# Patient Record
Sex: MALE | Race: WHITE | NOT HISPANIC OR LATINO | Employment: OTHER | URBAN - METROPOLITAN AREA
[De-identification: names, ages, dates, MRNs, and addresses within clinical notes are randomized per-mention and may not be internally consistent; named-entity substitution may affect disease eponyms.]

---

## 2015-05-14 LAB — HBA1C MFR BLD: 5.8 % (ref 4–6)

## 2017-02-16 ENCOUNTER — OFFICE VISIT (OUTPATIENT)
Dept: FAMILY MEDICINE | Facility: CLINIC | Age: 77
End: 2017-02-16
Payer: COMMERCIAL

## 2017-02-16 VITALS
DIASTOLIC BLOOD PRESSURE: 70 MMHG | BODY MASS INDEX: 34.36 KG/M2 | HEIGHT: 70 IN | SYSTOLIC BLOOD PRESSURE: 145 MMHG | TEMPERATURE: 97.8 F | WEIGHT: 240 LBS | HEART RATE: 67 BPM | OXYGEN SATURATION: 96 %

## 2017-02-16 DIAGNOSIS — I10 HTN (HYPERTENSION), BENIGN: ICD-10-CM

## 2017-02-16 DIAGNOSIS — N18.30 CKD (CHRONIC KIDNEY DISEASE) STAGE 3, GFR 30-59 ML/MIN (H): ICD-10-CM

## 2017-02-16 DIAGNOSIS — R06.2 WHEEZING: ICD-10-CM

## 2017-02-16 DIAGNOSIS — G62.9 PERIPHERAL POLYNEUROPATHY: ICD-10-CM

## 2017-02-16 DIAGNOSIS — R60.0 BILATERAL EDEMA OF LOWER EXTREMITY: Primary | ICD-10-CM

## 2017-02-16 PROCEDURE — 80048 BASIC METABOLIC PNL TOTAL CA: CPT | Performed by: PHYSICIAN ASSISTANT

## 2017-02-16 PROCEDURE — 36415 COLL VENOUS BLD VENIPUNCTURE: CPT | Performed by: PHYSICIAN ASSISTANT

## 2017-02-16 PROCEDURE — 99214 OFFICE O/P EST MOD 30 MIN: CPT | Performed by: PHYSICIAN ASSISTANT

## 2017-02-16 RX ORDER — METOPROLOL TARTRATE 50 MG
50 TABLET ORAL 3 TIMES DAILY
Qty: 90 TABLET | Refills: 1 | Status: SHIPPED | OUTPATIENT
Start: 2017-02-16 | End: 2017-05-02

## 2017-02-16 RX ORDER — GABAPENTIN 600 MG/1
300 TABLET ORAL 2 TIMES DAILY
Qty: 270 TABLET | Refills: 3 | COMMUNITY
Start: 2017-02-16 | End: 2017-03-02

## 2017-02-16 RX ORDER — ALBUTEROL SULFATE 90 UG/1
2 AEROSOL, METERED RESPIRATORY (INHALATION) EVERY 6 HOURS PRN
Qty: 1 INHALER | Refills: 1 | Status: SHIPPED | OUTPATIENT
Start: 2017-02-16 | End: 2018-01-10

## 2017-02-16 RX ORDER — DULOXETIN HYDROCHLORIDE 30 MG/1
30 CAPSULE, DELAYED RELEASE ORAL 2 TIMES DAILY
Qty: 60 CAPSULE | Refills: 3 | Status: SHIPPED | OUTPATIENT
Start: 2017-02-16 | End: 2017-03-02

## 2017-02-16 NOTE — PROGRESS NOTES
HPI: 77 yo male here with worsening of bilat ankle edema since last week  He does take neurontin but has decreased dose to 300mg bid  He is followed by Dr. Knowles for his back pain  When he decreases his dose of neurontin his burning pain   Denies any increase in the sodium in his diet.  He does have a cough but the sputum is clear  Denies fever or chills  He does feel a slight sob.  When he decreased his dose of neurontin   He is seeing a chiro who has him doing exercises for his back    Past Medical History   Diagnosis Date     Arthritis      BPH (benign prostatic hyperplasia)      CKD (chronic kidney disease) stage 3, GFR 30-59 ml/min      Cluster headache 2015     Dr. Roth     Constipation      Dyslipidemia      Dyspnea      Normal nuc stress test 9-15-08, fainted once in life     Fatty liver      Ultrasound 11/11     FH: colon cancer      colonoscopy 10/10, repeat 5 years.     Former smoker      quit 2008     HTN (hypertension), benign      Peripheral neuropathy (H)      Dr. Patricia     Spinal stenosis      Last MRI 2010, Dr. Fraire     Syncope      Type 2 diabetes mellitus (H)      diet controlled     Ureterolithiases      Past Surgical History   Procedure Laterality Date     Thumb surg       Orthopedic surgery       Keratotomy arcuate with femtosecond laser/imaging for atiol Left 8/29/2016     Procedure: KERATOTOMY ARCUATE WITH FEMTOSECOND LASER/IMAGING FOR ATIOL;  Surgeon: Gerard Larson MD;  Location: [facility]     Phacoemulsification clear cornea with standard intraocular lens implant Left 8/29/2016     Procedure: PHACOEMULSIFICATION CLEAR CORNEA WITH STANDARD INTRAOCULAR LENS IMPLANT;  Surgeon: Gerard Larson MD;  Location: [facility]     Social History   Substance Use Topics     Smoking status: Former Smoker     Packs/day: 1.00     Years: 56.00     Types: Cigarettes     Quit date: 1/1/2008     Smokeless tobacco: Never Used      Comment: quit 2008     Alcohol use No     Current Outpatient Prescriptions  "  Medication Sig Dispense Refill     gabapentin (NEURONTIN) 600 MG tablet Take 0.5 tablets (300 mg) by mouth 2 times daily 270 tablet 3     DULoxetine (CYMBALTA) 30 MG EC capsule Take 1 capsule (30 mg) by mouth 2 times daily 60 capsule 3     finasteride (PROSCAR) 5 MG tablet Take 1 tablet (5 mg) by mouth daily 90 tablet 3     omega-3 acid ethyl esters (LOVAZA) 1 G capsule Take 2 capsules (2 g) by mouth 2 times daily 360 capsule 3     aspirin-acetaminophen-caffeine (EXCEDRIN MIGRAINE) 250-250-65 MG per tablet Take 1 tablet by mouth every 6 hours as needed for headaches       fluticasone (FLONASE) 50 MCG/ACT nasal spray Spray 1-2 sprays into both nostrils every evening as needed 48 g 3     tamsulosin (FLOMAX) 0.4 MG 24 hr capsule Take 1 capsule (0.4 mg) by mouth daily 90 capsule 1     losartan (COZAAR) 100 MG tablet Take 100 mg by mouth daily        Coenzyme Q10 (CO Q 10) 100 MG CAPS Take 1 capsule by mouth daily       atorvastatin (LIPITOR) 20 MG tablet Take 20 mg by mouth every evening       Cholecalciferol (VITAMIN D3) 5000 UNITS TABS Take 1 tablet by mouth daily.       aspirin 81 MG tablet Take 1 tablet by mouth daily.       Multiple Vitamin (MULTI-VITAMIN) per tablet Take 1 tablet by mouth daily.       metoprolol (LOPRESSOR) 50 MG tablet Take 50 mg by mouth 2 times daily.       [DISCONTINUED] gabapentin (NEURONTIN) 600 MG tablet Take 1 tablet (600 mg) by mouth 2 times daily 270 tablet 3     Allergies   Allergen Reactions     Lisinopril Shortness Of Breath and Fatigue     Amoxicillin Itching     Oxycodone GI Disturbance     Penicillins Itching     Red Yeast Rice Extract [Monascus Purpureus Went Yeast] Hives and Itching     Tramadol Itching     Verapamil Itching     Vicodin [Hydrocodone-Acetaminophen] Itching     FAMILY HISTORY NOTED AND REVIEWED    PHYSICAL EXAM:    /70 (BP Location: Right arm, Patient Position: Chair, Cuff Size: Adult Regular)  Pulse 67  Temp 97.8  F (36.6  C) (Oral)  Ht 5' 10.25\" (1.784 m) "  Wt 240 lb (108.9 kg)  SpO2 96%  BMI 34.19 kg/m2    Patient appears non toxic  Lungs: bilat coarse wheezing throughout  Heart: RRR  Extr: bilat puffy edema ankles, no weeping or ulcerations    Assessment and Plan:     (R60.0) Bilateral edema of lower extremity  (primary encounter diagnosis)  Comment: avoid salt, elevate legs.  Pt notes already better on lower dose of gabapentin  Plan: Taper completely off of gabapentin.  Will have him try cymbalta for his neuropathy pain instead.      (G62.9) Peripheral polyneuropathy (H)  Comment:   Plan: Basic metabolic panel, DULoxetine (CYMBALTA) 30 MG EC        capsule        Start with QD dosing for 1-2 weeks, then increase to bid    (I10) HTN (hypertension), benign  Comment: not well controlled, inreased metoprolol from 50mg bid to TID.  Plan: metoprolol (LOPRESSOR) 50 MG tablet            (N18.3) CKD (chronic kidney disease) stage 3, GFR 30-59 ml/min  Comment:   Plan: BMP today    (R06.2) Wheezing  Comment: f/u 2 weeks for recheck of lungs, BP and to see how edema is off of gabapentin  Plan: albuterol (PROAIR HFA/PROVENTIL HFA/VENTOLIN         HFA) 108 (90 BASE) MCG/ACT Inhaler              Josephine Jacob PA-C

## 2017-02-16 NOTE — NURSING NOTE
"Chief Complaint   Patient presents with     Leg Swelling     leg and feet swelling x 10days possibly due to gabapentin       Initial /70 (BP Location: Right arm, Patient Position: Chair, Cuff Size: Adult Regular)  Pulse 67  Temp 97.8  F (36.6  C) (Oral)  Ht 5' 10.25\" (1.784 m)  Wt 240 lb (108.9 kg)  SpO2 96%  BMI 34.19 kg/m2 Estimated body mass index is 34.19 kg/(m^2) as calculated from the following:    Height as of this encounter: 5' 10.25\" (1.784 m).    Weight as of this encounter: 240 lb (108.9 kg).  Medication Reconciliation: complete     Omar Ingram, ALDAIR        "

## 2017-02-17 LAB
ANION GAP SERPL CALCULATED.3IONS-SCNC: 7 MMOL/L (ref 3–14)
BUN SERPL-MCNC: 20 MG/DL (ref 7–30)
CALCIUM SERPL-MCNC: 8.9 MG/DL (ref 8.5–10.1)
CHLORIDE SERPL-SCNC: 102 MMOL/L (ref 94–109)
CO2 SERPL-SCNC: 28 MMOL/L (ref 20–32)
CREAT SERPL-MCNC: 1.6 MG/DL (ref 0.66–1.25)
GFR SERPL CREATININE-BSD FRML MDRD: 42 ML/MIN/1.7M2
GLUCOSE SERPL-MCNC: 108 MG/DL (ref 70–99)
POTASSIUM SERPL-SCNC: 4.8 MMOL/L (ref 3.4–5.3)
SODIUM SERPL-SCNC: 137 MMOL/L (ref 133–144)

## 2017-02-17 NOTE — PROGRESS NOTES
Casey,    Your creatinine is stable and hopefully this will improve once you get off of neurontin.    Josephine Jacob PA-C

## 2017-03-02 ENCOUNTER — OFFICE VISIT (OUTPATIENT)
Dept: FAMILY MEDICINE | Facility: CLINIC | Age: 77
End: 2017-03-02
Payer: COMMERCIAL

## 2017-03-02 VITALS
OXYGEN SATURATION: 94 % | TEMPERATURE: 97.7 F | HEART RATE: 64 BPM | BODY MASS INDEX: 33.23 KG/M2 | DIASTOLIC BLOOD PRESSURE: 60 MMHG | HEIGHT: 70 IN | WEIGHT: 232.1 LBS | SYSTOLIC BLOOD PRESSURE: 128 MMHG

## 2017-03-02 DIAGNOSIS — E78.5 HYPERLIPIDEMIA WITH TARGET LDL LESS THAN 100: ICD-10-CM

## 2017-03-02 DIAGNOSIS — G62.9 PERIPHERAL POLYNEUROPATHY: Primary | ICD-10-CM

## 2017-03-02 DIAGNOSIS — N18.30 CKD (CHRONIC KIDNEY DISEASE) STAGE 3, GFR 30-59 ML/MIN (H): ICD-10-CM

## 2017-03-02 DIAGNOSIS — I10 HTN (HYPERTENSION), BENIGN: ICD-10-CM

## 2017-03-02 PROCEDURE — 99213 OFFICE O/P EST LOW 20 MIN: CPT | Performed by: PHYSICIAN ASSISTANT

## 2017-03-02 RX ORDER — OMEGA-3-ACID ETHYL ESTERS 1 G/1
2 CAPSULE, LIQUID FILLED ORAL 2 TIMES DAILY
Qty: 360 CAPSULE | Refills: 3 | Status: SHIPPED | OUTPATIENT
Start: 2017-03-02 | End: 2017-12-14

## 2017-03-02 NOTE — TELEPHONE ENCOUNTER
Prescription approved per [facility], [facility] or [facility] refill protocol.  Meli Weeks RN  [facility] [employer]

## 2017-03-02 NOTE — TELEPHONE ENCOUNTER
omega-3 acid ethyl esters (LOVAZA) 1 G capsule       Last Written Prescription Date: 1/13/2016  Last Fill Quantity: 360, # refills: 3    Last Office Visit with [facility], [facility] or [facility] prescribing provider:  3/2/2017   Future Office Visit:    Next 5 appointments (look out 90 days)     Mar 02, 2017 12:30 PM CST   [facility] Visit with Josephine Jacob PA-C   [facility]    [address] [address]   [phone]                  Cholesterol   Date Value Ref Range Status   10/12/2016 121 <200 mg/dL Final     HDL Cholesterol   Date Value Ref Range Status   10/12/2016 43 >39 mg/dL Final     LDL Cholesterol Calculated   Date Value Ref Range Status   10/12/2016 51 <100 mg/dL Final     Comment:     Desirable:       <100 mg/dl     Triglycerides   Date Value Ref Range Status   10/12/2016 134 <150 mg/dL Final     Comment:     Fasting specimen     Cholesterol/HDL Ratio   Date Value Ref Range Status   11/10/2014 2.9 0.0 - 5.0 Final     ALT   Date Value Ref Range Status   08/10/2016 21 0 - 70 U/L Final

## 2017-03-02 NOTE — PROGRESS NOTES
HPI:  75 yo male with DM and peripheral neuropathy here for f/u HTN and LE edema  See note dated 2/16/17 for more details.  He had a lot of LE edema at that time so was tapered off of neurontin  Since then this edema has completely resolved and his wt is down 8lbs.    Pt tried to take cymbalta for his neuropathy but had too many side effects even with only 30mg qd and felt lethargic so stopped this.     HTN:  He is getting good readings at home with the increased dose of metoprolol    Past Medical History   Diagnosis Date     Arthritis      BPH (benign prostatic hyperplasia)      CKD (chronic kidney disease) stage 3, GFR 30-59 ml/min      Cluster headache 2015     Dr. Roth     Constipation      Dyslipidemia      Dyspnea      Normal nuc stress test 9-15-08, fainted once in life     Fatty liver      Ultrasound 11/11     FH: colon cancer      colonoscopy 10/10, repeat 5 years.     Former smoker      quit 2008     HTN (hypertension), benign      Peripheral neuropathy (H)      Dr. Patricia     Spinal stenosis      Last MRI 2010, Dr. Fraire     Syncope      Type 2 diabetes mellitus (H)      diet controlled     Ureterolithiases      Past Surgical History   Procedure Laterality Date     Thumb surg       Orthopedic surgery       Keratotomy arcuate with femtosecond laser/imaging for atiol Left 8/29/2016     Procedure: KERATOTOMY ARCUATE WITH FEMTOSECOND LASER/IMAGING FOR ATIOL;  Surgeon: Gerard Larson MD;  Location: [facility]     Phacoemulsification clear cornea with standard intraocular lens implant Left 8/29/2016     Procedure: PHACOEMULSIFICATION CLEAR CORNEA WITH STANDARD INTRAOCULAR LENS IMPLANT;  Surgeon: Gerard Larson MD;  Location: [facility]     Social History   Substance Use Topics     Smoking status: Former Smoker     Packs/day: 1.00     Years: 56.00     Types: Cigarettes     Quit date: 1/1/2008     Smokeless tobacco: Never Used      Comment: quit 2008     Alcohol use No     Current Outpatient Prescriptions  "  Medication Sig Dispense Refill     metoprolol (LOPRESSOR) 50 MG tablet Take 1 tablet (50 mg) by mouth 3 times daily 90 tablet 1     albuterol (PROAIR HFA/PROVENTIL HFA/VENTOLIN HFA) 108 (90 BASE) MCG/ACT Inhaler Inhale 2 puffs into the lungs every 6 hours as needed for shortness of breath / dyspnea or wheezing 1 Inhaler 1     finasteride (PROSCAR) 5 MG tablet Take 1 tablet (5 mg) by mouth daily 90 tablet 3     omega-3 acid ethyl esters (LOVAZA) 1 G capsule Take 2 capsules (2 g) by mouth 2 times daily 360 capsule 3     aspirin-acetaminophen-caffeine (EXCEDRIN MIGRAINE) 250-250-65 MG per tablet Take 1 tablet by mouth every 6 hours as needed for headaches       fluticasone (FLONASE) 50 MCG/ACT nasal spray Spray 1-2 sprays into both nostrils every evening as needed 48 g 3     tamsulosin (FLOMAX) 0.4 MG 24 hr capsule Take 1 capsule (0.4 mg) by mouth daily 90 capsule 1     losartan (COZAAR) 100 MG tablet Take 100 mg by mouth daily        Coenzyme Q10 (CO Q 10) 100 MG CAPS Take 1 capsule by mouth daily       atorvastatin (LIPITOR) 20 MG tablet Take 20 mg by mouth every evening       Cholecalciferol (VITAMIN D3) 5000 UNITS TABS Take 1 tablet by mouth daily.       aspirin 81 MG tablet Take 1 tablet by mouth daily.       Multiple Vitamin (MULTI-VITAMIN) per tablet Take 1 tablet by mouth daily.       Allergies   Allergen Reactions     Lisinopril Shortness Of Breath and Fatigue     Amoxicillin Itching     Neurontin [Gabapentin]      edema     Oxycodone GI Disturbance     Penicillins Itching     Red Yeast Rice Extract [Monascus Purpureus Went Yeast] Hives and Itching     Tramadol Itching     Verapamil Itching     Vicodin [Hydrocodone-Acetaminophen] Itching     PHYSICAL EXAM:    /60 (BP Location: Left arm, Patient Position: Chair, Cuff Size: Adult Large)  Pulse 64  Temp 97.7  F (36.5  C) (Oral)  Ht 5' 10.25\" (1.784 m)  Wt 232 lb 1.6 oz (105.3 kg)  SpO2 94%  BMI 33.07 kg/m2    Patient appears non toxic  LE: no edema, this " has resolved.  Wt is down  Psych: approp affect and mood.    Assessment and Plan:     (G62.9) Peripheral polyneuropathy (H)  (primary encounter diagnosis)  Comment: he had sig LE edema due to neurontin and suspect Cr elevated due to that as well.  Plan: recheck BMP in 2 months.  He stopped cymbalta after 10d due to SE.  His peripheral neuropathy sxs not bothersome right now so no further tx for this now.  If the neuropathy recurs, he could retry cymbalta 30mg qhs and see if the SE are transient.    (I10) HTN (hypertension), benign  Comment: well controlled with higher dose of metoprolol so cont same  Plan: cont to monitor    (N18.3) CKD (chronic kidney disease) stage 3, GFR 30-59 ml/min  Comment:   Plan: repeat BMP in 2 months, but suspect Cr to improve off of the neurontin.      Josephine Jacob PA-C

## 2017-03-02 NOTE — MR AVS SNAPSHOT
"              After Visit Summary   3/2/2017    Luis Daniel Gomez    MRN: [id number]           Patient Information     Date Of Birth          1940        Visit Information        Provider Department      3/2/2017 12:30 PM Josephine Jacob PA-C [facility]        Today's Diagnoses     Peripheral polyneuropathy (H)    -  1    HTN (hypertension), benign           Follow-ups after your visit        Who to contact     If you have questions or need follow up information about today's clinic visit or your schedule please contact [facility] directly at [phone].  Normal or non-critical lab and imaging results will be communicated to you by [employer], letter or phone within 4 business days after the clinic has received the results. If you do not hear from us within 7 days, please contact the clinic through [employer] or phone. If you have a critical or abnormal lab result, we will notify you by phone as soon as possible.  Submit refill requests through [employer] or call your pharmacy and they will forward the refill request to us. Please allow 3 business days for your refill to be completed.          Additional Information About Your Visit        MyChart Information     [employer] gives you secure access to your electronic health record. If you see a primary care provider, you can also send messages to your care team and make appointments. If you have questions, please call your primary care clinic.  If you do not have a primary care provider, please call [phone] and they will assist you.        Care EveryWhere ID     This is your Care EveryWhere ID. This could be used by other organizations to access your [place] medical records  [id number]        Your Vitals Were     Pulse Temperature Height Pulse Oximetry BMI (Body Mass Index)       64 97.7  F (36.5  C) (Oral) 5' 10.25\" (1.784 m) 94% 33.07 kg/m2        Blood Pressure from Last 3 Encounters:   03/02/17 128/60   02/16/17 145/70   12/13/16 " 142/60    Weight from Last 3 Encounters:   03/02/17 232 lb 1.6 oz (105.3 kg)   02/16/17 240 lb (108.9 kg)   12/13/16 233 lb (105.7 kg)              Today, you had the following     No orders found for display         Today's Medication Changes          These changes are accurate as of: 3/2/17  1:03 PM.  If you have any questions, ask your nurse or doctor.               Stop taking these medicines if you haven't already. Please contact your care team if you have questions.     DULoxetine 30 MG EC capsule   Commonly known as:  CYMBALTA   Stopped by:  Josephine Jacob PA-C                    Primary Care Provider Office Phone # Fax #    Josephine Jacob PA-C [phone]       [facility] [address] [facility] [address]  [facility] [address]        Thank you!     Thank you for choosing [facility]  for your care. Our goal is always to provide you with excellent care. Hearing back from our patients is one way we can continue to improve our services. Please take a few minutes to complete the written survey that you may receive in the mail after your visit with us. Thank you!             Your Updated Medication List - Protect others around you: Learn how to safely use, store and throw away your medicines at www.disposemymeds.org.          This list is accurate as of: 3/2/17  1:03 PM.  Always use your most recent med list.                   Brand Name Dispense Instructions for use    albuterol 108 (90 BASE) MCG/ACT Inhaler    PROAIR HFA/PROVENTIL HFA/VENTOLIN HFA    1 Inhaler    Inhale 2 puffs into the lungs every 6 hours as needed for shortness of breath / dyspnea or wheezing       aspirin 81 MG tablet      Take 1 tablet by mouth daily.       aspirin-acetaminophen-caffeine 250-250-65 MG per tablet    EXCEDRIN MIGRAINE     Take 1 tablet by mouth every 6 hours as needed for headaches       atorvastatin 20 MG tablet    LIPITOR     Take 20 mg by mouth every evening       cholecalciferol 5000 UNITS Tabs  tablet    vitamin D3     Take 1 tablet by mouth daily.       Co Q 10 100 MG Caps      Take 1 capsule by mouth daily       finasteride 5 MG tablet    PROSCAR    90 tablet    Take 1 tablet (5 mg) by mouth daily       fluticasone 50 MCG/ACT spray    FLONASE    48 g    Spray 1-2 sprays into both nostrils every evening as needed       losartan 100 MG tablet    COZAAR     Take 100 mg by mouth daily       metoprolol 50 MG tablet    LOPRESSOR    90 tablet    Take 1 tablet (50 mg) by mouth 3 times daily       Multi-vitamin Tabs tablet   Generic drug:  multivitamin, therapeutic with minerals      Take 1 tablet by mouth daily.       omega-3 acid ethyl esters 1 G capsule    Lovaza    360 capsule    Take 2 capsules (2 g) by mouth 2 times daily       tamsulosin 0.4 MG capsule    FLOMAX    90 capsule    Take 1 capsule (0.4 mg) by mouth daily

## 2017-03-02 NOTE — NURSING NOTE
"Chief Complaint   Patient presents with     Follow Up For     Bilateral edema of lower extremity        Initial /60 (BP Location: Left arm, Patient Position: Chair, Cuff Size: Adult Large)  Pulse 64  Temp 97.7  F (36.5  C) (Oral)  Ht 5' 10.25\" (1.784 m)  Wt 232 lb 1.6 oz (105.3 kg)  SpO2 94%  BMI 33.07 kg/m2 Estimated body mass index is 33.07 kg/(m^2) as calculated from the following:    Height as of this encounter: 5' 10.25\" (1.784 m).    Weight as of this encounter: 232 lb 1.6 oz (105.3 kg).  Medication Reconciliation: complete   Gloria Guzman MA  "

## 2017-05-02 DIAGNOSIS — I10 HTN (HYPERTENSION), BENIGN: ICD-10-CM

## 2017-05-02 RX ORDER — METOPROLOL TARTRATE 50 MG
TABLET ORAL
Qty: 270 TABLET | Refills: 1 | Status: SHIPPED | OUTPATIENT
Start: 2017-05-02 | End: 2018-01-10

## 2017-05-02 NOTE — TELEPHONE ENCOUNTER
Pending Prescriptions:                       Disp   Refills    metoprolol (LOPRESSOR) 50 MG tablet [Pharm*270 ta*1        Sig: TAKE 1 TABLET BY MOUTH THREE TIMES DAILY          Last Written Prescription Date: 02/16/2017  Last Fill Quantity: 90, # refills: 1    Last Office Visit with [facility], [facility] or [facility] prescribing provider:  03/02/2017   Future Office Visit:        BP Readings from Last 3 Encounters:   03/02/17 128/60   02/16/17 145/70   12/13/16 142/60

## 2017-05-02 NOTE — TELEPHONE ENCOUNTER
Prescription approved per [facility] Refill Protocol.  Charlene Corey RN

## 2017-06-26 ENCOUNTER — TRANSFERRED RECORDS (OUTPATIENT)
Dept: HEALTH INFORMATION MANAGEMENT | Facility: CLINIC | Age: 77
End: 2017-06-26

## 2017-06-26 LAB
CHOLEST SERPL-MCNC: 114 MG/DL (ref 0–200)
CREAT SERPL-MCNC: 1.6 MG/DL (ref 0.7–1.2)
GLUCOSE SERPL-MCNC: 121 MG/DL (ref 74–106)
HBA1C MFR BLD: 5.8 % (ref 0–5.7)
POTASSIUM SERPL-SCNC: 4.9 MMOL/L (ref 3.5–5)
TRIGL SERPL-MCNC: 191 MG/DL (ref 0–150)

## 2017-07-10 ENCOUNTER — OFFICE VISIT (OUTPATIENT)
Dept: FAMILY MEDICINE | Facility: CLINIC | Age: 77
End: 2017-07-10
Payer: COMMERCIAL

## 2017-07-10 VITALS
DIASTOLIC BLOOD PRESSURE: 82 MMHG | WEIGHT: 232.3 LBS | BODY MASS INDEX: 33.26 KG/M2 | OXYGEN SATURATION: 98 % | HEART RATE: 65 BPM | TEMPERATURE: 97.6 F | SYSTOLIC BLOOD PRESSURE: 130 MMHG | HEIGHT: 70 IN

## 2017-07-10 DIAGNOSIS — J98.01 ACUTE BRONCHOSPASM: Primary | ICD-10-CM

## 2017-07-10 DIAGNOSIS — J30.2 CHRONIC SEASONAL ALLERGIC RHINITIS, UNSPECIFIED TRIGGER: ICD-10-CM

## 2017-07-10 PROCEDURE — 99213 OFFICE O/P EST LOW 20 MIN: CPT | Performed by: NURSE PRACTITIONER

## 2017-07-10 RX ORDER — ALBUTEROL SULFATE 90 UG/1
2 AEROSOL, METERED RESPIRATORY (INHALATION) EVERY 6 HOURS PRN
Qty: 1 INHALER | Refills: 1 | Status: SHIPPED | OUTPATIENT
Start: 2017-07-10 | End: 2017-09-19

## 2017-07-10 NOTE — PATIENT INSTRUCTIONS
Nasal sprays:  Flonase, nasocort, rhinocort-  2 sprays each nostril once a day  Begin claritin 10mg  (plain) once a day  Take plain mucinex 1200mg twice a day  Push fluid intake    Use your albuterol inhaler 2 inhalation every 6-8 hours

## 2017-07-10 NOTE — PROGRESS NOTES
SUBJECTIVE:                                                    Luis Daniel Gomez is a 76 year old male who presents to clinic today for the following health issues:      RESPIRATORY SYMPTOMS      Duration: for months difficulty breathing through his nose.  Using afrin but still has large amt phlegm in back of throat at night with cough that wakes him up.  All the mucous seems to be coming from the right side of his sinuses. No facial pain, no fever or chills, no chest pain, no wheezes or SOB.  Does use albuterol about once a day       Description  nasal congestion at night and cough with thick phlegm    Severity:     Accompanying signs and symptoms: None    History (predisposing factors):  none    Precipitating or alleviating factors: None    Therapies tried and outcome:   mucinex, ventolin, nasal mist    Problem list and histories reviewed & adjusted, as indicated.  Additional history: as documented    Patient Active Problem List   Diagnosis     CKD (chronic kidney disease) stage 3, GFR 30-59 ml/min     BPH (benign prostatic hyperplasia)     HTN (hypertension), benign     Advanced directives, counseling/discussion     Kidney stone     Hyperlipidemia LDL goal <130     Former smoker     Peripheral neuropathy (H)     Syncope     Paroxysmal ventricular tachycardia (H)     Vascular bruit     Hyperlipidemia with target LDL less than 100     Intractable episodic cluster headache     Type 2 diabetes mellitus with diabetic nephropathy, unspecified long term insulin use status (H)     Past Surgical History:   Procedure Laterality Date     KERATOTOMY ARCUATE WITH FEMTOSECOND LASER/IMAGING FOR ATIOL Left 8/29/2016    Procedure: KERATOTOMY ARCUATE WITH FEMTOSECOND LASER/IMAGING FOR ATIOL;  Surgeon: Gerard Larson MD;  Location: [facility]     ORTHOPEDIC SURGERY       PHACOEMULSIFICATION CLEAR CORNEA WITH STANDARD INTRAOCULAR LENS IMPLANT Left 8/29/2016    Procedure: PHACOEMULSIFICATION CLEAR CORNEA WITH STANDARD INTRAOCULAR LENS  IMPLANT;  Surgeon: Gerard Larson MD;  Location:  [facility]     Thumb surg         Social History   Substance Use Topics     Smoking status: Former Smoker     Packs/day: 1.00     Years: 56.00     Types: Cigarettes     Quit date: 1/1/2008     Smokeless tobacco: Never Used      Comment: quit 2008     Alcohol use No     Family History   Problem Relation Age of Onset     C.A.D. Father      DIABETES Father      Cancer - colorectal Mother 80     Colon Cancer Mother      Cancer - colorectal Maternal Grandmother          Current Outpatient Prescriptions   Medication Sig Dispense Refill     cyabnocobalamin (VITAMIN B-12) 2500 MCG sublingual tablet Place 2,500 mcg under the tongue daily 30 tablet      albuterol (PROAIR HFA/PROVENTIL HFA/VENTOLIN HFA) 108 (90 BASE) MCG/ACT Inhaler Inhale 2 puffs into the lungs every 6 hours as needed for shortness of breath / dyspnea or wheezing 1 Inhaler 1     metoprolol (LOPRESSOR) 50 MG tablet TAKE 1 TABLET BY MOUTH THREE TIMES DAILY 270 tablet 1     omega-3 acid ethyl esters (LOVAZA) 1 G capsule Take 2 capsules (2 g) by mouth 2 times daily 360 capsule 3     albuterol (PROAIR HFA/PROVENTIL HFA/VENTOLIN HFA) 108 (90 BASE) MCG/ACT Inhaler Inhale 2 puffs into the lungs every 6 hours as needed for shortness of breath / dyspnea or wheezing 1 Inhaler 1     finasteride (PROSCAR) 5 MG tablet Take 1 tablet (5 mg) by mouth daily 90 tablet 3     tamsulosin (FLOMAX) 0.4 MG 24 hr capsule Take 1 capsule (0.4 mg) by mouth daily 90 capsule 1     losartan (COZAAR) 100 MG tablet Take 100 mg by mouth daily        Coenzyme Q10 (CO Q 10) 100 MG CAPS Take 1 capsule by mouth daily       atorvastatin (LIPITOR) 20 MG tablet Take 20 mg by mouth every evening       Cholecalciferol (VITAMIN D3) 5000 UNITS TABS Take 1 tablet by mouth daily.       aspirin 81 MG tablet Take 1 tablet by mouth daily.       Multiple Vitamin (MULTI-VITAMIN) per tablet Take 1 tablet by mouth daily.       Allergies   Allergen Reactions      "Lisinopril Shortness Of Breath and Fatigue     Amoxicillin Itching     Neurontin [Gabapentin]      edema     Oxycodone GI Disturbance     Penicillins Itching     Red Yeast Rice Extract [[place] Purpureus Went Yeast] Hives and Itching     Tramadol Itching     Verapamil Itching     Vicodin [Hydrocodone-Acetaminophen] Itching       Reviewed and updated as needed this visit by clinical staff       Reviewed and updated as needed this visit by Provider         ROS:  Constitutional, HEENT, cardiovascular, pulmonary, gi and gu systems are negative, except as otherwise noted.    OBJECTIVE:     /82  Pulse 65  Temp 97.6  F (36.4  C) (Oral)  Ht 5' 10.25\" (1.784 m)  Wt 232 lb 4.8 oz (105.4 kg)  SpO2 98%  BMI 33.09 kg/m2  Body mass index is 33.09 kg/(m^2).  GENERAL: healthy, alert and no distress  EYES: Eyes grossly normal to inspection, PERRL and conjunctivae and sclerae normal  HENT: ear canals and TM's normal, nose and mouth without ulcers or lesions  NECK: no adenopathy, no asymmetry, masses, or scars and thyroid normal to palpation  RESP: lungs  - no rales, rhonchi , bibasilar low pitched sonorous wheeze  CV: regular rate and rhythm, normal S1 S2, no S3 or S4, no murmur, click or rub, no peripheral edema and peripheral pulses strong  MS: no gross musculoskeletal defects noted, no edema  PSYCH: mentation appears normal, affect normal/bright    Diagnostic Test Results:  none     ASSESSMENT/PLAN:       ICD-10-CM    1. Acute bronchospasm J98.01 albuterol (PROAIR HFA/PROVENTIL HFA/VENTOLIN HFA) 108 (90 BASE) MCG/ACT Inhaler   2. Chronic seasonal allergic rhinitis, unspecified trigger J30.2        Patient Instructions   Nasal sprays:  Flonase, nasocort, rhinocort-  2 sprays each nostril once a day  Begin claritin 10mg  (plain) once a day  Take plain mucinex 1200mg twice a day  Push fluid intake    Use your albuterol inhaler 2 inhalation every 6-8 hours         HARSHA Miller [facility]  "

## 2017-07-10 NOTE — MR AVS SNAPSHOT
After Visit Summary   7/10/2017    Luis Daniel Gomez    MRN: [id number]           Patient Information     Date Of Birth          1940        Visit Information        Provider Department      7/10/2017 10:30 AM Aurelia Barnes APRN CNP [facility]        Today's Diagnoses     Acute bronchospasm    -  1      Care Instructions    Nasal sprays:  Flonase, nasocort, rhinocort-  2 sprays each nostril once a day  Begin claritin 10mg  (plain) once a day  Take plain mucinex 1200mg twice a day  Push fluid intake    Use your albuterol inhaler 2 inhalation every 6-8 hours             Follow-ups after your visit        Who to contact     If you have questions or need follow up information about today's clinic visit or your schedule please contact [facility] directly at [phone].  Normal or non-critical lab and imaging results will be communicated to you by Ailolahart, letter or phone within 4 business days after the clinic has received the results. If you do not hear from us within 7 days, please contact the clinic through Ailolahart or phone. If you have a critical or abnormal lab result, we will notify you by phone as soon as possible.  Submit refill requests through [employer] or call your pharmacy and they will forward the refill request to us. Please allow 3 business days for your refill to be completed.          Additional Information About Your Visit        MyChart Information     [employer] gives you secure access to your electronic health record. If you see a primary care provider, you can also send messages to your care team and make appointments. If you have questions, please call your primary care clinic.  If you do not have a primary care provider, please call [phone] and they will assist you.        Care EveryWhere ID     This is your Care EveryWhere ID. This could be used by other organizations to access your [place] medical records  [id number]        Your Vitals Were      "Pulse Temperature Height Pulse Oximetry BMI (Body Mass Index)       65 97.6  F (36.4  C) (Oral) 5' 10.25\" (1.784 m) 98% 33.09 kg/m2        Blood Pressure from Last 3 Encounters:   07/10/17 130/82   03/02/17 128/60   02/16/17 145/70    Weight from Last 3 Encounters:   07/10/17 232 lb 4.8 oz (105.4 kg)   03/02/17 232 lb 1.6 oz (105.3 kg)   02/16/17 240 lb (108.9 kg)              Today, you had the following     No orders found for display         Today's Medication Changes          These changes are accurate as of: 7/10/17 10:58 AM.  If you have any questions, ask your nurse or doctor.               These medicines have changed or have updated prescriptions.        Dose/Directions    * albuterol 108 (90 BASE) MCG/ACT Inhaler   Commonly known as:  PROAIR HFA/PROVENTIL HFA/VENTOLIN HFA   This may have changed:  Another medication with the same name was added. Make sure you understand how and when to take each.   Used for:  Wheezing   Changed by:  Josephine Jacob PA-C        Dose:  2 puff   Inhale 2 puffs into the lungs every 6 hours as needed for shortness of breath / dyspnea or wheezing   Quantity:  1 Inhaler   Refills:  1       * albuterol 108 (90 BASE) MCG/ACT Inhaler   Commonly known as:  PROAIR HFA/PROVENTIL HFA/VENTOLIN HFA   This may have changed:  You were already taking a medication with the same name, and this prescription was added. Make sure you understand how and when to take each.   Used for:  Acute bronchospasm   Changed by:  Aurelia Barnes APRN CNP        Dose:  2 puff   Inhale 2 puffs into the lungs every 6 hours as needed for shortness of breath / dyspnea or wheezing   Quantity:  1 Inhaler   Refills:  1       * Notice:  This list has 2 medication(s) that are the same as other medications prescribed for you. Read the directions carefully, and ask your doctor or other care provider to review them with you.      Stop taking these medicines if you haven't already. Please contact your care team if you " have questions.     aspirin-acetaminophen-caffeine 250-250-65 MG per tablet   Commonly known as:  EXCEDRIN MIGRAINE   Stopped by:  Aurelia Barnes APRN CNP           fluticasone 50 MCG/ACT spray   Commonly known as:  FLONASE   Stopped by:  Aurelia Barnes APRN CNP                Where to get your medicines      These medications were sent to [facility] [address] [place] [address] & [address], [place] [address]     Phone:  [phone]     albuterol 108 (90 BASE) MCG/ACT Inhaler                Primary Care Provider Office Phone # Fax #    Josephine Jacob PA-C [phone]       [facility] [address]  [facility] [address]        Equal Access to Services     MICHAEL JIMENEZ : Hadii aad ku hadasho Sonievesali, waaxda luqadaha, qaybta kaalmada adeegyada, regla sandoval haydexter webster . So [facility] [phone].    ATENCIÓN: Si habla español, tiene a machuca disposición servicios gratuitos de asistencia lingüística. Llame al [phone].    We comply with applicable federal civil rights laws and [place] laws. We do not discriminate on the basis of race, color, national origin, age, disability sex, sexual orientation or gender identity.            Thank you!     Thank you for choosing [facility]  for your care. Our goal is always to provide you with excellent care. Hearing back from our patients is one way we can continue to improve our services. Please take a few minutes to complete the written survey that you may receive in the mail after your visit with us. Thank you!             Your Updated Medication List - Protect others around you: Learn how to safely use, store and throw away your medicines at www.disposemymeds.org.          This list is accurate as of: 7/10/17 10:58 AM.  Always use your most recent med list.                   Brand Name Dispense Instructions for use Diagnosis    * albuterol 108 (90  BASE) MCG/ACT Inhaler    PROAIR HFA/PROVENTIL HFA/VENTOLIN HFA    1 Inhaler    Inhale 2 puffs into the lungs every 6 hours as needed for shortness of breath / dyspnea or wheezing    Wheezing       * albuterol 108 (90 BASE) MCG/ACT Inhaler    PROAIR HFA/PROVENTIL HFA/VENTOLIN HFA    1 Inhaler    Inhale 2 puffs into the lungs every 6 hours as needed for shortness of breath / dyspnea or wheezing    Acute bronchospasm       aspirin 81 MG tablet      Take 1 tablet by mouth daily.        atorvastatin 20 MG tablet    LIPITOR     Take 20 mg by mouth every evening        cholecalciferol 5000 UNITS Tabs tablet    vitamin D3     Take 1 tablet by mouth daily.        Co Q 10 100 MG Caps      Take 1 capsule by mouth daily        cyabnocobalamin 2500 MCG sublingual tablet    VITAMIN B-12    30 tablet    Place 2,500 mcg under the tongue daily        finasteride 5 MG tablet    PROSCAR    90 tablet    Take 1 tablet (5 mg) by mouth daily    Benign non-nodular prostatic hyperplasia with lower urinary tract symptoms       losartan 100 MG tablet    COZAAR     Take 100 mg by mouth daily        metoprolol 50 MG tablet    LOPRESSOR    270 tablet    TAKE 1 TABLET BY MOUTH THREE TIMES DAILY    HTN (hypertension), benign       Multi-vitamin Tabs tablet   Generic drug:  multivitamin, therapeutic with minerals      Take 1 tablet by mouth daily.        omega-3 acid ethyl esters 1 G capsule    Lovaza    360 capsule    Take 2 capsules (2 g) by mouth 2 times daily    Hyperlipidemia with target LDL less than 100       tamsulosin 0.4 MG capsule    FLOMAX    90 capsule    Take 1 capsule (0.4 mg) by mouth daily    BPH (benign prostatic hyperplasia)       * Notice:  This list has 2 medication(s) that are the same as other medications prescribed for you. Read the directions carefully, and ask your doctor or other care provider to review them with you.

## 2017-07-10 NOTE — NURSING NOTE
"Chief Complaint   Patient presents with     URI       Initial /72 (BP Location: Right arm, Patient Position: Chair, Cuff Size: Adult Regular)  Pulse 65  Temp 97.6  F (36.4  C) (Oral)  Ht 5' 10.25\" (1.784 m)  Wt 232 lb 4.8 oz (105.4 kg)  SpO2 98%  BMI 33.09 kg/m2 Estimated body mass index is 33.09 kg/(m^2) as calculated from the following:    Height as of this encounter: 5' 10.25\" (1.784 m).    Weight as of this encounter: 232 lb 4.8 oz (105.4 kg).  Medication Reconciliation: complete  Bianca Davis CMA    "

## 2017-09-19 DIAGNOSIS — J98.01 ACUTE BRONCHOSPASM: ICD-10-CM

## 2017-09-19 NOTE — TELEPHONE ENCOUNTER
Ventolin       Last Written Prescription Date: 07/10/17  Last Fill Quantity: 1 inhaler, # refills: 1    Last Office Visit with G, P or [facility] prescribing provider:  07/10/17   Future Office Visit:       Date of Last Asthma Action Plan Letter:   There are no preventive care reminders to display for this patient.   Asthma Control Test: No flowsheet data found.    Date of Last Spirometry Test:   No results found for this or any previous visit.          Nighat Caballero MA

## 2017-09-20 RX ORDER — ALBUTEROL SULFATE 90 UG/1
AEROSOL, METERED RESPIRATORY (INHALATION)
Qty: 18 G | Refills: 1 | Status: SHIPPED | OUTPATIENT
Start: 2017-09-20 | End: 2017-11-06

## 2017-09-20 NOTE — TELEPHONE ENCOUNTER
Routing to PCP.  Pt was in for acute visit 7/10/17 for this.  Please advise if needing further f/u or fill as appropriate  Sandy Blunt RN

## 2017-09-27 ENCOUNTER — MYC MEDICAL ADVICE (OUTPATIENT)
Dept: FAMILY MEDICINE | Facility: CLINIC | Age: 77
End: 2017-09-27

## 2017-09-27 DIAGNOSIS — M48.061 SPINAL STENOSIS OF LUMBAR REGION: Primary | ICD-10-CM

## 2017-09-27 NOTE — TELEPHONE ENCOUNTER
See other MyChart encounter, pt would like Rx sent in for Amitriptyline at bedtime.   Tahira MADSEN RN

## 2017-10-30 ENCOUNTER — TELEPHONE (OUTPATIENT)
Dept: FAMILY MEDICINE | Facility: CLINIC | Age: 77
End: 2017-10-30

## 2017-10-30 NOTE — TELEPHONE ENCOUNTER
[facility] has been [facility] for further review of the amitriptyline.    Homer Pro, CMA

## 2017-11-02 NOTE — TELEPHONE ENCOUNTER
PA has been approved for the amitriptyline from 8/2/17 - 10/31/18. I called the pharmacy to let them know.    Homer Pro, CMA

## 2017-11-06 DIAGNOSIS — J98.01 ACUTE BRONCHOSPASM: ICD-10-CM

## 2017-11-07 ENCOUNTER — DOCUMENTATION ONLY (OUTPATIENT)
Dept: LAB | Facility: CLINIC | Age: 77
End: 2017-11-07

## 2017-11-07 DIAGNOSIS — E78.5 HYPERLIPIDEMIA WITH TARGET LDL LESS THAN 100: ICD-10-CM

## 2017-11-07 DIAGNOSIS — E11.21 TYPE 2 DIABETES MELLITUS WITH DIABETIC NEPHROPATHY, UNSPECIFIED LONG TERM INSULIN USE STATUS: ICD-10-CM

## 2017-11-07 DIAGNOSIS — Z00.00 ENCOUNTER FOR ROUTINE ADULT HEALTH EXAMINATION WITHOUT ABNORMAL FINDINGS: Primary | ICD-10-CM

## 2017-11-07 DIAGNOSIS — I10 HTN (HYPERTENSION), BENIGN: ICD-10-CM

## 2017-11-07 DIAGNOSIS — N18.30 CKD (CHRONIC KIDNEY DISEASE) STAGE 3, GFR 30-59 ML/MIN (H): ICD-10-CM

## 2017-11-07 RX ORDER — ALBUTEROL SULFATE 90 UG/1
AEROSOL, METERED RESPIRATORY (INHALATION)
Qty: 18 G | Refills: 0 | Status: SHIPPED | OUTPATIENT
Start: 2017-11-07 | End: 2017-11-30

## 2017-11-07 NOTE — PROGRESS NOTES
This patient is coming in for pre physical labs. Please review, associate diagnosis and sign the orders. Thanks!  -Lab Staff

## 2017-11-07 NOTE — TELEPHONE ENCOUNTER
Routing refill request to provider for review/approval because:  Drug not on the G refill protocol for dx of Acute bronchospasm   Shantal PEREZ RN

## 2017-12-08 DIAGNOSIS — E11.21 TYPE 2 DIABETES MELLITUS WITH DIABETIC NEPHROPATHY, UNSPECIFIED LONG TERM INSULIN USE STATUS: ICD-10-CM

## 2017-12-08 DIAGNOSIS — N40.1 BENIGN NON-NODULAR PROSTATIC HYPERPLASIA WITH LOWER URINARY TRACT SYMPTOMS: ICD-10-CM

## 2017-12-08 DIAGNOSIS — E78.5 HYPERLIPIDEMIA WITH TARGET LDL LESS THAN 100: ICD-10-CM

## 2017-12-08 DIAGNOSIS — I10 HTN (HYPERTENSION), BENIGN: ICD-10-CM

## 2017-12-08 LAB
ALBUMIN SERPL-MCNC: 3.5 G/DL (ref 3.4–5)
ALP SERPL-CCNC: 94 U/L (ref 40–150)
ALT SERPL W P-5'-P-CCNC: 33 U/L (ref 0–70)
ANION GAP SERPL CALCULATED.3IONS-SCNC: 9 MMOL/L (ref 3–14)
AST SERPL W P-5'-P-CCNC: 21 U/L (ref 0–45)
BILIRUB SERPL-MCNC: 0.4 MG/DL (ref 0.2–1.3)
BUN SERPL-MCNC: 24 MG/DL (ref 7–30)
CALCIUM SERPL-MCNC: 9 MG/DL (ref 8.5–10.1)
CHLORIDE SERPL-SCNC: 105 MMOL/L (ref 94–109)
CHOLEST SERPL-MCNC: 135 MG/DL
CO2 SERPL-SCNC: 26 MMOL/L (ref 20–32)
CREAT SERPL-MCNC: 1.73 MG/DL (ref 0.66–1.25)
CREAT UR-MCNC: 89 MG/DL
ERYTHROCYTE [DISTWIDTH] IN BLOOD BY AUTOMATED COUNT: 12.7 % (ref 10–15)
GFR SERPL CREATININE-BSD FRML MDRD: 38 ML/MIN/1.7M2
GLUCOSE SERPL-MCNC: 83 MG/DL (ref 70–99)
HBA1C MFR BLD: 5.7 % (ref 4.3–6)
HCT VFR BLD AUTO: 44.8 % (ref 40–53)
HDLC SERPL-MCNC: 40 MG/DL
HGB BLD-MCNC: 15 G/DL (ref 13.3–17.7)
LDLC SERPL CALC-MCNC: 47 MG/DL
MCH RBC QN AUTO: 32.8 PG (ref 26.5–33)
MCHC RBC AUTO-ENTMCNC: 33.5 G/DL (ref 31.5–36.5)
MCV RBC AUTO: 98 FL (ref 78–100)
MICROALBUMIN UR-MCNC: 23 MG/L
MICROALBUMIN/CREAT UR: 26.35 MG/G CR (ref 0–17)
NONHDLC SERPL-MCNC: 95 MG/DL
PLATELET # BLD AUTO: 233 10E9/L (ref 150–450)
POTASSIUM SERPL-SCNC: 4.7 MMOL/L (ref 3.4–5.3)
PROT SERPL-MCNC: 7.5 G/DL (ref 6.8–8.8)
RBC # BLD AUTO: 4.58 10E12/L (ref 4.4–5.9)
SODIUM SERPL-SCNC: 140 MMOL/L (ref 133–144)
TRIGL SERPL-MCNC: 241 MG/DL
TSH SERPL DL<=0.005 MIU/L-ACNC: 3.83 MU/L (ref 0.4–4)
WBC # BLD AUTO: 10.4 10E9/L (ref 4–11)

## 2017-12-08 PROCEDURE — 84443 ASSAY THYROID STIM HORMONE: CPT | Performed by: PHYSICIAN ASSISTANT

## 2017-12-08 PROCEDURE — 85027 COMPLETE CBC AUTOMATED: CPT | Performed by: PHYSICIAN ASSISTANT

## 2017-12-08 PROCEDURE — 82043 UR ALBUMIN QUANTITATIVE: CPT | Performed by: PHYSICIAN ASSISTANT

## 2017-12-08 PROCEDURE — 80061 LIPID PANEL: CPT | Performed by: PHYSICIAN ASSISTANT

## 2017-12-08 PROCEDURE — 80053 COMPREHEN METABOLIC PANEL: CPT | Performed by: PHYSICIAN ASSISTANT

## 2017-12-08 PROCEDURE — 83036 HEMOGLOBIN GLYCOSYLATED A1C: CPT | Performed by: PHYSICIAN ASSISTANT

## 2017-12-08 PROCEDURE — 36415 COLL VENOUS BLD VENIPUNCTURE: CPT | Performed by: PHYSICIAN ASSISTANT

## 2017-12-12 RX ORDER — FINASTERIDE 5 MG/1
TABLET, FILM COATED ORAL
Qty: 30 TABLET | Refills: 0 | Status: SHIPPED | OUTPATIENT
Start: 2017-12-12 | End: 2017-12-14

## 2017-12-12 NOTE — TELEPHONE ENCOUNTER
Medication is being filled for 1 time refill only due to:  Patient needs to be seen because due fro Px.

## 2017-12-14 ENCOUNTER — OFFICE VISIT (OUTPATIENT)
Dept: FAMILY MEDICINE | Facility: CLINIC | Age: 77
End: 2017-12-14
Payer: COMMERCIAL

## 2017-12-14 VITALS
TEMPERATURE: 98.3 F | WEIGHT: 230 LBS | SYSTOLIC BLOOD PRESSURE: 141 MMHG | OXYGEN SATURATION: 95 % | HEART RATE: 77 BPM | DIASTOLIC BLOOD PRESSURE: 68 MMHG | BODY MASS INDEX: 32.93 KG/M2 | HEIGHT: 70 IN

## 2017-12-14 DIAGNOSIS — N40.1 BENIGN NON-NODULAR PROSTATIC HYPERPLASIA WITH LOWER URINARY TRACT SYMPTOMS: ICD-10-CM

## 2017-12-14 DIAGNOSIS — I10 HTN (HYPERTENSION), BENIGN: ICD-10-CM

## 2017-12-14 DIAGNOSIS — Z13.89 SCREENING FOR DIABETIC PERIPHERAL NEUROPATHY: ICD-10-CM

## 2017-12-14 DIAGNOSIS — E78.5 HYPERLIPIDEMIA WITH TARGET LDL LESS THAN 100: ICD-10-CM

## 2017-12-14 DIAGNOSIS — E11.21 TYPE 2 DIABETES MELLITUS WITH DIABETIC NEPHROPATHY, UNSPECIFIED LONG TERM INSULIN USE STATUS: ICD-10-CM

## 2017-12-14 DIAGNOSIS — M48.061 SPINAL STENOSIS OF LUMBAR REGION, UNSPECIFIED WHETHER NEUROGENIC CLAUDICATION PRESENT: ICD-10-CM

## 2017-12-14 DIAGNOSIS — N18.30 CKD (CHRONIC KIDNEY DISEASE) STAGE 3, GFR 30-59 ML/MIN (H): ICD-10-CM

## 2017-12-14 DIAGNOSIS — Z00.00 ENCOUNTER FOR ROUTINE ADULT HEALTH EXAMINATION WITHOUT ABNORMAL FINDINGS: Primary | ICD-10-CM

## 2017-12-14 PROCEDURE — G0439 PPPS, SUBSEQ VISIT: HCPCS | Performed by: PHYSICIAN ASSISTANT

## 2017-12-14 PROCEDURE — 99207 C FOOT EXAM  NO CHARGE: CPT | Performed by: PHYSICIAN ASSISTANT

## 2017-12-14 RX ORDER — FINASTERIDE 5 MG/1
1 TABLET, FILM COATED ORAL DAILY
Qty: 90 TABLET | Refills: 3 | Status: SHIPPED | OUTPATIENT
Start: 2017-12-14 | End: 2019-01-03

## 2017-12-14 RX ORDER — AMITRIPTYLINE HYDROCHLORIDE 50 MG/1
50 TABLET ORAL AT BEDTIME
Qty: 30 TABLET | Refills: 3 | Status: SHIPPED | OUTPATIENT
Start: 2017-12-14 | End: 2017-12-14

## 2017-12-14 RX ORDER — AMLODIPINE BESYLATE 2.5 MG/1
2.5 TABLET ORAL DAILY
Qty: 30 TABLET | Refills: 3 | Status: SHIPPED | OUTPATIENT
Start: 2017-12-14 | End: 2017-12-14

## 2017-12-14 RX ORDER — OMEGA-3-ACID ETHYL ESTERS 1 G/1
2 CAPSULE, LIQUID FILLED ORAL 2 TIMES DAILY
Qty: 360 CAPSULE | Refills: 3 | Status: SHIPPED | OUTPATIENT
Start: 2017-12-14 | End: 2018-01-10

## 2017-12-14 NOTE — MR AVS SNAPSHOT
After Visit Summary   12/14/2017    Luis Daniel Gomez    MRN: [id number]           Patient Information     Date Of Birth          1940        Visit Information        Provider Department      12/14/2017 10:00 AM Josephine Jacob PA-C [facility]        Today's Diagnoses     Encounter for routine adult health examination without abnormal findings    -  1    Type 2 diabetes mellitus with diabetic nephropathy, unspecified long term insulin use status (H)        CKD (chronic kidney disease) stage 3, GFR 30-59 ml/min        HTN (hypertension), benign        Hyperlipidemia with target LDL less than 100        Screening for diabetic peripheral neuropathy        Spinal stenosis of lumbar region, unspecified whether neurogenic claudication present        Benign non-nodular prostatic hyperplasia with lower urinary tract symptoms          Care Instructions      Preventive Health Recommendations:       Male Ages 65 and over    Yearly exam:             See your health care provider every year in order to  o   Review health changes.   o   Discuss preventive care.    o   Review your medicines if your doctor has prescribed any.    Talk with your health care provider about whether you should have a test to screen for prostate cancer (PSA).    Every 3 years, have a diabetes test (fasting glucose). If you are at risk for diabetes, you should have this test more often.    Every 5 years, have a cholesterol test. Have this test more often if you are at risk for high cholesterol or heart disease.     Every 10 years, have a colonoscopy. Or, have a yearly FIT test (stool test). These exams will check for colon cancer.    Talk to with your health care provider about screening for Abdominal Aortic Aneurysm if you have a family history of AAA or have a history of smoking.  Shots:     Get a flu shot each year.     Get a tetanus shot every 10 years.     Talk to your doctor about your pneumonia vaccines. There are  now two you should receive - Pneumovax (PPSV 23) and Prevnar (PCV 13).    Talk to your doctor about a shingles vaccine.     Talk to your doctor about the hepatitis B vaccine.  Nutrition:     Eat at least 5 servings of fruits and vegetables each day.     Eat whole-grain bread, whole-wheat pasta and brown rice instead of white grains and rice.     Talk to your doctor about Calcium and Vitamin D.   Lifestyle    Exercise for at least 150 minutes a week (30 minutes a day, 5 days a week). This will help you control your weight and prevent disease.     Limit alcohol to one drink per day.     No smoking.     Wear sunscreen to prevent skin cancer.     See your dentist every six months for an exam and cleaning.     See your eye doctor every 1 to 2 years to screen for conditions such as glaucoma, macular degeneration and cataracts.          Follow-ups after your visit        Additional Services     ORTHOPEDICS ADULT REFERRAL       Your provider has referred you to: [facility]  [place] [phone]   [email or web address]    Please be aware that coverage of these services is subject to the terms and limitations of your health insurance plan.  Call member services at your health plan with any benefit or coverage questions.      Please bring the following to your appointment:    >>   Any x-rays, CTs or MRIs which have been performed.  Contact the facility where they were done to arrange for  prior to your scheduled appointment.    >>   List of current medications   >>   This referral request   >>   Any documents/labs given to you for this referral                  Who to contact     If you have questions or need follow up information about today's clinic visit or your schedule please contact [facility] directly at [phone].  Normal or non-critical lab and imaging results will be communicated to you by MyChart, letter or phone within 4 business days after the clinic has received  "the results. If you do not hear from us within 7 days, please contact the clinic through [facility] or phone. If you have a critical or abnormal lab result, we will notify you by phone as soon as possible.  Submit refill requests through [facility] or call your pharmacy and they will forward the refill request to us. Please allow 3 business days for your refill to be completed.          Additional Information About Your Visit        [employer] Information     [employer] gives you secure access to your electronic health record. If you see a primary care provider, you can also send messages to your care team and make appointments. If you have questions, please call your primary care clinic.  If you do not have a primary care provider, please call [phone] and they will assist you.        Care EveryWhere ID     This is your Care EveryWhere ID. This could be used by other organizations to access your [place] medical records  [id number]        Your Vitals Were     Pulse Temperature Height Pulse Oximetry BMI (Body Mass Index)       77 98.3  F (36.8  C) 5' 10.25\" (1.784 m) 95% 32.77 kg/m2        Blood Pressure from Last 3 Encounters:   12/14/17 141/68   07/10/17 130/82   03/02/17 128/60    Weight from Last 3 Encounters:   12/14/17 230 lb (104.3 kg)   07/10/17 232 lb 4.8 oz (105.4 kg)   03/02/17 232 lb 1.6 oz (105.3 kg)              We Performed the Following     FOOT EXAM  NO CHARGE [90486.114]     ORTHOPEDICS ADULT REFERRAL          Today's Medication Changes          These changes are accurate as of: 12/14/17 10:21 AM.  If you have any questions, ask your nurse or doctor.               These medicines have changed or have updated prescriptions.        Dose/Directions    * amitriptyline 25 MG tablet   Commonly known as:  ELAVIL   This may have changed:  Another medication with the same name was added. Make sure you understand how and when to take each.   Used for:  Spinal stenosis of lumbar region   Changed by:  Josephine Jacob " SOLANGE Wu        Dose:  25 mg   Take 1 tablet (25 mg) by mouth At Bedtime   Quantity:  30 tablet   Refills:  6       * amitriptyline 50 MG tablet   Commonly known as:  ELAVIL   This may have changed:  You were already taking a medication with the same name, and this prescription was added. Make sure you understand how and when to take each.   Used for:  Spinal stenosis of lumbar region, unspecified whether neurogenic claudication present   Changed by:  Josephine Jacob PA-C        Dose:  50 mg   Take 1 tablet (50 mg) by mouth At Bedtime   Quantity:  30 tablet   Refills:  3       finasteride 5 MG tablet   Commonly known as:  PROSCAR   This may have changed:  See the new instructions.   Used for:  Benign non-nodular prostatic hyperplasia with lower urinary tract symptoms   Changed by:  Josephine Jacob PA-C        Dose:  1 tablet   Take 1 tablet (5 mg) by mouth daily   Quantity:  90 tablet   Refills:  3       * Notice:  This list has 2 medication(s) that are the same as other medications prescribed for you. Read the directions carefully, and ask your doctor or other care provider to review them with you.         Where to get your medicines      These medications were sent to [facility] - [address]  [address] [facility] [address]    Hours:  Mon-Fri 8:30am-5:00pm Toll Free [phone] Phone:  [phone]     finasteride 5 MG tablet    omega-3 acid ethyl esters 1 G capsule         These medications were sent to [facility] [address] [place] [address] & [address], [place] [address]     Phone:  [phone]     amitriptyline 50 MG tablet                Primary Care Provider Office Phone # Fax #    Josephine Jacob PA-C [phone] [address] [address]        Equal Access to Services     MICHAEL JIMENEZ AH: reji Weinstein,  regla flanneryaan ah. So [facility] [phone].    ATENCIÓN: Si mauricio scott, tiene a machuca disposición servicios gratuitos de asistencia lingüística. Dagoberto al [phone].    We comply with applicable federal civil rights laws and [place] laws. We do not discriminate on the basis of race, color, national origin, age, disability, sex, sexual orientation, or gender identity.            Thank you!     Thank you for choosing [facility]  for your care. Our goal is always to provide you with excellent care. Hearing back from our patients is one way we can continue to improve our services. Please take a few minutes to complete the written survey that you may receive in the mail after your visit with us. Thank you!             Your Updated Medication List - Protect others around you: Learn how to safely use, store and throw away your medicines at www.disposemymeds.org.          This list is accurate as of: 12/14/17 10:21 AM.  Always use your most recent med list.                   Brand Name Dispense Instructions for use Diagnosis    * albuterol 108 (90 BASE) MCG/ACT Inhaler    PROAIR HFA/PROVENTIL HFA/VENTOLIN HFA    1 Inhaler    Inhale 2 puffs into the lungs every 6 hours as needed for shortness of breath / dyspnea or wheezing    Wheezing       * VENTOLIN  (90 BASE) MCG/ACT Inhaler   Generic drug:  albuterol     18 g    INHALE 2 PUFFS INTO THE LUNGS EVERY 6 HOURS AS NEEDED FOR SHORTNESS OF BREATH OR DIFFICULT BREATHING OR WHEEZING    Acute bronchospasm       * amitriptyline 25 MG tablet    ELAVIL    30 tablet    Take 1 tablet (25 mg) by mouth At Bedtime    Spinal stenosis of lumbar region       * amitriptyline 50 MG tablet    ELAVIL    30 tablet    Take 1 tablet (50 mg) by mouth At Bedtime    Spinal stenosis of lumbar region, unspecified whether neurogenic claudication present       aspirin 81 MG tablet      Take 1 tablet by mouth daily.        atorvastatin  20 MG tablet    LIPITOR     Take 20 mg by mouth every evening        cholecalciferol 5000 UNITS Tabs tablet    vitamin D3     Take 1 tablet by mouth daily.        Co Q 10 100 MG Caps      Take 1 capsule by mouth daily        cyabnocobalamin 2500 MCG sublingual tablet    VITAMIN B-12    30 tablet    Place 3,000 mcg under the tongue daily        finasteride 5 MG tablet    PROSCAR    90 tablet    Take 1 tablet (5 mg) by mouth daily    Benign non-nodular prostatic hyperplasia with lower urinary tract symptoms       losartan 100 MG tablet    COZAAR     Take 100 mg by mouth daily        metoprolol 50 MG tablet    LOPRESSOR    270 tablet    TAKE 1 TABLET BY MOUTH THREE TIMES DAILY    HTN (hypertension), benign       Multi-vitamin Tabs tablet   Generic drug:  multivitamin, therapeutic with minerals      Take 1 tablet by mouth daily.        omega-3 acid ethyl esters 1 G capsule    Lovaza    360 capsule    Take 2 capsules (2 g) by mouth 2 times daily    Hyperlipidemia with target LDL less than 100       tamsulosin 0.4 MG capsule    FLOMAX    90 capsule    Take 1 capsule (0.4 mg) by mouth daily    BPH (benign prostatic hyperplasia)       * Notice:  This list has 4 medication(s) that are the same as other medications prescribed for you. Read the directions carefully, and ask your doctor or other care provider to review them with you.

## 2017-12-14 NOTE — PROGRESS NOTES
SUBJECTIVE:   Luis Daniel Gomez is a 76 year old male who presents for Preventive Visit.  Are you in the first 12 months of your Medicare Part B coverage?  No    Pt has lumbar spinal stenosis and can only walk one block before sitting to rest.  He has numbness in both feet but the burning isn't as bad  He has tried oxycodone and ultram which both made him itch  He tried elavil 25mg qhs but didn't notice any improvement in his back pain  He has CKD so avoids nsaids.  He takes some tylenol without relief.    He has hx of kidney stones and was in ED and needed morphine      Healthy Habits:    Do you get at least three servings of calcium containing foods daily (dairy, green leafy vegetables, etc.)? yes    Amount of exercise or daily activities, outside of work: 7 day(s) per week    Problems taking medications regularly No    Medication side effects: No    Have you had an eye exam in the past two years? yes    Do you see a dentist twice per year? yes    Do you have sleep apnea, excessive snoring or daytime drowsiness?no      Ability to successfully perform activities of daily living: Yes, no assistance needed    Home safety:  none identified     Hearing impairment: No    Fall risk:  Fallen 2 or more times in the past year?: No  Any fall with injury in the past year?: No        COGNITIVE SCREEN  1) Repeat 3 items (Banana, Sunrise, Chair)    2) Clock draw: NORMAL  3) 3 item recall: Recalls 3 objects  Results: 3 items recalled: COGNITIVE IMPAIRMENT LESS LIKELY    Mini-CogTM Copyright NILESH Dickey. Licensed by the author for use in [facility]; reprinted with permission ([email or web address]). All rights reserved.        Tobacco  Allergies  Meds  Med Hx  Surg Hx  Fam Hx         Reviewed and updated as needed this visit by Provider  Allergies  Meds  Med Hx  Surg Hx  Fam Hx        Social History   Substance Use Topics     Smoking status: Former Smoker     Packs/day: 1.00     Years: 56.00     Types: Cigarettes      Quit date: 1/1/2008     Smokeless tobacco: Never Used      Comment: quit 2008     Alcohol use No       If you drink alcohol do you typically have >3 drinks per day or >7 drinks per week? No                        Today's PHQ-2 Score:   PHQ-2 ( 1999 Pfizer) 12/14/2017 3/2/2017   Q1: Little interest or pleasure in doing things 0 0   Q2: Feeling down, depressed or hopeless 0 0   PHQ-2 Score 0 0         Do you feel safe in your environment - Yes    Do you have a Health Care Directive?: Yes: Patient states has Advance Directive and will bring in a copy to clinic.    Current providers sharing in care for this patient include:   Patient Care Team:  Josephine Jacob PA-C as PCP - General (Internal Medicine)    Past Medical History:   Diagnosis Date     Arthritis      BPH (benign prostatic hyperplasia)      CKD (chronic kidney disease) stage 3, GFR 30-59 ml/min      Cluster headache 2015    Dr. Roth     Constipation      Dyslipidemia      Dyspnea     Normal nuc stress test 9-15-08, fainted once in life     Fatty liver     Ultrasound 11/11     FH: colon cancer     colonoscopy 10/10, repeat 5 years.     Former smoker     quit 2008     HTN (hypertension), benign      Peripheral neuropathy     Dr. Patricia     Spinal stenosis     Last MRI 2010, Dr. Fraire     Syncope      Type 2 diabetes mellitus (H)     diet controlled     Ureterolithiases      Past Surgical History:   Procedure Laterality Date     KERATOTOMY ARCUATE WITH FEMTOSECOND LASER/IMAGING FOR ATIOL Left 8/29/2016    Procedure: KERATOTOMY ARCUATE WITH FEMTOSECOND LASER/IMAGING FOR ATIOL;  Surgeon: Gerard Larson MD;  Location: [facility]     ORTHOPEDIC SURGERY       PHACOEMULSIFICATION CLEAR CORNEA WITH STANDARD INTRAOCULAR LENS IMPLANT Left 8/29/2016    Procedure: PHACOEMULSIFICATION CLEAR CORNEA WITH STANDARD INTRAOCULAR LENS IMPLANT;  Surgeon: Gerard Larson MD;  Location: [facility]     Thumb surg       Current Outpatient Prescriptions   Medication Sig Dispense Refill      "amitriptyline (ELAVIL) 50 MG tablet Take 1 tablet (50 mg) by mouth At Bedtime 30 tablet 3     finasteride (PROSCAR) 5 MG tablet Take 1 tablet (5 mg) by mouth daily 90 tablet 3     omega-3 acid ethyl esters (LOVAZA) 1 G capsule Take 2 capsules (2 g) by mouth 2 times daily 360 capsule 3     amLODIPine (NORVASC) 2.5 MG tablet Take 1 tablet (2.5 mg) by mouth daily 30 tablet 3     VENTOLIN  (90 BASE) MCG/ACT Inhaler INHALE 2 PUFFS INTO THE LUNGS EVERY 6 HOURS AS NEEDED FOR SHORTNESS OF BREATH OR DIFFICULT BREATHING OR WHEEZING 18 g 1     cyabnocobalamin (VITAMIN B-12) 2500 MCG sublingual tablet Place 3,000 mcg under the tongue daily  30 tablet      metoprolol (LOPRESSOR) 50 MG tablet TAKE 1 TABLET BY MOUTH THREE TIMES DAILY 270 tablet 1     albuterol (PROAIR HFA/PROVENTIL HFA/VENTOLIN HFA) 108 (90 BASE) MCG/ACT Inhaler Inhale 2 puffs into the lungs every 6 hours as needed for shortness of breath / dyspnea or wheezing 1 Inhaler 1     tamsulosin (FLOMAX) 0.4 MG 24 hr capsule Take 1 capsule (0.4 mg) by mouth daily 90 capsule 1     losartan (COZAAR) 100 MG tablet Take 100 mg by mouth daily        Coenzyme Q10 (CO Q 10) 100 MG CAPS Take 1 capsule by mouth daily       atorvastatin (LIPITOR) 20 MG tablet Take 20 mg by mouth every evening       Cholecalciferol (VITAMIN D3) 5000 UNITS TABS Take 1 tablet by mouth daily.       aspirin 81 MG tablet Take 1 tablet by mouth daily.       Multiple Vitamin (MULTI-VITAMIN) per tablet Take 1 tablet by mouth daily.       [DISCONTINUED] amitriptyline (ELAVIL) 25 MG tablet Take 1 tablet (25 mg) by mouth At Bedtime 30 tablet 6       ROS:  Constitutional, HEENT, cardiovascular, pulmonary, GI, , musculoskeletal, neuro, skin, endocrine and psych systems are negative, except as otherwise noted.      OBJECTIVE:   /68 (BP Location: Right arm, Cuff Size: Adult Large)  Pulse 77  Temp 98.3  F (36.8  C)  Ht 5' 10.25\" (1.784 m)  Wt 230 lb (104.3 kg)  SpO2 95%  BMI 32.77 kg/m2 " "Estimated body mass index is 32.77 kg/(m^2) as calculated from the following:    Height as of this encounter: 5' 10.25\" (1.784 m).    Weight as of this encounter: 230 lb (104.3 kg).  EXAM:   GENERAL: healthy, alert and no distress  EYES: Eyes grossly normal to inspection, PERRL and conjunctivae and sclerae normal  HENT: ear canals and TM's normal, nose and mouth without ulcers or lesions  NECK: no adenopathy, no asymmetry, masses, or scars and thyroid normal to palpation  RESP: lungs clear to auscultation - no rales, rhonchi or wheezes  CV: regular rate and rhythm, normal S1 S2, no S3 or S4, no murmur, click or rub, no peripheral edema and peripheral pulses strong  ABDOMEN: soft, nontender, no hepatosplenomegaly, no masses and bowel sounds normal  MS: no gross musculoskeletal defects noted, no edema  SKIN: no suspicious lesions or rashes  NEURO: Normal strength and tone, mentation intact and speech normal  PSYCH: mentation appears normal, affect normal/bright    Results for orders placed or performed in visit on 12/08/17   Hemoglobin A1c   Result Value Ref Range    Hemoglobin A1C 5.7 4.3 - 6.0 %   TSH with free T4 reflex   Result Value Ref Range    TSH 3.83 0.40 - 4.00 mU/L   Albumin Random Urine Quantitative with Creat Ratio   Result Value Ref Range    Creatinine Urine 89 mg/dL    Albumin Urine mg/L 23 mg/L    Albumin Urine mg/g Cr 26.35 (H) 0 - 17 mg/g Cr   Comprehensive metabolic panel   Result Value Ref Range    Sodium 140 133 - 144 mmol/L    Potassium 4.7 3.4 - 5.3 mmol/L    Chloride 105 94 - 109 mmol/L    Carbon Dioxide 26 20 - 32 mmol/L    Anion Gap 9 3 - 14 mmol/L    Glucose 83 70 - 99 mg/dL    Urea Nitrogen 24 7 - 30 mg/dL    Creatinine 1.73 (H) 0.66 - 1.25 mg/dL    GFR Estimate 38 (L) >60 mL/min/1.7m2    GFR Estimate If Black 46 (L) >60 mL/min/1.7m2    Calcium 9.0 8.5 - 10.1 mg/dL    Bilirubin Total 0.4 0.2 - 1.3 mg/dL    Albumin 3.5 3.4 - 5.0 g/dL    Protein Total 7.5 6.8 - 8.8 g/dL    Alkaline " Phosphatase 94 40 - 150 U/L    ALT 33 0 - 70 U/L    AST 21 0 - 45 U/L   CBC with platelets   Result Value Ref Range    WBC 10.4 4.0 - 11.0 10e9/L    RBC Count 4.58 4.4 - 5.9 10e12/L    Hemoglobin 15.0 13.3 - 17.7 g/dL    Hematocrit 44.8 40.0 - 53.0 %    MCV 98 78 - 100 fl    MCH 32.8 26.5 - 33.0 pg    MCHC 33.5 31.5 - 36.5 g/dL    RDW 12.7 10.0 - 15.0 %    Platelet Count 233 150 - 450 10e9/L   Lipid Profile   Result Value Ref Range    Cholesterol 135 <200 mg/dL    Triglycerides 241 (H) <150 mg/dL    HDL Cholesterol 40 >39 mg/dL    LDL Cholesterol Calculated 47 <100 mg/dL    Non HDL Cholesterol 95 <130 mg/dL         ASSESSMENT / PLAN:   Assessment and Plan:     (Z00.00) Encounter for routine adult health examination without abnormal findings  (primary encounter diagnosis)  Comment:   Plan: reviewed previsit labs with pt and copy given.    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  Fu one month    (I10) HTN (hypertension), benign  Comment: not well controlled  Plan: add amLODIPine (NORVASC) 2.5 MG tablet        Qd. F/u with readings one month    (E78.5) Hyperlipidemia with target LDL less than 100  Comment:   Plan: omega-3 acid ethyl esters (LOVAZA) 1 G capsule        refilled    (Z13.89) Screening for diabetic peripheral neuropathy  Comment:   Plan: FOOT EXAM  NO CHARGE [30241.114]            (M48.061) Spinal stenosis of lumbar region, unspecified whether neurogenic claudication present  Comment:   Plan: ORTHOPEDICS ADULT REFERRAL, amitriptyline         (ELAVIL) 50 MG tablet        See either Dr. Lindo or Jerardo as very inactive now due to pain    (N40.1) Benign non-nodular prostatic hyperplasia with lower urinary tract symptoms  Comment:   Plan:  "finasteride (PROSCAR) 5 MG tablet        refilled        End of Life Planning:  Patient currently has an advanced directive: Yes.  Practitioner is supportive of decision.    COUNSELING:  Reviewed preventive health counseling, as reflected in patient instructions        Estimated body mass index is 32.77 kg/(m^2) as calculated from the following:    Height as of this encounter: 5' 10.25\" (1.784 m).    Weight as of this encounter: 230 lb (104.3 kg).       reports that he quit smoking about 9 years ago. His smoking use included Cigarettes. He has a 56.00 pack-year smoking history. He has never used smokeless tobacco.        Appropriate preventive services were discussed with this patient, including applicable screening as appropriate for cardiovascular disease, diabetes, osteopenia/osteoporosis, and glaucoma.  As appropriate for age/gender, discussed screening for colorectal cancer, prostate cancer, breast cancer, and cervical cancer. Checklist reviewing preventive services available has been given to the patient.    Reviewed patients plan of care and provided an AVS. The Basic Care Plan (routine screening as documented in Health Maintenance) for Casey meets the Care Plan requirement. This Care Plan has been established and reviewed with the Patient.    Counseling Resources:  ATP IV Guidelines  Pooled Cohorts Equation Calculator  Breast Cancer Risk Calculator  FRAX Risk Assessment  ICSI Preventive Guidelines  Dietary Guidelines for Americans, 2010  USDA's MyPlate  ASA Prophylaxis  Lung CA Screening    Josephine Jacob PA-C  [facility]  "

## 2017-12-14 NOTE — NURSING NOTE
"Chief Complaint   Patient presents with     Wellness Visit       Initial /68 (BP Location: Right arm, Cuff Size: Adult Large)  Pulse 77  Temp 98.3  F (36.8  C)  Ht 5' 10.25\" (1.784 m)  Wt 230 lb (104.3 kg)  SpO2 95%  BMI 32.77 kg/m2 Estimated body mass index is 32.77 kg/(m^2) as calculated from the following:    Height as of this encounter: 5' 10.25\" (1.784 m).    Weight as of this encounter: 230 lb (104.3 kg).  Medication Reconciliation: complete   Alivia Mitchell MA      "

## 2018-01-09 ENCOUNTER — TELEPHONE (OUTPATIENT)
Dept: FAMILY MEDICINE | Facility: CLINIC | Age: 78
End: 2018-01-09

## 2018-01-09 NOTE — TELEPHONE ENCOUNTER
"Fax from [facility]  Request for \"refill\" of Virtussin AC Syrup, #120mL, SIG take 5mL by mouth every 4 hours PRN for cough    Needs triage    Medication not active in chart    LOV 12-14-17 Nidia Gamino, RT (R)    "

## 2018-01-09 NOTE — TELEPHONE ENCOUNTER
Call back to patient and wife came on the phone at patient's request, she states [facility] made a mistake and they did not request a refill of the cough medicine from last year.  They are not ill with any respiratory illness at this time.  I addressed the needed follow up appointment since newly starting Norvasc and Casey has noticed a new swelling of both feet the last few days.  Appointment made to see Josephine Jacob tomorrow to follow up.    Tiny Benavidez RN    FYI to Josephine

## 2018-01-10 ENCOUNTER — OFFICE VISIT (OUTPATIENT)
Dept: FAMILY MEDICINE | Facility: CLINIC | Age: 78
End: 2018-01-10
Payer: COMMERCIAL

## 2018-01-10 VITALS
HEART RATE: 73 BPM | WEIGHT: 238 LBS | TEMPERATURE: 97.6 F | SYSTOLIC BLOOD PRESSURE: 148 MMHG | BODY MASS INDEX: 34.07 KG/M2 | HEIGHT: 70 IN | DIASTOLIC BLOOD PRESSURE: 65 MMHG | OXYGEN SATURATION: 95 %

## 2018-01-10 DIAGNOSIS — I10 HTN (HYPERTENSION), BENIGN: ICD-10-CM

## 2018-01-10 DIAGNOSIS — R60.0 BILATERAL LOWER EXTREMITY EDEMA: ICD-10-CM

## 2018-01-10 DIAGNOSIS — I10 HTN (HYPERTENSION), BENIGN: Primary | ICD-10-CM

## 2018-01-10 DIAGNOSIS — N18.30 CKD (CHRONIC KIDNEY DISEASE) STAGE 3, GFR 30-59 ML/MIN (H): ICD-10-CM

## 2018-01-10 PROCEDURE — 99213 OFFICE O/P EST LOW 20 MIN: CPT | Performed by: PHYSICIAN ASSISTANT

## 2018-01-10 RX ORDER — METOPROLOL TARTRATE 50 MG
50 TABLET ORAL 2 TIMES DAILY
Qty: 270 TABLET | Refills: 1 | COMMUNITY
End: 2019-12-18

## 2018-01-10 RX ORDER — LORATADINE 10 MG/1
10 TABLET ORAL DAILY
Qty: 30 TABLET | Refills: 1 | COMMUNITY
End: 2023-10-03

## 2018-01-10 RX ORDER — VALSARTAN 320 MG/1
320 TABLET ORAL DAILY
Qty: 30 TABLET | Refills: 3 | Status: SHIPPED | OUTPATIENT
Start: 2018-01-10 | End: 2018-01-10

## 2018-01-10 RX ORDER — OMEGA-3-ACID ETHYL ESTERS 1 G/1
2 CAPSULE, LIQUID FILLED ORAL DAILY
Qty: 360 CAPSULE | Refills: 3 | COMMUNITY
End: 2019-05-17

## 2018-01-10 RX ORDER — GUAIFENESIN 600 MG/1
1200 TABLET, EXTENDED RELEASE ORAL DAILY
Qty: 40 TABLET | Refills: 0 | COMMUNITY
End: 2019-12-18

## 2018-01-10 NOTE — MR AVS SNAPSHOT
After Visit Summary   1/10/2018    Luis Daniel Gomez    MRN: [id number]           Patient Information     Date Of Birth          1940        Visit Information        Provider Department      1/10/2018 12:00 PM Josephine Jacob PA-C [facility]        Today's Diagnoses     HTN (hypertension), benign    -  1    Bilateral lower extremity edema        CKD (chronic kidney disease) stage 3, GFR 30-59 ml/min          Care Instructions    Stop the amlodipine (norvasc) because it is causing feet swelling    Stop losartan (Cozaar) because it alone is not controlling your blood pressure and Diovan may work better.          Follow-ups after your visit        Your next 10 appointments already scheduled     Daniel 10, 2018 12:00 PM CST   [facility] Visit with Josephine Jacob PA-C   [facility]    [address] [facility] [id number] [phone]           Bring a current list of meds and any records pertaining to this visit. For Physicals, please bring immunization records and any forms needing to be filled out. Please arrive 10 minutes early to complete paperwork.              Who to contact     If you have questions or need follow up information about today's clinic visit or your schedule please contact [facility] directly at [phone].  Normal or non-critical lab and imaging results will be communicated to you by MyChart, letter or phone within 4 business days after the clinic has received the results. If you do not hear from us within 7 days, please contact the clinic through [employer] or phone. If you have a critical or abnormal lab result, we will notify you by phone as soon as possible.  Submit refill requests through [employer] or call your pharmacy and they will forward the refill request to us. Please allow 3 business days for your refill to be completed.          Additional Information About Your Visit        MyChart Information     [employer]  "gives you secure access to your electronic health record. If you see a primary care provider, you can also send messages to your care team and make appointments. If you have questions, please call your primary care clinic.  If you do not have a primary care provider, please call [phone] and they will assist you.        Care EveryWhere ID     This is your Care EveryWhere ID. This could be used by other organizations to access your [facility] medical records  [id number]        Your Vitals Were     Pulse Temperature Height Pulse Oximetry BMI (Body Mass Index)       73 97.6  F (36.4  C) (Oral) 5' 10.25\" (1.784 m) 95% 33.91 kg/m2        Blood Pressure from Last 3 Encounters:   01/10/18 155/71   12/14/17 141/68   07/10/17 130/82    Weight from Last 3 Encounters:   01/10/18 238 lb (108 kg)   12/14/17 230 lb (104.3 kg)   07/10/17 232 lb 4.8 oz (105.4 kg)              We Performed the Following     Creatinine          Today's Medication Changes          These changes are accurate as of: 1/10/18 11:48 AM.  If you have any questions, ask your nurse or doctor.               Start taking these medicines.        Dose/Directions    valsartan 320 MG tablet   Commonly known as:  DIOVAN   Used for:  HTN (hypertension), benign   Started by:  Josephine Jacob PA-C        Dose:  320 mg   Take 1 tablet (320 mg) by mouth daily   Quantity:  30 tablet   Refills:  3         These medicines have changed or have updated prescriptions.        Dose/Directions    metoprolol 50 MG tablet   Commonly known as:  LOPRESSOR   This may have changed:  See the new instructions.   Used for:  HTN (hypertension), benign   Changed by:  Josephine Jacob PA-C        Dose:  50 mg   Take 1 tablet (50 mg) by mouth 2 times daily   Quantity:  270 tablet   Refills:  1       omega-3 acid ethyl esters 1 G capsule   Commonly known as:  Lovaza   This may have changed:  when to take this   Changed by:  Josephine Jacob PA-C        Dose:  2 g   Take 2 capsules (2 " g) by mouth daily   Quantity:  360 capsule   Refills:  3         Stop taking these medicines if you haven't already. Please contact your care team if you have questions.     albuterol 108 (90 BASE) MCG/ACT Inhaler   Commonly known as:  PROAIR HFA/PROVENTIL HFA/VENTOLIN HFA   Stopped by:  Josephine Jacob PA-C           amLODIPine 2.5 MG tablet   Commonly known as:  NORVASC   Stopped by:  Josephine Jacob PA-C           losartan 100 MG tablet   Commonly known as:  COZAAR   Stopped by:  Josephine Jacob PA-C           VENTOLIN  (90 BASE) MCG/ACT Inhaler   Generic drug:  albuterol   Stopped by:  Josephine Jacob PA-C                Where to get your medicines      These medications were sent to [facility] [address] [place] [address] & [address], [place] [address]     Phone:  [phone]     valsartan 320 MG tablet                Primary Care Provider Office Phone # Fax #    Josephine Jacob PA-C [phone] [address] [address]        Equal Access to Services     MICHAEL JIMENEZ AH: Hadii aad ku hadasho Soomaali, waaxda luqadaha, qaybta kaalmada adeegyada, waxay idiin haydexter webster . So [facility] [phone].    ATENCIÓN: Si habla español, tiene a machuca disposición servicios gratuitos de asistencia lingüística. Llame al [phone].    We comply with applicable federal civil rights laws and [place] laws. We do not discriminate on the basis of race, color, national origin, age, disability, sex, sexual orientation, or gender identity.            Thank you!     Thank you for choosing [facility]  for your care. Our goal is always to provide you with excellent care. Hearing back from our patients is one way we can continue to improve our services. Please take a few minutes to complete the written survey that you may receive in the mail after your visit with us. Thank you!             Your  Updated Medication List - Protect others around you: Learn how to safely use, store and throw away your medicines at www.disposemymeds.org.          This list is accurate as of: 1/10/18 11:48 AM.  Always use your most recent med list.                   Brand Name Dispense Instructions for use Diagnosis    amitriptyline 50 MG tablet    ELAVIL    90 tablet    TAKE 1 TABLET(50 MG) BY MOUTH AT BEDTIME    Spinal stenosis of lumbar region, unspecified whether neurogenic claudication present       aspirin 81 MG tablet      Take 1 tablet by mouth daily.        atorvastatin 20 MG tablet    LIPITOR     Take 20 mg by mouth every evening        cholecalciferol 5000 UNITS Tabs tablet    vitamin D3     Take 1 tablet by mouth daily.        CLARITIN 10 MG tablet   Generic drug:  loratadine     30 tablet    Take 1 tablet (10 mg) by mouth daily        Co Q 10 100 MG Caps      Take 1 capsule by mouth daily        cyabnocobalamin 2500 MCG sublingual tablet    VITAMIN B-12    30 tablet    Place 3,000 mcg under the tongue daily        finasteride 5 MG tablet    PROSCAR    90 tablet    Take 1 tablet (5 mg) by mouth daily    Benign non-nodular prostatic hyperplasia with lower urinary tract symptoms       metoprolol 50 MG tablet    LOPRESSOR    270 tablet    Take 1 tablet (50 mg) by mouth 2 times daily    HTN (hypertension), benign       MUCINEX 600 MG 12 hr tablet   Generic drug:  guaiFENesin     40 tablet    Take 2 tablets (1,200 mg) by mouth daily        Multi-vitamin Tabs tablet   Generic drug:  multivitamin, therapeutic with minerals      Take 1 tablet by mouth daily.        omega-3 acid ethyl esters 1 G capsule    Lovaza    360 capsule    Take 2 capsules (2 g) by mouth daily        tamsulosin 0.4 MG capsule    FLOMAX    90 capsule    Take 1 capsule (0.4 mg) by mouth daily    BPH (benign prostatic hyperplasia)       valsartan 320 MG tablet    DIOVAN    30 tablet    Take 1 tablet (320 mg) by mouth daily    HTN (hypertension), benign

## 2018-01-10 NOTE — NURSING NOTE
"Chief Complaint   Patient presents with     Recheck Medication       Initial /71 (BP Location: Right arm, Patient Position: Chair, Cuff Size: Adult Regular)  Pulse 73  Temp 97.6  F (36.4  C) (Oral)  Ht 5' 10.25\" (1.784 m)  Wt 238 lb (108 kg)  SpO2 95%  BMI 33.91 kg/m2 Estimated body mass index is 33.91 kg/(m^2) as calculated from the following:    Height as of this encounter: 5' 10.25\" (1.784 m).    Weight as of this encounter: 238 lb (108 kg).  Medication Reconciliation: complete.  Bianca Davis CMA    "

## 2018-01-10 NOTE — PROGRESS NOTES
HPI: coretta is a 78 yo male here for f/u HTN   He is taking amitriptyline for his back (spinal stenosis) and notes he is sleeping better on this  He has a dry mouth from this medication  He has ref to surgery for his back but hasn't made appt yet.    Past Medical History:   Diagnosis Date     Arthritis      BPH (benign prostatic hyperplasia)      CKD (chronic kidney disease) stage 3, GFR 30-59 ml/min      Cluster headache 2015    Dr. Roth     Constipation      Dyslipidemia      Dyspnea     Normal nuc stress test 9-15-08, fainted once in life     Fatty liver     Ultrasound 11/11     FH: colon cancer     colonoscopy 10/10, repeat 5 years.     Former smoker     quit 2008     HTN (hypertension), benign      Peripheral neuropathy     Dr. Patricia     Spinal stenosis     Last MRI 2010, Dr. Fraire     Syncope      Type 2 diabetes mellitus (H)     diet controlled     Ureterolithiases      Past Surgical History:   Procedure Laterality Date     KERATOTOMY ARCUATE WITH FEMTOSECOND LASER/IMAGING FOR ATIOL Left 8/29/2016    Procedure: KERATOTOMY ARCUATE WITH FEMTOSECOND LASER/IMAGING FOR ATIOL;  Surgeon: Gerard Larson MD;  Location: [facility]     ORTHOPEDIC SURGERY       PHACOEMULSIFICATION CLEAR CORNEA WITH STANDARD INTRAOCULAR LENS IMPLANT Left 8/29/2016    Procedure: PHACOEMULSIFICATION CLEAR CORNEA WITH STANDARD INTRAOCULAR LENS IMPLANT;  Surgeon: Gerard Larson MD;  Location: [facility]     Thumb surg       Social History   Substance Use Topics     Smoking status: Former Smoker     Packs/day: 1.00     Years: 56.00     Types: Cigarettes     Quit date: 1/1/2008     Smokeless tobacco: Never Used      Comment: quit 2008     Alcohol use No     Current Outpatient Prescriptions   Medication Sig Dispense Refill     metoprolol (LOPRESSOR) 50 MG tablet Take 1 tablet (50 mg) by mouth 2 times daily 270 tablet 1     omega-3 acid ethyl esters (LOVAZA) 1 G capsule Take 2 capsules (2 g) by mouth daily 360 capsule 3     guaiFENesin (MUCINEX) 600  "MG 12 hr tablet Take 2 tablets (1,200 mg) by mouth daily 40 tablet 0     loratadine (CLARITIN) 10 MG tablet Take 1 tablet (10 mg) by mouth daily 30 tablet 1     valsartan (DIOVAN) 320 MG tablet Take 1 tablet (320 mg) by mouth daily 30 tablet 3     amitriptyline (ELAVIL) 50 MG tablet TAKE 1 TABLET(50 MG) BY MOUTH AT BEDTIME 90 tablet 3     finasteride (PROSCAR) 5 MG tablet Take 1 tablet (5 mg) by mouth daily 90 tablet 3     cyabnocobalamin (VITAMIN B-12) 2500 MCG sublingual tablet Place 3,000 mcg under the tongue daily  30 tablet      tamsulosin (FLOMAX) 0.4 MG 24 hr capsule Take 1 capsule (0.4 mg) by mouth daily 90 capsule 1     Coenzyme Q10 (CO Q 10) 100 MG CAPS Take 1 capsule by mouth daily       atorvastatin (LIPITOR) 20 MG tablet Take 20 mg by mouth every evening       Cholecalciferol (VITAMIN D3) 5000 UNITS TABS Take 1 tablet by mouth daily.       aspirin 81 MG tablet Take 1 tablet by mouth daily.       Multiple Vitamin (MULTI-VITAMIN) per tablet Take 1 tablet by mouth daily.       [DISCONTINUED] metoprolol (LOPRESSOR) 50 MG tablet TAKE 1 TABLET BY MOUTH THREE TIMES DAILY 270 tablet 1     Allergies   Allergen Reactions     Lisinopril Shortness Of Breath and Fatigue     Amoxicillin Itching     Duloxetine      \"I can't take it\"     Levofloxacin      \"I can't take the 500 mgs\"     Neurontin [Gabapentin]      edema     Norvasc [Amlodipine]      edema     Oxycodone GI Disturbance     Penicillins Itching     Red Yeast Rice Extract [Monascus Purpureus Went Yeast] Hives and Itching     Tramadol Itching     Verapamil Itching     Vicodin [Hydrocodone-Acetaminophen] Itching     FAMILY HISTORY NOTED AND REVIEWED      PHYSICAL EXAM:    /65  Pulse 73  Temp 97.6  F (36.4  C) (Oral)  Ht 5' 10.25\" (1.784 m)  Wt 238 lb (108 kg)  SpO2 95%  BMI 33.91 kg/m2    Patient appears non toxic  Rechecked pt's BP and this remained elevated  Ankles swollen bilat.  No ulcerations or weeping    Assessment and Plan:     (I10) HTN " (hypertension), benign  (primary encounter diagnosis)  Comment: DC norvasc given edema. DC losartan as another ARB may be more effective. Will have him start diovan. Bring in home BP monitor and readings one month  Plan: metoprolol (LOPRESSOR) 50 MG tablet, valsartan         (DIOVAN) 320 MG tablet            (R60.0) Bilateral lower extremity edema  Comment:   Plan: DC norvasc as above    (N18.3) CKD (chronic kidney disease) stage 3, GFR 30-59 ml/min  Comment:   Plan: Creatinine            Patient Instructions   Stop the amlodipine (norvasc) because it is causing feet swelling    Stop losartan (Cozaar) because it alone is not controlling your blood pressure and Diovan may work better.      Josephine Jacob PA-C

## 2018-01-10 NOTE — PATIENT INSTRUCTIONS
Stop the amlodipine (norvasc) because it is causing feet swelling    Stop losartan (Cozaar) because it alone is not controlling your blood pressure and Diovan may work better.

## 2018-01-11 RX ORDER — VALSARTAN 320 MG/1
TABLET ORAL
Qty: 90 TABLET | Refills: 0 | Status: SHIPPED | OUTPATIENT
Start: 2018-01-11 | End: 2018-08-06 | Stop reason: ALTCHOICE

## 2018-01-11 NOTE — TELEPHONE ENCOUNTER
PCP:     Pt is requesting 90 day supply. Rx pended, routing d/t Very High Contraindication.     Thank you,     Haley GASTON RN

## 2018-01-14 DIAGNOSIS — I10 HTN (HYPERTENSION), BENIGN: ICD-10-CM

## 2018-01-15 NOTE — TELEPHONE ENCOUNTER
metoprolol tartrate (LOPRESSOR) 50 MG tablet        Last Written Prescription Date:  ?  Last Fill Quantity: ?,   # refills: ?  Last Office Visit: 1/10/2018  Future Office visit:       Routing refill request to provider for review/approval because:  Medication is reported/historical

## 2018-01-16 RX ORDER — METOPROLOL TARTRATE 50 MG
TABLET ORAL
Refills: 0
Start: 2018-01-16

## 2018-01-16 NOTE — TELEPHONE ENCOUNTER
No longer takes TID, dose adjustment, see OV notes from 1/10/18, now only takes BID.    Kaitlin Hampton RN

## 2018-01-26 DIAGNOSIS — J98.01 ACUTE BRONCHOSPASM: ICD-10-CM

## 2018-01-26 RX ORDER — ALBUTEROL SULFATE 90 UG/1
2 AEROSOL, METERED RESPIRATORY (INHALATION) EVERY 6 HOURS PRN
Refills: 1 | COMMUNITY
Start: 2018-01-04 | End: 2018-03-27

## 2018-01-26 RX ORDER — ALBUTEROL SULFATE 90 UG/1
AEROSOL, METERED RESPIRATORY (INHALATION)
Qty: 18 G | Refills: 3 | Status: SHIPPED | OUTPATIENT
Start: 2018-01-26 | End: 2019-12-18

## 2018-01-26 NOTE — TELEPHONE ENCOUNTER
Routing refill request to provider for review/approval because:  Medication is reported/historical  Please authorize if appropriate.  Thanks,  Cata Mejia RN

## 2018-01-26 NOTE — TELEPHONE ENCOUNTER
"     VENTOLIN  (90 BASE) MCG/ACT Inhaler  1 1/4/2018        Sig: Inhale 2 puffs into the lungs every 6 hours as needed     Class: Historical         Last Written Prescription Date:  01/04/2018  Last Fill Quantity: Unknown,  # refills: Unknown   Last Office Visit with [facility] provider:  01/10/2018   Future Office Visit:   Unknown    Requested Prescriptions   Pending Prescriptions Disp Refills     VENTOLIN  (90 BASE) MCG/ACT Inhaler [Pharmacy Med Name: VENTOLIN HFA INH W/DOS CTR 200PUFFS] 18 g 0     Sig: INHALE 2 PUFFS INTO THE LUNGS EVERY 6 HOURS AS NEEDED FOR SHORTNESS OF BREATH OR DIFFICULT BREATHING OR WHEEZING    Asthma Maintenance Inhalers - Anticholinergics Passed    1/26/2018  3:38 AM       Passed - Patient is age 12 years or older       Passed - Recent or future visit with authorizing provider's specialty    Patient had office visit in the last year or has a visit in the next 30 days with authorizing provider.  See \"Patient Info\" tab in inbasket, or \"Choose Columns\" in Meds & Orders section of the refill encounter.               "

## 2018-02-05 ENCOUNTER — TRANSFERRED RECORDS (OUTPATIENT)
Dept: HEALTH INFORMATION MANAGEMENT | Facility: CLINIC | Age: 78
End: 2018-02-05

## 2018-03-27 ENCOUNTER — OFFICE VISIT (OUTPATIENT)
Dept: FAMILY MEDICINE | Facility: CLINIC | Age: 78
End: 2018-03-27
Payer: COMMERCIAL

## 2018-03-27 VITALS
DIASTOLIC BLOOD PRESSURE: 69 MMHG | TEMPERATURE: 98.3 F | OXYGEN SATURATION: 96 % | BODY MASS INDEX: 33.07 KG/M2 | WEIGHT: 231 LBS | HEIGHT: 70 IN | HEART RATE: 83 BPM | SYSTOLIC BLOOD PRESSURE: 133 MMHG

## 2018-03-27 DIAGNOSIS — J20.9 ACUTE BRONCHITIS, UNSPECIFIED ORGANISM: ICD-10-CM

## 2018-03-27 DIAGNOSIS — N18.30 CKD (CHRONIC KIDNEY DISEASE) STAGE 3, GFR 30-59 ML/MIN (H): ICD-10-CM

## 2018-03-27 DIAGNOSIS — I10 HTN (HYPERTENSION), BENIGN: Primary | ICD-10-CM

## 2018-03-27 PROCEDURE — 80048 BASIC METABOLIC PNL TOTAL CA: CPT | Performed by: PHYSICIAN ASSISTANT

## 2018-03-27 PROCEDURE — 99214 OFFICE O/P EST MOD 30 MIN: CPT | Performed by: PHYSICIAN ASSISTANT

## 2018-03-27 PROCEDURE — 36415 COLL VENOUS BLD VENIPUNCTURE: CPT | Performed by: PHYSICIAN ASSISTANT

## 2018-03-27 RX ORDER — PREDNISONE 20 MG/1
20 TABLET ORAL 2 TIMES DAILY
Qty: 10 TABLET | Refills: 0 | Status: SHIPPED | OUTPATIENT
Start: 2018-03-27 | End: 2018-08-20

## 2018-03-27 RX ORDER — AZITHROMYCIN 250 MG/1
TABLET, FILM COATED ORAL
Qty: 6 TABLET | Refills: 0 | Status: SHIPPED | OUTPATIENT
Start: 2018-03-27 | End: 2018-08-20

## 2018-03-27 NOTE — PROGRESS NOTES
HPI: Casey is a 78 yo male here for f/u HTN  See note dated 1/0/18: at that time we stopped norvasc due to edema and the edema did resolve  We also stopped losartan since BP was high and switched to diovan 320mg  BP lower on on this combination.    He was getting lower readings so decreased his metoprolol on his own (he thinks, not sure, will double check)    2. He has had chest congestion for a long time (one month) and thought this was a cold  He has cough and wheezing  Cough is loose and prod of yellow sputum  He is using his ventolin every day which does help for awhile.  He stopped mucinex about a week ago so stopped the medication.    3. Chronic back pain: He was too sedated on elavil 50mg so cut the dose to 25mg   We is not sure this is helping with his chronic back pain    Past Medical History:   Diagnosis Date     Arthritis      BPH (benign prostatic hyperplasia)      CKD (chronic kidney disease) stage 3, GFR 30-59 ml/min      Cluster headache 2015    Dr. Roth     Constipation      Dyslipidemia      Dyspnea     Normal nuc stress test 9-15-08, fainted once in life     Fatty liver     Ultrasound 11/11     FH: colon cancer     colonoscopy 10/10, repeat 5 years.     Former smoker     quit 2008     HTN (hypertension), benign      Peripheral neuropathy     Dr. Patricia     Spinal stenosis     Last MRI 2010, Dr. Fraire     Syncope      Type 2 diabetes mellitus (H)     diet controlled     Ureterolithiases      Past Surgical History:   Procedure Laterality Date     KERATOTOMY ARCUATE WITH FEMTOSECOND LASER/IMAGING FOR ATIOL Left 8/29/2016    Procedure: KERATOTOMY ARCUATE WITH FEMTOSECOND LASER/IMAGING FOR ATIOL;  Surgeon: Gerard Larson MD;  Location: [facility]     ORTHOPEDIC SURGERY       PHACOEMULSIFICATION CLEAR CORNEA WITH STANDARD INTRAOCULAR LENS IMPLANT Left 8/29/2016    Procedure: PHACOEMULSIFICATION CLEAR CORNEA WITH STANDARD INTRAOCULAR LENS IMPLANT;  Surgeon: Gerard Larson MD;  Location: [facility]     Thumb  surg       Social History   Substance Use Topics     Smoking status: Former Smoker     Packs/day: 1.00     Years: 56.00     Types: Cigarettes     Quit date: 1/1/2008     Smokeless tobacco: Never Used      Comment: quit 2008     Alcohol use No     Current Outpatient Prescriptions   Medication Sig Dispense Refill     azithromycin (ZITHROMAX) 250 MG tablet Two tablets first day, then one tablet daily for four days. 6 tablet 0     predniSONE (DELTASONE) 20 MG tablet Take 1 tablet (20 mg) by mouth 2 times daily 10 tablet 0     VENTOLIN  (90 BASE) MCG/ACT Inhaler INHALE 2 PUFFS INTO THE LUNGS EVERY 6 HOURS AS NEEDED FOR SHORTNESS OF BREATH OR DIFFICULT BREATHING OR WHEEZING 18 g 3     valsartan (DIOVAN) 320 MG tablet TAKE 1 TABLET(320 MG) BY MOUTH DAILY 90 tablet 0     metoprolol (LOPRESSOR) 50 MG tablet Take 1 tablet (50 mg) by mouth 2 times daily 270 tablet 1     omega-3 acid ethyl esters (LOVAZA) 1 G capsule Take 2 capsules (2 g) by mouth daily 360 capsule 3     guaiFENesin (MUCINEX) 600 MG 12 hr tablet Take 2 tablets (1,200 mg) by mouth daily 40 tablet 0     loratadine (CLARITIN) 10 MG tablet Take 1 tablet (10 mg) by mouth daily 30 tablet 1     amitriptyline (ELAVIL) 50 MG tablet TAKE 1 TABLET(50 MG) BY MOUTH AT BEDTIME 90 tablet 3     finasteride (PROSCAR) 5 MG tablet Take 1 tablet (5 mg) by mouth daily 90 tablet 3     cyabnocobalamin (VITAMIN B-12) 2500 MCG sublingual tablet Place 3,000 mcg under the tongue daily  30 tablet      tamsulosin (FLOMAX) 0.4 MG 24 hr capsule Take 1 capsule (0.4 mg) by mouth daily 90 capsule 1     Coenzyme Q10 (CO Q 10) 100 MG CAPS Take 1 capsule by mouth daily       atorvastatin (LIPITOR) 20 MG tablet Take 20 mg by mouth every evening       Cholecalciferol (VITAMIN D3) 5000 UNITS TABS Take 1 tablet by mouth daily.       aspirin 81 MG tablet Take 1 tablet by mouth daily.       Multiple Vitamin (MULTI-VITAMIN) per tablet Take 1 tablet by mouth daily.       [DISCONTINUED] VENTOLIN HFA  "108 (90 BASE) MCG/ACT Inhaler Inhale 2 puffs into the lungs every 6 hours as needed  1     Allergies   Allergen Reactions     Lisinopril Shortness Of Breath and Fatigue     Amoxicillin Itching     Duloxetine      \"I can't take it\"     Levofloxacin      \"I can't take the 500 mgs\"     Neurontin [Gabapentin]      edema     Norvasc [Amlodipine]      edema     Oxycodone GI Disturbance     Penicillins Itching     Red Yeast Rice Extract [Monascus Purpureus Went Yeast] Hives and Itching     Tramadol Itching     Verapamil Itching     Vicodin [Hydrocodone-Acetaminophen] Itching     FAMILY HISTORY NOTED AND REVIEWED      PHYSICAL EXAM:    /69 (BP Location: Right arm, Cuff Size: Adult Large)  Pulse 83  Temp 98.3  F (36.8  C) (Tympanic)  Ht 5' 10.25\" (1.784 m)  Wt 231 lb (104.8 kg)  SpO2 96%  BMI 32.91 kg/m2    Patient appears non toxic  Lungs: coarse wheezing throughout  Heart: RRR  Extr: no edema.    Assessment and Plan:     (I10) HTN (hypertension), benign  (primary encounter diagnosis)  Comment: well controlled on current regimen but pt to double check what dose of metoprolol he is taking  Plan: Basic metabolic panel            (N18.3) CKD (chronic kidney disease) stage 3, GFR 30-59 ml/min  Comment:   Plan: BMP    (J20.9) Acute bronchitis, unspecified organism  Comment:   Plan: azithromycin (ZITHROMAX) 250 MG tablet take as directed,         predniSONE (DELTASONE) 20 MG tablet        Bid x 5d with food.      Josephine Jacob PA-C                "

## 2018-03-27 NOTE — NURSING NOTE
"Chief Complaint   Patient presents with     Hypertension     pt does check bp at home, results are mostly under 130/70; pt was having dizzy spells, quit turmeric on 3/20/18       Initial /69 (BP Location: Right arm, Cuff Size: Adult Large)  Pulse 83  Temp 98.3  F (36.8  C) (Tympanic)  Ht 5' 10.25\" (1.784 m)  Wt 231 lb (104.8 kg)  SpO2 96%  BMI 32.91 kg/m2 Estimated body mass index is 32.91 kg/(m^2) as calculated from the following:    Height as of this encounter: 5' 10.25\" (1.784 m).    Weight as of this encounter: 231 lb (104.8 kg).  Medication Reconciliation: complete     Nighat Caballero MA    "

## 2018-03-27 NOTE — MR AVS SNAPSHOT
"              After Visit Summary   3/27/2018    Luis Daniel Gomez    MRN: [id number]           Patient Information     Date Of Birth          1940        Visit Information        Provider Department      3/27/2018 11:30 AM Josephine Jacob PA-C [facility]        Today's Diagnoses     HTN (hypertension), benign    -  1    CKD (chronic kidney disease) stage 3, GFR 30-59 ml/min        Acute bronchitis, unspecified organism           Follow-ups after your visit        Who to contact     If you have questions or need follow up information about today's clinic visit or your schedule please contact [facility] directly at [phone].  Normal or non-critical lab and imaging results will be communicated to you by [email or web address], letter or phone within 4 business days after the clinic has received the results. If you do not hear from us within 7 days, please contact the clinic through [email or web address] or phone. If you have a critical or abnormal lab result, we will notify you by phone as soon as possible.  Submit refill requests through [facility] or call your pharmacy and they will forward the refill request to us. Please allow 3 business days for your refill to be completed.          Additional Information About Your Visit        MyChart Information     [facility] gives you secure access to your electronic health record. If you see a primary care provider, you can also send messages to your care team and make appointments. If you have questions, please call your primary care clinic.  If you do not have a primary care provider, please call [phone] and they will assist you.        Care EveryWhere ID     This is your Care EveryWhere ID. This could be used by other organizations to access your [facility] medical records  [id number]        Your Vitals Were     Pulse Temperature Height Pulse Oximetry BMI (Body Mass Index)       83 98.3  F (36.8  C) (Tympanic) 5' 10.25\" (1.784 m) 96% 32.91 kg/m2        Blood Pressure " from Last 3 Encounters:   03/27/18 133/69   01/10/18 148/65   12/14/17 141/68    Weight from Last 3 Encounters:   03/27/18 231 lb (104.8 kg)   01/10/18 238 lb (108 kg)   12/14/17 230 lb (104.3 kg)              We Performed the Following     Basic metabolic panel          Today's Medication Changes          These changes are accurate as of 3/27/18 11:51 AM.  If you have any questions, ask your nurse or doctor.               Start taking these medicines.        Dose/Directions    azithromycin 250 MG tablet   Commonly known as:  ZITHROMAX   Used for:  Acute bronchitis, unspecified organism   Started by:  Josephine Jacob PA-C        Two tablets first day, then one tablet daily for four days.   Quantity:  6 tablet   Refills:  0       predniSONE 20 MG tablet   Commonly known as:  DELTASONE   Used for:  Acute bronchitis, unspecified organism   Started by:  Josephine Jacob PA-C        Dose:  20 mg   Take 1 tablet (20 mg) by mouth 2 times daily   Quantity:  10 tablet   Refills:  0         These medicines have changed or have updated prescriptions.        Dose/Directions    VENTOLIN  (90 BASE) MCG/ACT Inhaler   This may have changed:  Another medication with the same name was removed. Continue taking this medication, and follow the directions you see here.   Used for:  Acute bronchospasm   Generic drug:  albuterol   Changed by:  Josephine Jacob PA-C        INHALE 2 PUFFS INTO THE LUNGS EVERY 6 HOURS AS NEEDED FOR SHORTNESS OF BREATH OR DIFFICULT BREATHING OR WHEEZING   Quantity:  18 g   Refills:  3            Where to get your medicines      These medications were sent to [facility] [address] [address], [place] [address]     Phone:  [phone]     azithromycin 250 MG tablet    predniSONE 20 MG tablet                Primary Care Provider Office Phone # Fax #    Josephine Jacob PA-C [phone] [address]  [facility] [address]  [facility] [address]        Equal Access to Services     [facility] : Hadii jose a rai nidiavinay Sofatmata, waaxda luqadaha, qaybta kapattiveronique mensah, regla blas. So [facility] [phone].    ATENCIÓN: Si habla español, tiene a machuca disposición servicios gratuitos de asistencia lingüística. Brittanyame al [phone].    We comply with applicable federal civil rights laws and [place] laws. We do not discriminate on the basis of race, color, national origin, age, disability, sex, sexual orientation, or gender identity.            Thank you!     Thank you for choosing [facility]  for your care. Our goal is always to provide you with excellent care. Hearing back from our patients is one way we can continue to improve our services. Please take a few minutes to complete the written survey that you may receive in the mail after your visit with us. Thank you!             Your Updated Medication List - Protect others around you: Learn how to safely use, store and throw away your medicines at www.disposemymeds.org.          This list is accurate as of 3/27/18 11:51 AM.  Always use your most recent med list.                   Brand Name Dispense Instructions for use Diagnosis    amitriptyline 50 MG tablet    ELAVIL    90 tablet    TAKE 1 TABLET(50 MG) BY MOUTH AT BEDTIME    Spinal stenosis of lumbar region, unspecified whether neurogenic claudication present       aspirin 81 MG tablet      Take 1 tablet by mouth daily.        atorvastatin 20 MG tablet    LIPITOR     Take 20 mg by mouth every evening        azithromycin 250 MG tablet    ZITHROMAX    6 tablet    Two tablets first day, then one tablet daily for four days.    Acute bronchitis, unspecified organism       cholecalciferol 5000 UNITS Tabs tablet    vitamin D3     Take 1 tablet by mouth daily.        CLARITIN 10 MG tablet   Generic drug:  loratadine     30 tablet    Take 1 tablet (10 mg) by mouth daily        Co Q 10 100 MG Caps       Take 1 capsule by mouth daily        cyanocobalamin 2500 MCG sublingual tablet    VITAMIN B-12    30 tablet    Place 3,000 mcg under the tongue daily        finasteride 5 MG tablet    PROSCAR    90 tablet    Take 1 tablet (5 mg) by mouth daily    Benign non-nodular prostatic hyperplasia with lower urinary tract symptoms       metoprolol tartrate 50 MG tablet    LOPRESSOR    270 tablet    Take 1 tablet (50 mg) by mouth 2 times daily    HTN (hypertension), benign       MUCINEX 600 MG 12 hr tablet   Generic drug:  guaiFENesin     40 tablet    Take 2 tablets (1,200 mg) by mouth daily        Multi-vitamin Tabs tablet   Generic drug:  multivitamin, therapeutic with minerals      Take 1 tablet by mouth daily.        omega-3 acid ethyl esters 1 G capsule    Lovaza    360 capsule    Take 2 capsules (2 g) by mouth daily        predniSONE 20 MG tablet    DELTASONE    10 tablet    Take 1 tablet (20 mg) by mouth 2 times daily    Acute bronchitis, unspecified organism       tamsulosin 0.4 MG capsule    FLOMAX    90 capsule    Take 1 capsule (0.4 mg) by mouth daily    BPH (benign prostatic hyperplasia)       valsartan 320 MG tablet    DIOVAN    90 tablet    TAKE 1 TABLET(320 MG) BY MOUTH DAILY    HTN (hypertension), benign       VENTOLIN  (90 BASE) MCG/ACT Inhaler   Generic drug:  albuterol     18 g    INHALE 2 PUFFS INTO THE LUNGS EVERY 6 HOURS AS NEEDED FOR SHORTNESS OF BREATH OR DIFFICULT BREATHING OR WHEEZING    Acute bronchospasm

## 2018-03-28 LAB
ANION GAP SERPL CALCULATED.3IONS-SCNC: 6 MMOL/L (ref 3–14)
BUN SERPL-MCNC: 27 MG/DL (ref 7–30)
CALCIUM SERPL-MCNC: 9.4 MG/DL (ref 8.5–10.1)
CHLORIDE SERPL-SCNC: 103 MMOL/L (ref 94–109)
CO2 SERPL-SCNC: 27 MMOL/L (ref 20–32)
CREAT SERPL-MCNC: 1.79 MG/DL (ref 0.66–1.25)
GFR SERPL CREATININE-BSD FRML MDRD: 37 ML/MIN/1.7M2
GLUCOSE SERPL-MCNC: 123 MG/DL (ref 70–99)
POTASSIUM SERPL-SCNC: 5.4 MMOL/L (ref 3.4–5.3)
SODIUM SERPL-SCNC: 136 MMOL/L (ref 133–144)

## 2018-03-28 NOTE — PROGRESS NOTES
Casey,    Your labs are stable.    I was hoping the kidney function would come down but it stayed about the same.  Let's recheck this again in 3-4 months.    Josephine Jacob PA-C

## 2018-04-23 DIAGNOSIS — M48.061 SPINAL STENOSIS OF LUMBAR REGION, UNSPECIFIED WHETHER NEUROGENIC CLAUDICATION PRESENT: ICD-10-CM

## 2018-04-24 RX ORDER — AMITRIPTYLINE HYDROCHLORIDE 50 MG/1
TABLET ORAL
Start: 2018-04-24

## 2018-04-24 NOTE — TELEPHONE ENCOUNTER
"Denied  Refill request too early; Rx sent 12/19/2017 for 1 year  Shantal PEREZ RN    Requested Prescriptions   Pending Prescriptions Disp Refills     amitriptyline (ELAVIL) 50 MG tablet [Pharmacy Med Name: AMITRIPTYLINE 50MG TABLETS] 30 tablet 0     Sig: TAKE 1 TABLET(50 MG) BY MOUTH AT BEDTIME    Tricyclic Agents ( Annual appt and no PHQ9) Passed    4/24/2018  2:55 PM       Passed - Blood Pressure under 140/90 in past 12 mos    BP Readings from Last 3 Encounters:   03/27/18 133/69   01/10/18 148/65   12/14/17 141/68                Passed - Recent (12 mo) or future (30 days) visit within authorizing provider's specialty    Patient had office visit in the last 12 months or has a visit in the next 30 days with authorizing provider or within the authorizing provider's specialty.  See \"Patient Info\" tab in inbasket, or \"Choose Columns\" in Meds & Orders section of the refill encounter.           Passed - Patient is age 18 or older            "

## 2018-04-25 DIAGNOSIS — M48.061 SPINAL STENOSIS OF LUMBAR REGION, UNSPECIFIED WHETHER NEUROGENIC CLAUDICATION PRESENT: ICD-10-CM

## 2018-04-25 NOTE — TELEPHONE ENCOUNTER
"Last Written Prescription Date:  12/19/17  Last Fill Quantity: 90 tablet,  # refills: 3   Last office visit: 3/27/2018 with prescribing provider:  Nidia   Future Office Visit:      Requested Prescriptions   Pending Prescriptions Disp Refills     amitriptyline (ELAVIL) 50 MG tablet [Pharmacy Med Name: AMITRIPTYLINE 50MG TABLETS] 30 tablet 0     Sig: TAKE 1 TABLET(50 MG) BY MOUTH AT BEDTIME    Tricyclic Agents ( Annual appt and no PHQ9) Passed    4/25/2018  2:48 PM       Passed - Blood Pressure under 140/90 in past 12 mos    BP Readings from Last 3 Encounters:   03/27/18 133/69   01/10/18 148/65   12/14/17 141/68                Passed - Recent (12 mo) or future (30 days) visit within authorizing provider's specialty    Patient had office visit in the last 12 months or has a visit in the next 30 days with authorizing provider or within the authorizing provider's specialty.  See \"Patient Info\" tab in inbasket, or \"Choose Columns\" in Meds & Orders section of the refill encounter.           Passed - Patient is age 18 or older          "

## 2018-04-26 RX ORDER — AMITRIPTYLINE HYDROCHLORIDE 50 MG/1
TABLET ORAL
Start: 2018-04-26

## 2018-04-30 DIAGNOSIS — M48.061 SPINAL STENOSIS OF LUMBAR REGION, UNSPECIFIED WHETHER NEUROGENIC CLAUDICATION PRESENT: ICD-10-CM

## 2018-04-30 NOTE — TELEPHONE ENCOUNTER
"  amitriptyline (ELAVIL) 50 MG tablet 90 tablet 3 12/19/2017       Last Written Prescription Date:  12/19/2017  Last Fill Quantity: 90,  # refills: 3   Last office visit: 3/27/2018 with prescribing provider:  Josephine Jacob   Future Office Visit:  Unknown    Requested Prescriptions   Pending Prescriptions Disp Refills     amitriptyline (ELAVIL) 50 MG tablet [Pharmacy Med Name: AMITRIPTYLINE 50MG TABLETS] 30 tablet 0     Sig: TAKE 1 TABLET(50 MG) BY MOUTH AT BEDTIME    Tricyclic Agents ( Annual appt and no PHQ9) Passed    4/30/2018 10:49 AM       Passed - Blood Pressure under 140/90 in past 12 mos    BP Readings from Last 3 Encounters:   03/27/18 133/69   01/10/18 148/65   12/14/17 141/68                Passed - Recent (12 mo) or future (30 days) visit within authorizing provider's specialty    Patient had office visit in the last 12 months or has a visit in the next 30 days with authorizing provider or within the authorizing provider's specialty.  See \"Patient Info\" tab in inbasket, or \"Choose Columns\" in Meds & Orders section of the refill encounter.           Passed - Patient is age 18 or older          "

## 2018-05-01 RX ORDER — AMITRIPTYLINE HYDROCHLORIDE 50 MG/1
TABLET ORAL
Qty: 90 TABLET | Refills: 1 | Status: SHIPPED | OUTPATIENT
Start: 2018-05-01 | End: 2018-08-20

## 2018-05-01 NOTE — TELEPHONE ENCOUNTER
Prescription approved per [facility] [place] [place] Refill Protocol.  Marietta Morel RN

## 2018-07-03 ENCOUNTER — TRANSFERRED RECORDS (OUTPATIENT)
Dept: HEALTH INFORMATION MANAGEMENT | Facility: CLINIC | Age: 78
End: 2018-07-03

## 2018-07-03 LAB — HBA1C MFR BLD: 5.6 % (ref 4–6)

## 2018-08-06 ENCOUNTER — TELEPHONE (OUTPATIENT)
Dept: FAMILY MEDICINE | Facility: CLINIC | Age: 78
End: 2018-08-06

## 2018-08-06 DIAGNOSIS — I10 BENIGN ESSENTIAL HYPERTENSION: Primary | ICD-10-CM

## 2018-08-06 RX ORDER — IRBESARTAN 300 MG/1
300 TABLET ORAL DAILY
Qty: 90 TABLET | Refills: 1 | Status: SHIPPED | OUTPATIENT
Start: 2018-08-06 | End: 2018-08-20

## 2018-08-06 NOTE — TELEPHONE ENCOUNTER
The patient said that he has not taken the Valsartan in 5-6 months and that he takes his Metropolol in the AM and in the evening and his BP is fine   He does not want any replacement for Valsartan

## 2018-08-06 NOTE — TELEPHONE ENCOUNTER
Josephine had me call pt to inform him of the Valsartan recall and that she changed him to Avapro.   Pt wroe down that he will stop Valsartan and ask pharmacy for disposal guidelines. Pt will start taking Avapro.

## 2018-08-07 NOTE — TELEPHONE ENCOUNTER
He should be on that class of medication (ARB) because of his diabetes.  He is overdue for A1c. Have him make appt to see me so we can f/u diabetes, HTN and figure out when this med was stopped.

## 2018-08-20 ENCOUNTER — OFFICE VISIT (OUTPATIENT)
Dept: FAMILY MEDICINE | Facility: CLINIC | Age: 78
End: 2018-08-20
Payer: COMMERCIAL

## 2018-08-20 VITALS
TEMPERATURE: 97.8 F | RESPIRATION RATE: 12 BRPM | HEART RATE: 101 BPM | OXYGEN SATURATION: 93 % | DIASTOLIC BLOOD PRESSURE: 84 MMHG | WEIGHT: 227 LBS | SYSTOLIC BLOOD PRESSURE: 126 MMHG | BODY MASS INDEX: 32.34 KG/M2

## 2018-08-20 DIAGNOSIS — R09.81 CHRONIC NASAL CONGESTION: ICD-10-CM

## 2018-08-20 DIAGNOSIS — E11.21 TYPE 2 DIABETES MELLITUS WITH DIABETIC NEPHROPATHY, UNSPECIFIED LONG TERM INSULIN USE STATUS: Primary | ICD-10-CM

## 2018-08-20 DIAGNOSIS — R39.12 BENIGN PROSTATIC HYPERPLASIA WITH WEAK URINARY STREAM: ICD-10-CM

## 2018-08-20 DIAGNOSIS — N18.30 CKD (CHRONIC KIDNEY DISEASE) STAGE 3, GFR 30-59 ML/MIN (H): ICD-10-CM

## 2018-08-20 DIAGNOSIS — N40.1 BENIGN PROSTATIC HYPERPLASIA WITH WEAK URINARY STREAM: ICD-10-CM

## 2018-08-20 DIAGNOSIS — J01.90 ACUTE SINUSITIS WITH SYMPTOMS > 10 DAYS: ICD-10-CM

## 2018-08-20 DIAGNOSIS — I10 BENIGN ESSENTIAL HYPERTENSION: ICD-10-CM

## 2018-08-20 PROCEDURE — 99214 OFFICE O/P EST MOD 30 MIN: CPT | Performed by: PHYSICIAN ASSISTANT

## 2018-08-20 RX ORDER — FLUTICASONE PROPIONATE 50 MCG
1-2 SPRAY, SUSPENSION (ML) NASAL DAILY
Qty: 1 BOTTLE | Refills: 11 | Status: SHIPPED | OUTPATIENT
Start: 2018-08-20 | End: 2018-08-20

## 2018-08-20 RX ORDER — IRBESARTAN 150 MG/1
150 TABLET ORAL DAILY
Qty: 90 TABLET | Refills: 1 | Status: SHIPPED | OUTPATIENT
Start: 2018-08-20 | End: 2019-10-23

## 2018-08-20 RX ORDER — CEFUROXIME AXETIL 500 MG/1
500 TABLET ORAL 2 TIMES DAILY
Qty: 20 TABLET | Refills: 0 | Status: SHIPPED | OUTPATIENT
Start: 2018-08-20 | End: 2018-12-17

## 2018-08-20 RX ORDER — TAMSULOSIN HYDROCHLORIDE 0.4 MG/1
0.8 CAPSULE ORAL DAILY
Qty: 60 CAPSULE | Refills: 11 | Status: SHIPPED | OUTPATIENT
Start: 2018-08-20 | End: 2018-08-20

## 2018-08-20 NOTE — Clinical Note
Patient had A1C done and [facility] are [facility] ([facility]). Please change in HM or how can this be changed?   Thank you, JUAN Arceo

## 2018-08-20 NOTE — TELEPHONE ENCOUNTER
"tamsulosin (FLOMAX) 0.4 MG capsule 60 capsule 11 8/20/2018     Last Written Prescription Date:  8/20/18  THIS IS TODAY  Last Fill Quantity: 60,  # refills: 11   Last office visit: No previous visit found with prescribing provider:  Nidia   Future Office Visit:  none    Requested Prescriptions   Pending Prescriptions Disp Refills     tamsulosin (FLOMAX) 0.4 MG capsule [Pharmacy Med Name: TAMSULOSIN 0.4MG CAPSULES] 180 capsule 11     Sig: TAKE 2 CAPSULES(0.8 MG) BY MOUTH DAILY    Alpha Blockers Failed    8/20/2018  2:00 PM       Failed - Blood pressure under 140/90 in past 12 months    BP Readings from Last 3 Encounters:   08/20/18 157/89   03/27/18 133/69   01/10/18 148/65                Passed - Recent (12 mo) or future (30 days) visit within the authorizing provider's specialty    Patient had office visit in the last 12 months or has a visit in the next 30 days with authorizing provider or within the authorizing provider's specialty.  See \"Patient Info\" tab in inbasket, or \"Choose Columns\" in Meds & Orders section of the refill encounter.           Passed - Patient does not have Tadalafil, Vardenafil, or Sildenafil on their medication list       Passed - Patient is 18 years of age or older        fluticasone (FLONASE) 50 MCG/ACT spray [Pharmacy Med Name: FLUTICASONE 50MCG NASAL SP (120) RX] 48 mL 11     Sig: SHAKE LIQUID AND USE 1 TO 2 SPRAYS IN EACH NOSTRIL DAILY    Inhaled Steroids Protocol Passed    8/20/2018  2:00 PM       Passed - Patient is age 12 or older       Passed - Recent (12 mo) or future (30 days) visit within the authorizing provider's specialty    Patient had office visit in the last 12 months or has a visit in the next 30 days with authorizing provider or within the authorizing provider's specialty.  See \"Patient Info\" tab in [employer], or \"Choose Columns\" in Meds & Orders section of the refill encounter.            No flowsheet data found.     fluticasone (FLONASE) 50 MCG/ACT spray 1 Bottle 11 " 8/20/2018       Last Written Prescription Date:  8/20/18  Last Fill Quantity: 1 Bottle,  # refills: 11   Last office visit: No previous visit found with prescribing provider:  Nidia Adair Office Visit:

## 2018-08-20 NOTE — PROGRESS NOTES
HPI: Casey is a 78 yo male here for f/u DM and HTN  [facility]  Lipid profile okay with LDL of 59  A1c was down to 5.6%  Cr was stable at 1.7  He is not sure if he had his eye exam this year.    He stopped his valsartan due to dizziness and low blood pressure  He used to be on norvasc which caused edema  Losartan didn't control his BP  He continues to take metoprolol 50mg bid  [facility]; [facility] Epic  He has nasal congestion R side every night since spring  The discharge is green   He had a Zpack and prednisone in March which helped the cough  Taking mucinex without relief.      He is taking elavil 50mg at bedtime and his chronic low back pain is little less bothersome  We discussed Dr. Kurtz's note that surgery not an option for him.    Past Medical History:   Diagnosis Date     Arthritis      BPH (benign prostatic hyperplasia)      CKD (chronic kidney disease) stage 3, GFR 30-59 ml/min      Cluster headache 2015    Dr. Roth     Constipation      Dyslipidemia      Dyspnea     Normal nuc stress test 9-15-08, fainted once in life     Fatty liver     Ultrasound 11/11     FH: colon cancer     colonoscopy 10/10, repeat 5 years.     Former smoker     quit 2008     HTN (hypertension), benign      Peripheral neuropathy     Dr. Patricia     Spinal stenosis     Last MRI 2010, Dr. Fraire     Syncope      Type 2 diabetes mellitus (H)     diet controlled     Ureterolithiases      Past Surgical History:   Procedure Laterality Date     KERATOTOMY ARCUATE WITH FEMTOSECOND LASER/IMAGING FOR ATIOL Left 8/29/2016    Procedure: KERATOTOMY ARCUATE WITH FEMTOSECOND LASER/IMAGING FOR ATIOL;  Surgeon: Gerard Larson MD;  Location: [facility]     ORTHOPEDIC SURGERY       PHACOEMULSIFICATION CLEAR CORNEA WITH STANDARD INTRAOCULAR LENS IMPLANT Left 8/29/2016    Procedure: PHACOEMULSIFICATION CLEAR CORNEA WITH STANDARD INTRAOCULAR LENS IMPLANT;  Surgeon: Gerard Larson MD;  Location: [facility]     Thumb surg        Social History   Substance Use Topics     Smoking status: Former Smoker     Packs/day: 1.00     Years: 56.00     Types: Cigarettes     Quit date: 1/1/2008     Smokeless tobacco: Never Used      Comment: quit 2008     Alcohol use No     Current Outpatient Prescriptions   Medication Sig Dispense Refill     amitriptyline (ELAVIL) 50 MG tablet TAKE 1 TABLET(50 MG) BY MOUTH AT BEDTIME 90 tablet 3     aspirin 81 MG tablet Take 1 tablet by mouth daily.       atorvastatin (LIPITOR) 20 MG tablet Take 20 mg by mouth every evening       cefuroxime (CEFTIN) 500 MG tablet Take 1 tablet (500 mg) by mouth 2 times daily 20 tablet 0     Cholecalciferol (VITAMIN D3) 5000 UNITS TABS Take 1 tablet by mouth daily.       Coenzyme Q10 (CO Q 10) 100 MG CAPS Take 1 capsule by mouth daily       cyabnocobalamin (VITAMIN B-12) 2500 MCG sublingual tablet Place 3,000 mcg under the tongue daily  30 tablet      finasteride (PROSCAR) 5 MG tablet Take 1 tablet (5 mg) by mouth daily 90 tablet 3     fluticasone (FLONASE) 50 MCG/ACT spray Spray 1-2 sprays into both nostrils daily 1 Bottle 11     guaiFENesin (MUCINEX) 600 MG 12 hr tablet Take 2 tablets (1,200 mg) by mouth daily 40 tablet 0     irbesartan (AVAPRO) 150 MG tablet Take 1 tablet (150 mg) by mouth daily 90 tablet 1     loratadine (CLARITIN) 10 MG tablet Take 1 tablet (10 mg) by mouth daily 30 tablet 1     metoprolol (LOPRESSOR) 50 MG tablet Take 1 tablet (50 mg) by mouth 2 times daily 270 tablet 1     Multiple Vitamin (MULTI-VITAMIN) per tablet Take 1 tablet by mouth daily.       omega-3 acid ethyl esters (LOVAZA) 1 G capsule Take 2 capsules (2 g) by mouth daily 360 capsule 3     tamsulosin (FLOMAX) 0.4 MG capsule Take 2 capsules (0.8 mg) by mouth daily 60 capsule 11     VENTOLIN  (90 BASE) MCG/ACT Inhaler INHALE 2 PUFFS INTO THE LUNGS EVERY 6 HOURS AS NEEDED FOR SHORTNESS OF BREATH OR DIFFICULT BREATHING OR WHEEZING (Patient not taking: Reported on 8/20/2018) 18 g 3      "[DISCONTINUED] amitriptyline (ELAVIL) 50 MG tablet TAKE 1 TABLET(50 MG) BY MOUTH AT BEDTIME 90 tablet 1     [DISCONTINUED] irbesartan (AVAPRO) 300 MG tablet Take 1 tablet (300 mg) by mouth daily (Patient not taking: Reported on 8/20/2018) 90 tablet 1     Allergies   Allergen Reactions     Lisinopril Shortness Of Breath and Fatigue     Amoxicillin Itching     Duloxetine      \"I can't take it\"     Levofloxacin      \"I can't take the 500 mgs\"     Neurontin [Gabapentin]      edema     Norvasc [Amlodipine]      edema     Oxycodone GI Disturbance     Penicillins Itching     Red Yeast Rice Extract [Monascus Purpureus Went Yeast] Hives and Itching     Tramadol Itching     Verapamil Itching     Vicodin [Hydrocodone-Acetaminophen] Itching     FAMILY HISTORY NOTED AND REVIEWED  PHYSICAL EXAM:    /84  Pulse 101  Temp 97.8  F (36.6  C) (Oral)  Resp 12  Wt 227 lb (103 kg)  SpO2 93%  BMI 32.34 kg/m2    Patient appears non toxic  Heart; RRR without m/r/g  Extr:no edema.  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    (I10) Benign essential hypertension  Comment: discussed due to DM and CKD he should be on an ARB.  He was allergic to ACE.  He stopped valsartan on his own due to low BP and lightheadedness.  I called in new avapro, but he did not pick this up.  Will send in lower dose and he is to check BP and [facility] with results in 3 weels.  Plan: irbesartan (AVAPRO) 150 MG tablet        qd    (N18.3) CKD (chronic kidney disease) stage 3, GFR 30-59 ml/min  Comment:   Plan: restarting ARB today, DM well controlled    (N40.1,  R39.12) Benign prostatic hyperplasia with weak urinary stream  Comment:   Plan: tamsulosin (FLOMAX) 0.4 MG capsule        Okay to try " higher dose of 0.8mg    (R09.81) Chronic nasal congestion  Comment:   Plan: fluticasone (FLONASE) 50 MCG/ACT spray            (J01.90) Acute sinusitis with symptoms > 10 days  Comment:   Plan: cefuroxime (CEFTIN) 500 MG tablet          Patient Instructions   Start flonase nasal spray for your nasal congestion 2 sprays in right nostril and one spray in L nostril every night  If you continue to have problems, you can fill the prescription for the antibiotic (Ceftin)    Start irbesartan (Avapro) for your blood pressure. This also helps protect the kidneys    Stay on metoprolol 50mg twice per day for your blood pressure.    Okay to increase Flomax to 0.8mg (2 pills) for the prostate.     Make sure you have an updated eye exam.          Josephine Jacob PA-C

## 2018-08-20 NOTE — MR AVS SNAPSHOT
After Visit Summary   8/20/2018    Luis Daniel Gomez    MRN: [id number]           Patient Information     Date Of Birth          1940        Visit Information        Provider Department      8/20/2018 1:30 PM Josephine Jacob PA-C [facility]        Today's Diagnoses     Benign essential hypertension    -  1    CKD (chronic kidney disease) stage 3, GFR 30-59 ml/min        Benign prostatic hyperplasia with weak urinary stream        Acute sinusitis with symptoms > 10 days        Chronic nasal congestion          Care Instructions    Start flonase nasal spray for your nasal congestion 2 sprays in right nostril and one spray in L nostril every night  If you continue to have problems, you can fill the prescription for the antibiotic (Ceftin)    Start irbesartan (Avapro) for your blood pressure. This also helps protect the kidneys    Stay on metoprolol 50mg twice per day for your blood pressure.    Okay to increase Flomax to 0.8mg (2 pills) for the prostate.     Make sure you have an updated eye exam.          Follow-ups after your visit        Who to contact     If you have questions or need follow up information about today's clinic visit or your schedule please contact [facility] directly at [phone].  Normal or non-critical lab and imaging results will be communicated to you by MyChart, letter or phone within 4 business days after the clinic has received the results. If you do not hear from us within 7 days, please contact the clinic through [facility]Cube[facility] or phone. If you have a critical or abnormal lab result, we will notify you by phone as soon as possible.  Submit refill requests through [facility] or call your pharmacy and they will forward the refill request to us. Please allow 3 business days for your refill to be completed.          Additional Information About Your Visit        MyChart Information     [facility] gives you secure access to your electronic health record. If you  see a primary care provider, you can also send messages to your care team and make appointments. If you have questions, please call your primary care clinic.  If you do not have a primary care provider, please call [phone] and they will assist you.        Care EveryWhere ID     This is your Care EveryWhere ID. This could be used by other organizations to access your [facility] medical records  [id number]        Your Vitals Were     Pulse Temperature Respirations Pulse Oximetry BMI (Body Mass Index)       101 97.8  F (36.6  C) (Oral) 12 93% 32.34 kg/m2        Blood Pressure from Last 3 Encounters:   08/20/18 157/89   03/27/18 133/69   01/10/18 148/65    Weight from Last 3 Encounters:   08/20/18 227 lb (103 kg)   03/27/18 231 lb (104.8 kg)   01/10/18 238 lb (108 kg)              Today, you had the following     No orders found for display         Today's Medication Changes          These changes are accurate as of 8/20/18  1:53 PM.  If you have any questions, ask your nurse or doctor.               Start taking these medicines.        Dose/Directions    cefuroxime 500 MG tablet   Commonly known as:  CEFTIN   Used for:  Acute sinusitis with symptoms > 10 days   Started by:  Josephine Jacob PA-C        Dose:  500 mg   Take 1 tablet (500 mg) by mouth 2 times daily   Quantity:  20 tablet   Refills:  0       fluticasone 50 MCG/ACT spray   Commonly known as:  FLONASE   Used for:  Chronic nasal congestion   Started by:  Josephine Jacob PA-C        Dose:  1-2 spray   Spray 1-2 sprays into both nostrils daily   Quantity:  1 Bottle   Refills:  11         These medicines have changed or have updated prescriptions.        Dose/Directions    * irbesartan 300 MG tablet   Commonly known as:  AVAPRO   This may have changed:  Another medication with the same name was added. Make sure you understand how and when to take each.   Used for:  Benign essential hypertension   Changed by:  Josephine Jacob PA-C        Dose:  300 mg    Take 1 tablet (300 mg) by mouth daily   Quantity:  90 tablet   Refills:  1       * irbesartan 150 MG tablet   Commonly known as:  AVAPRO   This may have changed:  You were already taking a medication with the same name, and this prescription was added. Make sure you understand how and when to take each.   Used for:  Benign essential hypertension   Changed by:  Josephine Jacob PA-C        Dose:  150 mg   Take 1 tablet (150 mg) by mouth daily   Quantity:  90 tablet   Refills:  1       tamsulosin 0.4 MG capsule   Commonly known as:  FLOMAX   This may have changed:  how much to take   Used for:  Benign prostatic hyperplasia with weak urinary stream   Changed by:  Josephine Jacob PA-C        Dose:  0.8 mg   Take 2 capsules (0.8 mg) by mouth daily   Quantity:  60 capsule   Refills:  11       * Notice:  This list has 2 medication(s) that are the same as other medications prescribed for you. Read the directions carefully, and ask your doctor or other care provider to review them with you.         Where to get your medicines      These medications were sent to [facility] [address] [place] [address] & [address], [place] [address]     Phone:  [phone]     cefuroxime 500 MG tablet    fluticasone 50 MCG/ACT spray    irbesartan 150 MG tablet    tamsulosin 0.4 MG capsule                Primary Care Provider Office Phone # Fax #    Josephine Jacob PA-C [phone] [address] [facility] [address]  [facility] [address]        Equal Access to Services     MICAHEL JIMENEZ : Hadii aad ku hadasho Soomaali, waaxda luqadaha, qaybta kaalmada adeegyada, regla blas. So [facility] [phone].    ATENCIÓN: Si habla español, tiene a machuca disposición servicios gratuitos de asistencia lingüística. Llame al [phone].    We comply with applicable federal civil rights laws and [place] laws. We do not discriminate on the basis of race, color,  national origin, age, disability, sex, sexual orientation, or gender identity.            Thank you!     Thank you for choosing [facility]  for your care. Our goal is always to provide you with excellent care. Hearing back from our patients is one way we can continue to improve our services. Please take a few minutes to complete the written survey that you may receive in the mail after your visit with us. Thank you!             Your Updated Medication List - Protect others around you: Learn how to safely use, store and throw away your medicines at www.disposemymeds.org.          This list is accurate as of 8/20/18  1:53 PM.  Always use your most recent med list.                   Brand Name Dispense Instructions for use Diagnosis    amitriptyline 50 MG tablet    ELAVIL    90 tablet    TAKE 1 TABLET(50 MG) BY MOUTH AT BEDTIME    Spinal stenosis of lumbar region, unspecified whether neurogenic claudication present       aspirin 81 MG tablet      Take 1 tablet by mouth daily.        atorvastatin 20 MG tablet    LIPITOR     Take 20 mg by mouth every evening        cefuroxime 500 MG tablet    CEFTIN    20 tablet    Take 1 tablet (500 mg) by mouth 2 times daily    Acute sinusitis with symptoms > 10 days       cholecalciferol 5000 units Tabs tablet    vitamin D3     Take 1 tablet by mouth daily.        CLARITIN 10 MG tablet   Generic drug:  loratadine     30 tablet    Take 1 tablet (10 mg) by mouth daily        Co Q 10 100 MG Caps      Take 1 capsule by mouth daily        cyanocobalamin 2500 MCG sublingual tablet    VITAMIN B-12    30 tablet    Place 3,000 mcg under the tongue daily        finasteride 5 MG tablet    PROSCAR    90 tablet    Take 1 tablet (5 mg) by mouth daily    Benign non-nodular prostatic hyperplasia with lower urinary tract symptoms       fluticasone 50 MCG/ACT spray    FLONASE    1 Bottle    Spray 1-2 sprays into both nostrils daily    Chronic nasal congestion       * irbesartan 300 MG  tablet    AVAPRO    90 tablet    Take 1 tablet (300 mg) by mouth daily    Benign essential hypertension       * irbesartan 150 MG tablet    AVAPRO    90 tablet    Take 1 tablet (150 mg) by mouth daily    Benign essential hypertension       metoprolol tartrate 50 MG tablet    LOPRESSOR    270 tablet    Take 1 tablet (50 mg) by mouth 2 times daily    HTN (hypertension), benign       MUCINEX 600 MG 12 hr tablet   Generic drug:  guaiFENesin     40 tablet    Take 2 tablets (1,200 mg) by mouth daily        Multi-vitamin Tabs tablet   Generic drug:  multivitamin, therapeutic with minerals      Take 1 tablet by mouth daily.        omega-3 acid ethyl esters 1 g capsule    Lovaza    360 capsule    Take 2 capsules (2 g) by mouth daily        tamsulosin 0.4 MG capsule    FLOMAX    60 capsule    Take 2 capsules (0.8 mg) by mouth daily    Benign prostatic hyperplasia with weak urinary stream       VENTOLIN  (90 Base) MCG/ACT inhaler   Generic drug:  albuterol     18 g    INHALE 2 PUFFS INTO THE LUNGS EVERY 6 HOURS AS NEEDED FOR SHORTNESS OF BREATH OR DIFFICULT BREATHING OR WHEEZING    Acute bronchospasm       * Notice:  This list has 2 medication(s) that are the same as other medications prescribed for you. Read the directions carefully, and ask your doctor or other care provider to review them with you.

## 2018-08-20 NOTE — PATIENT INSTRUCTIONS
Start flonase nasal spray for your nasal congestion 2 sprays in right nostril and one spray in L nostril every night  If you continue to have problems, you can fill the prescription for the antibiotic (Ceftin)    Start irbesartan (Avapro) for your blood pressure. This also helps protect the kidneys    Stay on metoprolol 50mg twice per day for your blood pressure.    Okay to increase Flomax to 0.8mg (2 pills) for the prostate.     Make sure you have an updated eye exam.

## 2018-08-22 RX ORDER — FLUTICASONE PROPIONATE 50 MCG
SPRAY, SUSPENSION (ML) NASAL
Qty: 48 ML | Refills: 3 | Status: SHIPPED | OUTPATIENT
Start: 2018-08-22 | End: 2020-12-22

## 2018-08-22 RX ORDER — TAMSULOSIN HYDROCHLORIDE 0.4 MG/1
CAPSULE ORAL
Qty: 180 CAPSULE | Refills: 3 | Status: SHIPPED | OUTPATIENT
Start: 2018-08-22 | End: 2019-12-18

## 2018-08-22 NOTE — TELEPHONE ENCOUNTER
Pres[place] [facility] [place].    Pt requested 90 day supply, both meds are chronic meds.    Haley GASTON RN

## 2018-09-11 ENCOUNTER — MYC MEDICAL ADVICE (OUTPATIENT)
Dept: FAMILY MEDICINE | Facility: CLINIC | Age: 78
End: 2018-09-11

## 2018-10-26 DIAGNOSIS — M48.061 SPINAL STENOSIS OF LUMBAR REGION, UNSPECIFIED WHETHER NEUROGENIC CLAUDICATION PRESENT: ICD-10-CM

## 2018-10-26 RX ORDER — AMITRIPTYLINE HYDROCHLORIDE 50 MG/1
TABLET ORAL
Start: 2018-10-26

## 2018-10-26 NOTE — TELEPHONE ENCOUNTER
"amitriptyline (ELAVIL) 50 MG tablet  Last Written Prescription Date:  12/19/2017  Last Fill Quantity: 90,  # refills: 3   Last office visit: 8/20/2018 with prescribing provider:  Josephine Jacob   Future Office Visit:      Requested Prescriptions   Pending Prescriptions Disp Refills     amitriptyline (ELAVIL) 50 MG tablet [Pharmacy Med Name: AMITRIPTYLINE 50MG TABLETS] 90 tablet 0     Sig: TAKE 1 TABLET(50 MG) BY MOUTH AT BEDTIME    Tricyclic Agents ( Annual appt and no PHQ9) Passed    10/26/2018  8:55 AM       Passed - Blood Pressure under 140/90 in past 12 mos    BP Readings from Last 3 Encounters:   08/20/18 126/84   03/27/18 133/69   01/10/18 148/65                Passed - Recent (12 mo) or future (30 days) visit within authorizing provider's specialty    Patient had office visit in the last 12 months or has a visit in the next 30 days with authorizing provider or within the authorizing provider's specialty.  See \"Patient Info\" tab in inbasket, or \"Choose Columns\" in Meds & Orders section of the refill encounter.             Passed - Patient is age 18 or older            "

## 2018-10-29 DIAGNOSIS — M48.061 SPINAL STENOSIS OF LUMBAR REGION, UNSPECIFIED WHETHER NEUROGENIC CLAUDICATION PRESENT: ICD-10-CM

## 2018-10-29 NOTE — TELEPHONE ENCOUNTER
"Requested Prescriptions   Pending Prescriptions Disp Refills     amitriptyline (ELAVIL) 50 MG tablet [Pharmacy Med Name: AMITRIPTYLINE 50MG TABLETS]  Last Written Prescription Date:  12/19/2017  Last Fill Quantity: 90,  # refills: 3   Last office visit: 8/20/2018 with prescribing provider:   JUDITH  Future Office Visit:     90 tablet 0     Sig: TAKE 1 TABLET(50 MG) BY MOUTH AT BEDTIME    Tricyclic Agents ( Annual appt and no PHQ9) Passed    10/29/2018  9:31 AM       Passed - Blood Pressure under 140/90 in past 12 mos    BP Readings from Last 3 Encounters:   08/20/18 126/84   03/27/18 133/69   01/10/18 148/65                Passed - Recent (12 mo) or future (30 days) visit within authorizing provider's specialty    Patient had office visit in the last 12 months or has a visit in the next 30 days with authorizing provider or within the authorizing provider's specialty.  See \"Patient Info\" tab in inbasket, or \"Choose Columns\" in Meds & Orders section of the refill encounter.             Passed - Patient is age 18 or older          "

## 2018-10-31 RX ORDER — AMITRIPTYLINE HYDROCHLORIDE 50 MG/1
TABLET ORAL
Qty: 90 TABLET | Refills: 3 | Status: SHIPPED | OUTPATIENT
Start: 2018-10-31 | End: 2020-01-20

## 2018-10-31 NOTE — TELEPHONE ENCOUNTER
Routing refill request to provider for review/approval because:  Not on RN Refill Protocol for pt's diagnosis.     Please review and authorize if appropriate,     Thank you,   Haley MILIAN RN

## 2018-11-28 ENCOUNTER — DOCUMENTATION ONLY (OUTPATIENT)
Dept: LAB | Facility: CLINIC | Age: 78
End: 2018-11-28

## 2018-11-28 DIAGNOSIS — I10 HTN (HYPERTENSION), BENIGN: ICD-10-CM

## 2018-11-28 DIAGNOSIS — E08.21 DIABETES MELLITUS DUE TO UNDERLYING CONDITION WITH DIABETIC NEPHROPATHY, WITHOUT LONG-TERM CURRENT USE OF INSULIN (H): ICD-10-CM

## 2018-11-28 DIAGNOSIS — E78.5 HYPERLIPIDEMIA WITH TARGET LDL LESS THAN 100: ICD-10-CM

## 2018-11-28 DIAGNOSIS — N18.30 CKD (CHRONIC KIDNEY DISEASE) STAGE 3, GFR 30-59 ML/MIN (H): ICD-10-CM

## 2018-11-28 DIAGNOSIS — Z00.00 ENCOUNTER FOR ROUTINE ADULT HEALTH EXAMINATION WITHOUT ABNORMAL FINDINGS: Primary | ICD-10-CM

## 2018-12-12 DIAGNOSIS — N18.30 CKD (CHRONIC KIDNEY DISEASE) STAGE 3, GFR 30-59 ML/MIN (H): ICD-10-CM

## 2018-12-12 DIAGNOSIS — E78.5 HYPERLIPIDEMIA WITH TARGET LDL LESS THAN 100: ICD-10-CM

## 2018-12-12 DIAGNOSIS — Z00.00 ENCOUNTER FOR ROUTINE ADULT HEALTH EXAMINATION WITHOUT ABNORMAL FINDINGS: ICD-10-CM

## 2018-12-12 DIAGNOSIS — E08.21 DIABETES MELLITUS DUE TO UNDERLYING CONDITION WITH DIABETIC NEPHROPATHY, WITHOUT LONG-TERM CURRENT USE OF INSULIN (H): ICD-10-CM

## 2018-12-12 DIAGNOSIS — I10 HTN (HYPERTENSION), BENIGN: ICD-10-CM

## 2018-12-12 LAB
ALBUMIN SERPL-MCNC: 3.6 G/DL (ref 3.4–5)
ALP SERPL-CCNC: 89 U/L (ref 40–150)
ALT SERPL W P-5'-P-CCNC: 28 U/L (ref 0–70)
ANION GAP SERPL CALCULATED.3IONS-SCNC: 9 MMOL/L (ref 3–14)
AST SERPL W P-5'-P-CCNC: 23 U/L (ref 0–45)
BILIRUB SERPL-MCNC: 0.5 MG/DL (ref 0.2–1.3)
BUN SERPL-MCNC: 19 MG/DL (ref 7–30)
CALCIUM SERPL-MCNC: 9.3 MG/DL (ref 8.5–10.1)
CHLORIDE SERPL-SCNC: 102 MMOL/L (ref 94–109)
CHOLEST SERPL-MCNC: 139 MG/DL
CO2 SERPL-SCNC: 26 MMOL/L (ref 20–32)
CREAT SERPL-MCNC: 1.7 MG/DL (ref 0.66–1.25)
CREAT UR-MCNC: 150 MG/DL
ERYTHROCYTE [DISTWIDTH] IN BLOOD BY AUTOMATED COUNT: 12.9 % (ref 10–15)
GFR SERPL CREATININE-BSD FRML MDRD: 39 ML/MIN/1.7M2
GLUCOSE SERPL-MCNC: 126 MG/DL (ref 70–99)
HBA1C MFR BLD: 5.6 % (ref 0–5.6)
HCT VFR BLD AUTO: 44 % (ref 40–53)
HDLC SERPL-MCNC: 40 MG/DL
HGB BLD-MCNC: 14.5 G/DL (ref 13.3–17.7)
LDLC SERPL CALC-MCNC: 66 MG/DL
MCH RBC QN AUTO: 31.9 PG (ref 26.5–33)
MCHC RBC AUTO-ENTMCNC: 33 G/DL (ref 31.5–36.5)
MCV RBC AUTO: 97 FL (ref 78–100)
MICROALBUMIN UR-MCNC: 20 MG/L
MICROALBUMIN/CREAT UR: 13.67 MG/G CR (ref 0–17)
NONHDLC SERPL-MCNC: 99 MG/DL
PLATELET # BLD AUTO: 218 10E9/L (ref 150–450)
POTASSIUM SERPL-SCNC: 4.4 MMOL/L (ref 3.4–5.3)
PROT SERPL-MCNC: 7.1 G/DL (ref 6.8–8.8)
RBC # BLD AUTO: 4.54 10E12/L (ref 4.4–5.9)
SODIUM SERPL-SCNC: 137 MMOL/L (ref 133–144)
TRIGL SERPL-MCNC: 164 MG/DL
WBC # BLD AUTO: 9.6 10E9/L (ref 4–11)

## 2018-12-12 PROCEDURE — 83036 HEMOGLOBIN GLYCOSYLATED A1C: CPT | Performed by: PHYSICIAN ASSISTANT

## 2018-12-12 PROCEDURE — 82043 UR ALBUMIN QUANTITATIVE: CPT | Performed by: PHYSICIAN ASSISTANT

## 2018-12-12 PROCEDURE — 80053 COMPREHEN METABOLIC PANEL: CPT | Performed by: PHYSICIAN ASSISTANT

## 2018-12-12 PROCEDURE — 85027 COMPLETE CBC AUTOMATED: CPT | Performed by: PHYSICIAN ASSISTANT

## 2018-12-12 PROCEDURE — 36415 COLL VENOUS BLD VENIPUNCTURE: CPT | Performed by: PHYSICIAN ASSISTANT

## 2018-12-12 PROCEDURE — 80061 LIPID PANEL: CPT | Performed by: PHYSICIAN ASSISTANT

## 2018-12-17 ENCOUNTER — OFFICE VISIT (OUTPATIENT)
Dept: FAMILY MEDICINE | Facility: CLINIC | Age: 78
End: 2018-12-17
Payer: COMMERCIAL

## 2018-12-17 VITALS
SYSTOLIC BLOOD PRESSURE: 135 MMHG | OXYGEN SATURATION: 96 % | TEMPERATURE: 97.5 F | HEART RATE: 74 BPM | BODY MASS INDEX: 32.93 KG/M2 | WEIGHT: 230 LBS | HEIGHT: 70 IN | DIASTOLIC BLOOD PRESSURE: 70 MMHG

## 2018-12-17 DIAGNOSIS — I10 HTN (HYPERTENSION), BENIGN: ICD-10-CM

## 2018-12-17 DIAGNOSIS — E11.21 TYPE 2 DIABETES MELLITUS WITH DIABETIC NEPHROPATHY, WITHOUT LONG-TERM CURRENT USE OF INSULIN (H): ICD-10-CM

## 2018-12-17 DIAGNOSIS — J32.9 CHRONIC SINUSITIS, UNSPECIFIED LOCATION: ICD-10-CM

## 2018-12-17 DIAGNOSIS — N18.30 CKD (CHRONIC KIDNEY DISEASE) STAGE 3, GFR 30-59 ML/MIN (H): ICD-10-CM

## 2018-12-17 DIAGNOSIS — Z13.89 SCREENING FOR DIABETIC PERIPHERAL NEUROPATHY: ICD-10-CM

## 2018-12-17 DIAGNOSIS — Z00.00 ENCOUNTER FOR MEDICARE ANNUAL WELLNESS EXAM: Primary | ICD-10-CM

## 2018-12-17 DIAGNOSIS — E78.5 HYPERLIPIDEMIA WITH TARGET LDL LESS THAN 100: ICD-10-CM

## 2018-12-17 PROCEDURE — 99397 PER PM REEVAL EST PAT 65+ YR: CPT | Performed by: PHYSICIAN ASSISTANT

## 2018-12-17 PROCEDURE — 99207 C FOOT EXAM  NO CHARGE: CPT | Mod: 25 | Performed by: PHYSICIAN ASSISTANT

## 2018-12-17 RX ORDER — PREDNISONE 20 MG/1
20 TABLET ORAL DAILY
Qty: 7 TABLET | Refills: 0 | Status: SHIPPED | OUTPATIENT
Start: 2018-12-17 | End: 2018-12-24

## 2018-12-17 RX ORDER — AZITHROMYCIN 250 MG/1
TABLET, FILM COATED ORAL
Qty: 6 TABLET | Refills: 0 | Status: SHIPPED | OUTPATIENT
Start: 2018-12-17 | End: 2019-12-18

## 2018-12-17 ASSESSMENT — MIFFLIN-ST. JEOR: SCORE: 1768.49

## 2018-12-17 NOTE — PROGRESS NOTES
"  SUBJECTIVE:   Luis Daniel Gomez is a 77 year old male who presents for Preventive Visit.    [facility]  He main concern today is that he has persistent nasal congestion/sneezing  Tried flonase without relief; R nostril feels plugged and had green nasal drainage  He is no longer using flonase  Has some cough prod of sputum but not sob  He tried antibiotics in march and that did help  He did see ENT for another issue in 2015 (headaches)  He tried taking Claritin regularly which didn't really help  He has tried a facial steamer regularly for the past month  Worse when supine and better after getting up and moving around  His LE neuropathy have been stable for 9 years  HTN: brings in readings of 111/69, 127/6, 124/66, 124/74, 125/75, 115/69, 125/77    Are you in the first 12 months of your Medicare Part B coverage?  No    Physical Health:    In general, how would you rate your overall physical health? excellent    Outside of work, how many days during the week do you exercise? 6-7 days/week    Outside of work, approximately how many minutes a day do you exercise?45-60 minutes    If you drink alcohol do you typically have >3 drinks per day or >7 drinks per week? No    Do you usually eat at least 4 servings of fruit and vegetables a day, include whole grains & fiber and avoid regularly eating high fat or \"junk\" foods? NO but taking M Vit     Do you have any problems taking medications regularly?  No    Do you have any side effects from medications? maybe dry mouth    Needs assistance for the following daily activities: no assistance needed    Which of the following safety concerns are present in your home?  none identified     Hearing impairment: Yes[facility]  Does not wear hearing aids.     In the past 6 months, have you been bothered by leaking of urine? no    Mental Health:    In general, how would you rate your overall mental or emotional health? excellent  PHQ-2 Score:      Do you feel " safe in your environment? Yes    Do you have a Health Care Directive? No: Advance care planning reviewed with patient; information given to patient to review.    Additional concerns to address?  No    Fall risk:  Fallen 2 or more times in the past year?: No  Any fall with injury in the past year?: No    Cognitive Screenin) Repeat 3 items (Leader, Season, Table)    2) Clock draw: NORMAL  3) 3 item recall: Recalls 3 objects  Results: 3 items recalled: COGNITIVE IMPAIRMENT LESS LIKELY    Mini-CogTM Copyright NILESH Dickey. Licensed by the author for use in [facility]; reprinted with permission (jose a@[facility]). All rights reserved.      Do you have sleep apnea, excessive snoring or daytime drowsiness?: no      Reviewed and updated as needed this visit by clinical staff  Tobacco  Allergies  Meds         Reviewed and updated as needed this visit by Provider  Allergies  Meds        Social History     Tobacco Use     Smoking status: Former Smoker     Packs/day: 1.00     Years: 56.00     Pack years: 56.00     Types: Cigarettes     Last attempt to quit: 2008     Years since quitting: 10.9     Smokeless tobacco: Never Used     Tobacco comment: quit    Substance Use Topics     Alcohol use: No     Alcohol/week: 0.0 oz                           Current providers sharing in care for this patient include:   Patient Care Team:  Josephine Jacob PA-C as PCP - General (Internal Medicine)    The following health maintenance items are reviewed in Epic and correct as of today:  Health Maintenance   Topic Date Due     EYE EXAM Q1 YEAR  2016     FOOT EXAM Q1 YEAR  2018     FALL RISK ASSESSMENT  2018     A1C Q6 MO  2019     PHQ-2 Q1 YR  2019     ADVANCE DIRECTIVE PLANNING Q5 YRS  2019     TSH W/ FREE T4 REFLEX Q2 YEAR  2019     CREATININE Q1 YEAR  2019     BMP Q1 YR  2019     HEMOGLOBIN Q1 YR  2019     LIPID MONITORING Q1 YEAR  2019     MICROALBUMIN  Q1 YEAR  12/12/2019     COLONOSCOPY Q5 YR  01/05/2021     DTAP/TDAP/TD IMMUNIZATION (3 - Td) 11/05/2022     INFLUENZA VACCINE  Completed     PNEUMOVAX IMMUNIZATION 65+ LOW/MEDIUM RISK  Completed     ZOSTER IMMUNIZATION  Completed     IPV IMMUNIZATION  Aged Out     MENINGITIS IMMUNIZATION  Aged Out       Past Medical History:   Diagnosis Date     Arthritis      BPH (benign prostatic hyperplasia)      CKD (chronic kidney disease) stage 3, GFR 30-59 ml/min (H)      Cluster headache 2015    Dr. Ira Finn      Dyslipidemia      Dyspnea     Normal nuc stress test 9-15-08, fainted once in life     Fatty liver     Ultrasound 11/11     FH: colon cancer     colonoscopy 10/10, repeat 5 years.     Former smoker     quit 2008     HTN (hypertension), benign      Peripheral neuropathy     Dr. Patricia     Spinal stenosis     Last MRI 2010, Dr. Fraire     Syncope      Type 2 diabetes mellitus (H)     diet controlled     Ureterolithiases      Past Medical History:   Diagnosis Date     Arthritis      BPH (benign prostatic hyperplasia)      CKD (chronic kidney disease) stage 3, GFR 30-59 ml/min (H)      Cluster headache 2015    Dr. Ira Finn      Dyslipidemia      Dyspnea     Normal nuc stress test 9-15-08, fainted once in life     Fatty liver     Ultrasound 11/11     FH: colon cancer     colonoscopy 10/10, repeat 5 years.     Former smoker     quit 2008     HTN (hypertension), benign      Peripheral neuropathy     Dr. Patricia     Spinal stenosis     Last MRI 2010, Dr. Fraire     Syncope      Type 2 diabetes mellitus (H)     diet controlled     Ureterolithiases      Current Outpatient Medications   Medication Sig Dispense Refill     amitriptyline (ELAVIL) 50 MG tablet TAKE 1 TABLET(50 MG) BY MOUTH AT BEDTIME 90 tablet 3     aspirin 81 MG tablet Take 1 tablet by mouth daily.       atorvastatin (LIPITOR) 20 MG tablet Take 20 mg by mouth every evening       Cholecalciferol (VITAMIN D3) 5000 UNITS TABS Take 1 tablet by  "mouth daily.       Coenzyme Q10 (CO Q 10) 100 MG CAPS Take 1 capsule by mouth daily       finasteride (PROSCAR) 5 MG tablet Take 1 tablet (5 mg) by mouth daily 90 tablet 3     metoprolol (LOPRESSOR) 50 MG tablet Take 1 tablet (50 mg) by mouth 2 times daily 270 tablet 1     Multiple Vitamin (MULTI-VITAMIN) per tablet Take 1 tablet by mouth daily.       omega-3 acid ethyl esters (LOVAZA) 1 G capsule Take 2 capsules (2 g) by mouth daily 360 capsule 3     tamsulosin (FLOMAX) 0.4 MG capsule TAKE 2 CAPSULES(0.8 MG) BY MOUTH DAILY 180 capsule 3     cyabnocobalamin (VITAMIN B-12) 2500 MCG sublingual tablet Place 3,000 mcg under the tongue daily  30 tablet      fluticasone (FLONASE) 50 MCG/ACT spray SHAKE LIQUID AND USE 1 TO 2 SPRAYS IN EACH NOSTRIL DAILY 48 mL 3     guaiFENesin (MUCINEX) 600 MG 12 hr tablet Take 2 tablets (1,200 mg) by mouth daily 40 tablet 0     irbesartan (AVAPRO) 150 MG tablet Take 1 tablet (150 mg) by mouth daily 90 tablet 1     loratadine (CLARITIN) 10 MG tablet Take 1 tablet (10 mg) by mouth daily 30 tablet 1     VENTOLIN  (90 BASE) MCG/ACT Inhaler INHALE 2 PUFFS INTO THE LUNGS EVERY 6 HOURS AS NEEDED FOR SHORTNESS OF BREATH OR DIFFICULT BREATHING OR WHEEZING 18 g 3           ROS:  Constitutional, HEENT, cardiovascular, pulmonary, GI, , musculoskeletal, neuro, skin, endocrine and psych systems are negative, except as otherwise noted.    OBJECTIVE:   /70 (BP Location: Right arm, Patient Position: Chair, Cuff Size: Adult Large)   Pulse 74   Temp 97.5  F (36.4  C) (Oral)   Ht 1.784 m (5' 10.25\")   Wt 104.3 kg (230 lb)   SpO2 96%   BMI 32.77 kg/m   Estimated body mass index is 32.77 kg/m  as calculated from the following:    Height as of this encounter: 1.784 m (5' 10.25\").    Weight as of this encounter: 104.3 kg (230 lb).  EXAM:   GENERAL: healthy, alert and no distress  EYES: Eyes grossly normal to inspection, PERRL and conjunctivae and sclerae normal  HENT: ear canals and TM's " normal, nose and mouth without ulcers or lesions  NECK: no adenopathy, no asymmetry, masses, or scars and thyroid normal to palpation  RESP: lungs clear to auscultation - no rales, rhonchi or wheezes  CV: regular rate and rhythm, normal S1 S2, no S3 or S4, no murmur, click or rub, no peripheral edema and peripheral pulses strong  ABDOMEN: soft, nontender, no hepatosplenomegaly, no masses and bowel sounds normal  MS: no gross musculoskeletal defects noted, no edema  SKIN: no suspicious lesions or rashes  NEURO: Normal strength and tone, mentation intact and speech normal  PSYCH: mentation appears normal, affect normal/bright    Results for orders placed or performed in visit on 12/12/18   Albumin Random Urine Quantitative with Creat Ratio   Result Value Ref Range    Creatinine Urine 150 mg/dL    Albumin Urine mg/L 20 mg/L    Albumin Urine mg/g Cr 13.67 0 - 17 mg/g Cr   Lipid Profile   Result Value Ref Range    Cholesterol 139 <200 mg/dL    Triglycerides 164 (H) <150 mg/dL    HDL Cholesterol 40 >39 mg/dL    LDL Cholesterol Calculated 66 <100 mg/dL    Non HDL Cholesterol 99 <130 mg/dL   CBC with platelets   Result Value Ref Range    WBC 9.6 4.0 - 11.0 10e9/L    RBC Count 4.54 4.4 - 5.9 10e12/L    Hemoglobin 14.5 13.3 - 17.7 g/dL    Hematocrit 44.0 40.0 - 53.0 %    MCV 97 78 - 100 fl    MCH 31.9 26.5 - 33.0 pg    MCHC 33.0 31.5 - 36.5 g/dL    RDW 12.9 10.0 - 15.0 %    Platelet Count 218 150 - 450 10e9/L   Comprehensive metabolic panel   Result Value Ref Range    Sodium 137 133 - 144 mmol/L    Potassium 4.4 3.4 - 5.3 mmol/L    Chloride 102 94 - 109 mmol/L    Carbon Dioxide 26 20 - 32 mmol/L    Anion Gap 9 3 - 14 mmol/L    Glucose 126 (H) 70 - 99 mg/dL    Urea Nitrogen 19 7 - 30 mg/dL    Creatinine 1.70 (H) 0.66 - 1.25 mg/dL    GFR Estimate 39 (L) >60 mL/min/1.7m2    GFR Estimate If Black 47 (L) >60 mL/min/1.7m2    Calcium 9.3 8.5 - 10.1 mg/dL    Bilirubin Total 0.5 0.2 - 1.3 mg/dL    Albumin 3.6 3.4 - 5.0 g/dL    Protein  "Total 7.1 6.8 - 8.8 g/dL    Alkaline Phosphatase 89 40 - 150 U/L    ALT 28 0 - 70 U/L    AST 23 0 - 45 U/L   Hemoglobin A1c   Result Value Ref Range    Hemoglobin A1C 5.6 0 - 5.6 %         ASSESSMENT / PLAN:   Assessment and Plan:     (Z00.00) Encounter for Medicare annual wellness exam  (primary encounter diagnosis)  Comment:   Plan:  previsit labs reviewed with pt. Immunizations and colonoscopy up to date.    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 Weight control discussed.    (I10) HTN (hypertension), benign  Comment:   Plan: well controlled, cont same    (N18.3) CKD (chronic kidney disease) stage 3, GFR 30-59 ml/min (H)  Comment:   Plan: stable over the years. Cont to monitor. HTN and DM well controlled and he is on an ARB and avoids nsaids.    (E78.5) Hyperlipidemia with target LDL less than 100  Comment:   Plan: LDL at goal, cont same    (Z13.89) Screening for diabetic peripheral neuropathy  Comment:   Plan: FOOT EXAM  NO CHARGE [10161.114]              Josephine Jacob PA-C        End of Life Planning:  Patient currently has an advanced directive: Yes.  Practitioner is supportive of decision.    COUNSELING:  Reviewed preventive health counseling, as reflected in patient instructions    BP Readings from Last 1 Encounters:   12/17/18 135/70     Estimated body mass index is 32.77 kg/m  as calculated from the following:    Height as of this encounter: 1.784 m (5' 10.25\").    Weight as of this encounter: 104.3 kg (230 lb).           reports that he quit smoking about 10 years ago. His smoking use included cigarettes. He has a 56.00 pack-year smoking history. he has never used smokeless tobacco.      Appropriate preventive services were discussed with this patient, " including applicable screening as appropriate for cardiovascular disease, diabetes, osteopenia/osteoporosis, and glaucoma.  As appropriate for age/gender, discussed screening for colorectal cancer, prostate cancer, breast cancer, and cervical cancer. Checklist reviewing preventive services available has been given to the patient.    Reviewed patients plan of care and provided an AVS. The Basic Care Plan (routine screening as documented in Health Maintenance) for Luis Daniel meets the Care Plan requirement. This Care Plan has been established and reviewed with the Patient.    Counseling Resources:  ATP IV Guidelines  Pooled Cohorts Equation Calculator  Breast Cancer Risk Calculator  FRAX Risk Assessment  ICSI Preventive Guidelines  Dietary Guidelines for Americans, 2010  USDA's MyPlate  ASA Prophylaxis  Lung CA Screening    Josephine Jacob PA-C  [facility]

## 2018-12-17 NOTE — PATIENT INSTRUCTIONS
Preventive Health Recommendations:     See your health care provider every year to    Review health changes.     Discuss preventive care.      Review your medicines if your doctor has prescribed any.    Talk with your health care provider about whether you should have a test to screen for prostate cancer (PSA).    Every 3 years, have a diabetes test (fasting glucose). If you are at risk for diabetes, you should have this test more often.    Every 5 years, have a cholesterol test. Have this test more often if you are at risk for high cholesterol or heart disease.     Every 10 years, have a colonoscopy. Or, have a yearly FIT test (stool test). These exams will check for colon cancer.    Talk to with your health care provider about screening for Abdominal Aortic Aneurysm if you have a family history of AAA or have a history of smoking.  Shots:     Get a flu shot each year.     Get a tetanus shot every 10 years.     Talk to your doctor about your pneumonia vaccines. There are now two you should receive - Pneumovax (PPSV 23) and Prevnar (PCV 13).    Talk to your pharmacist about a shingles vaccine.     Talk to your doctor about the hepatitis B vaccine.  Nutrition:     Eat at least 5 servings of fruits and vegetables each day.     Eat whole-grain bread, whole-wheat pasta and brown rice instead of white grains and rice.     Get adequate Calcium and Vitamin D.   Lifestyle    Exercise for at least 150 minutes a week (30 minutes a day, 5 days a week). This will help you control your weight and prevent disease.     Limit alcohol to one drink per day.     No smoking.     Wear sunscreen to prevent skin cancer.     See your dentist every six months for an exam and cleaning.     See your eye doctor every 1 to 2 years to screen for conditions such as glaucoma, macular degeneration and cataracts.    Personalized Prevention Plan  You are due for the preventive services outlined below.  Your care team is available to assist you in  scheduling these services.  If you have already completed any of these items, please share that information with your care team to update in your medical record.    Health Maintenance Due   Topic Date Due     Eye Exam - yearly  11/02/2016     Diabetic Foot Exam - yearly  12/14/2018     FALL RISK ASSESSMENT  12/14/2018

## 2019-01-02 DIAGNOSIS — N40.1 BENIGN NON-NODULAR PROSTATIC HYPERPLASIA WITH LOWER URINARY TRACT SYMPTOMS: ICD-10-CM

## 2019-01-03 RX ORDER — FINASTERIDE 5 MG/1
1 TABLET, FILM COATED ORAL DAILY
Qty: 90 TABLET | Refills: 3 | Status: SHIPPED | OUTPATIENT
Start: 2019-01-03 | End: 2019-12-18

## 2019-01-03 NOTE — TELEPHONE ENCOUNTER
Prescription approved per [facility] Refill Protocol.  Tahira MADSEN RN

## 2019-01-03 NOTE — TELEPHONE ENCOUNTER
"Requested Prescriptions   Pending Prescriptions Disp Refills     finasteride (PROSCAR) 5 MG tablet  Last Written Prescription Date:  12/14/2017  Last Fill Quantity: 90 tablet,  # refills: 3   Last Office Visit: 12/17/2018   Future Office Visit:      90 tablet 3     Sig: Take 1 tablet (5 mg) by mouth daily    Alpha Blockers Passed - 1/2/2019  7:38 PM       Passed - Blood pressure under 140/90 in past 12 months    BP Readings from Last 3 Encounters:   12/17/18 135/70   08/20/18 126/84   03/27/18 133/69                Passed - Recent (12 mo) or future (30 days) visit within the authorizing provider's specialty    Patient had office visit in the last 12 months or has a visit in the next 30 days with authorizing provider or within the authorizing provider's specialty.  See \"Patient Info\" tab in inbasket, or \"Choose Columns\" in Meds & Orders section of the refill encounter.             Passed - Patient does not have Tadalafil, Vardenafil, or Sildenafil on their medication list       Passed - Patient is 18 years of age or older          "

## 2019-05-17 DIAGNOSIS — E78.5 HYPERLIPIDEMIA LDL GOAL <100: Primary | ICD-10-CM

## 2019-05-17 NOTE — TELEPHONE ENCOUNTER
"Requested Prescriptions   Pending Prescriptions Disp Refills     omega-3 acid ethyl esters (LOVAZA) 1 g capsule [Pharmacy Med Name:  Last Written Prescription Date:  1/10/18  Last Fill Quantity: 360,  # refills: Historical   Last Office Visit: 12/17/2018   Future Office Visit:      OMEGA-3-ACID 1GM CAPSULES (RX)] 360 capsule 0     Sig: TAKE 2 CAPSULES BY MOUTH TWICE DAILY       Antihyperlipidemic agents Passed - 5/17/2019  2:15 PM        Passed - Lipid panel on file in past 12 mos     Recent Labs   Lab Test 12/12/18  0715  11/10/14  0730   CHOL 139   < > 120   TRIG 164*   < > 144   HDL 40   < > 41   LDL 66   < > 50   NHDL 99   < >  --    VLDL  --   --  29   CHOLHDLRATIO  --   --  2.9    < > = values in this interval not displayed.               Passed - Normal serum ALT on record in past 12 mos     Recent Labs   Lab Test 12/12/18  0715   ALT 28             Passed - Recent (12 mo) or future (30 days) visit within the authorizing provider's specialty     Patient had office visit in the last 12 months or has a visit in the next 30 days with authorizing provider or within the authorizing provider's specialty.  See \"Patient Info\" tab in inbasket, or \"Choose Columns\" in Meds & Orders section of the refill encounter.              Passed - Medication is active on med list        Passed - Patient is age 18 years or older          "

## 2019-05-20 RX ORDER — OMEGA-3-ACID ETHYL ESTERS 1 G/1
CAPSULE, LIQUID FILLED ORAL
Qty: 360 CAPSULE | Refills: 11 | Status: SHIPPED | OUTPATIENT
Start: 2019-05-20 | End: 2020-12-22

## 2019-05-20 NOTE — TELEPHONE ENCOUNTER
Routing refill request to provider for review/approval because:  Medication is reported/historical  Please authorize if appropriate.  Thanks,  Ctaa Mejia RN

## 2019-05-23 ENCOUNTER — TELEPHONE (OUTPATIENT)
Dept: FAMILY MEDICINE | Facility: CLINIC | Age: 79
End: 2019-05-23

## 2019-05-23 NOTE — TELEPHONE ENCOUNTER
Prior Authorization Retail Medication Request    Medication/Dose: Omega-3-acid Ethyl Esters 1 g  ICD code (if different than what is on RX):  E78.5  Previously Tried and Failed:  Simvastatin, Atorvastatin  Rationale:  Patient stable on medication since 2012 with notable results in improving patient's lipid panel    Insurance Name:  [facility]  Insurance ID:  [id number]      Pharmacy Information (if different than what is on RX)  Name:  [id number]  Phone:  [phone]    CoverMyMeds key: [id number]

## 2019-05-28 NOTE — TELEPHONE ENCOUNTER
PA Initiation    Medication: Omega-3-acid Ethyl Esters -   Insurance Company: [employer] - Phone [phone] Fax [phone]  Pharmacy Filling the Rx: [id number] - [place], [place] - [address]  Filling Pharmacy Phone: [phone]  Filling Pharmacy Fax: [phone]  Start Date: 5/28/2019

## 2019-05-28 NOTE — TELEPHONE ENCOUNTER
Prior Authorization Approval    Authorization Effective Date: 2/27/2019  Authorization Expiration Date: 5/27/2020  Medication: Omega-3-acid Ethyl Esters - APPROVED  Approved Dose/Quantity:   Reference #:     Insurance Company: [employer] - Phone [phone] Fax [phone]  Expected CoPay:       CoPay Card Available:      Foundation Assistance Needed:    Which Pharmacy is filling the prescription (Not needed for infusion/clinic administered): [id number] - [place], [place] - [address]  Pharmacy Notified: Yes  Patient Notified: Comment:  **Instructed pharmacy to notify patient when script is ready to /ship.**

## 2019-09-29 ENCOUNTER — HEALTH MAINTENANCE LETTER (OUTPATIENT)
Age: 79
End: 2019-09-29

## 2019-10-06 DIAGNOSIS — N40.1 BENIGN PROSTATIC HYPERPLASIA WITH WEAK URINARY STREAM: ICD-10-CM

## 2019-10-06 DIAGNOSIS — R39.12 BENIGN PROSTATIC HYPERPLASIA WITH WEAK URINARY STREAM: ICD-10-CM

## 2019-10-07 NOTE — TELEPHONE ENCOUNTER
"tamsulosin (FLOMAX) 0.4 MG capsule 180 capsule 3 8/22/2018     Last Written Prescription Date:  8/22/2018  Last Fill Quantity: 180,  # refills: 3   Last office visit: 12/17/2018 with prescribing provider: KIN Jacob    Future Office Visit: Unknown   Requested Prescriptions   Pending Prescriptions Disp Refills     tamsulosin (FLOMAX) 0.4 MG capsule [Pharmacy Med Name: TAMSULOSIN 0.4MG CAPSULES] 60 capsule 0     Sig: TAKE 2 CAPSULES(0.8 MG) BY MOUTH DAILY       Alpha Blockers Passed - 10/6/2019  2:59 PM        Passed - Blood pressure under 140/90 in past 12 months     BP Readings from Last 3 Encounters:   12/17/18 135/70   08/20/18 126/84   03/27/18 133/69                 Passed - Recent (12 mo) or future (30 days) visit within the authorizing provider's specialty     Patient has had an office visit with the authorizing provider or a provider within the authorizing providers department within the previous 12 mos or has a future within next 30 days. See \"Patient Info\" tab in inbasket, or \"Choose Columns\" in Meds & Orders section of the refill encounter.              Passed - Patient does not have Tadalafil, Vardenafil, or Sildenafil on their medication list        Passed - Medication is active on med list        Passed - Patient is 18 years of age or older          "

## 2019-10-08 RX ORDER — TAMSULOSIN HYDROCHLORIDE 0.4 MG/1
CAPSULE ORAL
Qty: 180 CAPSULE | Refills: 0 | Status: SHIPPED | OUTPATIENT
Start: 2019-10-08 | End: 2019-12-18

## 2019-10-23 DIAGNOSIS — I10 BENIGN ESSENTIAL HYPERTENSION: ICD-10-CM

## 2019-10-23 NOTE — TELEPHONE ENCOUNTER
"Pending Prescriptions:                       Disp   Refills    irbesartan (AVAPRO) 150 MG tablet [Pharma*90 tab*0            Sig: TAKE 1 TABLET(150 MG) BY MOUTH DAILY    Last Written Prescription Date:  08/20/2018  Last Fill Quantity: 90,  # refills: 1   Last office visit: 12/17/2018 with prescribing provider:  Josephine Jacob   Future Office Visit:    Requested Prescriptions   Pending Prescriptions Disp Refills     irbesartan (AVAPRO) 150 MG tablet [Pharmacy Med Name: IRBESARTAN 150MG TABLETS] 90 tablet 0     Sig: TAKE 1 TABLET(150 MG) BY MOUTH DAILY       Angiotensin-II Receptors Failed - 10/23/2019  4:41 PM        Failed - Normal serum creatinine on file in past 12 months     Recent Labs   Lab Test 12/12/18  0715   CR 1.70*             Passed - Last blood pressure under 140/90 in past 12 months     BP Readings from Last 3 Encounters:   12/17/18 135/70   08/20/18 126/84   03/27/18 133/69                 Passed - Recent (12 mo) or future (30 days) visit within the authorizing provider's specialty     Patient has had an office visit with the authorizing provider or a provider within the authorizing providers department within the previous 12 mos or has a future within next 30 days. See \"Patient Info\" tab in inbasket, or \"Choose Columns\" in Meds & Orders section of the refill encounter.              Passed - Medication is active on med list        Passed - Patient is age 18 or older        Passed - Normal serum potassium on file in past 12 months     Recent Labs   Lab Test 12/12/18  0715   POTASSIUM 4.4                      "

## 2019-10-24 RX ORDER — IRBESARTAN 150 MG/1
TABLET ORAL
Qty: 90 TABLET | Refills: 0 | Status: SHIPPED | OUTPATIENT
Start: 2019-10-24 | End: 2019-12-18

## 2019-10-24 NOTE — TELEPHONE ENCOUNTER
Routing refill request to provider for review/approval because:  Labs out of range:  Creat  Drug interaction warning  Due for apt in Dec- added in pharm comments.  Please authorize if appropriate.  Thanks,  Cata Mejia RN      '

## 2019-11-12 ENCOUNTER — HOSPITAL PATHOLOGY (OUTPATIENT)
Dept: OTHER | Facility: CLINIC | Age: 79
End: 2019-11-12

## 2019-11-14 LAB — COPATH REPORT: NORMAL

## 2019-11-22 ENCOUNTER — TRANSFERRED RECORDS (OUTPATIENT)
Dept: HEALTH INFORMATION MANAGEMENT | Facility: CLINIC | Age: 79
End: 2019-11-22

## 2019-12-05 ENCOUNTER — DOCUMENTATION ONLY (OUTPATIENT)
Dept: FAMILY MEDICINE | Facility: CLINIC | Age: 79
End: 2019-12-05

## 2019-12-05 DIAGNOSIS — E78.5 HYPERLIPIDEMIA WITH TARGET LDL LESS THAN 100: ICD-10-CM

## 2019-12-05 DIAGNOSIS — I10 HTN (HYPERTENSION), BENIGN: Primary | ICD-10-CM

## 2019-12-05 DIAGNOSIS — N18.30 CKD (CHRONIC KIDNEY DISEASE) STAGE 3, GFR 30-59 ML/MIN (H): ICD-10-CM

## 2019-12-05 DIAGNOSIS — E11.21 TYPE 2 DIABETES MELLITUS WITH DIABETIC NEPHROPATHY, WITHOUT LONG-TERM CURRENT USE OF INSULIN (H): ICD-10-CM

## 2019-12-11 DIAGNOSIS — E78.5 HYPERLIPIDEMIA WITH TARGET LDL LESS THAN 100: ICD-10-CM

## 2019-12-11 DIAGNOSIS — E11.21 TYPE 2 DIABETES MELLITUS WITH DIABETIC NEPHROPATHY, WITHOUT LONG-TERM CURRENT USE OF INSULIN (H): ICD-10-CM

## 2019-12-11 DIAGNOSIS — I10 HTN (HYPERTENSION), BENIGN: ICD-10-CM

## 2019-12-11 LAB
ERYTHROCYTE [DISTWIDTH] IN BLOOD BY AUTOMATED COUNT: 13.8 % (ref 10–15)
HBA1C MFR BLD: 5.5 % (ref 0–5.6)
HCT VFR BLD AUTO: 43 % (ref 40–53)
HGB BLD-MCNC: 14.4 G/DL (ref 13.3–17.7)
MCH RBC QN AUTO: 32.9 PG (ref 26.5–33)
MCHC RBC AUTO-ENTMCNC: 33.5 G/DL (ref 31.5–36.5)
MCV RBC AUTO: 98 FL (ref 78–100)
PLATELET # BLD AUTO: 221 10E9/L (ref 150–450)
RBC # BLD AUTO: 4.38 10E12/L (ref 4.4–5.9)
WBC # BLD AUTO: 10 10E9/L (ref 4–11)

## 2019-12-11 PROCEDURE — 36415 COLL VENOUS BLD VENIPUNCTURE: CPT | Performed by: PHYSICIAN ASSISTANT

## 2019-12-11 PROCEDURE — 84443 ASSAY THYROID STIM HORMONE: CPT | Performed by: PHYSICIAN ASSISTANT

## 2019-12-11 PROCEDURE — 85027 COMPLETE CBC AUTOMATED: CPT | Performed by: PHYSICIAN ASSISTANT

## 2019-12-11 PROCEDURE — 83036 HEMOGLOBIN GLYCOSYLATED A1C: CPT | Performed by: PHYSICIAN ASSISTANT

## 2019-12-11 PROCEDURE — 84439 ASSAY OF FREE THYROXINE: CPT | Performed by: PHYSICIAN ASSISTANT

## 2019-12-11 PROCEDURE — 80061 LIPID PANEL: CPT | Performed by: PHYSICIAN ASSISTANT

## 2019-12-11 PROCEDURE — 80053 COMPREHEN METABOLIC PANEL: CPT | Performed by: PHYSICIAN ASSISTANT

## 2019-12-12 LAB
ALBUMIN SERPL-MCNC: 3.7 G/DL (ref 3.4–5)
ALP SERPL-CCNC: 90 U/L (ref 40–150)
ALT SERPL W P-5'-P-CCNC: 28 U/L (ref 0–70)
ANION GAP SERPL CALCULATED.3IONS-SCNC: 8 MMOL/L (ref 3–14)
AST SERPL W P-5'-P-CCNC: 17 U/L (ref 0–45)
BILIRUB SERPL-MCNC: 0.4 MG/DL (ref 0.2–1.3)
BUN SERPL-MCNC: 25 MG/DL (ref 7–30)
CALCIUM SERPL-MCNC: 8.8 MG/DL (ref 8.5–10.1)
CHLORIDE SERPL-SCNC: 103 MMOL/L (ref 94–109)
CHOLEST SERPL-MCNC: 142 MG/DL
CO2 SERPL-SCNC: 26 MMOL/L (ref 20–32)
CREAT SERPL-MCNC: 1.62 MG/DL (ref 0.66–1.25)
GFR SERPL CREATININE-BSD FRML MDRD: 39 ML/MIN/{1.73_M2}
GLUCOSE SERPL-MCNC: 111 MG/DL (ref 70–99)
HDLC SERPL-MCNC: 41 MG/DL
LDLC SERPL CALC-MCNC: 74 MG/DL
NONHDLC SERPL-MCNC: 101 MG/DL
POTASSIUM SERPL-SCNC: 4.7 MMOL/L (ref 3.4–5.3)
PROT SERPL-MCNC: 7 G/DL (ref 6.8–8.8)
SODIUM SERPL-SCNC: 137 MMOL/L (ref 133–144)
T4 FREE SERPL-MCNC: 0.95 NG/DL (ref 0.76–1.46)
TRIGL SERPL-MCNC: 137 MG/DL
TSH SERPL DL<=0.005 MIU/L-ACNC: 4.53 MU/L (ref 0.4–4)

## 2019-12-18 ENCOUNTER — OFFICE VISIT (OUTPATIENT)
Dept: FAMILY MEDICINE | Facility: CLINIC | Age: 79
End: 2019-12-18
Payer: COMMERCIAL

## 2019-12-18 VITALS
TEMPERATURE: 96.7 F | SYSTOLIC BLOOD PRESSURE: 136 MMHG | BODY MASS INDEX: 31.64 KG/M2 | OXYGEN SATURATION: 97 % | WEIGHT: 221 LBS | HEART RATE: 73 BPM | DIASTOLIC BLOOD PRESSURE: 69 MMHG | HEIGHT: 70 IN

## 2019-12-18 DIAGNOSIS — J98.01 ACUTE BRONCHOSPASM: ICD-10-CM

## 2019-12-18 DIAGNOSIS — N18.30 CKD (CHRONIC KIDNEY DISEASE) STAGE 3, GFR 30-59 ML/MIN (H): ICD-10-CM

## 2019-12-18 DIAGNOSIS — I10 HTN (HYPERTENSION), BENIGN: ICD-10-CM

## 2019-12-18 DIAGNOSIS — E78.5 HYPERLIPIDEMIA WITH TARGET LDL LESS THAN 100: ICD-10-CM

## 2019-12-18 DIAGNOSIS — I47.29 PAROXYSMAL VENTRICULAR TACHYCARDIA (H): ICD-10-CM

## 2019-12-18 DIAGNOSIS — Z00.00 ENCOUNTER FOR MEDICARE ANNUAL WELLNESS EXAM: Primary | ICD-10-CM

## 2019-12-18 DIAGNOSIS — E11.21 TYPE 2 DIABETES MELLITUS WITH DIABETIC NEPHROPATHY, WITHOUT LONG-TERM CURRENT USE OF INSULIN (H): ICD-10-CM

## 2019-12-18 DIAGNOSIS — N40.1 BENIGN NON-NODULAR PROSTATIC HYPERPLASIA WITH LOWER URINARY TRACT SYMPTOMS: ICD-10-CM

## 2019-12-18 PROCEDURE — 99207 C PAF COMPLETED  NO CHARGE: CPT | Mod: 25 | Performed by: PHYSICIAN ASSISTANT

## 2019-12-18 PROCEDURE — 99207 C FOOT EXAM  NO CHARGE: CPT | Mod: 25 | Performed by: PHYSICIAN ASSISTANT

## 2019-12-18 PROCEDURE — 99397 PER PM REEVAL EST PAT 65+ YR: CPT | Performed by: PHYSICIAN ASSISTANT

## 2019-12-18 RX ORDER — IRBESARTAN 150 MG/1
TABLET ORAL
Qty: 90 TABLET | Refills: 3 | Status: SHIPPED | OUTPATIENT
Start: 2019-12-18 | End: 2020-12-22

## 2019-12-18 RX ORDER — ALBUTEROL SULFATE 90 UG/1
AEROSOL, METERED RESPIRATORY (INHALATION)
Qty: 18 G | Refills: 3 | Status: SHIPPED | OUTPATIENT
Start: 2019-12-18 | End: 2023-03-07

## 2019-12-18 RX ORDER — ATORVASTATIN CALCIUM 20 MG/1
20 TABLET, FILM COATED ORAL EVERY EVENING
Qty: 90 TABLET | Refills: 3 | Status: SHIPPED | OUTPATIENT
Start: 2019-12-18 | End: 2020-12-22

## 2019-12-18 RX ORDER — FINASTERIDE 5 MG/1
1 TABLET, FILM COATED ORAL DAILY
Qty: 90 TABLET | Refills: 3 | Status: SHIPPED | OUTPATIENT
Start: 2019-12-18

## 2019-12-18 RX ORDER — METOPROLOL TARTRATE 50 MG
50 TABLET ORAL 2 TIMES DAILY
Qty: 270 TABLET | Refills: 1 | Status: SHIPPED | OUTPATIENT
Start: 2019-12-18 | End: 2020-12-18

## 2019-12-18 RX ORDER — TAMSULOSIN HYDROCHLORIDE 0.4 MG/1
CAPSULE ORAL
Qty: 180 CAPSULE | Refills: 3 | Status: SHIPPED | OUTPATIENT
Start: 2019-12-18 | End: 2020-12-22

## 2019-12-18 ASSESSMENT — MIFFLIN-ST. JEOR: SCORE: 1718.7

## 2019-12-18 ASSESSMENT — ACTIVITIES OF DAILY LIVING (ADL): CURRENT_FUNCTION: NO ASSISTANCE NEEDED

## 2019-12-18 NOTE — PATIENT INSTRUCTIONS
Call and see if you are due for a diabetic eye exam (needs to be done annually).  I need the report regarding retinopathy.

## 2019-12-18 NOTE — PROGRESS NOTES
"SUBJECTIVE:   Luis Daniel Gomez is a 78 year old male who presents for Preventive Visit.    Pt was treated for pneumonia by [facility]; he will try to get me the records.  Finally sxs resolved after one Zpack, 2 courses of Ceftin and prednisone and use of a nebulizer  Pt has diet controlled diabetes and A1c <6% for years.  Pt has BPH and has some starting and stopping despite use of proscar and flomax  Are you in the first 12 months of your Medicare coverage?  No    Healthy Habits:     In general, how would you rate your overall health?  Very good    Frequency of exercise:  6-7 days/week    Duration of exercise:  45-60 minutes (walking )    Do you usually eat at least 4 servings of fruit and vegetables a day, include whole grains    & fiber and avoid regularly eating high fat or \"junk\" foods?  No (1)    Taking medications regularly:  Yes    Barriers to taking medications:  None    Medication side effects:  None    Ability to successfully perform activities of daily living:  No assistance needed    Home Safety:  No safety concerns identified    Hearing Impairment:  No hearing concerns    In the past 6 months, have you been bothered by leaking of urine?  No    In general, how would you rate your overall mental or emotional health?  Excellent      PHQ-2 Total Score: 0    Additional concerns today:  No       Do you feel safe in your environment? YES    Have you ever done Advance Care Planning? (For example, a Health Directive, POLST, or a discussion with a medical provider or your loved ones about your wishes): No, advance care planning information given to patient to review.  Patient plans to discuss their wishes with loved ones or provider.        Fall risk  Fallen 2 or more times in the past year?: No  Any fall with injury in the past year?: No    Cognitive Screening   1) Repeat 3 items (Leader, Season, Table)    2) Clock draw: NORMAL  3) 3 item recall: Recalls 3 objects  Results: 3 items recalled: COGNITIVE " IMPAIRMENT LESS LIKELY    Mini-CogTM Copyright NILESH Dickey. Licensed by the author for use in [facility]; reprinted with permission (jose a@.[facility]). All rights reserved.      Do you have sleep apnea, excessive snoring or daytime drowsiness?: no    Reviewed and updated as needed this visit by clinical staff  Tobacco  Allergies  Meds         Reviewed and updated as needed this visit by Provider        Social History     Tobacco Use     Smoking status: Former Smoker     Packs/day: 1.00     Years: 56.00     Pack years: 56.00     Types: Cigarettes     Last attempt to quit: 2008     Years since quittin.9     Smokeless tobacco: Never Used     Tobacco comment: quit    Substance Use Topics     Alcohol use: No     Alcohol/week: 0.0 standard drinks     If you drink alcohol do you typically have >3 drinks per day or >7 drinks per week? No    Alcohol Use 2019   Prescreen: >3 drinks/day or >7 drinks/week? No           Current providers sharing in care for this patient include:   Patient Care Team:  Josephine Jacob PA-C as PCP - General (Internal Medicine)  Josephine Jacob PA-C as Assigned PCP    The following health maintenance items are reviewed in Epic and correct as of today:  Health Maintenance   Topic Date Due     EYE EXAM  2016     COLONOSCOPY  2019     ADVANCE CARE PLANNING  2019     DIABETIC FOOT EXAM  2019     MEDICARE ANNUAL WELLNESS VISIT  2019     A1C  2020     BMP  2020     LIPID  2020     MICROALBUMIN  2020     FALL RISK ASSESSMENT  2020     TSH W/FREE T4 REFLEX  2021     DTAP/TDAP/TD IMMUNIZATION (4 - Td) 2024     PHQ-2  Completed     INFLUENZA VACCINE  Completed     PNEUMOCOCCAL IMMUNIZATION 65+ LOW/MEDIUM RISK  Completed     ZOSTER IMMUNIZATION  Completed     IPV IMMUNIZATION  Aged Out     MENINGITIS IMMUNIZATION  Aged Out       Past Medical History:   Diagnosis Date     Arthritis      BPH (benign prostatic  hyperplasia)      CKD (chronic kidney disease) stage 3, GFR 30-59 ml/min (H)      Cluster headache 2015    Dr. Roth     Constipation      Dyslipidemia      Dyspnea     Normal nuc stress test 9-15-08, fainted once in life     Fatty liver     Ultrasound 11/11     FH: colon cancer     colonoscopy 10/10, repeat 5 years.     Former smoker     quit 2008     HTN (hypertension), benign      Peripheral neuropathy     Dr. Patricia     Spinal stenosis     Last MRI 2010, Dr. Fraire     Syncope      Type 2 diabetes mellitus (H)     diet controlled     Ureterolithiases      Past Surgical History:   Procedure Laterality Date     KERATOTOMY ARCUATE WITH FEMTOSECOND LASER/IMAGING FOR ATIOL Left 8/29/2016    Procedure: KERATOTOMY ARCUATE WITH FEMTOSECOND LASER/IMAGING FOR ATIOL;  Surgeon: Gerard Larson MD;  Location: [facility]     ORTHOPEDIC SURGERY       PHACOEMULSIFICATION CLEAR CORNEA WITH STANDARD INTRAOCULAR LENS IMPLANT Left 8/29/2016    Procedure: PHACOEMULSIFICATION CLEAR CORNEA WITH STANDARD INTRAOCULAR LENS IMPLANT;  Surgeon: Gerard Larson MD;  Location: [facility]     Thumb surg       Current Outpatient Medications   Medication Sig Dispense Refill     amitriptyline (ELAVIL) 50 MG tablet TAKE 1 TABLET(50 MG) BY MOUTH AT BEDTIME 90 tablet 3     aspirin 81 MG tablet Take 1 tablet by mouth daily.       atorvastatin (LIPITOR) 20 MG tablet Take 20 mg by mouth every evening       Cholecalciferol (VITAMIN D3) 5000 UNITS TABS Take 1 tablet by mouth daily.       Coenzyme Q10 (CO Q 10) 100 MG CAPS Take 1 capsule by mouth daily       cyabnocobalamin (VITAMIN B-12) 2500 MCG sublingual tablet Place 3,000 mcg under the tongue daily  30 tablet      finasteride (PROSCAR) 5 MG tablet Take 1 tablet (5 mg) by mouth daily 90 tablet 3     fluticasone (FLONASE) 50 MCG/ACT spray SHAKE LIQUID AND USE 1 TO 2 SPRAYS IN EACH NOSTRIL DAILY 48 mL 3     irbesartan (AVAPRO) 150 MG tablet TAKE 1 TABLET(150 MG) BY MOUTH DAILY 90 tablet 0     loratadine  "(CLARITIN) 10 MG tablet Take 1 tablet (10 mg) by mouth daily 30 tablet 1     metoprolol (LOPRESSOR) 50 MG tablet Take 1 tablet (50 mg) by mouth 2 times daily 270 tablet 1     Multiple Vitamin (MULTI-VITAMIN) per tablet Take 1 tablet by mouth daily.       omega-3 acid ethyl esters (LOVAZA) 1 g capsule TAKE 2 CAPSULES BY MOUTH TWICE DAILY 360 capsule 11     tamsulosin (FLOMAX) 0.4 MG capsule TAKE 2 CAPSULES(0.8 MG) BY MOUTH DAILY 180 capsule 0     VENTOLIN  (90 BASE) MCG/ACT Inhaler INHALE 2 PUFFS INTO THE LUNGS EVERY 6 HOURS AS NEEDED FOR SHORTNESS OF BREATH OR DIFFICULT BREATHING OR WHEEZING 18 g 3       Review of Systems  Constitutional, HEENT, cardiovascular, pulmonary, GI, , musculoskeletal, neuro, skin, endocrine and psych systems are negative, except as otherwise noted.    OBJECTIVE:   /69 (BP Location: Right arm, Patient Position: Sitting, Cuff Size: Adult Regular)   Pulse 73   Temp 96.7  F (35.9  C) (Oral)   Ht 1.778 m (5' 10\")   Wt 100.2 kg (221 lb)   SpO2 97%   BMI 31.71 kg/m   Estimated body mass index is 31.71 kg/m  as calculated from the following:    Height as of this encounter: 1.778 m (5' 10\").    Weight as of this encounter: 100.2 kg (221 lb).  Physical Exam  GENERAL: healthy, alert and no distress  EYES: Eyes grossly normal to inspection, PERRL and conjunctivae and sclerae normal  HENT: ear canals and TM's normal, nose and mouth without ulcers or lesions  NECK: no adenopathy, no asymmetry, masses, or scars and thyroid normal to palpation  RESP: lungs clear to auscultation - no rales, rhonchi or wheezes  CV: regular rate and rhythm, normal S1 S2, no S3 or S4, no murmur, click or rub, no peripheral edema and peripheral pulses strong  ABDOMEN: soft, nontender, no hepatosplenomegaly, no masses and bowel sounds normal  Rectal; no prostates masses   MS: no gross musculoskeletal defects noted, no edema  SKIN: no suspicious lesions or rashes  NEURO: Normal strength and tone, mentation " "intact and speech normal  PSYCH: mentation appears normal, affect normal/bright      ASSESSMENT / PLAN:   Assessment and Plan:     (Z00.00) Encounter for Medicare annual wellness exam  (primary encounter diagnosis)  Comment:   Plan: previsit labs discussed with pt. Colonoscopy is up to date. 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      (I10) HTN (hypertension), benign  Comment:   Plan: metoprolol tartrate (LOPRESSOR) 50 MG tablet        Cont to monitor BPs    (J98.01) Acute bronchospasm  Comment:   Plan: albuterol (VENTOLIN HFA) 108 (90 Base) MCG/ACT         inhaler        refilled      (N40.1) Benign non-nodular prostatic hyperplasia with lower urinary tract symptoms  Comment:   Plan: finasteride (PROSCAR) 5 MG tablet        And flomax refilled        COUNSELING:  Reviewed preventive health counseling, as reflected in patient instructions    Estimated body mass index is 31.71 kg/m  as calculated from the following:    Height as of this encounter: 1.778 m (5' 10\").    Weight as of this encounter: 100.2 kg (221 lb).         reports that he quit smoking about 11 years ago. His smoking use included cigarettes. He has a 56.00 pack-year smoking history. He has never used smokeless tobacco.      Appropriate preventive services were discussed with this patient, including applicable screening as appropriate for cardiovascular disease, diabetes, osteopenia/osteoporosis, and glaucoma.  As appropriate for age/gender, discussed " screening for colorectal cancer, prostate cancer, breast cancer, and cervical cancer. Checklist reviewing preventive services available has been given to the patient.    Reviewed patients plan of care and provided an AVS. The Basic Care Plan (routine screening as documented in Health Maintenance) for Luis Daniel meets the Care Plan requirement. This Care Plan has been established and reviewed with the Patient.    Counseling Resources:  ATP IV Guidelines  Pooled Cohorts Equation Calculator  Breast Cancer Risk Calculator  FRAX Risk Assessment  ICSI Preventive Guidelines  Dietary Guidelines for Americans, 2010  USDA's MyPlate  ASA Prophylaxis  Lung CA Screening    Josephine Jacob PA-C  [facility]    Identified Health Risks:

## 2019-12-19 RX ORDER — ALBUTEROL SULFATE 90 UG/1
AEROSOL, METERED RESPIRATORY (INHALATION)
Qty: 54 G | Refills: 3 | OUTPATIENT
Start: 2019-12-19

## 2020-01-18 DIAGNOSIS — M48.061 SPINAL STENOSIS OF LUMBAR REGION, UNSPECIFIED WHETHER NEUROGENIC CLAUDICATION PRESENT: ICD-10-CM

## 2020-01-18 NOTE — TELEPHONE ENCOUNTER
"Last Written Prescription Date:  10/31/18  Last Fill Quantity: 90 tablet,  # refills: 3   Last office visit: 12/18/2019 with prescribing provider:  Nidia   Future Office Visit:      Requested Prescriptions   Pending Prescriptions Disp Refills     amitriptyline (ELAVIL) 50 MG tablet [Pharmacy Med Name: AMITRIPTYLINE 50MG TABLETS] 90 tablet 3     Sig: TAKE 1 TABLET(50 MG) BY MOUTH AT BEDTIME       Tricyclic Agents ( Annual appt and no PHQ9) Passed - 1/18/2020  3:49 PM        Passed - Blood Pressure under 140/90 in past 12 mos     BP Readings from Last 3 Encounters:   12/18/19 136/69   12/17/18 135/70   08/20/18 126/84                 Passed - Recent (12 mo) or future (30 days) visit within authorizing provider's specialty     Patient has had an office visit with the authorizing provider or a provider within the authorizing providers department within the previous 12 mos or has a future within next 30 days. See \"Patient Info\" tab in inbasket, or \"Choose Columns\" in Meds & Orders section of the refill encounter.              Passed - Medication is active on med list        Passed - Patient is age 18 or older          "

## 2020-01-20 RX ORDER — AMITRIPTYLINE HYDROCHLORIDE 50 MG/1
TABLET ORAL
Qty: 90 TABLET | Refills: 2 | Status: SHIPPED | OUTPATIENT
Start: 2020-01-20 | End: 2020-12-14

## 2020-10-13 ENCOUNTER — TRANSFERRED RECORDS (OUTPATIENT)
Dept: HEALTH INFORMATION MANAGEMENT | Facility: CLINIC | Age: 80
End: 2020-10-13

## 2020-10-23 ENCOUNTER — TRANSFERRED RECORDS (OUTPATIENT)
Dept: HEALTH INFORMATION MANAGEMENT | Facility: CLINIC | Age: 80
End: 2020-10-23

## 2020-10-26 ENCOUNTER — OFFICE VISIT (OUTPATIENT)
Dept: FAMILY MEDICINE | Facility: CLINIC | Age: 80
End: 2020-10-26
Payer: COMMERCIAL

## 2020-10-26 VITALS
SYSTOLIC BLOOD PRESSURE: 130 MMHG | HEIGHT: 70 IN | DIASTOLIC BLOOD PRESSURE: 64 MMHG | OXYGEN SATURATION: 95 % | HEART RATE: 75 BPM | WEIGHT: 229.4 LBS | TEMPERATURE: 98 F | BODY MASS INDEX: 32.84 KG/M2

## 2020-10-26 DIAGNOSIS — Z01.818 PRE-OP EXAM: Primary | ICD-10-CM

## 2020-10-26 DIAGNOSIS — H25.9 SENILE CATARACT OF RIGHT EYE, UNSPECIFIED AGE-RELATED CATARACT TYPE: ICD-10-CM

## 2020-10-26 PROCEDURE — 99214 OFFICE O/P EST MOD 30 MIN: CPT | Performed by: NURSE PRACTITIONER

## 2020-10-26 ASSESSMENT — MIFFLIN-ST. JEOR: SCORE: 1751.8

## 2020-10-26 NOTE — PROGRESS NOTES
[address] [facility] [address]  Phone: [phone]  Primary Provider: Josephine Jcaob      PREOPERATIVE EVALUATION:  Today's date: 10/26/2020    Luis Daniel Gomez is a 79 year old male who presents for a preoperative evaluation.    Surgical Information:  Surgery/Procedure: Right cataract  Surgery Location: [facility]  Surgeon: Dr. Sutton  Surgery Date: 11-  Time of Surgery: tbd  Where patient plans to recover: At home with family  Fax number for surgical facility:   [phone]  Type of Anesthesia Anticipated: to be determined    Subjective     HPI related to upcoming procedure: cataract surgery right eye    Preop Questions 10/25/2020   1. Have you ever had a heart attack or stroke? No   2. Have you ever had surgery on your heart or blood vessels, such as a stent placement, a coronary artery bypass, or surgery on an artery in your head, neck, heart, or legs? No   3. Do you have chest pain with activity? No   4. Do you have a history of  heart failure? No   5. Do you currently have a cold, bronchitis or symptoms of other infection? No   6. Do you have a cough, shortness of breath, or wheezing? No   7. Do you or anyone in your family have previous history of blood clots? No   8. Do you or does anyone in your family have a serious bleeding problem such as prolonged bleeding following surgeries or cuts? No   9. Have you ever had problems with anemia or been told to take iron pills? No   10. Have you had any abnormal blood loss such as black, tarry or bloody stools? No   11. Have you ever had a blood transfusion? No   12. Are you willing to have a blood transfusion if it is medically needed before, during, or after your surgery? Yes   13. Have you or any of your relatives ever had problems with anesthesia? No   14. Do you have sleep apnea, excessive snoring or daytime drowsiness? No   15. Do you have any artifical heart valves or other implanted  medical devices like a pacemaker, defibrillator, or continuous glucose monitor? No   16. Do you have artificial joints? No   17. Are you allergic to latex? No       Health Care Directive:  Patient does not have a Health Care Directive or Living Will: Discussed advance care planning with patient; information given to patient to review.    Status of Chronic Conditions:  DIABETES - Patient has a longstanding history of DiabetesType Type II . Controlled with diet and exercise   Control has been good. Complicating factors include but are not limited to: diabetic nephropathy     HYPERTENSION - Patient has longstanding history of HTN , currently denies any symptoms referable to elevated blood pressure. Specifically denies chest pain, palpitations, dyspnea, orthopnea, PND or peripheral edema. Blood pressure readings have been in normal range. Current medication regimen is as listed below. Patient denies any side effects of medication.     Paroxysmal ventricular tachycardia 1980s    Review of Systems  CONSTITUTIONAL: NEGATIVE for fever, chills, change in weight  ENT/MOUTH: NEGATIVE for ear, mouth and throat problems  RESP: NEGATIVE for significant cough or SOB  CV: NEGATIVE for chest pain, palpitations or peripheral edema    Patient Active Problem List    Diagnosis Date Noted     Type 2 diabetes mellitus with diabetic nephropathy (H) 10/12/2016     Priority: Medium     Intractable episodic cluster headache 12/08/2015     Priority: Medium     Hyperlipidemia with target LDL less than 100 11/17/2014     Priority: Medium     Diagnosis updated by automated process. Provider to review and confirm.       Paroxysmal ventricular tachycardia (H) 07/08/2014     Priority: Medium     Vascular bruit 07/08/2014     Priority: Medium     Syncope 07/07/2014     Priority: Medium     Peripheral neuropathy 06/16/2014     Priority: Medium     Former smoker      Priority: Medium     Kidney stone 05/29/2012     Priority: Medium     Advanced  directives, counseling/discussion 05/16/2012     Priority: Medium     Advance Care Planning:   ACP Review and Resources Provided:  Reviewed chart for advance care plan.  Luis Daniel Gomez has no plan or code status on file. Letter sent..   Added by Charline Pham on 11/18/2014               HTN (hypertension), benign      Priority: Medium     CKD (chronic kidney disease) stage 3, GFR 30-59 ml/min 05/11/2012     Priority: Medium     BPH (benign prostatic hyperplasia) 05/11/2012     Priority: Medium      Past Medical History:   Diagnosis Date     Arthritis      BPH (benign prostatic hyperplasia)      CKD (chronic kidney disease) stage 3, GFR 30-59 ml/min (H)      Cluster headache 2015    Dr. Roth     Constipation      Dyslipidemia      Dyspnea     Normal nuc stress test 9-15-08, fainted once in life     Fatty liver     Ultrasound 11/11     FH: colon cancer     colonoscopy 10/10, repeat 5 years.     Former smoker     quit 2008     HTN (hypertension), benign      Peripheral neuropathy     Dr. Patricia     Spinal stenosis     Last MRI 2010, Dr. Fraire     Syncope      Type 2 diabetes mellitus (H)     diet controlled     Ureterolithiases      Past Surgical History:   Procedure Laterality Date     KERATOTOMY ARCUATE WITH FEMTOSECOND LASER/IMAGING FOR ATIOL Left 8/29/2016    Procedure: KERATOTOMY ARCUATE WITH FEMTOSECOND LASER/IMAGING FOR ATIOL;  Surgeon: Gerard Larson MD;  Location: [facility]     ORTHOPEDIC SURGERY       PHACOEMULSIFICATION CLEAR CORNEA WITH STANDARD INTRAOCULAR LENS IMPLANT Left 8/29/2016    Procedure: PHACOEMULSIFICATION CLEAR CORNEA WITH STANDARD INTRAOCULAR LENS IMPLANT;  Surgeon: Gerard Larson MD;  Location: [facility]     Thumb surg       Current Outpatient Medications   Medication Sig Dispense Refill     albuterol (VENTOLIN HFA) 108 (90 Base) MCG/ACT inhaler INHALE 2 PUFFS INTO THE LUNGS EVERY 6 HOURS AS NEEDED FOR SHORTNESS OF BREATH OR DIFFICULT BREATHING OR WHEEZING 18 g 3     amitriptyline (ELAVIL)  "50 MG tablet TAKE 1 TABLET(50 MG) BY MOUTH AT BEDTIME 90 tablet 2     aspirin 81 MG tablet Take 1 tablet by mouth daily.       atorvastatin (LIPITOR) 20 MG tablet Take 1 tablet (20 mg) by mouth every evening 90 tablet 3     Cholecalciferol (VITAMIN D3) 5000 UNITS TABS Take 1 tablet by mouth daily.       Coenzyme Q10 (CO Q 10) 100 MG CAPS Take 1 capsule by mouth daily       cyabnocobalamin (VITAMIN B-12) 2500 MCG sublingual tablet Place 3,000 mcg under the tongue daily  30 tablet      finasteride (PROSCAR) 5 MG tablet Take 1 tablet (5 mg) by mouth daily 90 tablet 3     fluticasone (FLONASE) 50 MCG/ACT spray SHAKE LIQUID AND USE 1 TO 2 SPRAYS IN EACH NOSTRIL DAILY 48 mL 3     irbesartan (AVAPRO) 150 MG tablet TAKE 1 TABLET(150 MG) BY MOUTH DAILY 90 tablet 3     loratadine (CLARITIN) 10 MG tablet Take 1 tablet (10 mg) by mouth daily 30 tablet 1     metoprolol tartrate (LOPRESSOR) 50 MG tablet Take 1 tablet (50 mg) by mouth 2 times daily 270 tablet 1     Multiple Vitamin (MULTI-VITAMIN) per tablet Take 1 tablet by mouth daily.       omega-3 acid ethyl esters (LOVAZA) 1 g capsule TAKE 2 CAPSULES BY MOUTH TWICE DAILY 360 capsule 11     tamsulosin (FLOMAX) 0.4 MG capsule TAKE 2 CAPSULES(0.8 MG) BY MOUTH DAILY 180 capsule 3       Allergies   Allergen Reactions     Lisinopril Shortness Of Breath and Fatigue     Amoxicillin Itching     Duloxetine      \"I can't take it\"     Levofloxacin      \"I can't take the 500 mgs\"     Neurontin [Gabapentin]      edema     Norvasc [Amlodipine]      edema     Oxycodone GI Disturbance     Penicillins Itching     Red Yeast Rice Extract [Monascus Purpureus Went Yeast] Hives and Itching     Tramadol Itching     Verapamil Itching     Vicodin [Hydrocodone-Acetaminophen] Itching        Social History     Tobacco Use     Smoking status: Former Smoker     Packs/day: 1.00     Years: 56.00     Pack years: 56.00     Types: Cigarettes     Quit date: 2008     Years since quittin.8     Smokeless " "tobacco: Never Used     Tobacco comment: quit 2008   Substance Use Topics     Alcohol use: No     Alcohol/week: 0.0 standard drinks       History   Drug Use No         Objective   /64 (BP Location: Left arm, Cuff Size: Adult Large)   Pulse 75   Temp 98  F (36.7  C)   Ht 1.778 m (5' 10\")   Wt 104.1 kg (229 lb 6.4 oz)   SpO2 95%   BMI 32.92 kg/m        Physical Exam  GENERAL APPEARANCE: healthy, alert and no distress  HENT: ear canals and TM's normal and nose and mouth without ulcers or lesions  RESP: lungs clear to auscultation - no rales, rhonchi or wheezes  CV: regular rate and rhythm, normal S1 S2, no S3 or S4 and no murmur, click or rub   ABDOMEN: soft, nontender, no HSM or masses and bowel sounds normal  NEURO: Normal strength and tone, sensory exam grossly normal, mentation intact and speech normal    Recent Labs   Lab Test 12/11/19  0716 12/12/18  0715   HGB 14.4 14.5    218    137   POTASSIUM 4.7 4.4   CR 1.62* 1.70*   A1C 5.5 5.6        Diagnostics:  No labs were ordered during this visit.   No EKG required for low risk surgery (cataract, skin procedure, breast biopsy, etc).    Revised Cardiac Risk Index (RCRI):  The patient has the following serious cardiovascular risks for perioperative complications:   - No serious cardiac risks = 0 points     RCRI Interpretation: 0 points: Class I (very low risk - 0.4% complication rate)    Assessment & Plan   The proposed surgical procedure is considered LOW risk.    Pre-op exam      Senile cataract of right eye, unspecified age-related cataract type      Risks and Recommendations:  The patient has the following additional risks and recommendations for perioperative complications:   - No identified additional risk factors other than previously addressed    Medication Instructions:  Patient is to take all scheduled medications on the day of surgery    RECOMMENDATION:  APPROVAL GIVEN to proceed with proposed procedure, without further diagnostic " evaluation.    Signed Electronically by: HARSHA Miller CNP    Copy of this evaluation report is provided to requesting physician.    Preop [facility] Preop Guidelines    Revised Cardiac Risk Index

## 2020-12-03 ENCOUNTER — DOCUMENTATION ONLY (OUTPATIENT)
Dept: FAMILY MEDICINE | Facility: CLINIC | Age: 80
End: 2020-12-03

## 2020-12-03 DIAGNOSIS — E78.5 HYPERLIPIDEMIA WITH TARGET LDL LESS THAN 100: ICD-10-CM

## 2020-12-03 DIAGNOSIS — Z00.00 ENCOUNTER FOR MEDICARE ANNUAL WELLNESS EXAM: ICD-10-CM

## 2020-12-03 DIAGNOSIS — E11.21 TYPE 2 DIABETES MELLITUS WITH DIABETIC NEPHROPATHY, WITHOUT LONG-TERM CURRENT USE OF INSULIN (H): ICD-10-CM

## 2020-12-03 DIAGNOSIS — I10 HTN (HYPERTENSION), BENIGN: Primary | ICD-10-CM

## 2020-12-10 DIAGNOSIS — M48.061 SPINAL STENOSIS OF LUMBAR REGION, UNSPECIFIED WHETHER NEUROGENIC CLAUDICATION PRESENT: ICD-10-CM

## 2020-12-10 NOTE — TELEPHONE ENCOUNTER
amitriptyline (ELAVIL) 50 MG tablet 90 tablet 2 1/20/2020  No   Sig: TAKE 1 TABLET(50 MG) BY MOUTH AT BEDTIME

## 2020-12-14 RX ORDER — AMITRIPTYLINE HYDROCHLORIDE 50 MG/1
50 TABLET ORAL AT BEDTIME
Qty: 90 TABLET | Refills: 2 | Status: SHIPPED | OUTPATIENT
Start: 2020-12-14 | End: 2021-09-15

## 2020-12-15 NOTE — MR AVS SNAPSHOT
"              After Visit Summary   2/16/2017    Luis Daniel Gomez    MRN: [id number]           Patient Information     Date Of Birth          1940        Visit Information        Provider Department      2/16/2017 1:00 PM Josephine Jacob PA-C [facility]        Today's Diagnoses     Bilateral edema of lower extremity    -  1    Peripheral polyneuropathy (H)        HTN (hypertension), benign        CKD (chronic kidney disease) stage 3, GFR 30-59 ml/min           Follow-ups after your visit        Who to contact     If you have questions or need follow up information about today's clinic visit or your schedule please contact [facility] directly at [phone].  Normal or non-critical lab and imaging results will be communicated to you by ChromaDexhart, letter or phone within 4 business days after the clinic has received the results. If you do not hear from us within 7 days, please contact the clinic through ChromaDexhart or phone. If you have a critical or abnormal lab result, we will notify you by phone as soon as possible.  Submit refill requests through WinView or call your pharmacy and they will forward the refill request to us. Please allow 3 business days for your refill to be completed.          Additional Information About Your Visit        MyChart Information     WinView gives you secure access to your electronic health record. If you see a primary care provider, you can also send messages to your care team and make appointments. If you have questions, please call your primary care clinic.  If you do not have a primary care provider, please call [phone] and they will assist you.        Care EveryWhere ID     This is your Care EveryWhere ID. This could be used by other organizations to access your [facility] medical records  [id number]        Your Vitals Were     Pulse Temperature Height Pulse Oximetry BMI (Body Mass Index)       67 97.8  F (36.6  C) (Oral) 5' 10.25\" (1.784 m) 96% 34.19 " BP is borderline elevated, goal should be below 140/90 consistently. Continue BP check and call ig persistenetly more than 140/90 on 3 separate occasions    kg/m2        Blood Pressure from Last 3 Encounters:   02/16/17 145/70   12/13/16 142/60   11/29/16 133/70    Weight from Last 3 Encounters:   02/16/17 240 lb (108.9 kg)   12/13/16 233 lb (105.7 kg)   11/29/16 238 lb (108 kg)              We Performed the Following     Basic metabolic panel          Today's Medication Changes          These changes are accurate as of: 2/16/17  1:22 PM.  If you have any questions, ask your nurse or doctor.               Start taking these medicines.        Dose/Directions    DULoxetine 30 MG EC capsule   Commonly known as:  CYMBALTA   Used for:  Peripheral polyneuropathy (H)   Started by:  Josephine Jacob PA-C        Dose:  30 mg   Take 1 capsule (30 mg) by mouth 2 times daily   Quantity:  60 capsule   Refills:  3         These medicines have changed or have updated prescriptions.        Dose/Directions    gabapentin 600 MG tablet   Commonly known as:  NEURONTIN   This may have changed:  how much to take   Used for:  Peripheral polyneuropathy (H)   Changed by:  Josephine Jacob PA-C        Dose:  300 mg   Take 0.5 tablets (300 mg) by mouth 2 times daily   Quantity:  270 tablet   Refills:  3       metoprolol 50 MG tablet   Commonly known as:  LOPRESSOR   This may have changed:  when to take this   Used for:  HTN (hypertension), benign   Changed by:  Josephine Jacob PA-C        Dose:  50 mg   Take 1 tablet (50 mg) by mouth 3 times daily   Quantity:  90 tablet   Refills:  1            Where to get your medicines      These medications were sent to [facility] [address] [place] [address] & [address], [place] [address]     Phone:  [phone]     DULoxetine 30 MG EC capsule    metoprolol 50 MG tablet                Primary Care Provider Office Phone # Fax #    Josephine Jacob PA-C [phone]       [facility] [address]  [facility] [address]        Thank you!     Thank you for choosing  [facility]  for your care. Our goal is always to provide you with excellent care. Hearing back from our patients is one way we can continue to improve our services. Please take a few minutes to complete the written survey that you may receive in the mail after your visit with us. Thank you!             Your Updated Medication List - Protect others around you: Learn how to safely use, store and throw away your medicines at www.disposemymeds.org.          This list is accurate as of: 2/16/17  1:22 PM.  Always use your most recent med list.                   Brand Name Dispense Instructions for use    aspirin 81 MG tablet      Take 1 tablet by mouth daily.       aspirin-acetaminophen-caffeine 250-250-65 MG per tablet    EXCEDRIN MIGRAINE     Take 1 tablet by mouth every 6 hours as needed for headaches       atorvastatin 20 MG tablet    LIPITOR     Take 20 mg by mouth every evening       cholecalciferol 5000 UNITS Tabs tablet    vitamin D3     Take 1 tablet by mouth daily.       Co Q 10 100 MG Caps      Take 1 capsule by mouth daily       DULoxetine 30 MG EC capsule    CYMBALTA    60 capsule    Take 1 capsule (30 mg) by mouth 2 times daily       finasteride 5 MG tablet    PROSCAR    90 tablet    Take 1 tablet (5 mg) by mouth daily       fluticasone 50 MCG/ACT spray    FLONASE    48 g    Spray 1-2 sprays into both nostrils every evening as needed       gabapentin 600 MG tablet    NEURONTIN    270 tablet    Take 0.5 tablets (300 mg) by mouth 2 times daily       losartan 100 MG tablet    COZAAR     Take 100 mg by mouth daily       metoprolol 50 MG tablet    LOPRESSOR    90 tablet    Take 1 tablet (50 mg) by mouth 3 times daily       Multi-vitamin Tabs tablet   Generic drug:  multivitamin, therapeutic with minerals      Take 1 tablet by mouth daily.       omega-3 acid ethyl esters 1 G capsule    Lovaza    360 capsule    Take 2 capsules (2 g) by mouth 2 times daily       tamsulosin 0.4 MG capsule    FLOMAX    90  capsule    Take 1 capsule (0.4 mg) by mouth daily

## 2020-12-17 DIAGNOSIS — I10 HTN (HYPERTENSION), BENIGN: ICD-10-CM

## 2020-12-17 DIAGNOSIS — E11.21 TYPE 2 DIABETES MELLITUS WITH DIABETIC NEPHROPATHY, WITHOUT LONG-TERM CURRENT USE OF INSULIN (H): ICD-10-CM

## 2020-12-17 DIAGNOSIS — E78.5 HYPERLIPIDEMIA WITH TARGET LDL LESS THAN 100: ICD-10-CM

## 2020-12-17 DIAGNOSIS — Z00.00 ENCOUNTER FOR MEDICARE ANNUAL WELLNESS EXAM: ICD-10-CM

## 2020-12-17 LAB
ALBUMIN SERPL-MCNC: 3.8 G/DL (ref 3.4–5)
ALP SERPL-CCNC: 102 U/L (ref 40–150)
ALT SERPL W P-5'-P-CCNC: 43 U/L (ref 0–70)
ANION GAP SERPL CALCULATED.3IONS-SCNC: 5 MMOL/L (ref 3–14)
AST SERPL W P-5'-P-CCNC: 25 U/L (ref 0–45)
BILIRUB SERPL-MCNC: 0.6 MG/DL (ref 0.2–1.3)
BUN SERPL-MCNC: 23 MG/DL (ref 7–30)
CALCIUM SERPL-MCNC: 8.9 MG/DL (ref 8.5–10.1)
CHLORIDE SERPL-SCNC: 104 MMOL/L (ref 94–109)
CHOLEST SERPL-MCNC: 134 MG/DL
CO2 SERPL-SCNC: 26 MMOL/L (ref 20–32)
CREAT SERPL-MCNC: 1.79 MG/DL (ref 0.66–1.25)
CREAT UR-MCNC: 154 MG/DL
ERYTHROCYTE [DISTWIDTH] IN BLOOD BY AUTOMATED COUNT: 12.9 % (ref 10–15)
GFR SERPL CREATININE-BSD FRML MDRD: 35 ML/MIN/{1.73_M2}
GLUCOSE SERPL-MCNC: 124 MG/DL (ref 70–99)
HBA1C MFR BLD: 5.5 % (ref 0–5.6)
HCT VFR BLD AUTO: 44.8 % (ref 40–53)
HDLC SERPL-MCNC: 36 MG/DL
HGB BLD-MCNC: 14.8 G/DL (ref 13.3–17.7)
LDLC SERPL CALC-MCNC: 54 MG/DL
MCH RBC QN AUTO: 32.7 PG (ref 26.5–33)
MCHC RBC AUTO-ENTMCNC: 33 G/DL (ref 31.5–36.5)
MCV RBC AUTO: 99 FL (ref 78–100)
MICROALBUMIN UR-MCNC: 36 MG/L
MICROALBUMIN/CREAT UR: 23.51 MG/G CR (ref 0–17)
NONHDLC SERPL-MCNC: 98 MG/DL
PLATELET # BLD AUTO: 236 10E9/L (ref 150–450)
POTASSIUM SERPL-SCNC: 4.4 MMOL/L (ref 3.4–5.3)
PROT SERPL-MCNC: 7.4 G/DL (ref 6.8–8.8)
RBC # BLD AUTO: 4.52 10E12/L (ref 4.4–5.9)
SODIUM SERPL-SCNC: 135 MMOL/L (ref 133–144)
TRIGL SERPL-MCNC: 218 MG/DL
TSH SERPL DL<=0.005 MIU/L-ACNC: 2.97 MU/L (ref 0.4–4)
WBC # BLD AUTO: 9.7 10E9/L (ref 4–11)

## 2020-12-17 PROCEDURE — 84443 ASSAY THYROID STIM HORMONE: CPT | Performed by: PHYSICIAN ASSISTANT

## 2020-12-17 PROCEDURE — 82043 UR ALBUMIN QUANTITATIVE: CPT | Performed by: PHYSICIAN ASSISTANT

## 2020-12-17 PROCEDURE — 36415 COLL VENOUS BLD VENIPUNCTURE: CPT | Performed by: PHYSICIAN ASSISTANT

## 2020-12-17 PROCEDURE — 85027 COMPLETE CBC AUTOMATED: CPT | Performed by: PHYSICIAN ASSISTANT

## 2020-12-17 PROCEDURE — 80061 LIPID PANEL: CPT | Performed by: PHYSICIAN ASSISTANT

## 2020-12-17 PROCEDURE — 80053 COMPREHEN METABOLIC PANEL: CPT | Performed by: PHYSICIAN ASSISTANT

## 2020-12-17 PROCEDURE — 83036 HEMOGLOBIN GLYCOSYLATED A1C: CPT | Performed by: PHYSICIAN ASSISTANT

## 2020-12-18 DIAGNOSIS — I10 HTN (HYPERTENSION), BENIGN: ICD-10-CM

## 2020-12-18 RX ORDER — METOPROLOL TARTRATE 50 MG
TABLET ORAL
Qty: 270 TABLET | Refills: 3 | Status: SHIPPED | OUTPATIENT
Start: 2020-12-18 | End: 2022-01-04

## 2020-12-21 ASSESSMENT — ACTIVITIES OF DAILY LIVING (ADL): CURRENT_FUNCTION: NO ASSISTANCE NEEDED

## 2020-12-22 ENCOUNTER — OFFICE VISIT (OUTPATIENT)
Dept: FAMILY MEDICINE | Facility: CLINIC | Age: 80
End: 2020-12-22
Payer: COMMERCIAL

## 2020-12-22 VITALS
HEIGHT: 70 IN | OXYGEN SATURATION: 95 % | BODY MASS INDEX: 31.92 KG/M2 | HEART RATE: 73 BPM | WEIGHT: 223 LBS | TEMPERATURE: 96.2 F | DIASTOLIC BLOOD PRESSURE: 49 MMHG | SYSTOLIC BLOOD PRESSURE: 136 MMHG

## 2020-12-22 DIAGNOSIS — Z00.00 ENCOUNTER FOR MEDICARE ANNUAL WELLNESS EXAM: Primary | ICD-10-CM

## 2020-12-22 DIAGNOSIS — E11.21 TYPE 2 DIABETES MELLITUS WITH DIABETIC NEPHROPATHY, WITHOUT LONG-TERM CURRENT USE OF INSULIN (H): ICD-10-CM

## 2020-12-22 DIAGNOSIS — R09.89 LEFT CAROTID BRUIT: ICD-10-CM

## 2020-12-22 DIAGNOSIS — E78.5 HYPERLIPIDEMIA WITH TARGET LDL LESS THAN 100: ICD-10-CM

## 2020-12-22 DIAGNOSIS — R09.81 CHRONIC NASAL CONGESTION: ICD-10-CM

## 2020-12-22 DIAGNOSIS — N40.1 BENIGN PROSTATIC HYPERPLASIA WITH POST-VOID DRIBBLING: ICD-10-CM

## 2020-12-22 DIAGNOSIS — N18.30 STAGE 3 CHRONIC KIDNEY DISEASE, UNSPECIFIED WHETHER STAGE 3A OR 3B CKD (H): ICD-10-CM

## 2020-12-22 DIAGNOSIS — I10 HTN (HYPERTENSION), BENIGN: ICD-10-CM

## 2020-12-22 DIAGNOSIS — N39.43 BENIGN PROSTATIC HYPERPLASIA WITH POST-VOID DRIBBLING: ICD-10-CM

## 2020-12-22 PROCEDURE — 99397 PER PM REEVAL EST PAT 65+ YR: CPT | Performed by: PHYSICIAN ASSISTANT

## 2020-12-22 PROCEDURE — 99207 PR FOOT EXAM NO CHARGE: CPT | Mod: 25 | Performed by: PHYSICIAN ASSISTANT

## 2020-12-22 RX ORDER — IRBESARTAN 150 MG/1
TABLET ORAL
Qty: 90 TABLET | Refills: 3 | Status: SHIPPED | OUTPATIENT
Start: 2020-12-22 | End: 2021-03-11

## 2020-12-22 RX ORDER — FLUTICASONE PROPIONATE 50 MCG
1-2 SPRAY, SUSPENSION (ML) NASAL DAILY
Qty: 48 ML | Refills: 3 | Status: SHIPPED | OUTPATIENT
Start: 2020-12-22 | End: 2022-01-04

## 2020-12-22 RX ORDER — ATORVASTATIN CALCIUM 20 MG/1
20 TABLET, FILM COATED ORAL EVERY EVENING
Qty: 90 TABLET | Refills: 3 | Status: SHIPPED | OUTPATIENT
Start: 2020-12-22 | End: 2022-01-04

## 2020-12-22 RX ORDER — TAMSULOSIN HYDROCHLORIDE 0.4 MG/1
CAPSULE ORAL
Qty: 180 CAPSULE | Refills: 3 | Status: SHIPPED | OUTPATIENT
Start: 2020-12-22 | End: 2021-02-05

## 2020-12-22 ASSESSMENT — ACTIVITIES OF DAILY LIVING (ADL): CURRENT_FUNCTION: NO ASSISTANCE NEEDED

## 2020-12-22 ASSESSMENT — MIFFLIN-ST. JEOR: SCORE: 1717.77

## 2020-12-22 NOTE — PROGRESS NOTES
"SUBJECTIVE:   Luis Daniel Gomez is a 80 year old male who presents for Preventive Visit.    Pt had cataract surgery which went well  Eye exam up to date  [facility]    He did cologuard since wants to avoid colonoscopy  MA to try to get records.    He feels his penis looks smaller than it used to be  Able to get erection  Has issues with starting and stopping urination which is unchanged   Takes proscar and flomax        Patient has been advised of split billing requirements and indicates understanding: Yes   Are you in the first 12 months of your Medicare coverage?  No    Healthy Habits:     In general, how would you rate your overall health?  Good    Frequency of exercise:  6-7 days/week    Duration of exercise:  15-30 minutes    Do you usually eat at least 4 servings of fruit and vegetables a day, include whole grains    & fiber and avoid regularly eating high fat or \"junk\" foods?  No    Taking medications regularly:  Yes    Medication side effects:  None    Ability to successfully perform activities of daily living:  No assistance needed    Home Safety:  No safety concerns identified    Hearing Impairment:  No hearing concerns    In the past 6 months, have you been bothered by leaking of urine?  No    In general, how would you rate your overall mental or emotional health?  Good      PHQ-2 Total Score: 0    Additional concerns today:  No    Do you feel safe in your environment? Yes    Fall risk  Fallen 2 or more times in the past year?: No  Any fall with injury in the past year?: No    Cognitive Screening   1) Repeat 3 items (Leader, Season, Table)    2) Clock draw: NORMAL  3) 3 item recall: Recalls 3 objects  Results: 3 items recalled: COGNITIVE IMPAIRMENT LESS LIKELY    Mini-CogTM Copyright NILESH Dickey. Licensed by the author for use in [place] [employer]; reprinted with permission (jose a@.[facility]). All rights reserved.      Do you have sleep apnea, excessive snoring or " daytime drowsiness?: no    Reviewed and updated as needed this visit by clinical staff    EYE exam thru Dr. Sutton when had Cataract Surgery 2020  Colon---did Cologaurd within month. Was negative per pt   Tobacco  Allergies               Reviewed and updated as needed this visit by Provider                Social History     Tobacco Use     Smoking status: Former Smoker     Packs/day: 1.00     Years: 56.00     Pack years: 56.00     Types: Cigarettes     Quit date: 2008     Years since quittin.9     Smokeless tobacco: Never Used     Tobacco comment: quit    Substance Use Topics     Alcohol use: No     Alcohol/week: 0.0 standard drinks     If you drink alcohol do you typically have >3 drinks per day or >7 drinks per week? No    Alcohol Use 2020   Prescreen: >3 drinks/day or >7 drinks/week? -   Prescreen: >3 drinks/day or >7 drinks/week? No   No flowsheet data found.    Current providers sharing in care for this patient include:   Patient Care Team:  Josephine Jacob PA-C as PCP - General (Internal Medicine)  Josephine Jacob PA-C as Assigned PCP    The following health maintenance items are reviewed in Epic and correct as of today:  Health Maintenance   Topic Date Due     EYE EXAM  2016     COLORECTAL CANCER SCREENING  2019     FALL RISK ASSESSMENT  2020     A1C  2021     BMP  2021     LIPID  2021     MICROALBUMIN  2021     MEDICARE ANNUAL WELLNESS VISIT  2021     DIABETIC FOOT EXAM  2021     DTAP/TDAP/TD IMMUNIZATION (5 - Td) 2024     ADVANCE CARE PLANNING  2024     PHQ-2  Completed     INFLUENZA VACCINE  Completed     Pneumococcal Vaccine: 65+ Years  Completed     ZOSTER IMMUNIZATION  Completed     Pneumococcal Vaccine: Pediatrics (0 to 5 Years) and At-Risk Patients (6 to 64 Years)  Aged Out     IPV IMMUNIZATION  Aged Out     MENINGITIS IMMUNIZATION  Aged Out     Past Medical History:   Diagnosis Date     Arthritis       BPH (benign prostatic hyperplasia)      CKD (chronic kidney disease) stage 3, GFR 30-59 ml/min      Cluster headache 2015    Dr. Roth     Constipation      Dyslipidemia      Dyspnea     Normal nuc stress test 9-15-08, fainted once in life     Fatty liver     Ultrasound 11/11     FH: colon cancer     colonoscopy 10/10, repeat 5 years.     Former smoker     quit 2008     HTN (hypertension), benign      Peripheral neuropathy     Dr. Patricia     Spinal stenosis     Last MRI 2010, Dr. Fraire     Syncope      Type 2 diabetes mellitus (H)     diet controlled     Ureterolithiases      Past Surgical History:   Procedure Laterality Date     COLONOSCOPY       KERATOTOMY ARCUATE WITH FEMTOSECOND LASER/IMAGING FOR ATIOL Left 8/29/2016    Procedure: KERATOTOMY ARCUATE WITH FEMTOSECOND LASER/IMAGING FOR ATIOL;  Surgeon: Gerard Larson MD;  Location: [facility]     ORTHOPEDIC SURGERY       PHACOEMULSIFICATION CLEAR CORNEA WITH STANDARD INTRAOCULAR LENS IMPLANT Left 8/29/2016    Procedure: PHACOEMULSIFICATION CLEAR CORNEA WITH STANDARD INTRAOCULAR LENS IMPLANT;  Surgeon: Gerard Larson MD;  Location: [facility]     Thumb surg       Current Outpatient Medications   Medication Sig Dispense Refill     albuterol (VENTOLIN HFA) 108 (90 Base) MCG/ACT inhaler INHALE 2 PUFFS INTO THE LUNGS EVERY 6 HOURS AS NEEDED FOR SHORTNESS OF BREATH OR DIFFICULT BREATHING OR WHEEZING 18 g 3     amitriptyline (ELAVIL) 50 MG tablet Take 1 tablet (50 mg) by mouth At Bedtime 90 tablet 2     aspirin 81 MG tablet Take 81 mg by mouth daily Taking 325 mg aspirin x 1 week       atorvastatin (LIPITOR) 20 MG tablet Take 1 tablet (20 mg) by mouth every evening 90 tablet 3     Cholecalciferol (VITAMIN D3) 5000 UNITS TABS Take 1 tablet by mouth daily.       cyabnocobalamin (VITAMIN B-12) 2500 MCG sublingual tablet Place 3,000 mcg under the tongue daily  30 tablet      finasteride (PROSCAR) 5 MG tablet Take 1 tablet (5 mg) by mouth daily 90 tablet 3     fluticasone  "(FLONASE) 50 MCG/ACT spray SHAKE LIQUID AND USE 1 TO 2 SPRAYS IN EACH NOSTRIL DAILY 48 mL 3     irbesartan (AVAPRO) 150 MG tablet TAKE 1 TABLET(150 MG) BY MOUTH DAILY 90 tablet 3     loratadine (CLARITIN) 10 MG tablet Take 1 tablet (10 mg) by mouth daily 30 tablet 1     metoprolol tartrate (LOPRESSOR) 50 MG tablet TAKE 1 TABLET BY MOUTH TWICE DAILY 270 tablet 3     Multiple Vitamin (MULTI-VITAMIN) per tablet Take 1 tablet by mouth daily.       tamsulosin (FLOMAX) 0.4 MG capsule TAKE 2 CAPSULES(0.8 MG) BY MOUTH DAILY 180 capsule 3     Coenzyme Q10 (CO Q 10) 100 MG CAPS Take 1 capsule by mouth daily       omega-3 acid ethyl esters (LOVAZA) 1 g capsule TAKE 2 CAPSULES BY MOUTH TWICE DAILY 360 capsule 11       Review of Systems  Constitutional, HEENT, cardiovascular, pulmonary, GI, , musculoskeletal, neuro, skin, endocrine and psych systems are negative, except as otherwise noted.    OBJECTIVE:   /49 (BP Location: Right arm, Patient Position: Chair, Cuff Size: Adult Large)   Pulse 73   Temp 96.2  F (35.7  C) (Temporal)   Ht 1.778 m (5' 10\")   Wt 101.2 kg (223 lb)   SpO2 95%   BMI 32.00 kg/m   Estimated body mass index is 32 kg/m  as calculated from the following:    Height as of this encounter: 1.778 m (5' 10\").    Weight as of this encounter: 101.2 kg (223 lb).  Physical Exam  GENERAL: healthy, alert and no distress  EYES: Eyes grossly normal to inspection, PERRL and conjunctivae and sclerae normal  HENT: ear canals and TM's normal, nose and mouth without ulcers or lesions  NECK: no adenopathy, no asymmetry, masses, or scars and thyroid normal to palpation. +L carotid bruit.  RESP: lungs clear to auscultation - no rales, rhonchi or wheezes  CV: regular rate and rhythm, normal S1 S2, no S3 or S4, no murmur, click or rub, no peripheral edema and peripheral pulses strong  ABDOMEN: soft, nontender, no hepatosplenomegaly, no masses and bowel sounds normal  MS: no gross musculoskeletal defects noted, no " "edema  SKIN: no suspicious lesions or rashes  NEURO: Normal strength and tone, mentation intact and speech normal  PSYCH: mentation appears normal, affect normal/bright        ASSESSMENT / PLAN:   Assessment and Plan:     (Z00.00) Encounter for Medicare annual wellness exam  (primary encounter diagnosis)  Comment: Immun up to date.   Plan: Pt had cologuard instead of colonoscopy and will get me the results of this.    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 Recd annual eye exam  Lab Results   Component Value Date    A1C 5.5 12/17/2020    A1C 5.5 12/11/2019    A1C 5.6 12/12/2018    A1C 5.6 07/03/2018    A1C 5.7 12/08/2017     Plan: FOOT EXAM            (N18.30) Stage 3 chronic kidney disease, unspecified whether stage 3a or 3b CKD  Comment:   Plan: Cr 1.79 with GFR of 35.  Recd he avoid nsaids and increase water intake. Pt on an ARB.    (E78.5) Hyperlipidemia with target LDL less than 100  Comment:   Plan: atorvastatin (LIPITOR) 20 MG tablet            (I10) HTN (hypertension), benign  Comment:   Plan: irbesartan (AVAPRO) 150 MG tablet            (R09.81) Chronic nasal congestion  Comment:   Plan: fluticasone (FLONASE) 50 MCG/ACT nasal spray        refille    (N40.1,  N39.43) Benign prostatic hyperplasia with post-void dribbling  Comment:   Plan: tamsulosin (FLOMAX) 0.4 MG capsule        refilled    (R09.89) Left carotid bruit  Comment:   Plan: US Carotid Bilateral              Patient has been advised of split billing requirements and indicates understanding: Yes  COUNSELING:  Reviewed preventive health counseling, as reflected in patient instructions    Estimated body mass index is 32 kg/m  as calculated from the following:    Height as of this encounter: 1.778 m (5' 10\").    Weight as of this encounter: 101.2 kg (223 lb).        He reports that he quit smoking about 12 years ago. His smoking use included cigarettes. He " has a 56.00 pack-year smoking history. He has never used smokeless tobacco.      Appropriate preventive services were discussed with this patient, including applicable screening as appropriate for cardiovascular disease, diabetes, osteopenia/osteoporosis, and glaucoma.  As appropriate for age/gender, discussed screening for colorectal cancer, prostate cancer, breast cancer, and cervical cancer. Checklist reviewing preventive services available has been given to the patient.    Reviewed patients plan of care and provided an AVS. The Basic Care Plan (routine screening as documented in Health Maintenance) for Luis Daniel meets the Care Plan requirement. This Care Plan has been established and reviewed with the Patient.    Counseling Resources:  ATP IV Guidelines  Pooled Cohorts Equation Calculator  Breast Cancer Risk Calculator  Breast Cancer: Medication to Reduce Risk  FRAX Risk Assessment  ICSI Preventive Guidelines  Dietary Guidelines for Americans, 2010  USDA's MyPlate  ASA Prophylaxis  Lung CA Screening    Josephine Jacob PA-C  [facility]    Identified Health Risks:

## 2021-01-05 ENCOUNTER — HOSPITAL ENCOUNTER (OUTPATIENT)
Dept: ULTRASOUND IMAGING | Facility: CLINIC | Age: 81
Discharge: HOME | End: 2021-01-05
Attending: PHYSICIAN ASSISTANT | Admitting: PHYSICIAN ASSISTANT
Payer: COMMERCIAL

## 2021-01-05 DIAGNOSIS — R09.89 LEFT CAROTID BRUIT: ICD-10-CM

## 2021-01-05 PROCEDURE — 93880 EXTRACRANIAL BILAT STUDY: CPT

## 2021-01-06 NOTE — RESULT ENCOUNTER NOTE
Casey,    Your carotid ultrasound shows that your plaque is stable and hasn't increased.    Josephine Jacob PA-C

## 2021-01-08 ENCOUNTER — TRANSFERRED RECORDS (OUTPATIENT)
Dept: HEALTH INFORMATION MANAGEMENT | Facility: CLINIC | Age: 81
End: 2021-01-08

## 2021-02-03 DIAGNOSIS — N39.43 BENIGN PROSTATIC HYPERPLASIA WITH POST-VOID DRIBBLING: ICD-10-CM

## 2021-02-03 DIAGNOSIS — N40.1 BENIGN PROSTATIC HYPERPLASIA WITH POST-VOID DRIBBLING: ICD-10-CM

## 2021-02-05 RX ORDER — TAMSULOSIN HYDROCHLORIDE 0.4 MG/1
CAPSULE ORAL
Qty: 180 CAPSULE | Refills: 3 | Status: SHIPPED | OUTPATIENT
Start: 2021-02-05 | End: 2022-01-04

## 2021-03-10 DIAGNOSIS — I10 HTN (HYPERTENSION), BENIGN: ICD-10-CM

## 2021-03-11 RX ORDER — IRBESARTAN 150 MG/1
TABLET ORAL
Qty: 90 TABLET | Refills: 3 | Status: SHIPPED | OUTPATIENT
Start: 2021-03-11 | End: 2022-06-14

## 2021-03-11 NOTE — TELEPHONE ENCOUNTER
Routing refill request to provider for review/approval because:  Creatinine   Date Value Ref Range Status   12/17/2020 1.79 (H) 0.66 - 1.25 mg/dL Final       Aretha PARKER RN,BSN

## 2021-03-25 ENCOUNTER — TRANSFERRED RECORDS (OUTPATIENT)
Dept: HEALTH INFORMATION MANAGEMENT | Facility: CLINIC | Age: 81
End: 2021-03-25

## 2021-04-21 ENCOUNTER — OFFICE VISIT (OUTPATIENT)
Dept: FAMILY MEDICINE | Facility: CLINIC | Age: 81
End: 2021-04-21
Payer: COMMERCIAL

## 2021-04-21 VITALS
SYSTOLIC BLOOD PRESSURE: 125 MMHG | WEIGHT: 226 LBS | HEART RATE: 75 BPM | HEIGHT: 70 IN | BODY MASS INDEX: 32.35 KG/M2 | TEMPERATURE: 97.4 F | DIASTOLIC BLOOD PRESSURE: 66 MMHG | OXYGEN SATURATION: 96 %

## 2021-04-21 DIAGNOSIS — I47.29 PAROXYSMAL VENTRICULAR TACHYCARDIA (H): ICD-10-CM

## 2021-04-21 DIAGNOSIS — M18.11 PRIMARY OSTEOARTHRITIS OF FIRST CARPOMETACARPAL JOINT OF RIGHT HAND: ICD-10-CM

## 2021-04-21 DIAGNOSIS — I10 HTN (HYPERTENSION), BENIGN: ICD-10-CM

## 2021-04-21 DIAGNOSIS — Z01.818 PREOP GENERAL PHYSICAL EXAM: Primary | ICD-10-CM

## 2021-04-21 DIAGNOSIS — E11.21 TYPE 2 DIABETES MELLITUS WITH DIABETIC NEPHROPATHY, WITHOUT LONG-TERM CURRENT USE OF INSULIN (H): ICD-10-CM

## 2021-04-21 DIAGNOSIS — N18.30 STAGE 3 CHRONIC KIDNEY DISEASE, UNSPECIFIED WHETHER STAGE 3A OR 3B CKD (H): ICD-10-CM

## 2021-04-21 DIAGNOSIS — M67.441 GANGLION OF RIGHT HAND: ICD-10-CM

## 2021-04-21 LAB
ANION GAP SERPL CALCULATED.3IONS-SCNC: 4 MMOL/L (ref 3–14)
BUN SERPL-MCNC: 27 MG/DL (ref 7–30)
CALCIUM SERPL-MCNC: 9.2 MG/DL (ref 8.5–10.1)
CHLORIDE SERPL-SCNC: 105 MMOL/L (ref 94–109)
CO2 SERPL-SCNC: 29 MMOL/L (ref 20–32)
CREAT SERPL-MCNC: 1.78 MG/DL (ref 0.66–1.25)
GFR SERPL CREATININE-BSD FRML MDRD: 35 ML/MIN/{1.73_M2}
GLUCOSE SERPL-MCNC: 114 MG/DL (ref 70–99)
HGB BLD-MCNC: 15.3 G/DL (ref 13.3–17.7)
POTASSIUM SERPL-SCNC: 5.1 MMOL/L (ref 3.4–5.3)
SODIUM SERPL-SCNC: 138 MMOL/L (ref 133–144)

## 2021-04-21 PROCEDURE — 85018 HEMOGLOBIN: CPT | Performed by: PHYSICIAN ASSISTANT

## 2021-04-21 PROCEDURE — 36415 COLL VENOUS BLD VENIPUNCTURE: CPT | Performed by: PHYSICIAN ASSISTANT

## 2021-04-21 PROCEDURE — 99214 OFFICE O/P EST MOD 30 MIN: CPT | Performed by: PHYSICIAN ASSISTANT

## 2021-04-21 PROCEDURE — 80048 BASIC METABOLIC PNL TOTAL CA: CPT | Performed by: PHYSICIAN ASSISTANT

## 2021-04-21 ASSESSMENT — MIFFLIN-ST. JEOR: SCORE: 1731.38

## 2021-04-21 NOTE — PROGRESS NOTES
[place] [address] [facility] [address]  Phone: [phone]  Primary Provider: Yanelis Wong PA-C  Pre-op Performing Provider: YANELIS WONG PA-C      PREOPERATIVE EVALUATION:  Today's date: 4/21/2021    Luis Daniel Gomez is a 80 year old male who presents for a preoperative evaluation.    Surgical Information:  Surgery/Procedure: Right Thumb Surgery  Surgery Location: [facility]  Surgeon: Dr. Koch  Surgery Date: 5/5/2021  Time of Surgery: 1:00  Where patient plans to recover: At home with family  Fax number for surgical facility: [phone]    Type of Anesthesia Anticipated: General      Subjective     HPI related to upcoming procedure: R hand ganglion cystectomy and R thumb surgery      Preop Questions 10/25/2020   1. Have you ever had a heart attack or stroke? No   2. Have you ever had surgery on your heart or blood vessels, such as a stent placement, a coronary artery bypass, or surgery on an artery in your head, neck, heart, or legs? No   3. Do you have chest pain with activity? No   4. Do you have a history of  heart failure? No   5. Do you currently have a cold, bronchitis or symptoms of other infection? No   6. Do you have a cough, shortness of breath, or wheezing? No   7. Do you or anyone in your family have previous history of blood clots? No   8. Do you or does anyone in your family have a serious bleeding problem such as prolonged bleeding following surgeries or cuts? No   9. Have you ever had problems with anemia or been told to take iron pills? No   10. Have you had any abnormal blood loss such as black, tarry or bloody stools? No   11. Have you ever had a blood transfusion? No   12. Are you willing to have a blood transfusion if it is medically needed before, during, or after your surgery? Yes   13. Have you or any of your relatives ever had problems with anesthesia? No   14. Do you have sleep apnea, excessive snoring or daytime drowsiness? No   15. Do you  have any artifical heart valves or other implanted medical devices like a pacemaker, defibrillator, or continuous glucose monitor? No   16. Do you have artificial joints? No   17. Are you allergic to latex? No     Health Care Directive:  Patient does not have a Health Care Directive or Living Will: Discussed advance care planning with patient; however, patient declined at this time.    Preoperative Review of :      Status of Chronic Conditions:  DIABETES - Patient has a longstanding history of DiabetesType Type II . Patient is being treated with diet and denies significant side effects. Control has been good. Complicating factors include but are not limited to: hypertension.     HYPERTENSION - Patient has longstanding history of HTN , currently denies any symptoms referable to elevated blood pressure. Specifically denies chest pain, palpitations, dyspnea, orthopnea, PND or peripheral edema. Blood pressure readings have been in normal range. Current medication regimen is as listed below. Patient denies any side effects of medication.     RENAL INSUFFICIENCY - Patient has a longstanding history of moderate-severe chronic renal insufficiency.       Review of Systems  CONSTITUTIONAL: NEGATIVE for fever, chills, change in weight  ENT/MOUTH: NEGATIVE for ear, mouth and throat problems  RESP: NEGATIVE for significant cough or SOB  CV: NEGATIVE for chest pain, palpitations or peripheral edema    Patient Active Problem List    Diagnosis Date Noted     Type 2 diabetes mellitus with diabetic nephropathy (H) 10/12/2016     Priority: Medium     Intractable episodic cluster headache 12/08/2015     Priority: Medium     Hyperlipidemia with target LDL less than 100 11/17/2014     Priority: Medium     Diagnosis updated by automated process. Provider to review and confirm.       Paroxysmal ventricular tachycardia (H) 07/08/2014     Priority: Medium     Vascular bruit 07/08/2014     Priority: Medium     Syncope 07/07/2014      Priority: Medium     Peripheral neuropathy 06/16/2014     Priority: Medium     Former smoker      Priority: Medium     Kidney stone 05/29/2012     Priority: Medium     Advanced directives, counseling/discussion 05/16/2012     Priority: Medium     Advance Care Planning:   ACP Review and Resources Provided:  Reviewed chart for advance care plan.  Luis Daniel Gomez has no plan or code status on file. Letter sent..   Added by Charline Pham on 11/18/2014               HTN (hypertension), benign      Priority: Medium     CKD (chronic kidney disease) stage 3, GFR 30-59 ml/min 05/11/2012     Priority: Medium     BPH (benign prostatic hyperplasia) 05/11/2012     Priority: Medium      Past Medical History:   Diagnosis Date     Arthritis      BPH (benign prostatic hyperplasia)      CKD (chronic kidney disease) stage 3, GFR 30-59 ml/min      Cluster headache 2015    Dr. Roth     Constipation      Dyslipidemia      Dyspnea     Normal nuc stress test 9-15-08, fainted once in life     Fatty liver     Ultrasound 11/11     FH: colon cancer     colonoscopy 10/10, repeat 5 years.     Former smoker     quit 2008     HTN (hypertension), benign      Peripheral neuropathy     Dr. Patricia     Spinal stenosis     Last MRI 2010, Dr. Fraire     Syncope      Type 2 diabetes mellitus (H)     diet controlled     Ureterolithiases      Past Surgical History:   Procedure Laterality Date     COLONOSCOPY       KERATOTOMY ARCUATE WITH FEMTOSECOND LASER/IMAGING FOR ATIOL Left 8/29/2016    Procedure: KERATOTOMY ARCUATE WITH FEMTOSECOND LASER/IMAGING FOR ATIOL;  Surgeon: Gerard Larson MD;  Location: [facility]     ORTHOPEDIC SURGERY       PHACOEMULSIFICATION CLEAR CORNEA WITH STANDARD INTRAOCULAR LENS IMPLANT Left 8/29/2016    Procedure: PHACOEMULSIFICATION CLEAR CORNEA WITH STANDARD INTRAOCULAR LENS IMPLANT;  Surgeon: Gerard Larson MD;  Location: [facility]     Thumb surg       Current Outpatient Medications   Medication Sig Dispense Refill     albuterol  "(VENTOLIN HFA) 108 (90 Base) MCG/ACT inhaler INHALE 2 PUFFS INTO THE LUNGS EVERY 6 HOURS AS NEEDED FOR SHORTNESS OF BREATH OR DIFFICULT BREATHING OR WHEEZING 18 g 3     amitriptyline (ELAVIL) 50 MG tablet Take 1 tablet (50 mg) by mouth At Bedtime 90 tablet 2     aspirin 81 MG tablet Take 81 mg by mouth daily Taking 325 mg aspirin x 1 week       atorvastatin (LIPITOR) 20 MG tablet Take 1 tablet (20 mg) by mouth every evening 90 tablet 3     Cholecalciferol (VITAMIN D3) 5000 UNITS TABS Take 1 tablet by mouth daily.       cyabnocobalamin (VITAMIN B-12) 2500 MCG sublingual tablet Place 3,000 mcg under the tongue daily  30 tablet      finasteride (PROSCAR) 5 MG tablet Take 1 tablet (5 mg) by mouth daily 90 tablet 3     fluticasone (FLONASE) 50 MCG/ACT nasal spray Spray 1-2 sprays into both nostrils daily 48 mL 3     irbesartan (AVAPRO) 150 MG tablet TAKE 1 TABLET(150 MG) BY MOUTH DAILY 90 tablet 3     loratadine (CLARITIN) 10 MG tablet Take 1 tablet (10 mg) by mouth daily 30 tablet 1     metoprolol tartrate (LOPRESSOR) 50 MG tablet TAKE 1 TABLET BY MOUTH TWICE DAILY 270 tablet 3     Multiple Vitamin (MULTI-VITAMIN) per tablet Take 1 tablet by mouth daily.       tamsulosin (FLOMAX) 0.4 MG capsule TAKE 2 CAPSULES(0.8 MG) BY MOUTH DAILY 180 capsule 3       Allergies   Allergen Reactions     Lisinopril Shortness Of Breath and Fatigue     Amoxicillin Itching     Duloxetine      \"I can't take it\"     Levofloxacin      \"I can't take the 500 mgs\"     Neurontin [Gabapentin]      edema     Norvasc [Amlodipine]      edema     Oxycodone GI Disturbance     Penicillins Itching     Red Yeast Rice Extract [Monascus Purpureus Went Yeast] Hives and Itching     Tramadol Itching     Verapamil Itching     Vicodin [Hydrocodone-Acetaminophen] Itching        Social History     Tobacco Use     Smoking status: Former Smoker     Packs/day: 1.00     Years: 56.00     Pack years: 56.00     Types: Cigarettes     Quit date: 1/1/2008     Years since " "quittin.3     Smokeless tobacco: Never Used     Tobacco comment: quit 2008   Substance Use Topics     Alcohol use: No     Alcohol/week: 0.0 standard drinks     Family History   Problem Relation Age of Onset     C.A.D. Father      Diabetes Father      Cancer - colorectal Mother 80     Cancer - colorectal Maternal Grandmother      History   Drug Use No         Objective     Pulse 75   Temp 97.4  F (36.3  C) (Tympanic)   Ht 1.778 m (5' 10\")   Wt 102.5 kg (226 lb)   SpO2 96%   BMI 32.43 kg/m      Physical Exam  GENERAL APPEARANCE: healthy, alert and no distress  HENT: ear canals and TM's normal and nose and mouth without ulcers or lesions  RESP: lungs clear to auscultation - no rales, rhonchi or wheezes  CV: regular rate and rhythm, normal S1 S2, no S3 or S4 and no murmur, click or rub   ABDOMEN: soft, nontender, no HSM or masses and bowel sounds normal  NEURO: Normal strength and tone, sensory exam grossly normal, mentation intact and speech normal        Diagnostics:  Results for orders placed or performed in visit on 21   Hemoglobin     Status: None   Result Value Ref Range    Hemoglobin 15.3 13.3 - 17.7 g/dL   Basic metabolic panel     Status: Abnormal   Result Value Ref Range    Sodium 138 133 - 144 mmol/L    Potassium 5.1 3.4 - 5.3 mmol/L    Chloride 105 94 - 109 mmol/L    Carbon Dioxide 29 20 - 32 mmol/L    Anion Gap 4 3 - 14 mmol/L    Glucose 114 (H) 70 - 99 mg/dL    Urea Nitrogen 27 7 - 30 mg/dL    Creatinine 1.78 (H) 0.66 - 1.25 mg/dL    GFR Estimate 35 (L) >60 mL/min/[1.73_m2]    GFR Estimate If Black 40 (L) >60 mL/min/[1.73_m2]    Calcium 9.2 8.5 - 10.1 mg/dL        No EKG required for low risk surgery (cataract, skin procedure, breast biopsy, etc).    Assessment and Plan:     (Z01.818) Preop general physical exam  (primary encounter diagnosis)  Comment: Pt has been advised to discontinue his asa now until after surgery. He will check to make sure he is scheduled for pre surgery covid " testing.  Plan: Hemoglobin, Basic metabolic panel            (M67.441) Ganglion of right hand  Comment:   Plan:     (M18.11) Primary osteoarthritis of first carpometacarpal joint of right hand  Comment:   Plan:     (I10) HTN (hypertension), benign  Comment:   Plan: well controlled, no changes.    (E11.21) Type 2 diabetes mellitus with diabetic nephropathy, without long-term current use of insulin (H)  Comment: diet controlled  Plan:   Lab Results   Component Value Date    A1C 5.5 12/17/2020    A1C 5.5 12/11/2019    A1C 5.6 12/12/2018    A1C 5.6 07/03/2018    A1C 5.7 12/08/2017         (N18.30) Stage 3 chronic kidney disease, unspecified whether stage 3a or 3b CKD  Comment:   Plan: stable    (I47.2) Paroxysmal ventricular tachycardia (H)  Comment:   Plan: denies recurrence         Signed Electronically by: Josephine Jacob PA-C  Copy of this evaluation report is provided to requesting physician.

## 2021-04-22 NOTE — RESULT ENCOUNTER NOTE
Casey,    Your kidney function is stable.  Your blood sugar was fine at 114.  Your electrolytes and hemoglobin were normal.  Good luck with surgery.    Josephine Jacob PA-C

## 2021-04-30 ENCOUNTER — OFFICE VISIT (OUTPATIENT)
Dept: FAMILY MEDICINE | Facility: CLINIC | Age: 81
End: 2021-04-30
Payer: COMMERCIAL

## 2021-04-30 VITALS
BODY MASS INDEX: 32.5 KG/M2 | DIASTOLIC BLOOD PRESSURE: 71 MMHG | HEART RATE: 74 BPM | HEIGHT: 70 IN | SYSTOLIC BLOOD PRESSURE: 125 MMHG | OXYGEN SATURATION: 94 % | TEMPERATURE: 97.5 F | WEIGHT: 227 LBS

## 2021-04-30 DIAGNOSIS — M10.9 ACUTE GOUT INVOLVING TOE OF LEFT FOOT, UNSPECIFIED CAUSE: Primary | ICD-10-CM

## 2021-04-30 PROCEDURE — 99213 OFFICE O/P EST LOW 20 MIN: CPT | Performed by: NURSE PRACTITIONER

## 2021-04-30 RX ORDER — PREDNISONE 20 MG/1
40 TABLET ORAL DAILY
Qty: 10 TABLET | Refills: 0 | Status: SHIPPED | OUTPATIENT
Start: 2021-04-30 | End: 2021-05-05

## 2021-04-30 ASSESSMENT — MIFFLIN-ST. JEOR: SCORE: 1735.92

## 2021-04-30 NOTE — PROGRESS NOTES
"  Assessment & Plan   Problem List Items Addressed This Visit     None      Visit Diagnoses     Acute gout involving toe of left foot, unspecified cause    -  Primary    Relevant Medications    predniSONE (DELTASONE) 20 MG tablet         New dx gout. Has significant comorbidities so started prednisone for tx. He is scheduled for surgery next week and recommended he call today to notify them as they may want to postpone surgery.       Joaquín Sorto, HARSHA CNP  [facility] ANA Peña is a 80 year old who presents for the following health issues  accompanied by his spouse:    HPI     Gout Left Great toe    Has neuropathy   The last 4 days his L great toe has been painful   Hard to sleep   Toe/foot is swollen   No hx gout   Tried tylenol       Review of Systems   Detailed as above         Objective    /71 (BP Location: Left arm, Patient Position: Chair, Cuff Size: Adult Large)   Pulse 74   Temp 97.5  F (36.4  C) (Temporal)   Ht 1.778 m (5' 10\")   Wt 103 kg (227 lb)   SpO2 94%   BMI 32.57 kg/m    There is no height or weight on file to calculate BMI.  Physical Exam  Constitutional:       Appearance: Normal appearance.   Pulmonary:      Effort: Pulmonary effort is normal.   Skin:     Comments: Swelling and redness at L 1st MTP   Neurological:      Mental Status: He is alert.   Psychiatric:         Mood and Affect: Mood normal.          "

## 2021-07-04 ENCOUNTER — HEALTH MAINTENANCE LETTER (OUTPATIENT)
Age: 81
End: 2021-07-04

## 2021-11-24 ENCOUNTER — TRANSFERRED RECORDS (OUTPATIENT)
Dept: MULTI SPECIALTY CLINIC | Facility: CLINIC | Age: 81
End: 2021-11-24

## 2021-11-24 ENCOUNTER — TRANSFERRED RECORDS (OUTPATIENT)
Dept: HEALTH INFORMATION MANAGEMENT | Facility: CLINIC | Age: 81
End: 2021-11-24
Payer: COMMERCIAL

## 2021-11-24 LAB — RETINOPATHY: NORMAL

## 2021-12-28 ENCOUNTER — LAB (OUTPATIENT)
Dept: LAB | Facility: CLINIC | Age: 81
End: 2021-12-28
Payer: COMMERCIAL

## 2021-12-28 DIAGNOSIS — E78.5 HYPERLIPIDEMIA WITH TARGET LDL LESS THAN 100: ICD-10-CM

## 2021-12-28 DIAGNOSIS — E11.21 TYPE 2 DIABETES MELLITUS WITH DIABETIC NEPHROPATHY, WITHOUT LONG-TERM CURRENT USE OF INSULIN (H): ICD-10-CM

## 2021-12-28 DIAGNOSIS — I10 HTN (HYPERTENSION), BENIGN: ICD-10-CM

## 2021-12-28 DIAGNOSIS — Z00.00 ENCOUNTER FOR MEDICARE ANNUAL WELLNESS EXAM: ICD-10-CM

## 2021-12-28 LAB
ALBUMIN SERPL-MCNC: 3.7 G/DL (ref 3.4–5)
ALP SERPL-CCNC: 91 U/L (ref 40–150)
ALT SERPL W P-5'-P-CCNC: 26 U/L (ref 0–70)
ANION GAP SERPL CALCULATED.3IONS-SCNC: 5 MMOL/L (ref 3–14)
AST SERPL W P-5'-P-CCNC: 17 U/L (ref 0–45)
BILIRUB SERPL-MCNC: 0.6 MG/DL (ref 0.2–1.3)
BUN SERPL-MCNC: 28 MG/DL (ref 7–30)
CALCIUM SERPL-MCNC: 9.5 MG/DL (ref 8.5–10.1)
CHLORIDE BLD-SCNC: 102 MMOL/L (ref 94–109)
CHOLEST SERPL-MCNC: 144 MG/DL
CO2 SERPL-SCNC: 29 MMOL/L (ref 20–32)
CREAT SERPL-MCNC: 1.67 MG/DL (ref 0.66–1.25)
CREAT UR-MCNC: 120 MG/DL
ERYTHROCYTE [DISTWIDTH] IN BLOOD BY AUTOMATED COUNT: 12.6 % (ref 10–15)
FASTING STATUS PATIENT QL REPORTED: YES
GFR SERPL CREATININE-BSD FRML MDRD: 40 ML/MIN/1.73M2
GLUCOSE BLD-MCNC: 130 MG/DL (ref 70–99)
HBA1C MFR BLD: 5.8 % (ref 0–5.6)
HCT VFR BLD AUTO: 44 % (ref 40–53)
HDLC SERPL-MCNC: 47 MG/DL
HGB BLD-MCNC: 14.8 G/DL (ref 13.3–17.7)
LDLC SERPL CALC-MCNC: 84 MG/DL
MCH RBC QN AUTO: 33.4 PG (ref 26.5–33)
MCHC RBC AUTO-ENTMCNC: 33.6 G/DL (ref 31.5–36.5)
MCV RBC AUTO: 99 FL (ref 78–100)
MICROALBUMIN UR-MCNC: 15 MG/L
MICROALBUMIN/CREAT UR: 12.5 MG/G CR (ref 0–17)
NONHDLC SERPL-MCNC: 97 MG/DL
PLATELET # BLD AUTO: 195 10E3/UL (ref 150–450)
POTASSIUM BLD-SCNC: 5.1 MMOL/L (ref 3.4–5.3)
PROT SERPL-MCNC: 7.4 G/DL (ref 6.8–8.8)
RBC # BLD AUTO: 4.43 10E6/UL (ref 4.4–5.9)
SODIUM SERPL-SCNC: 136 MMOL/L (ref 133–144)
TRIGL SERPL-MCNC: 67 MG/DL
WBC # BLD AUTO: 11 10E3/UL (ref 4–11)

## 2021-12-28 PROCEDURE — 80061 LIPID PANEL: CPT

## 2021-12-28 PROCEDURE — 36415 COLL VENOUS BLD VENIPUNCTURE: CPT

## 2021-12-28 PROCEDURE — 83036 HEMOGLOBIN GLYCOSYLATED A1C: CPT

## 2021-12-28 PROCEDURE — 85027 COMPLETE CBC AUTOMATED: CPT

## 2021-12-28 PROCEDURE — 80053 COMPREHEN METABOLIC PANEL: CPT

## 2021-12-28 PROCEDURE — 82043 UR ALBUMIN QUANTITATIVE: CPT

## 2022-01-03 NOTE — PROGRESS NOTES
"SUBJECTIVE:   Luis Daniel Gomez is a 81 year old male who presents for Preventive Visit.    Pt was treated for pneumonia by [facility] on 12/9/21 with doxy and prednisone  He did get better but now in the past 3 days has cough, weakness, green sputum production.  His sxs started initially mid Nov.    He has last over 20bs this year; has been trying not to snack but also not eating much since has cough.      Patient has been advised of split billing requirements and indicates understanding: Yes   Are you in the first 12 months of your Medicare coverage?  No    Healthy Habits:     In general, how would you rate your overall health?  Good    Frequency of exercise:  6-7 days/week    Duration of exercise:  15-30 minutes    Do you usually eat at least 4 servings of fruit and vegetables a day, include whole grains    & fiber and avoid regularly eating high fat or \"junk\" foods?  No    Taking medications regularly:  Yes    Barriers to taking medications:  None    Medication side effects:  None    Ability to successfully perform activities of daily living:  No assistance needed    Home Safety:  No safety concerns identified    Hearing Impairment:  No hearing concerns    In the past 6 months, have you been bothered by leaking of urine?  No    In general, how would you rate your overall mental or emotional health?  Excellent      PHQ-2 Total Score: 0    Additional concerns today:  No    Do you feel safe in your environment? Yes    Have you ever done Advance Care Planning? (For example, a Health Directive, POLST, or a discussion with a medical provider or your loved ones about your wishes): Yes, patient states has an Advance Care Planning document and will bring a copy to the clinic.       Fall risk  Fallen 2 or more times in the past year?: No  Any fall with injury in the past year?: No    Cognitive Screening   1) Repeat 3 items (Leader, Season, Table)    2) Clock draw: NORMAL  3) 3 item recall: Recalls NO objects   Results: 0 items " recalled: PROBABLE COGNITIVE IMPAIRMENT, **INFORM PROVIDER**    Mini-CogTM Copyright NILESH Dickey. Licensed by the author for use in [facility]; reprinted with permission (jose a@[facility]). All rights reserved.      Do you have sleep apnea, excessive snoring or daytime drowsiness?: no    Reviewed and updated as needed this visit by clinical staff  Tobacco  Allergies  Meds             Reviewed and updated as needed this visit by Provider               Social History     Tobacco Use     Smoking status: Former Smoker     Packs/day: 1.00     Years: 56.00     Pack years: 56.00     Types: Cigarettes     Quit date: 2008     Years since quittin.0     Smokeless tobacco: Never Used     Tobacco comment: quit    Substance Use Topics     Alcohol use: No     Alcohol/week: 0.0 standard drinks     If you drink alcohol do you typically have >3 drinks per day or >7 drinks per week? No    Alcohol Use 2022   Prescreen: >3 drinks/day or >7 drinks/week? Not Applicable   Prescreen: >3 drinks/day or >7 drinks/week? -       Current providers sharing in care for this patient include:   Patient Care Team:  Josephine Jacob PA-C as PCP - General (Internal Medicine)  Josephine Jacob PA-C as Assigned PCP    The following health maintenance items are reviewed in Epic and correct as of today:  Health Maintenance Due   Topic Date Due     COLORECTAL CANCER SCREENING  2019     EYE EXAM  10/13/2021     DIABETIC FOOT EXAM  2021     FALL RISK ASSESSMENT  2021       Past Medical History:   Diagnosis Date     Arthritis      BPH (benign prostatic hyperplasia)      CKD (chronic kidney disease) stage 3, GFR 30-59 ml/min (H)      Cluster headache     Dr. Roth     Constipation      Dyslipidemia      Dyspnea     Normal nuc stress test 9-15-08, fainted once in life     Fatty liver     Ultrasound      FH: colon cancer     colonoscopy 10/10, repeat 5 years.     Former smoker     quit      HTN  (hypertension), benign      Peripheral neuropathy     Dr. Patricia     Spinal stenosis     Last MRI 2010, Dr. Fraire     Syncope      Type 2 diabetes mellitus (H)     diet controlled     Ureterolithiases      Past Surgical History:   Procedure Laterality Date     COLONOSCOPY       KERATOTOMY ARCUATE WITH FEMTOSECOND LASER/IMAGING FOR ATIOL Left 8/29/2016    Procedure: KERATOTOMY ARCUATE WITH FEMTOSECOND LASER/IMAGING FOR ATIOL;  Surgeon: Gerard Larson MD;  Location: [facility]     ORTHOPEDIC SURGERY       PHACOEMULSIFICATION CLEAR CORNEA WITH STANDARD INTRAOCULAR LENS IMPLANT Left 8/29/2016    Procedure: PHACOEMULSIFICATION CLEAR CORNEA WITH STANDARD INTRAOCULAR LENS IMPLANT;  Surgeon: Gerard Larson MD;  Location: [facility]     Thumb surg       Current Outpatient Medications   Medication Sig Dispense Refill     albuterol (VENTOLIN HFA) 108 (90 Base) MCG/ACT inhaler INHALE 2 PUFFS INTO THE LUNGS EVERY 6 HOURS AS NEEDED FOR SHORTNESS OF BREATH OR DIFFICULT BREATHING OR WHEEZING 18 g 3     amitriptyline (ELAVIL) 50 MG tablet TAKE 1 TABLET(50 MG) BY MOUTH AT BEDTIME 90 tablet 2     aspirin 81 MG tablet Take 81 mg by mouth daily Taking 325 mg aspirin x 1 week       atorvastatin (LIPITOR) 20 MG tablet Take 1 tablet (20 mg) by mouth every evening 90 tablet 3     Cholecalciferol (VITAMIN D3) 5000 UNITS TABS Take 1 tablet by mouth daily.       cyabnocobalamin (VITAMIN B-12) 2500 MCG sublingual tablet Place 3,000 mcg under the tongue daily  30 tablet      finasteride (PROSCAR) 5 MG tablet Take 1 tablet (5 mg) by mouth daily 90 tablet 3     fluticasone (FLONASE) 50 MCG/ACT nasal spray Spray 1-2 sprays into both nostrils daily 48 mL 3     irbesartan (AVAPRO) 150 MG tablet TAKE 1 TABLET(150 MG) BY MOUTH DAILY 90 tablet 3     loratadine (CLARITIN) 10 MG tablet Take 1 tablet (10 mg) by mouth daily 30 tablet 1     metoprolol tartrate (LOPRESSOR) 50 MG tablet Take 1 tablet (50 mg) by mouth 2 times daily 270 tablet 3     Multiple Vitamin  "(MULTI-VITAMIN) per tablet Take 1 tablet by mouth daily.       tamsulosin (FLOMAX) 0.4 MG capsule Take 2 capsules (0.8 mg) by mouth daily 180 capsule 3             Review of Systems  Constitutional, HEENT, cardiovascular, pulmonary, GI, , musculoskeletal, neuro, skin, endocrine and psych systems are negative, except as otherwise noted.    OBJECTIVE:   /80 (BP Location: Right arm, Patient Position: Sitting, Cuff Size: Adult Regular)   Pulse 112   Temp 96.8  F (36  C) (Temporal)   Resp 18   Ht 1.77 m (5' 9.7\")   Wt 93 kg (205 lb)   SpO2 94%   BMI 29.67 kg/m   Estimated body mass index is 29.67 kg/m  as calculated from the following:    Height as of this encounter: 1.77 m (5' 9.7\").    Weight as of this encounter: 93 kg (205 lb).  Physical Exam  GENERAL: healthy, alert and no distress  EYES: Eyes grossly normal to inspection, PERRL and conjunctivae and sclerae normal  HENT: ear canals and TM's normal, nose and mouth without ulcers or lesions  NECK: no adenopathy, no asymmetry, masses, or scars and thyroid normal to palpation  RESP: lungs clear to auscultation - no rales, rhonchi or wheezes  CV: regular rate and rhythm, normal S1 S2, no S3 or S4, no murmur, click or rub, no peripheral edema and peripheral pulses strong  ABDOMEN: soft, nontender, no hepatosplenomegaly, no masses and bowel sounds normal  MS: no gross musculoskeletal defects noted, no edema  SKIN: no suspicious lesions or rashes  NEURO: Normal strength and tone, mentation intact and speech normal  PSYCH: mentation appears normal, affect normal/bright    EKG: NSR  WBC: 11,300  CXR: neg      ASSESSMENT / PLAN:   Assessment and Plan:     (Z00.00) Encounter for Medicare annual wellness exam  (primary encounter diagnosis)  Comment: previsit labs reviewed with pt  Plan: Immun up to date.    (R05.9) Cough  Comment: He did improve on omnicef but now sxs recurring. Will try Zpack. If cough persists, recd CT chest  Plan: XR Chest 2 Views, Symptomatic; " "Unknown COVID-19        Virus (Coronavirus) by PCR, WBC count,         azithromycin (ZITHROMAX) 250 MG tablet        TAD    (R09.81) Chronic nasal congestion  Comment:   Plan: fluticasone (FLONASE) 50 MCG/ACT nasal spray        refilled    (I10) HTN (hypertension), benign  Comment:   Plan: metoprolol tartrate (LOPRESSOR) 50 MG tablet            (E78.5) Hyperlipidemia with target LDL less than 100  Comment:   Plan: atorvastatin (LIPITOR) 20 MG tablet            (N40.1,  N39.43) Benign prostatic hyperplasia with post-void dribbling  Comment:   Plan: tamsulosin (FLOMAX) 0.4 MG capsule            (R00.0) Tachycardia  Comment:   Plan: EKG 12-lead complete w/read - Clinics            (E11.21) Type 2 diabetes mellitus with diabetic nephropathy, without long-term current use of insulin (H)  Comment:   Plan: well controlled.    (I47.2) Paroxysmal ventricular tachycardia (H)  Comment:   Plan: stable.  EKG normal today.  Tachycardic early during appt. Pulse came down on recheck    (N18.30) Stage 3 chronic kidney disease, unspecified whether stage 3a or 3b CKD (H)  Comment:   Plan: labs stable.      Patient has been advised of split billing requirements and indicates understanding: Yes  COUNSELING:  Reviewed preventive health counseling, as reflected in patient instructions    Estimated body mass index is 29.67 kg/m  as calculated from the following:    Height as of this encounter: 1.77 m (5' 9.7\").    Weight as of this encounter: 93 kg (205 lb).        He reports that he quit smoking about 14 years ago. His smoking use included cigarettes. He has a 56.00 pack-year smoking history. He has never used smokeless tobacco.      Appropriate preventive services were discussed with this patient, including applicable screening as appropriate for cardiovascular disease, diabetes, osteopenia/osteoporosis, and glaucoma.  As appropriate for age/gender, discussed screening for colorectal cancer, prostate cancer, breast cancer, and cervical " cancer. Checklist reviewing preventive services available has been given to the patient.    Reviewed patients plan of care and provided an AVS. The Basic Care Plan (routine screening as documented in Health Maintenance) for Luis Daniel meets the Care Plan requirement. This Care Plan has been established and reviewed with the Patient.    Counseling Resources:  ATP IV Guidelines  Pooled Cohorts Equation Calculator  Breast Cancer Risk Calculator  Breast Cancer: Medication to Reduce Risk  FRAX Risk Assessment  ICSI Preventive Guidelines  Dietary Guidelines for Americans, 2010  USDA's MyPlate  ASA Prophylaxis  Lung CA Screening    Josephine Jacob PA-C  [facility]    Identified Health Risks:

## 2022-01-04 ENCOUNTER — ANCILLARY PROCEDURE (OUTPATIENT)
Dept: GENERAL RADIOLOGY | Facility: CLINIC | Age: 82
End: 2022-01-04
Attending: PHYSICIAN ASSISTANT
Payer: COMMERCIAL

## 2022-01-04 ENCOUNTER — OFFICE VISIT (OUTPATIENT)
Dept: FAMILY MEDICINE | Facility: CLINIC | Age: 82
End: 2022-01-04
Payer: COMMERCIAL

## 2022-01-04 VITALS
HEART RATE: 112 BPM | HEIGHT: 70 IN | WEIGHT: 205 LBS | BODY MASS INDEX: 29.35 KG/M2 | TEMPERATURE: 96.8 F | DIASTOLIC BLOOD PRESSURE: 80 MMHG | SYSTOLIC BLOOD PRESSURE: 119 MMHG | OXYGEN SATURATION: 94 % | RESPIRATION RATE: 18 BRPM

## 2022-01-04 DIAGNOSIS — R05.9 COUGH: ICD-10-CM

## 2022-01-04 DIAGNOSIS — I47.29 PAROXYSMAL VENTRICULAR TACHYCARDIA (H): ICD-10-CM

## 2022-01-04 DIAGNOSIS — N39.43 BENIGN PROSTATIC HYPERPLASIA WITH POST-VOID DRIBBLING: ICD-10-CM

## 2022-01-04 DIAGNOSIS — R00.0 TACHYCARDIA: ICD-10-CM

## 2022-01-04 DIAGNOSIS — E78.5 HYPERLIPIDEMIA WITH TARGET LDL LESS THAN 100: ICD-10-CM

## 2022-01-04 DIAGNOSIS — I10 HTN (HYPERTENSION), BENIGN: ICD-10-CM

## 2022-01-04 DIAGNOSIS — E11.21 TYPE 2 DIABETES MELLITUS WITH DIABETIC NEPHROPATHY, WITHOUT LONG-TERM CURRENT USE OF INSULIN (H): ICD-10-CM

## 2022-01-04 DIAGNOSIS — Z00.00 ENCOUNTER FOR MEDICARE ANNUAL WELLNESS EXAM: Primary | ICD-10-CM

## 2022-01-04 DIAGNOSIS — N40.1 BENIGN PROSTATIC HYPERPLASIA WITH POST-VOID DRIBBLING: ICD-10-CM

## 2022-01-04 DIAGNOSIS — N18.30 STAGE 3 CHRONIC KIDNEY DISEASE, UNSPECIFIED WHETHER STAGE 3A OR 3B CKD (H): ICD-10-CM

## 2022-01-04 DIAGNOSIS — R09.81 CHRONIC NASAL CONGESTION: ICD-10-CM

## 2022-01-04 LAB — WBC # BLD AUTO: 11.3 10E3/UL (ref 4–11)

## 2022-01-04 PROCEDURE — 99397 PER PM REEVAL EST PAT 65+ YR: CPT | Performed by: PHYSICIAN ASSISTANT

## 2022-01-04 PROCEDURE — 85048 AUTOMATED LEUKOCYTE COUNT: CPT | Performed by: PHYSICIAN ASSISTANT

## 2022-01-04 PROCEDURE — 36415 COLL VENOUS BLD VENIPUNCTURE: CPT | Performed by: PHYSICIAN ASSISTANT

## 2022-01-04 PROCEDURE — U0005 INFEC AGEN DETEC AMPLI PROBE: HCPCS | Performed by: PHYSICIAN ASSISTANT

## 2022-01-04 PROCEDURE — U0003 INFECTIOUS AGENT DETECTION BY NUCLEIC ACID (DNA OR RNA); SEVERE ACUTE RESPIRATORY SYNDROME CORONAVIRUS 2 (SARS-COV-2) (CORONAVIRUS DISEASE [COVID-19]), AMPLIFIED PROBE TECHNIQUE, MAKING USE OF HIGH THROUGHPUT TECHNOLOGIES AS DESCRIBED BY CMS-2020-01-R: HCPCS | Performed by: PHYSICIAN ASSISTANT

## 2022-01-04 PROCEDURE — 99214 OFFICE O/P EST MOD 30 MIN: CPT | Mod: 25 | Performed by: PHYSICIAN ASSISTANT

## 2022-01-04 PROCEDURE — 71046 X-RAY EXAM CHEST 2 VIEWS: CPT | Performed by: INTERNAL MEDICINE

## 2022-01-04 PROCEDURE — 93000 ELECTROCARDIOGRAM COMPLETE: CPT | Performed by: PHYSICIAN ASSISTANT

## 2022-01-04 RX ORDER — ATORVASTATIN CALCIUM 20 MG/1
20 TABLET, FILM COATED ORAL EVERY EVENING
Qty: 90 TABLET | Refills: 3 | Status: SHIPPED | OUTPATIENT
Start: 2022-01-04 | End: 2024-01-23

## 2022-01-04 RX ORDER — TAMSULOSIN HYDROCHLORIDE 0.4 MG/1
0.8 CAPSULE ORAL DAILY
Qty: 180 CAPSULE | Refills: 3 | Status: SHIPPED | OUTPATIENT
Start: 2022-01-04 | End: 2022-06-27

## 2022-01-04 RX ORDER — AZITHROMYCIN 250 MG/1
TABLET, FILM COATED ORAL
Qty: 6 TABLET | Refills: 0 | Status: SHIPPED | OUTPATIENT
Start: 2022-01-04 | End: 2022-01-09

## 2022-01-04 RX ORDER — FLUTICASONE PROPIONATE 50 MCG
1-2 SPRAY, SUSPENSION (ML) NASAL DAILY
Qty: 48 ML | Refills: 3 | Status: SHIPPED | OUTPATIENT
Start: 2022-01-04

## 2022-01-04 RX ORDER — METOPROLOL TARTRATE 50 MG
50 TABLET ORAL 2 TIMES DAILY
Qty: 270 TABLET | Refills: 3 | Status: SHIPPED | OUTPATIENT
Start: 2022-01-04 | End: 2024-01-31

## 2022-01-04 ASSESSMENT — ENCOUNTER SYMPTOMS
HEMATURIA: 0
PARESTHESIAS: 0
NAUSEA: 0
ABDOMINAL PAIN: 0
WEAKNESS: 1
PALPITATIONS: 0
DIZZINESS: 1
NERVOUS/ANXIOUS: 0
EYE PAIN: 0
FREQUENCY: 0
HEMATOCHEZIA: 0
MYALGIAS: 0
FEVER: 0
CONSTIPATION: 1
JOINT SWELLING: 0
SORE THROAT: 0
HEARTBURN: 0
ARTHRALGIAS: 1
SHORTNESS OF BREATH: 1
HEADACHES: 0
CHILLS: 0
DYSURIA: 0
COUGH: 1

## 2022-01-04 ASSESSMENT — PAIN SCALES - GENERAL: PAINLEVEL: NO PAIN (0)

## 2022-01-04 ASSESSMENT — ACTIVITIES OF DAILY LIVING (ADL): CURRENT_FUNCTION: NO ASSISTANCE NEEDED

## 2022-01-04 ASSESSMENT — MIFFLIN-ST. JEOR: SCORE: 1626.36

## 2022-01-05 LAB — SARS-COV-2 RNA RESP QL NAA+PROBE: NEGATIVE

## 2022-01-05 NOTE — RESULT ENCOUNTER NOTE
Casey,    Your EKG was normal. See other message regarding the chest xray.    Josephine Jacob PA-C

## 2022-01-05 NOTE — RESULT ENCOUNTER NOTE
Casey,    Your chest xray was normal (no pneumonia).  Your white count is mildly elevated which could be due to the prednisone you were on but could also indicate infection.  I am going to send in a prescription for a Zpack. Let me know if the cough persists after that.    Josephine Jacob PA-C

## 2022-06-14 ENCOUNTER — VIRTUAL VISIT (OUTPATIENT)
Dept: FAMILY MEDICINE | Facility: CLINIC | Age: 82
End: 2022-06-14
Payer: COMMERCIAL

## 2022-06-14 DIAGNOSIS — I35.0 MILD AORTIC STENOSIS: ICD-10-CM

## 2022-06-14 DIAGNOSIS — I47.29 PAROXYSMAL VENTRICULAR TACHYCARDIA (H): ICD-10-CM

## 2022-06-14 DIAGNOSIS — Z87.898 HISTORY OF DIZZINESS: ICD-10-CM

## 2022-06-14 DIAGNOSIS — Z78.9 MEDICALLY COMPLEX PATIENT: ICD-10-CM

## 2022-06-14 DIAGNOSIS — N18.32 STAGE 3B CHRONIC KIDNEY DISEASE (H): ICD-10-CM

## 2022-06-14 DIAGNOSIS — E11.21 TYPE 2 DIABETES MELLITUS WITH DIABETIC NEPHROPATHY, WITHOUT LONG-TERM CURRENT USE OF INSULIN (H): ICD-10-CM

## 2022-06-14 DIAGNOSIS — N39.43 BENIGN PROSTATIC HYPERPLASIA WITH POST-VOID DRIBBLING: ICD-10-CM

## 2022-06-14 DIAGNOSIS — E78.5 HYPERLIPIDEMIA WITH TARGET LDL LESS THAN 100: ICD-10-CM

## 2022-06-14 DIAGNOSIS — Z76.89 ENCOUNTER TO ESTABLISH CARE: Primary | ICD-10-CM

## 2022-06-14 DIAGNOSIS — M48.061 SPINAL STENOSIS OF LUMBAR REGION, UNSPECIFIED WHETHER NEUROGENIC CLAUDICATION PRESENT: ICD-10-CM

## 2022-06-14 DIAGNOSIS — I10 HTN (HYPERTENSION), BENIGN: ICD-10-CM

## 2022-06-14 DIAGNOSIS — N40.1 BENIGN PROSTATIC HYPERPLASIA WITH POST-VOID DRIBBLING: ICD-10-CM

## 2022-06-14 PROCEDURE — 99214 OFFICE O/P EST MOD 30 MIN: CPT | Mod: 95 | Performed by: INTERNAL MEDICINE

## 2022-06-14 RX ORDER — AMITRIPTYLINE HYDROCHLORIDE 50 MG/1
TABLET ORAL
Qty: 90 TABLET | Refills: 2 | Status: CANCELLED | OUTPATIENT
Start: 2022-06-14

## 2022-06-14 NOTE — PROGRESS NOTES
Casey is a 81 year old who is being evaluated via a billable video visit.      How would you like to obtain your AVS? MyChart  If the video visit is dropped, the invitation should be resent by: Text to cell phone: [phone]  Will anyone else be joining your video visit? No but wife Shira will be in background.       Video Start Time: 9:10 AM    Assessment & Plan   Problem List Items Addressed This Visit        Endocrine    Hyperlipidemia with target LDL less than 100    Relevant Orders    Adult Cardiology Eval  Referral    Lipid panel reflex to direct LDL Fasting    Type 2 diabetes mellitus with diabetic nephropathy (H)    Relevant Orders    Hemoglobin A1c       Circulatory    HTN (hypertension), benign    Relevant Orders    Adult Cardiology Eval  Referral    Paroxysmal ventricular tachycardia (H)    Relevant Orders    Adult Cardiology Eval  Referral       Urinary    CKD (chronic kidney disease) stage 3, GFR 30-59 ml/min (H)    Relevant Orders    Comprehensive metabolic panel (BMP + Alb, Alk Phos, ALT, AST, Total. Bili, TP)    Parathyroid Hormone Intact    Phosphorus    Vitamin D Deficiency    BPH (benign prostatic hyperplasia)      Other Visit Diagnoses     Encounter to establish care    -  Primary    Spinal stenosis of lumbar region, unspecified whether neurogenic claudication present        Medically complex patient        Relevant Orders    Med Therapy Management Referral    History of dizziness        Relevant Orders    Adult Cardiology Eval  Referral    Mild aortic stenosis        Recommend repeat Echo, check AS.    Relevant Orders    Adult Cardiology Eval  Referral         He has been noticing some orthostatic dizziness advised to decrease his Flomax to 1 capsule once daily.  He has chronic kidney disease stage III.  Repeat labs discussed preventative measures avoid anti-inflammatories keeping blood pressure at goal.  He reports blood pressure 110 over 60s which  "is under control.  Chronic back pain with spinal stenosis.  He takes amitriptyline for sleep mainly that helps and denies being drowsy during the day.  EKG reviewed QT corrected is normal.  Patient had a nuclear stress test in 2013 was negative.  History of paroxysmal V. tach in the past, currently continues to have some dizzy spells, he stopped irbesartan due to low blood pressure being symptomatic, remains on metoprolol 50 mg twice daily well-tolerated, denies any palpitations but because of the dizziness, has also history for aortic valve disease, I would like him to reestablish with cardiology as well on further evaluation possible echo.  A1c has been under control on no medications.  Also established with MTM.  Discussed about colonoscopy that showed adenomatous polyps is a 1 cm he was advised repeat in 2021, he will consider  Follow-up in 3 months and as needed all questions answered.           BMI:   Estimated body mass index is 29.67 kg/m  as calculated from the following:    Height as of 1/4/22: 1.77 m (5' 9.7\").    Weight as of 1/4/22: 93 kg (205 lb).   Weight management plan: Patient was referred to their PCP to discuss a diet and exercise plan.    Work on weight loss  Regular exercise  See Patient Instructions    Return in about 3 months (around 9/14/2022), or if symptoms worsen or fail to improve, for As needed and if symptoms worsen.  Total time spent was 40 minutes review of records exam and virtual exam and recommendations  Eliza Rogers MD  [facility]  Total time spent was 40 minutes review of records virtual  exam and recommendations  Keanu Peña is a 81 year old who presents for the following health issues     HPI       irbesartan made him dizzy , BP WAS 80/50    [place] TO [place]    EXERCISES, WALKS EVERY DAY, WALK FOR 30 MIN OR MORE, DOES NOT DO POWER WALK, TRY TO WORK ON LOWER BACK, HAS LOW BACK PAIN, HAD MRI ON LOWER BACK , HAS ARTHRTIIS, CAN DEAL WITH IT..    Has " experienced that dizziness when standing fast, gets up at night only once..    Was advised to repeat colonoscopy at age 80    Mother had colonoscopy    110/65,,    Review of Systems   Constitutional, HEENT, cardiovascular, pulmonary, gi and gu systems are negative, except as otherwise noted.      Objective    Vitals - Patient Reported  Systolic (Patient Reported): 120  Diastolic (Patient Reported): 70      Vitals:  No vitals were obtained today due to virtual visit.    Physical Exam   GENERAL: Healthy, alert and no distress  EYES: Eyes grossly normal to inspection.  No discharge or erythema, or obvious scleral/conjunctival abnormalities.  RESP: No audible wheeze, cough, or visible cyanosis.  No visible retractions or increased work of breathing.    SKIN: Visible skin clear. No significant rash, abnormal pigmentation or lesions.  NEURO: Cranial nerves grossly intact.  Mentation and speech appropriate for age.  PSYCH: Mentation appears normal, affect normal/bright, judgement and insight intact, normal speech and appearance well-groomed.    Office Visit on 01/04/2022   Component Date Value Ref Range Status     SARS CoV2 PCR 01/04/2022 Negative  Negative, Testing sent to reference lab. Results will be returned via unsolicited result Final    NEGATIVE: SARS-CoV-2 (COVID-19) RNA not detected, presumed negative.     WBC Count 01/04/2022 11.3 (A) 4.0 - 11.0 10e3/uL Final               Video-Visit Details    Type of service:  Video Visit    Video End Time:9:28 AM    Originating Location (pt. Location): Home    Distant Location (provider location):  [facility]     Platform used for Video Visit: Briana

## 2022-06-27 ENCOUNTER — VIRTUAL VISIT (OUTPATIENT)
Dept: PHARMACY | Facility: CLINIC | Age: 82
End: 2022-06-27
Attending: INTERNAL MEDICINE
Payer: COMMERCIAL

## 2022-06-27 DIAGNOSIS — G47.00 INSOMNIA, UNSPECIFIED TYPE: ICD-10-CM

## 2022-06-27 DIAGNOSIS — Z79.82 ASPIRIN LONG-TERM USE: ICD-10-CM

## 2022-06-27 DIAGNOSIS — J30.2 SEASONAL ALLERGIC RHINITIS, UNSPECIFIED TRIGGER: ICD-10-CM

## 2022-06-27 DIAGNOSIS — E78.5 HYPERLIPIDEMIA WITH TARGET LDL LESS THAN 100: ICD-10-CM

## 2022-06-27 DIAGNOSIS — N40.1 BENIGN PROSTATIC HYPERPLASIA WITH POST-VOID DRIBBLING: Primary | ICD-10-CM

## 2022-06-27 DIAGNOSIS — R06.02 SOB (SHORTNESS OF BREATH): ICD-10-CM

## 2022-06-27 DIAGNOSIS — I10 HTN (HYPERTENSION), BENIGN: ICD-10-CM

## 2022-06-27 DIAGNOSIS — Z78.9 TAKES DIETARY SUPPLEMENTS: ICD-10-CM

## 2022-06-27 DIAGNOSIS — N39.43 BENIGN PROSTATIC HYPERPLASIA WITH POST-VOID DRIBBLING: Primary | ICD-10-CM

## 2022-06-27 PROCEDURE — 99607 MTMS BY PHARM ADDL 15 MIN: CPT | Performed by: PHARMACIST

## 2022-06-27 PROCEDURE — 99605 MTMS BY PHARM NP 15 MIN: CPT | Performed by: PHARMACIST

## 2022-06-27 RX ORDER — TAMSULOSIN HYDROCHLORIDE 0.4 MG/1
0.4 CAPSULE ORAL DAILY
COMMUNITY

## 2022-06-27 NOTE — PROGRESS NOTES
Medication Therapy Management (MTM) Encounter    ASSESSMENT:                            Medication Adherence/Access: No issues identified.  Medications are dosed appropriately for renal function.    BPH: Symptoms are stable, dizziness is improved with reduced tamsulosin dose.  No changes needed.    Shortness of breath: Stable.    Allergies: Stable.    Insomnia:  Amitriptyline is likely contributing to dizziness and anticholinergic side effects.  May benefit from tapering off.  Will need to monitor pain in doing so.    Prevent MI/Stroke:  No indication for aspirin therapy for primary prevention at age >70.    Hypertension: Stable. Patient is meeting blood pressure goal of < 140/90mmHg.     Hyperlipidemia: Guidelines don't make recommendations for his clinical scenario (advanced age in absence of CAD), it's questionable if he needs to remain on statin therapy.  He prefers to continue for now.    Supplements:  Would benefit from checking Vitamin B12 levels with next set of labs.     PLAN:                            1.  Recommended reducing amitriptyline to 25mg at bedtime.  Updated Rx sent to his pharmacy.  Will aim to taper off this completely.  2.  Recommended stopping aspirin.  3.  Future order placed for Vitamin B12 level to be checked with next set of labs.    Follow-up: Return in about 3 weeks (around 7/18/2022) for Follow-up Medication Review.    SUBJECTIVE/OBJECTIVE:                          Luis Daniel Gomez is a 81 year old male called for an initial visit. He was referred to me from Dr. Rogers. Patient was accompanied by his wife for our visit today.     Reason for visit: Comprehensive medication review.    Allergies/ADRs: Reviewed in chart  Past Medical History: Reviewed in chart  Tobacco: He reports that he quit smoking about 14 years ago. His smoking use included cigarettes. He has a 56.00 pack-year smoking history. He has never used smokeless tobacco.  Alcohol: none    Medication Adherence/Access: no issues  reported    BPH: Patient is taking finasteride 5mg daily and tamsulosin 0.4mg daily (recently reduced from 0.8mg daily due to dizziness).  He reports having a slow and intermittent urinary stream, doesn't feel like he's completely urinating his bladder but does feel medications have been moderately effective.  He reports dizziness is improved with reduced dose of tamsulosin.  No other side effects reported.    Shortness of breath: Patient uses albuterol as needed, currently using about once a day due to allergies.  He finds this effective when needed, no side effects reported.    Allergies:  Current regimen includes loratadine 10mg daily and Flonase nasal spray daily.  He reports allergies are stable with this regimen.  He denies side effects.    Insomnia: Patient takes amitriptyline 50mg at bedtime.  He reports he's generally sleeping well, gets up about once to use the restroom overnight.  As noted above, he does have quite a bit of dizziness. He would be open to stopping this.  Associated diagnosis on med order is spinal stenosis of lumbar region, he reports he's solely taking this for sleep at this time.    Prevent MI/Stroke:  Patient is taking aspirin 81mg daily.  He denies any personal CV history.  He denies signs and symptoms of bruising/bleeding.  The ASCVD Risk score (Sanborn DC Jr., et al., 2013) failed to calculate for the following reasons:    The 2013 ASCVD risk score is only valid for ages 40 to 79     Hypertension: Current medications include metoprolol 50mg twice daily.  Patient does self-monitor blood pressure. Home BP monitoring in range of 110's systolic over 60's diastolic.  Patient reports no current medication side effects other than dizziness noted above.  BP Readings from Last 3 Encounters:   01/04/22 119/80   04/30/21 125/71   04/21/21 125/66      Hyperlipidemia: Current therapy includes atoravastatin 20mg daily.  Patient reports no significant myalgias or other side effects.  Recent Labs   Lab  Test 12/28/21  0824 12/17/20  0901 11/30/15  0746 11/10/14  0730   CHOL 144 134   < > 120   HDL 47 36*   < > 41   LDL 84 54   < > 50   TRIG 67 218*   < > 144   CHOLHDLRATIO  --   --   --  2.9    < > = values in this interval not displayed.     Supplements:  Current supplements include Vitamin D 10,000 international unit(s) daily (he reports increased from 5000 international unit(s)), Vitamin B12 3000mcg daily and a daily multivitamin.  Future order is already in his chart to re-check Vitamin D levels.  Vitamin D Deficiency Screening Results:  No results found for: VITDT     No results found for: B12      Today's Vitals: There were no vitals taken for this visit.  ----------------    I spent 21 minutes with this patient today. All changes were made via collaborative practice agreement with Dr. Rogers. A copy of the visit note was provided to the patient's provider(s).    The patient was sent via [employer] a summary of these recommendations.     Megan Rossi, PharmD, BCACP  Medication Therapy Management Provider  Pager: [phone]     Telemedicine Visit Details  Type of service:  Telephone visit  Start Time: 3:02 PM  End Time: 3:23 PM  Originating Location (patient location): [place]  Distant Location (provider location):  [facility]     Medication Therapy Recommendations  Aspirin long-term use    Current Medication: aspirin 81 MG tablet (Discontinued)   Rationale: No medical indication at this time - Unnecessary medication therapy - Indication   Recommendation: Discontinue Medication   Status: Accepted - no CPA Needed         Insomnia, unspecified type    Current Medication: amitriptyline (ELAVIL) 50 MG tablet (Discontinued)   Rationale: Unsafe medication for the patient - Adverse medication event - Safety   Recommendation: Decrease Dose - amitriptyline 25 MG tablet   Status: Accepted per CPA         Takes dietary supplements    Current Medication: cyabnocobalamin (VITAMIN B-12) 2500 MCG sublingual  tablet   Rationale: Medication requires monitoring - Needs additional monitoring - Effectiveness   Recommendation: Order Lab   Status: Accepted per CPA

## 2022-06-27 NOTE — LETTER
_  Medication List        Prepared on: 6/29/2022     Bring your Medication List when you go to the doctor, hospital, or   emergency room. And, share it with your family or caregivers.     Note any changes to how you take your medications.  Cross out medications when you no longer use them.    Medication How I take it Why I use it Prescriber   albuterol (VENTOLIN HFA) 108 (90 Base) MCG/ACT inhaler INHALE 2 PUFFS INTO THE LUNGS EVERY 6 HOURS AS NEEDED FOR SHORTNESS OF BREATH OR DIFFICULT BREATHING OR WHEEZING Acute Bronchospasm Josephine Jacob PA-C   amitriptyline (ELAVIL) 25 MG tablet Take 1 tablet (25 mg) by mouth At Bedtime Insomnia, unspecified type Eliza Rogers MD   atorvastatin (LIPITOR) 20 MG tablet Take 1 tablet (20 mg) by mouth every evening Hyperlipidemia with Target LDL Less than 100 Josephine Jacob PA-C   Cholecalciferol (VITAMIN D3) 5000 UNITS TABS Take 2 tablets by mouth daily General Health  Self   cyabnocobalamin (VITAMIN B-12) 2500 MCG sublingual tablet Place 3,000 mcg under the tongue daily  General Health  Self   finasteride (PROSCAR) 5 MG tablet Take 1 tablet (5 mg) by mouth daily Benign non-nodular prostatic hyperplasia with lower urinary tract symptoms Josephine Jacob PA-C   fluticasone (FLONASE) 50 MCG/ACT nasal spray Spray 1-2 sprays into both nostrils daily Chronic Nasal Congestion Josephine Jacob PA-C   loratadine (CLARITIN) 10 MG tablet Take 1 tablet (10 mg) by mouth daily Allergy Self   metoprolol tartrate (LOPRESSOR) 50 MG tablet Take 1 tablet (50 mg) by mouth 2 times daily HTN (Hypertension), Benign Josephine Jacob PA-C   Multiple Vitamin (MULTI-VITAMIN) per tablet Take 1 tablet by mouth daily. General Health  Self   tamsulosin (FLOMAX) 0.4 MG capsule Take 0.4 mg by mouth daily BPH Dr. Rogers         Add new medications, over-the-counter drugs, herbals, vitamins, or  minerals in the blank rows below.    Medication How I take it Why I use it Prescriber                           Allergies:      lisinopril; amoxicillin; duloxetine; levofloxacin; neurontin [gabapentin]; norvasc [amlodipine]; oxycodone; penicillins; red yeast rice extract [monascus purpureus went yeast]; tramadol; verapamil; vicodin [hydrocodone-acetaminophen]        Side effects I have had:               Other Information:              My notes and questions:

## 2022-06-27 NOTE — LETTER
"Recommended To-Do List      Prepared on: 6/29/2022     You can get the best results from your medications by completing the items on this \"To-Do List.\"      Bring your To-Do List when you go to your doctor. And, share it with your family or caregivers.    My To-Do List:  What we talked about: What I should do:   A medication that you may no longer need    Stop taking aspirin (ASA)          What we talked about: What I should do:   An issue with your medication    Decrease your dosage of amitriptyline (ELAVIL) to 25mg at bedtime           What we talked about: What I should do:   Needing additional monitoring    Get the following lab test(s): Vitamin B12 level           What we talked about: What I should do:                       "

## 2022-06-27 NOTE — LETTER
June 29, 2022  Luis Daniel Gomez  [address]  [place] [address]    Dear Mr. Gomez, T[facility]        Thank you for talking with me on Jun 27, 2022 about your health and medications. As a follow-up to our conversation, I have included two documents:      1. Your Recommended To-Do List has steps you should take to get the best results from your medications.  2. Your Medication List will help you keep track of your medications and how to take them.    If you want to talk about these documents, please call Megan Rossi PharmD at phone: [phone], Monday-Friday 8-4:30pm.    I look forward to working with you and your doctors to make sure your medications work well for you.    Sincerely,    Megan Rosis PharmD  [facility] Pharmacist, [facility]

## 2022-06-29 NOTE — PATIENT INSTRUCTIONS
"Recommendations from today's MTM visit:                                                    MTM (medication therapy management) is a service provided by a clinical pharmacist designed to help you get the most of out of your medicines.   Today we reviewed what your medicines are for, how to know if they are working, that your medicines are safe and how to make your medicine regimen as easy as possible.      Please reduce amitriptyline to 25mg at bedtime.  This is likely contributing to your dizziness.  We'll aim to stop this completely if possible.  You can stop taking aspirin.  We no longer recommend this for folks over 70 years of age who do not have a history of heart attack or stroke.  I put an order in your chart to check a Vitamin B12 level with your next set of labs    Follow-up: Return in about 3 weeks (around 7/18/2022) for Follow-up Medication Review.    It was great speaking with you today.  I value your experience and would be very thankful for your time in providing feedback in our clinic survey. In the next few days, you may receive an email or text message from [email or web address] with a link to a survey related to your  clinical pharmacist.\"     To schedule another MTM appointment, please call the clinic directly or you may call the MTM scheduling line at [phone] or toll-free at [phone].     My Clinical Pharmacist's contact information:                                                      Please feel free to contact me with any questions or concerns you have.      Megan Rossi, [place], [facility]  Medication Therapy Management Provider  Pager: [phone]     Jyothi Arzola PharmD  Medication Therapy Management Resident  Pager: [phone]    "

## 2022-07-13 ENCOUNTER — HOSPITAL ENCOUNTER (OUTPATIENT)
Dept: CARDIOLOGY | Facility: CLINIC | Age: 82
Discharge: HOME | End: 2022-07-13
Attending: INTERNAL MEDICINE | Admitting: INTERNAL MEDICINE
Payer: COMMERCIAL

## 2022-07-13 ENCOUNTER — OFFICE VISIT (OUTPATIENT)
Dept: CARDIOLOGY | Facility: CLINIC | Age: 82
End: 2022-07-13
Attending: INTERNAL MEDICINE
Payer: COMMERCIAL

## 2022-07-13 VITALS
HEART RATE: 72 BPM | HEIGHT: 70 IN | WEIGHT: 207.7 LBS | BODY MASS INDEX: 29.73 KG/M2 | SYSTOLIC BLOOD PRESSURE: 130 MMHG | DIASTOLIC BLOOD PRESSURE: 62 MMHG

## 2022-07-13 DIAGNOSIS — E78.5 HYPERLIPIDEMIA WITH TARGET LDL LESS THAN 100: ICD-10-CM

## 2022-07-13 DIAGNOSIS — Z87.898 HISTORY OF DIZZINESS: ICD-10-CM

## 2022-07-13 DIAGNOSIS — I10 HTN (HYPERTENSION), BENIGN: ICD-10-CM

## 2022-07-13 DIAGNOSIS — I47.29 PAROXYSMAL VENTRICULAR TACHYCARDIA (H): ICD-10-CM

## 2022-07-13 DIAGNOSIS — I35.0 MILD AORTIC STENOSIS: ICD-10-CM

## 2022-07-13 DIAGNOSIS — I65.23 BILATERAL CAROTID ARTERY STENOSIS: ICD-10-CM

## 2022-07-13 DIAGNOSIS — I65.23 BILATERAL CAROTID ARTERY STENOSIS: Primary | ICD-10-CM

## 2022-07-13 LAB — LVEF ECHO: NORMAL

## 2022-07-13 PROCEDURE — 93306 TTE W/DOPPLER COMPLETE: CPT | Mod: 26 | Performed by: INTERNAL MEDICINE

## 2022-07-13 PROCEDURE — 255N000002 HC RX 255 OP 636: Performed by: INTERNAL MEDICINE

## 2022-07-13 PROCEDURE — 99204 OFFICE O/P NEW MOD 45 MIN: CPT | Mod: 25 | Performed by: INTERNAL MEDICINE

## 2022-07-13 PROCEDURE — 999N000208 ECHOCARDIOGRAM COMPLETE

## 2022-07-13 RX ADMIN — HUMAN ALBUMIN MICROSPHERES AND PERFLUTREN 9 ML: 10; .22 INJECTION, SOLUTION INTRAVENOUS at 14:39

## 2022-07-13 NOTE — LETTER
"7/13/2022    Eliza Rogers MD  [address]  [facility] [address]    RE: Luis Daniel Gomez       Dear Colleague,     I had the pleasure of seeing Luis Daniel Gomez in the [facility].  HPI and Plan:   It is my pleasure to see this very delightful 81-year-old gentleman accompanied by his wife Ella.    He used to be followed for many years by Josephine Jacob PA-C but recently switch providers as Josephine left that practice.  He had a virtual visit with Dr Rogers who made \"adult [facility] cardiology referral\" for management of hypertension hyperlipidemia dizziness aortic stenosis and ventricular tachycardia.    I had a very pleasant visit with this truly delightful gentleman and his wife I had the opportunity reviewing his previous medical records from 2014.  Unfortunately I am not able to see all the records but I have a good understanding of what happened he was seen by my former colleagues Yolanda Archuleta and Rehan Santo.      He had a 10 beat run of wide-complex tachycardia thought to be nonsustained VT.  That time he was admitted with vasovagal syncope and it is believe that the NSVT was an incidental finding.  His echocardiogram demonstrated mild aortic stenosis and he went on to have a stress test which was negative for inducible ischemia or infarction.    He has not had any further cardiac follow-up since then.    He is diabetic and hypertensive.  He has been diabetic for over 10 years and he has both neuropathy and nephropathy though I do note the recent HbA1c of 5.8 representing excellent control.    His blood pressure appears adequately controlled today.    As well as dizziness he definitely thinks is medication related.  He is taking lower doses of both Flomax and amitriptyline and his dizziness has completely resolved.    He tells me that he is feeling very well.  He has no chest pain shortness of breath or heart failure symptoms or syncopal episodes.    Physical examination reveals no " evidence of congestive heart failure.  His heart sounds are distant but there is a soft systolic murmur at the apex.  It has been a while since he had an echocardiogram and I would certainly be very interested in seeing if there has been significant progression in his aortic stenosis.  Repeat echocardiogram has been requested.    He does have bilateral carotid bruits which are quite prominent.  He had a carotid ultrasound in January 2021 which demonstrated bilateral moderate stenoses.  I will request a repeat carotid ultrasound for follow up.    On 20 mg of atorvastatin he has an LDL of 84 and HDL 47.    I will keep patient abreast of all the test results and arrange follow-up after I see them.    I do think there are any major problems with his lipid management and blood pressure management and have asked him to follow-up with his primary care physician for these conditions.        Orders Placed This Encounter   Procedures     US Carotid Bilateral     Follow-Up with Cardiology     Echocardiogram Complete       No orders of the defined types were placed in this encounter.      Encounter Diagnoses   Name Primary?     HTN (hypertension), benign      Paroxysmal ventricular tachycardia (H)      Hyperlipidemia with target LDL less than 100      History of dizziness      Mild aortic stenosis      Bilateral carotid artery stenosis Yes       CURRENT MEDICATIONS:  Current Outpatient Medications   Medication Sig Dispense Refill     albuterol (VENTOLIN HFA) 108 (90 Base) MCG/ACT inhaler INHALE 2 PUFFS INTO THE LUNGS EVERY 6 HOURS AS NEEDED FOR SHORTNESS OF BREATH OR DIFFICULT BREATHING OR WHEEZING 18 g 3     amitriptyline (ELAVIL) 25 MG tablet Take 1 tablet (25 mg) by mouth At Bedtime 30 tablet 0     atorvastatin (LIPITOR) 20 MG tablet Take 1 tablet (20 mg) by mouth every evening 90 tablet 3     Cholecalciferol (VITAMIN D3) 5000 UNITS TABS Take 2 tablets by mouth daily       cyabnocobalamin (VITAMIN B-12) 2500 MCG sublingual  "tablet Place 3,000 mcg under the tongue daily  30 tablet      finasteride (PROSCAR) 5 MG tablet Take 1 tablet (5 mg) by mouth daily 90 tablet 3     fluticasone (FLONASE) 50 MCG/ACT nasal spray Spray 1-2 sprays into both nostrils daily 48 mL 3     loratadine (CLARITIN) 10 MG tablet Take 1 tablet (10 mg) by mouth daily 30 tablet 1     metoprolol tartrate (LOPRESSOR) 50 MG tablet Take 1 tablet (50 mg) by mouth 2 times daily 270 tablet 3     Multiple Vitamin (MULTI-VITAMIN) per tablet Take 1 tablet by mouth daily.       tamsulosin (FLOMAX) 0.4 MG capsule Take 0.4 mg by mouth daily       ASPIRIN NOT PRESCRIBED (INTENTIONAL) Antiplatelet medication not prescribed intentionally due to Not indicated based on age         ALLERGIES     Allergies   Allergen Reactions     Lisinopril Shortness Of Breath and Fatigue     Amoxicillin Itching     Duloxetine      \"I can't take it\"     Levofloxacin      \"I can't take the 500 mgs\"     Neurontin [Gabapentin] Swelling     edema     Norvasc [Amlodipine] Swelling     edema     Oxycodone GI Disturbance     Penicillins Itching     Red Yeast Rice Extract [Monascus Purpureus Went Yeast] Hives and Itching     Tramadol Itching     Verapamil Itching     Vicodin [Hydrocodone-Acetaminophen] Itching       PAST MEDICAL HISTORY:  Past Medical History:   Diagnosis Date     Arthritis      BPH (benign prostatic hyperplasia)      CKD (chronic kidney disease) stage 3, GFR 30-59 ml/min (H)      Cluster headache 2015    Dr. Roth     Constipation      Dyslipidemia      Dyspnea     Normal nuc stress test 9-15-08, fainted once in life     Fatty liver     Ultrasound 11/11     FH: colon cancer     colonoscopy 10/10, repeat 5 years.     Former smoker     quit 2008     HTN (hypertension), benign      Peripheral neuropathy     Dr. Patricia     Spinal stenosis     Last MRI 2010, Dr. Fraire     Syncope      Type 2 diabetes mellitus (H)     diet controlled     Ureterolithiases        PAST SURGICAL HISTORY:  Past Surgical " History:   Procedure Laterality Date     COLONOSCOPY       KERATOTOMY ARCUATE WITH FEMTOSECOND LASER/IMAGING FOR ATIOL Left 2016    Procedure: KERATOTOMY ARCUATE WITH FEMTOSECOND LASER/IMAGING FOR ATIOL;  Surgeon: Gerard Larson MD;  Location: [facility]     ORTHOPEDIC SURGERY       PHACOEMULSIFICATION CLEAR CORNEA WITH STANDARD INTRAOCULAR LENS IMPLANT Left 2016    Procedure: PHACOEMULSIFICATION CLEAR CORNEA WITH STANDARD INTRAOCULAR LENS IMPLANT;  Surgeon: Gerard Larson MD;  Location: [facility]     Thumb surg         FAMILY HISTORY:  Family History   Problem Relation Age of Onset     C.A.D. Father      Diabetes Father      Cancer - colorectal Mother 80     Cancer - colorectal Maternal Grandmother        SOCIAL HISTORY:  Social History     Socioeconomic History     Marital status:      Spouse name: None     Number of children: None     Years of education: None     Highest education level: None   Tobacco Use     Smoking status: Former Smoker     Packs/day: 1.00     Years: 56.00     Pack years: 56.00     Types: Cigarettes     Quit date: 2008     Years since quittin.5     Smokeless tobacco: Never Used     Tobacco comment: quit    Substance and Sexual Activity     Alcohol use: No     Alcohol/week: 0.0 standard drinks     Drug use: No     Sexual activity: Yes     Partners: Female   Other Topics Concern     Caffeine Concern Yes     Comment: one cup caffeine per day     Sleep Concern No     Special Diet No     Exercise Yes     Comment: walks daily 30 minutes     Parent/sibling w/ CABG, MI or angioplasty before 65F 55M? No   Social History Narrative    , 2 kids, retired. Wife (Khalida).       Review of Systems:  Skin:  Negative     Eyes:  Positive for glasses  ENT:  Positive for nasal congestion  Respiratory:  Negative    Cardiovascular:  Negative;chest pain;palpitations;edema;exercise intolerance    Gastroenterology: Negative    Genitourinary:  Positive for prostate  "problem  Musculoskeletal:  Positive for back pain;arthritis  Neurologic:  Negative    Psychiatric:  Negative    Heme/Lymph/Imm:  Positive for allergies  Endocrine:  Positive for      Physical Exam:  Vitals: /62   Pulse 72   Ht 1.77 m (5' 9.7\")   Wt 94.2 kg (207 lb 11.2 oz)   BMI 30.06 kg/m      Constitutional:  cooperative, alert and oriented, well developed, well nourished, in no acute distress        Skin:  warm and dry to the touch, no apparent skin lesions or masses noted          Head:  normocephalic, no masses or lesions        Eyes:  pupils equal and round        Lymph:No Cervical lymphadenopathy present     ENT:  no pallor or cyanosis, dentition good        Neck:    bilateral carotid bruit      Respiratory:  normal breath sounds, clear to auscultation, normal A-P diameter, normal symmetry, normal respiratory excursion, no use of accessory muscles         Cardiac: regular rhythm;apical impulse not displaced   distant heart sounds   systolic murmur          pulses below the femoral arteries are diminished                                      GI:  abdomen soft, non-tender, BS normoactive, no mass, no HSM, no bruits        Extremities and Muscular Skeletal:  no deformities, clubbing, cyanosis, erythema observed              Neurological:  no gross motor deficits        Psych:  Alert and Oriented x 3        Recent Lab Results:  LIPID RESULTS:  Lab Results   Component Value Date    CHOL 144 12/28/2021    CHOL 134 12/17/2020    HDL 47 12/28/2021    HDL 36 (L) 12/17/2020    LDL 84 12/28/2021    LDL 54 12/17/2020    TRIG 67 12/28/2021    TRIG 218 (H) 12/17/2020    CHOLHDLRATIO 2.9 11/10/2014       LIVER ENZYME RESULTS:  Lab Results   Component Value Date    AST 17 12/28/2021    AST 25 12/17/2020    ALT 26 12/28/2021    ALT 43 12/17/2020       CBC RESULTS:  Lab Results   Component Value Date    WBC 11.3 (H) 01/04/2022    WBC 9.7 12/17/2020    RBC 4.43 12/28/2021    RBC 4.52 12/17/2020    HGB 14.8 12/28/2021 "    HGB 15.3 04/21/2021    HCT 44.0 12/28/2021    HCT 44.8 12/17/2020    MCV 99 12/28/2021    MCV 99 12/17/2020    MCH 33.4 (H) 12/28/2021    MCH 32.7 12/17/2020    MCHC 33.6 12/28/2021    MCHC 33.0 12/17/2020    RDW 12.6 12/28/2021    RDW 12.9 12/17/2020     12/28/2021     12/17/2020       BMP RESULTS:  Lab Results   Component Value Date     12/28/2021     04/21/2021    POTASSIUM 5.1 12/28/2021    POTASSIUM 5.1 04/21/2021    CHLORIDE 102 12/28/2021    CHLORIDE 105 04/21/2021    CO2 29 12/28/2021    CO2 29 04/21/2021    ANIONGAP 5 12/28/2021    ANIONGAP 4 04/21/2021     (H) 12/28/2021     (H) 04/21/2021    BUN 28 12/28/2021    BUN 27 04/21/2021    CR 1.67 (H) 12/28/2021    CR 1.78 (H) 04/21/2021    GFRESTIMATED 40 (L) 12/28/2021    GFRESTIMATED 35 (L) 04/21/2021    GFRESTBLACK 40 (L) 04/21/2021    SANTY 9.5 12/28/2021    SANTY 9.2 04/21/2021        A1C RESULTS:  Lab Results   Component Value Date    A1C 5.8 (H) 12/28/2021    A1C 5.5 12/17/2020       INR RESULTS:  No results found for: INR        CC  Eliza Rogers MD  [address]  [place] [address]      Thank you for allowing me to participate in the care of your patient.      Sincerely,     DR LATASHA MORENO MD     [facility]

## 2022-07-13 NOTE — PROGRESS NOTES
"HPI and Plan:   It is my pleasure to see this very delightful 81-year-old gentleman accompanied by his wife Ella.    He used to be followed for many years by Josephine Jacob PA-C but recently switch providers as Josephine left that practice.  He had a virtual visit with Dr Rogers who made \"adult [facility] cardiology referral\" for management of hypertension hyperlipidemia dizziness aortic stenosis and ventricular tachycardia.    I had a very pleasant visit with this truly delightful gentleman and his wife I had the opportunity reviewing his previous medical records from 2014.  Unfortunately I am not able to see all the records but I have a good understanding of what happened he was seen by my former colleagues Yolanda Archuleta and Rehan Santo.      He had a 10 beat run of wide-complex tachycardia thought to be nonsustained VT.  That time he was admitted with vasovagal syncope and it is believe that the NSVT was an incidental finding.  His echocardiogram demonstrated mild aortic stenosis and he went on to have a stress test which was negative for inducible ischemia or infarction.    He has not had any further cardiac follow-up since then.    He is diabetic and hypertensive.  He has been diabetic for over 10 years and he has both neuropathy and nephropathy though I do note the recent HbA1c of 5.8 representing excellent control.    His blood pressure appears adequately controlled today.    As well as dizziness he definitely thinks is medication related.  He is taking lower doses of both Flomax and amitriptyline and his dizziness has completely resolved.    He tells me that he is feeling very well.  He has no chest pain shortness of breath or heart failure symptoms or syncopal episodes.    Physical examination reveals no evidence of congestive heart failure.  His heart sounds are distant but there is a soft systolic murmur at the apex.  It has been a while since he had an echocardiogram and I would certainly be very interested in " seeing if there has been significant progression in his aortic stenosis.  Repeat echocardiogram has been requested.    He does have bilateral carotid bruits which are quite prominent.  He had a carotid ultrasound in January 2021 which demonstrated bilateral moderate stenoses.  I will request a repeat carotid ultrasound for follow up.    On 20 mg of atorvastatin he has an LDL of 84 and HDL 47.    I will keep patient abreast of all the test results and arrange follow-up after I see them.    I do think there are any major problems with his lipid management and blood pressure management and have asked him to follow-up with his primary care physician for these conditions.        Orders Placed This Encounter   Procedures     US Carotid Bilateral     Follow-Up with Cardiology     Echocardiogram Complete       No orders of the defined types were placed in this encounter.      Encounter Diagnoses   Name Primary?     HTN (hypertension), benign      Paroxysmal ventricular tachycardia (H)      Hyperlipidemia with target LDL less than 100      History of dizziness      Mild aortic stenosis      Bilateral carotid artery stenosis Yes       CURRENT MEDICATIONS:  Current Outpatient Medications   Medication Sig Dispense Refill     albuterol (VENTOLIN HFA) 108 (90 Base) MCG/ACT inhaler INHALE 2 PUFFS INTO THE LUNGS EVERY 6 HOURS AS NEEDED FOR SHORTNESS OF BREATH OR DIFFICULT BREATHING OR WHEEZING 18 g 3     amitriptyline (ELAVIL) 25 MG tablet Take 1 tablet (25 mg) by mouth At Bedtime 30 tablet 0     atorvastatin (LIPITOR) 20 MG tablet Take 1 tablet (20 mg) by mouth every evening 90 tablet 3     Cholecalciferol (VITAMIN D3) 5000 UNITS TABS Take 2 tablets by mouth daily       cyabnocobalamin (VITAMIN B-12) 2500 MCG sublingual tablet Place 3,000 mcg under the tongue daily  30 tablet      finasteride (PROSCAR) 5 MG tablet Take 1 tablet (5 mg) by mouth daily 90 tablet 3     fluticasone (FLONASE) 50 MCG/ACT nasal spray Spray 1-2 sprays into  "both nostrils daily 48 mL 3     loratadine (CLARITIN) 10 MG tablet Take 1 tablet (10 mg) by mouth daily 30 tablet 1     metoprolol tartrate (LOPRESSOR) 50 MG tablet Take 1 tablet (50 mg) by mouth 2 times daily 270 tablet 3     Multiple Vitamin (MULTI-VITAMIN) per tablet Take 1 tablet by mouth daily.       tamsulosin (FLOMAX) 0.4 MG capsule Take 0.4 mg by mouth daily       ASPIRIN NOT PRESCRIBED (INTENTIONAL) Antiplatelet medication not prescribed intentionally due to Not indicated based on age         ALLERGIES     Allergies   Allergen Reactions     Lisinopril Shortness Of Breath and Fatigue     Amoxicillin Itching     Duloxetine      \"I can't take it\"     Levofloxacin      \"I can't take the 500 mgs\"     Neurontin [Gabapentin] Swelling     edema     Norvasc [Amlodipine] Swelling     edema     Oxycodone GI Disturbance     Penicillins Itching     Red Yeast Rice Extract [Monascus Purpureus Went Yeast] Hives and Itching     Tramadol Itching     Verapamil Itching     Vicodin [Hydrocodone-Acetaminophen] Itching       PAST MEDICAL HISTORY:  Past Medical History:   Diagnosis Date     Arthritis      BPH (benign prostatic hyperplasia)      CKD (chronic kidney disease) stage 3, GFR 30-59 ml/min (H)      Cluster headache 2015    Dr. Roth     Constipation      Dyslipidemia      Dyspnea     Normal nuc stress test 9-15-08, fainted once in life     Fatty liver     Ultrasound 11/11     FH: colon cancer     colonoscopy 10/10, repeat 5 years.     Former smoker     quit 2008     HTN (hypertension), benign      Peripheral neuropathy     Dr. Patricia     Spinal stenosis     Last MRI 2010, Dr. Fraire     Syncope      Type 2 diabetes mellitus (H)     diet controlled     Ureterolithiases        PAST SURGICAL HISTORY:  Past Surgical History:   Procedure Laterality Date     COLONOSCOPY       KERATOTOMY ARCUATE WITH FEMTOSECOND LASER/IMAGING FOR ATIOL Left 8/29/2016    Procedure: KERATOTOMY ARCUATE WITH FEMTOSECOND LASER/IMAGING FOR ATIOL;  " Surgeon: Gerard Larson MD;  Location: [facility]     ORTHOPEDIC SURGERY       PHACOEMULSIFICATION CLEAR CORNEA WITH STANDARD INTRAOCULAR LENS IMPLANT Left 2016    Procedure: PHACOEMULSIFICATION CLEAR CORNEA WITH STANDARD INTRAOCULAR LENS IMPLANT;  Surgeon: Gerard Larson MD;  Location: [facility]     Thumb surg         FAMILY HISTORY:  Family History   Problem Relation Age of Onset     C.A.D. Father      Diabetes Father      Cancer - colorectal Mother 80     Cancer - colorectal Maternal Grandmother        SOCIAL HISTORY:  Social History     Socioeconomic History     Marital status:      Spouse name: None     Number of children: None     Years of education: None     Highest education level: None   Tobacco Use     Smoking status: Former Smoker     Packs/day: 1.00     Years: 56.00     Pack years: 56.00     Types: Cigarettes     Quit date: 2008     Years since quittin.5     Smokeless tobacco: Never Used     Tobacco comment: quit    Substance and Sexual Activity     Alcohol use: No     Alcohol/week: 0.0 standard drinks     Drug use: No     Sexual activity: Yes     Partners: Female   Other Topics Concern     Caffeine Concern Yes     Comment: one cup caffeine per day     Sleep Concern No     Special Diet No     Exercise Yes     Comment: walks daily 30 minutes     Parent/sibling w/ CABG, MI or angioplasty before 65F 55M? No   Social History Narrative    , 2 kids, retired. Wife (Khalida).       Review of Systems:  Skin:  Negative     Eyes:  Positive for glasses  ENT:  Positive for nasal congestion  Respiratory:  Negative    Cardiovascular:  Negative;chest pain;palpitations;edema;exercise intolerance    Gastroenterology: Negative    Genitourinary:  Positive for prostate problem  Musculoskeletal:  Positive for back pain;arthritis  Neurologic:  Negative    Psychiatric:  Negative    Heme/Lymph/Imm:  Positive for allergies  Endocrine:  Positive for      Physical Exam:  Vitals: /62   Pulse 72   " Ht 1.77 m (5' 9.7\")   Wt 94.2 kg (207 lb 11.2 oz)   BMI 30.06 kg/m      Constitutional:  cooperative, alert and oriented, well developed, well nourished, in no acute distress        Skin:  warm and dry to the touch, no apparent skin lesions or masses noted          Head:  normocephalic, no masses or lesions        Eyes:  pupils equal and round        Lymph:No Cervical lymphadenopathy present     ENT:  no pallor or cyanosis, dentition good        Neck:    bilateral carotid bruit      Respiratory:  normal breath sounds, clear to auscultation, normal A-P diameter, normal symmetry, normal respiratory excursion, no use of accessory muscles         Cardiac: regular rhythm;apical impulse not displaced   distant heart sounds   systolic murmur          pulses below the femoral arteries are diminished                                      GI:  abdomen soft, non-tender, BS normoactive, no mass, no HSM, no bruits        Extremities and Muscular Skeletal:  no deformities, clubbing, cyanosis, erythema observed              Neurological:  no gross motor deficits        Psych:  Alert and Oriented x 3        Recent Lab Results:  LIPID RESULTS:  Lab Results   Component Value Date    CHOL 144 12/28/2021    CHOL 134 12/17/2020    HDL 47 12/28/2021    HDL 36 (L) 12/17/2020    LDL 84 12/28/2021    LDL 54 12/17/2020    TRIG 67 12/28/2021    TRIG 218 (H) 12/17/2020    CHOLHDLRATIO 2.9 11/10/2014       LIVER ENZYME RESULTS:  Lab Results   Component Value Date    AST 17 12/28/2021    AST 25 12/17/2020    ALT 26 12/28/2021    ALT 43 12/17/2020       CBC RESULTS:  Lab Results   Component Value Date    WBC 11.3 (H) 01/04/2022    WBC 9.7 12/17/2020    RBC 4.43 12/28/2021    RBC 4.52 12/17/2020    HGB 14.8 12/28/2021    HGB 15.3 04/21/2021    HCT 44.0 12/28/2021    HCT 44.8 12/17/2020    MCV 99 12/28/2021    MCV 99 12/17/2020    MCH 33.4 (H) 12/28/2021    MCH 32.7 12/17/2020    MCHC 33.6 12/28/2021    MCHC 33.0 12/17/2020    RDW 12.6 " 12/28/2021    RDW 12.9 12/17/2020     12/28/2021     12/17/2020       BMP RESULTS:  Lab Results   Component Value Date     12/28/2021     04/21/2021    POTASSIUM 5.1 12/28/2021    POTASSIUM 5.1 04/21/2021    CHLORIDE 102 12/28/2021    CHLORIDE 105 04/21/2021    CO2 29 12/28/2021    CO2 29 04/21/2021    ANIONGAP 5 12/28/2021    ANIONGAP 4 04/21/2021     (H) 12/28/2021     (H) 04/21/2021    BUN 28 12/28/2021    BUN 27 04/21/2021    CR 1.67 (H) 12/28/2021    CR 1.78 (H) 04/21/2021    GFRESTIMATED 40 (L) 12/28/2021    GFRESTIMATED 35 (L) 04/21/2021    GFRESTBLACK 40 (L) 04/21/2021    SANTY 9.5 12/28/2021    SANTY 9.2 04/21/2021        A1C RESULTS:  Lab Results   Component Value Date    A1C 5.8 (H) 12/28/2021    A1C 5.5 12/17/2020       INR RESULTS:  No results found for: INR        CC  Eliza Rogers MD  [address]  [place] [address]

## 2022-07-14 ENCOUNTER — TELEPHONE (OUTPATIENT)
Dept: CARDIOLOGY | Facility: CLINIC | Age: 82
End: 2022-07-14

## 2022-07-14 NOTE — TELEPHONE ENCOUNTER
Antonia, MD HITESH Lopez [facility] Heart Team 3  This could well need intervention.  Pls arrange for him to see me in clinic after his carotid ultrasound so we can discuss aortic valve intervention and arrange for cath/CT etc.  Many thanks.         I left patient a detailed message stating that Dr. Knight would like to see him in clinic to review his echocardiogram that shows his aortic valve becoming more narrowed.    I told him that we can see him on 8/1 at 9:45 ( Dr. KNIGHT gave his verbal approval for this on 7/14 which is an Urgent new slot).    I told patient that I will also send him a My chart message and left him our nurse line number.

## 2022-07-18 ENCOUNTER — VIRTUAL VISIT (OUTPATIENT)
Dept: PHARMACY | Facility: CLINIC | Age: 82
End: 2022-07-18
Payer: COMMERCIAL

## 2022-07-18 DIAGNOSIS — G47.00 INSOMNIA, UNSPECIFIED TYPE: Primary | ICD-10-CM

## 2022-07-18 PROCEDURE — 99606 MTMS BY PHARM EST 15 MIN: CPT | Performed by: PHARMACIST

## 2022-07-18 NOTE — PATIENT INSTRUCTIONS
"Recommendations from today's MTM visit:                                                    MTM (medication therapy management) is a service provided by a clinical pharmacist designed to help you get the most of out of your medicines.   Today we reviewed what your medicines are for, how to know if they are working, that your medicines are safe and how to make your medicine regimen as easy as possible.      Continue current medication regimen    Follow-up: Return in about 6 months (around 1/18/2023) for Follow-up Medication Review.    It was great speaking with you today.  I value your experience and would be very thankful for your time in providing feedback in our clinic survey. In the next few days, you may receive an email or text message from [employer] with a link to a survey related to your  clinical pharmacist.\"     To schedule another MTM appointment, please call the clinic directly or you may call the MTM scheduling line at [phone] or toll-free at [phone].     My Clinical Pharmacist's contact information:                                                      Please feel free to contact me with any questions or concerns you have.      Megan Rossi, Vern, [facility]  Medication Therapy Management Provider  Pager: [phone]    "

## 2022-07-18 NOTE — PROGRESS NOTES
Medication Therapy Management (MTM) Encounter    ASSESSMENT:                            Medication Adherence/Access: No issues identified    Insomnia: Stable.  Could consider discontinuing amitriptyline completely, he prefers to continue at this time.    PLAN:                            Continue current medication regimen.      Follow-up: Return in about 6 months (around 1/18/2023) for Follow-up Medication Review.    SUBJECTIVE/OBJECTIVE:                          Luis Daniel Gomez is a 81 year old male called for a follow-up visit.  Today's visit is a follow-up MTM visit from 6/27/2022.     Reason for visit: Follow-up on amitriptyline dose reduction.    Tobacco: He reports that he quit smoking about 14 years ago. His smoking use included cigarettes. He has a 56.00 pack-year smoking history. He has never used smokeless tobacco.  Alcohol: none    Medication Adherence/Access: no issues reported    Insomnia: Patient takes amitriptyline 25mg at bedtime, dose reduced from 50mg at our last visit.  He reports dizziness is resolved since reducing amitriptyline (and previous tamsulosin dose reduction).  He wishes to continue on amitriptyline at this time, he feels he's sleeping well and is not having side effects.    Today's Vitals: There were no vitals taken for this visit.  ----------------    I spent 6 minutes with this patient today. A copy of the visit note was provided to the patient's provider(s).    The patient was sent via [employer] a summary of these recommendations.     Megan Rossi, PharmD, BCACP  Medication Therapy Management Provider  Pager: [phone]     Telemedicine Visit Details  Type of service:  Telephone visit  Start Time: 3:03 PM  End Time: 3:09 PM  Originating Location (patient location): [place]  Distant Location (provider location):  [facility]     Medication Therapy Recommendations  No medication therapy recommendations to display

## 2022-07-19 ENCOUNTER — HOSPITAL ENCOUNTER (OUTPATIENT)
Dept: ULTRASOUND IMAGING | Facility: CLINIC | Age: 82
Discharge: HOME | End: 2022-07-19
Attending: INTERNAL MEDICINE | Admitting: INTERNAL MEDICINE
Payer: COMMERCIAL

## 2022-07-19 DIAGNOSIS — R09.89 BILATERAL CAROTID BRUITS: ICD-10-CM

## 2022-07-19 PROCEDURE — 93880 EXTRACRANIAL BILAT STUDY: CPT

## 2022-07-19 PROCEDURE — 93880 EXTRACRANIAL BILAT STUDY: CPT | Mod: 26 | Performed by: INTERNAL MEDICINE

## 2022-07-30 ENCOUNTER — HEALTH MAINTENANCE LETTER (OUTPATIENT)
Age: 82
End: 2022-07-30

## 2022-08-01 ENCOUNTER — LAB (OUTPATIENT)
Dept: LAB | Facility: CLINIC | Age: 82
End: 2022-08-01

## 2022-08-01 ENCOUNTER — OFFICE VISIT (OUTPATIENT)
Dept: CARDIOLOGY | Facility: CLINIC | Age: 82
End: 2022-08-01
Payer: COMMERCIAL

## 2022-08-01 VITALS
OXYGEN SATURATION: 94 % | HEIGHT: 70 IN | HEART RATE: 70 BPM | SYSTOLIC BLOOD PRESSURE: 102 MMHG | BODY MASS INDEX: 29.72 KG/M2 | DIASTOLIC BLOOD PRESSURE: 65 MMHG | WEIGHT: 207.6 LBS

## 2022-08-01 DIAGNOSIS — R93.1 ABNORMAL FINDINGS ON DIAGNOSTIC IMAGING OF HEART AND CORONARY CIRCULATION: ICD-10-CM

## 2022-08-01 DIAGNOSIS — I35.0 NONRHEUMATIC AORTIC VALVE STENOSIS: Primary | ICD-10-CM

## 2022-08-01 DIAGNOSIS — G47.00 INSOMNIA, UNSPECIFIED TYPE: ICD-10-CM

## 2022-08-01 DIAGNOSIS — I35.0 NONRHEUMATIC AORTIC VALVE STENOSIS: ICD-10-CM

## 2022-08-01 LAB
ANION GAP SERPL CALCULATED.3IONS-SCNC: 2 MMOL/L (ref 3–14)
BUN SERPL-MCNC: 24 MG/DL (ref 7–30)
CALCIUM SERPL-MCNC: 9.8 MG/DL (ref 8.5–10.1)
CHLORIDE BLD-SCNC: 103 MMOL/L (ref 94–109)
CO2 SERPL-SCNC: 29 MMOL/L (ref 20–32)
CREAT SERPL-MCNC: 1.69 MG/DL (ref 0.66–1.25)
ERYTHROCYTE [DISTWIDTH] IN BLOOD BY AUTOMATED COUNT: 12.4 % (ref 10–15)
GFR SERPL CREATININE-BSD FRML MDRD: 40 ML/MIN/1.73M2
GLUCOSE BLD-MCNC: 134 MG/DL (ref 70–99)
HCT VFR BLD AUTO: 47.7 % (ref 40–53)
HGB BLD-MCNC: 15.9 G/DL (ref 13.3–17.7)
MCH RBC QN AUTO: 32.5 PG (ref 26.5–33)
MCHC RBC AUTO-ENTMCNC: 33.3 G/DL (ref 31.5–36.5)
MCV RBC AUTO: 98 FL (ref 78–100)
PLATELET # BLD AUTO: 230 10E3/UL (ref 150–450)
POTASSIUM BLD-SCNC: 4.6 MMOL/L (ref 3.4–5.3)
RBC # BLD AUTO: 4.89 10E6/UL (ref 4.4–5.9)
SODIUM SERPL-SCNC: 134 MMOL/L (ref 133–144)
WBC # BLD AUTO: 8 10E3/UL (ref 4–11)

## 2022-08-01 PROCEDURE — 99214 OFFICE O/P EST MOD 30 MIN: CPT | Performed by: INTERNAL MEDICINE

## 2022-08-01 PROCEDURE — 80048 BASIC METABOLIC PNL TOTAL CA: CPT | Performed by: INTERNAL MEDICINE

## 2022-08-01 PROCEDURE — 85027 COMPLETE CBC AUTOMATED: CPT | Performed by: INTERNAL MEDICINE

## 2022-08-01 PROCEDURE — 36415 COLL VENOUS BLD VENIPUNCTURE: CPT | Performed by: INTERNAL MEDICINE

## 2022-08-01 NOTE — PROGRESS NOTES
HPI and Plan:   It is certainly a pleasure to see Mr. Gomez again for follow-up of severe aortic stenosis.  He is accompanied by his delightful wife.    For full details please refer to my very recent note essentially this is an 81-year-old gentleman with hypertension well-controlled hyperlipidemia who presented in 2014 with vasovagal syncope and during work-up was found to have short runs of NSVT and mild aortic stenosis.  Stress test at that time was normal.  He has not been back for further evaluation until recently.    I repeat an echocardiogram which now shows severe aortic stenosis, aortic valve area 0.7 cm  with a mean gradient 47 mmHg.    He also underwent carotid ultrasound which demonstrated mild stenosis on the left and perhaps mild to moderate on the right.  The bruits were prominent and in retrospect likely to be the murmur of he has transmitted into his carotids.    Went over his symptoms again.  He has no exertional chest discomfort dizzy spells or syncopal episodes.  He has noticed progressive shortness of breath on exertion which he previously thought to be old age.  He has no heart failure symptoms.  His shortness of breath could certainly be due to age though I definitely cannot exclude contribution from his severe AS.    Physical examination is unchanged from previously, there is no CHF findings.    In December 2020 his creatinine was 1.79 and a year later is 1.67 with GFR of 40.  Potassium was 5.1.    We will have to be cautious in how we plan his further work-up in view of his stage III CRF.    He would need invasive angiography.  He is agreeable after I explained the risk and benefits to him.  The risks would include but not limited to MI, CVA, peripheral vascular injury, allergic reaction.  Emphasized to him that the risk of worsening renal failure is quite significant in him and at a minimum he would need to be admitted for rehydration.    He would also need CT TAVR which also involves  contrast.    I will check his labs today and liaise with my TAVR colleagues as to how best to approach his valvular heart disease.  We will be contacting the patient in the near future.  Orders Placed This Encounter   Procedures     CT Angiogram TAVR     Basic metabolic panel     CBC with platelets     Case Request Cath Lab: Coronary Angiogram       Orders Placed This Encounter   Medications     Turmeric (QC TUMERIC COMPLEX PO)     Sig: Take 2 chew tab by mouth daily       Encounter Diagnoses   Name Primary?     Nonrheumatic aortic valve stenosis Yes     Abnormal findings on diagnostic imaging of heart and coronary circulation        CURRENT MEDICATIONS:  Current Outpatient Medications   Medication Sig Dispense Refill     albuterol (VENTOLIN HFA) 108 (90 Base) MCG/ACT inhaler INHALE 2 PUFFS INTO THE LUNGS EVERY 6 HOURS AS NEEDED FOR SHORTNESS OF BREATH OR DIFFICULT BREATHING OR WHEEZING 18 g 3     amitriptyline (ELAVIL) 25 MG tablet Take 1 tablet (25 mg) by mouth At Bedtime 30 tablet 0     atorvastatin (LIPITOR) 20 MG tablet Take 1 tablet (20 mg) by mouth every evening 90 tablet 3     Cholecalciferol (VITAMIN D3) 5000 UNITS TABS Take 2 tablets by mouth daily       cyabnocobalamin (VITAMIN B-12) 2500 MCG sublingual tablet Place 3,000 mcg under the tongue daily  30 tablet      finasteride (PROSCAR) 5 MG tablet Take 1 tablet (5 mg) by mouth daily 90 tablet 3     fluticasone (FLONASE) 50 MCG/ACT nasal spray Spray 1-2 sprays into both nostrils daily 48 mL 3     loratadine (CLARITIN) 10 MG tablet Take 1 tablet (10 mg) by mouth daily 30 tablet 1     metoprolol tartrate (LOPRESSOR) 50 MG tablet Take 1 tablet (50 mg) by mouth 2 times daily 270 tablet 3     Multiple Vitamin (MULTI-VITAMIN) per tablet Take 1 tablet by mouth daily.       tamsulosin (FLOMAX) 0.4 MG capsule Take 0.4 mg by mouth daily       Turmeric (QC TUMERIC COMPLEX PO) Take 2 chew tab by mouth daily       ASPIRIN NOT PRESCRIBED (INTENTIONAL) Antiplatelet  "medication not prescribed intentionally due to Not indicated based on age (Patient not taking: No sig reported)         ALLERGIES     Allergies   Allergen Reactions     Lisinopril Shortness Of Breath and Fatigue     Amoxicillin Itching     Duloxetine      \"I can't take it\"     Levofloxacin      \"I can't take the 500 mgs\"     Neurontin [Gabapentin] Swelling     edema     Norvasc [Amlodipine] Swelling     edema     Oxycodone GI Disturbance     Penicillins Itching     Red Yeast Rice Extract [Monascus Purpureus Went Yeast] Hives and Itching     Tramadol Itching     Verapamil Itching     Vicodin [Hydrocodone-Acetaminophen] Itching       PAST MEDICAL HISTORY:  Past Medical History:   Diagnosis Date     Arthritis      BPH (benign prostatic hyperplasia)      CKD (chronic kidney disease) stage 3, GFR 30-59 ml/min (H)      Cluster headache 2015    Dr. Roth     Constipation      Dyslipidemia      Dyspnea     Normal nuc stress test 9-15-08, fainted once in life     Fatty liver     Ultrasound 11/11     FH: colon cancer     colonoscopy 10/10, repeat 5 years.     Former smoker     quit 2008     HTN (hypertension), benign      Peripheral neuropathy     Dr. Patricia     Spinal stenosis     Last MRI 2010, Dr. Fraire     Syncope      Type 2 diabetes mellitus (H)     diet controlled     Ureterolithiases        PAST SURGICAL HISTORY:  Past Surgical History:   Procedure Laterality Date     COLONOSCOPY       KERATOTOMY ARCUATE WITH FEMTOSECOND LASER/IMAGING FOR ATIOL Left 8/29/2016    Procedure: KERATOTOMY ARCUATE WITH FEMTOSECOND LASER/IMAGING FOR ATIOL;  Surgeon: Gerard Larson MD;  Location: [facility]     ORTHOPEDIC SURGERY       PHACOEMULSIFICATION CLEAR CORNEA WITH STANDARD INTRAOCULAR LENS IMPLANT Left 8/29/2016    Procedure: PHACOEMULSIFICATION CLEAR CORNEA WITH STANDARD INTRAOCULAR LENS IMPLANT;  Surgeon: Gerard Larson MD;  Location: [facility]     Thumb surg         FAMILY HISTORY:  Family History   Problem Relation Age of Onset     " "ADRIAN Father      Diabetes Father      Cancer - colorectal Mother 80     Cancer - colorectal Maternal Grandmother        SOCIAL HISTORY:  Social History     Socioeconomic History     Marital status:      Spouse name: None     Number of children: None     Years of education: None     Highest education level: None   Tobacco Use     Smoking status: Former Smoker     Packs/day: 1.00     Years: 56.00     Pack years: 56.00     Types: Cigarettes     Quit date: 2008     Years since quittin.5     Smokeless tobacco: Never Used     Tobacco comment: quit    Substance and Sexual Activity     Alcohol use: No     Alcohol/week: 0.0 standard drinks     Drug use: No     Sexual activity: Yes     Partners: Female   Other Topics Concern     Caffeine Concern Yes     Comment: one cup caffeine per day     Sleep Concern No     Special Diet No     Exercise Yes     Comment: walks daily 30 minutes     Parent/sibling w/ CABG, MI or angioplasty before 65F 55M? No   Social History Narrative    , 2 kids, retired. Wife (Khalida).       Review of Systems:  Skin:  not assessed     Eyes:  not assessed    ENT:  not assessed    Respiratory:  Positive for dyspnea on exertion  Cardiovascular:  Negative    Gastroenterology: not assessed    Genitourinary:  not assessed    Musculoskeletal:  not assessed    Neurologic:  not assessed    Psychiatric:  not assessed    Heme/Lymph/Imm:  not assessed    Endocrine:  Negative      Physical Exam:  Vitals: /65 (Cuff Size: Adult Large)   Pulse 70   Ht 1.77 m (5' 9.69\")   Wt 94.2 kg (207 lb 9.6 oz)   SpO2 94%   BMI 30.06 kg/m      Constitutional:  cooperative, alert and oriented, well developed, well nourished, in no acute distress        Skin:  warm and dry to the touch, no apparent skin lesions or masses noted          Head:  normocephalic, no masses or lesions        Eyes:  pupils equal and round        Lymph:No Cervical lymphadenopathy present     ENT:  no pallor or cyanosis, " dentition good        Neck:    bilateral carotid bruit      Respiratory:  normal breath sounds, clear to auscultation, normal A-P diameter, normal symmetry, normal respiratory excursion, no use of accessory muscles         Cardiac: regular rhythm;apical impulse not displaced   distant heart sounds   systolic murmur        pulses full and equal, no bruits auscultated pulses below the femoral arteries are diminished                                      GI:  abdomen soft, non-tender, BS normoactive, no mass, no HSM, no bruits        Extremities and Muscular Skeletal:  no deformities, clubbing, cyanosis, erythema observed              Neurological:  no gross motor deficits        Psych:  Alert and Oriented x 3        Recent Lab Results:  LIPID RESULTS:  Lab Results   Component Value Date    CHOL 144 12/28/2021    CHOL 134 12/17/2020    HDL 47 12/28/2021    HDL 36 (L) 12/17/2020    LDL 84 12/28/2021    LDL 54 12/17/2020    TRIG 67 12/28/2021    TRIG 218 (H) 12/17/2020    CHOLHDLRATIO 2.9 11/10/2014       LIVER ENZYME RESULTS:  Lab Results   Component Value Date    AST 17 12/28/2021    AST 25 12/17/2020    ALT 26 12/28/2021    ALT 43 12/17/2020       CBC RESULTS:  Lab Results   Component Value Date    WBC 11.3 (H) 01/04/2022    WBC 9.7 12/17/2020    RBC 4.43 12/28/2021    RBC 4.52 12/17/2020    HGB 14.8 12/28/2021    HGB 15.3 04/21/2021    HCT 44.0 12/28/2021    HCT 44.8 12/17/2020    MCV 99 12/28/2021    MCV 99 12/17/2020    MCH 33.4 (H) 12/28/2021    MCH 32.7 12/17/2020    MCHC 33.6 12/28/2021    MCHC 33.0 12/17/2020    RDW 12.6 12/28/2021    RDW 12.9 12/17/2020     12/28/2021     12/17/2020       BMP RESULTS:  Lab Results   Component Value Date     12/28/2021     04/21/2021    POTASSIUM 5.1 12/28/2021    POTASSIUM 5.1 04/21/2021    CHLORIDE 102 12/28/2021    CHLORIDE 105 04/21/2021    CO2 29 12/28/2021    CO2 29 04/21/2021    ANIONGAP 5 12/28/2021    ANIONGAP 4 04/21/2021     (H)  12/28/2021     (H) 04/21/2021    BUN 28 12/28/2021    BUN 27 04/21/2021    CR 1.67 (H) 12/28/2021    CR 1.78 (H) 04/21/2021    GFRESTIMATED 40 (L) 12/28/2021    GFRESTIMATED 35 (L) 04/21/2021    GFRESTBLACK 40 (L) 04/21/2021    SANTY 9.5 12/28/2021    SANTY 9.2 04/21/2021        A1C RESULTS:  Lab Results   Component Value Date    A1C 5.8 (H) 12/28/2021    A1C 5.5 12/17/2020       INR RESULTS:  No results found for: INR        CC  No referring provider defined for this encounter.

## 2022-08-01 NOTE — LETTER
8/1/2022    Eliza Rogers MD  [address]  [facility] [address]    RE: Luis Daniel Gomez       Dear Colleague,     I had the pleasure of seeing Luis Daniel Gomez in the [facility].  HPI and Plan:   It is certainly a pleasure to see Mr. Gomez again for follow-up of severe aortic stenosis.  He is accompanied by his delightful wife.    For full details please refer to my very recent note essentially this is an 81-year-old gentleman with hypertension well-controlled hyperlipidemia who presented in 2014 with vasovagal syncope and during work-up was found to have short runs of NSVT and mild aortic stenosis.  Stress test at that time was normal.  He has not been back for further evaluation until recently.    I repeat an echocardiogram which now shows severe aortic stenosis, aortic valve area 0.7 cm  with a mean gradient 47 mmHg.    He also underwent carotid ultrasound which demonstrated mild stenosis on the left and perhaps mild to moderate on the right.  The bruits were prominent and in retrospect likely to be the murmur of he has transmitted into his carotids.    Went over his symptoms again.  He has no exertional chest discomfort dizzy spells or syncopal episodes.  He has noticed progressive shortness of breath on exertion which he previously thought to be old age.  He has no heart failure symptoms.  His shortness of breath could certainly be due to age though I definitely cannot exclude contribution from his severe AS.    Physical examination is unchanged from previously, there is no CHF findings.    In December 2020 his creatinine was 1.79 and a year later is 1.67 with GFR of 40.  Potassium was 5.1.    We will have to be cautious in how we plan his further work-up in view of his stage III CRF.    He would need invasive angiography.  He is agreeable after I explained the risk and benefits to him.  The risks would include but not limited to MI, CVA, peripheral vascular injury, allergic  reaction.  Emphasized to him that the risk of worsening renal failure is quite significant in him and at a minimum he would need to be admitted for rehydration.    He would also need CT TAVR which also involves contrast.    I will check his labs today and liaise with my TAVR colleagues as to how best to approach his valvular heart disease.  We will be contacting the patient in the near future.  Orders Placed This Encounter   Procedures     CT Angiogram TAVR     Basic metabolic panel     CBC with platelets     Case Request Cath Lab: Coronary Angiogram       Orders Placed This Encounter   Medications     Turmeric (QC TUMERIC COMPLEX PO)     Sig: Take 2 chew tab by mouth daily       Encounter Diagnoses   Name Primary?     Nonrheumatic aortic valve stenosis Yes     Abnormal findings on diagnostic imaging of heart and coronary circulation        CURRENT MEDICATIONS:  Current Outpatient Medications   Medication Sig Dispense Refill     albuterol (VENTOLIN HFA) 108 (90 Base) MCG/ACT inhaler INHALE 2 PUFFS INTO THE LUNGS EVERY 6 HOURS AS NEEDED FOR SHORTNESS OF BREATH OR DIFFICULT BREATHING OR WHEEZING 18 g 3     amitriptyline (ELAVIL) 25 MG tablet Take 1 tablet (25 mg) by mouth At Bedtime 30 tablet 0     atorvastatin (LIPITOR) 20 MG tablet Take 1 tablet (20 mg) by mouth every evening 90 tablet 3     Cholecalciferol (VITAMIN D3) 5000 UNITS TABS Take 2 tablets by mouth daily       cyabnocobalamin (VITAMIN B-12) 2500 MCG sublingual tablet Place 3,000 mcg under the tongue daily  30 tablet      finasteride (PROSCAR) 5 MG tablet Take 1 tablet (5 mg) by mouth daily 90 tablet 3     fluticasone (FLONASE) 50 MCG/ACT nasal spray Spray 1-2 sprays into both nostrils daily 48 mL 3     loratadine (CLARITIN) 10 MG tablet Take 1 tablet (10 mg) by mouth daily 30 tablet 1     metoprolol tartrate (LOPRESSOR) 50 MG tablet Take 1 tablet (50 mg) by mouth 2 times daily 270 tablet 3     Multiple Vitamin (MULTI-VITAMIN) per tablet Take 1 tablet by  "mouth daily.       tamsulosin (FLOMAX) 0.4 MG capsule Take 0.4 mg by mouth daily       Turmeric (QC TUMERIC COMPLEX PO) Take 2 chew tab by mouth daily       ASPIRIN NOT PRESCRIBED (INTENTIONAL) Antiplatelet medication not prescribed intentionally due to Not indicated based on age (Patient not taking: No sig reported)         ALLERGIES     Allergies   Allergen Reactions     Lisinopril Shortness Of Breath and Fatigue     Amoxicillin Itching     Duloxetine      \"I can't take it\"     Levofloxacin      \"I can't take the 500 mgs\"     Neurontin [Gabapentin] Swelling     edema     Norvasc [Amlodipine] Swelling     edema     Oxycodone GI Disturbance     Penicillins Itching     Red Yeast Rice Extract [Monascus Purpureus Went Yeast] Hives and Itching     Tramadol Itching     Verapamil Itching     Vicodin [Hydrocodone-Acetaminophen] Itching       PAST MEDICAL HISTORY:  Past Medical History:   Diagnosis Date     Arthritis      BPH (benign prostatic hyperplasia)      CKD (chronic kidney disease) stage 3, GFR 30-59 ml/min (H)      Cluster headache 2015    Dr. Roth     Constipation      Dyslipidemia      Dyspnea     Normal nuc stress test 9-15-08, fainted once in life     Fatty liver     Ultrasound 11/11     FH: colon cancer     colonoscopy 10/10, repeat 5 years.     Former smoker     quit 2008     HTN (hypertension), benign      Peripheral neuropathy     Dr. Patricia     Spinal stenosis     Last MRI 2010, Dr. Fraire     Syncope      Type 2 diabetes mellitus (H)     diet controlled     Ureterolithiases        PAST SURGICAL HISTORY:  Past Surgical History:   Procedure Laterality Date     COLONOSCOPY       KERATOTOMY ARCUATE WITH FEMTOSECOND LASER/IMAGING FOR ATIOL Left 8/29/2016    Procedure: KERATOTOMY ARCUATE WITH FEMTOSECOND LASER/IMAGING FOR ATIOL;  Surgeon: Gerard Larson MD;  Location: [facility]     ORTHOPEDIC SURGERY       PHACOEMULSIFICATION CLEAR CORNEA WITH STANDARD INTRAOCULAR LENS IMPLANT Left 8/29/2016    Procedure: " "PHACOEMULSIFICATION CLEAR CORNEA WITH STANDARD INTRAOCULAR LENS IMPLANT;  Surgeon: Marsha, Gerard PRESTON MD;  Location: [facility]     Thumb surg         FAMILY HISTORY:  Family History   Problem Relation Age of Onset     C.A.D. Father      Diabetes Father      Cancer - colorectal Mother 80     Cancer - colorectal Maternal Grandmother        SOCIAL HISTORY:  Social History     Socioeconomic History     Marital status:      Spouse name: None     Number of children: None     Years of education: None     Highest education level: None   Tobacco Use     Smoking status: Former Smoker     Packs/day: 1.00     Years: 56.00     Pack years: 56.00     Types: Cigarettes     Quit date: 2008     Years since quittin.5     Smokeless tobacco: Never Used     Tobacco comment: quit    Substance and Sexual Activity     Alcohol use: No     Alcohol/week: 0.0 standard drinks     Drug use: No     Sexual activity: Yes     Partners: Female   Other Topics Concern     Caffeine Concern Yes     Comment: one cup caffeine per day     Sleep Concern No     Special Diet No     Exercise Yes     Comment: walks daily 30 minutes     Parent/sibling w/ CABG, MI or angioplasty before 65F 55M? No   Social History Narrative    , 2 kids, retired. Wife (Khalida).       Review of Systems:  Skin:  not assessed     Eyes:  not assessed    ENT:  not assessed    Respiratory:  Positive for dyspnea on exertion  Cardiovascular:  Negative    Gastroenterology: not assessed    Genitourinary:  not assessed    Musculoskeletal:  not assessed    Neurologic:  not assessed    Psychiatric:  not assessed    Heme/Lymph/Imm:  not assessed    Endocrine:  Negative      Physical Exam:  Vitals: /65 (Cuff Size: Adult Large)   Pulse 70   Ht 1.77 m (5' 9.69\")   Wt 94.2 kg (207 lb 9.6 oz)   SpO2 94%   BMI 30.06 kg/m      Constitutional:  cooperative, alert and oriented, well developed, well nourished, in no acute distress        Skin:  warm and dry to the touch, " no apparent skin lesions or masses noted          Head:  normocephalic, no masses or lesions        Eyes:  pupils equal and round        Lymph:No Cervical lymphadenopathy present     ENT:  no pallor or cyanosis, dentition good        Neck:    bilateral carotid bruit      Respiratory:  normal breath sounds, clear to auscultation, normal A-P diameter, normal symmetry, normal respiratory excursion, no use of accessory muscles         Cardiac: regular rhythm;apical impulse not displaced   distant heart sounds   systolic murmur        pulses full and equal, no bruits auscultated pulses below the femoral arteries are diminished                                      GI:  abdomen soft, non-tender, BS normoactive, no mass, no HSM, no bruits        Extremities and Muscular Skeletal:  no deformities, clubbing, cyanosis, erythema observed              Neurological:  no gross motor deficits        Psych:  Alert and Oriented x 3        Recent Lab Results:  LIPID RESULTS:  Lab Results   Component Value Date    CHOL 144 12/28/2021    CHOL 134 12/17/2020    HDL 47 12/28/2021    HDL 36 (L) 12/17/2020    LDL 84 12/28/2021    LDL 54 12/17/2020    TRIG 67 12/28/2021    TRIG 218 (H) 12/17/2020    CHOLHDLRATIO 2.9 11/10/2014       LIVER ENZYME RESULTS:  Lab Results   Component Value Date    AST 17 12/28/2021    AST 25 12/17/2020    ALT 26 12/28/2021    ALT 43 12/17/2020       CBC RESULTS:  Lab Results   Component Value Date    WBC 11.3 (H) 01/04/2022    WBC 9.7 12/17/2020    RBC 4.43 12/28/2021    RBC 4.52 12/17/2020    HGB 14.8 12/28/2021    HGB 15.3 04/21/2021    HCT 44.0 12/28/2021    HCT 44.8 12/17/2020    MCV 99 12/28/2021    MCV 99 12/17/2020    MCH 33.4 (H) 12/28/2021    MCH 32.7 12/17/2020    MCHC 33.6 12/28/2021    MCHC 33.0 12/17/2020    RDW 12.6 12/28/2021    RDW 12.9 12/17/2020     12/28/2021     12/17/2020       BMP RESULTS:  Lab Results   Component Value Date     12/28/2021     04/21/2021     POTASSIUM 5.1 12/28/2021    POTASSIUM 5.1 04/21/2021    CHLORIDE 102 12/28/2021    CHLORIDE 105 04/21/2021    CO2 29 12/28/2021    CO2 29 04/21/2021    ANIONGAP 5 12/28/2021    ANIONGAP 4 04/21/2021     (H) 12/28/2021     (H) 04/21/2021    BUN 28 12/28/2021    BUN 27 04/21/2021    CR 1.67 (H) 12/28/2021    CR 1.78 (H) 04/21/2021    GFRESTIMATED 40 (L) 12/28/2021    GFRESTIMATED 35 (L) 04/21/2021    GFRESTBLACK 40 (L) 04/21/2021    SANTY 9.5 12/28/2021    SANTY 9.2 04/21/2021        A1C RESULTS:  Lab Results   Component Value Date    A1C 5.8 (H) 12/28/2021    A1C 5.5 12/17/2020       INR RESULTS:  No results found for: INR      CC  No referring provider defined for this encounter.    Thank you for allowing me to participate in the care of your patient.      Sincerely,     DR LATASHA MORENO MD     [facility]

## 2022-08-02 ENCOUNTER — TELEPHONE (OUTPATIENT)
Dept: CARDIOLOGY | Facility: CLINIC | Age: 82
End: 2022-08-02

## 2022-08-02 NOTE — TELEPHONE ENCOUNTER
TAVR referral received by: Dr. Knight    Chart reviewed, recent Cr/GFR noted:  Recent Labs   Lab Test 08/01/22  1039 12/28/21  0824 04/21/21  1332    136 138   POTASSIUM 4.6 5.1 5.1   CHLORIDE 103 102 105   CO2 29 29 29   BUN 24 28 27   CR 1.69* 1.67* 1.78*   ANIONGAP 2* 5 4   SANTY 9.8 9.5 9.2   * 130* 114*     Cannot schedule TAVR CT prior to office visit per protocol. Patient is currently scheduled on 08/16/2022 and if able to get scheduled with Dr. Martinez on 08/11/2022 this will not be a problem.  Contrast allergy: No  Telephoned patient to introduce self, provide education on aortic stenosis.   Last dental visit: Patient saw the dentist last week.   Support/living situation: Lives with spouse in independent home.  Will provide new patient packet. Provided patient direct contact information.    Coco Aguilar RN  Structural Heart Coordinator  [facility]

## 2022-08-02 NOTE — TELEPHONE ENCOUNTER
[facility] Call [facility]    Phone Message    May a detailed message be left on voicemail: yes     Reason for Call: Other: Spouse called on behalf of the patient wanting to speak with a member of his care team. They would like to know if a pre-op is necessary for his upcoming angiogram. Please call spouse back to discuss further, thank you.    Action Taken: Message routed to:  Other: Cardiology    Travel Screening: Not Applicable

## 2022-08-02 NOTE — TELEPHONE ENCOUNTER
I called patient and spoke with spouse and told her that no prep op is necessary for his 8/22 coronary angiogram. I told her that we will place a call a day or two before to review the prep for the angiogram.    She expressed understanding and appreciation for the call back.

## 2022-08-03 ENCOUNTER — LAB (OUTPATIENT)
Dept: LAB | Facility: CLINIC | Age: 82
End: 2022-08-03
Payer: COMMERCIAL

## 2022-08-03 DIAGNOSIS — E11.21 TYPE 2 DIABETES MELLITUS WITH DIABETIC NEPHROPATHY, WITHOUT LONG-TERM CURRENT USE OF INSULIN (H): ICD-10-CM

## 2022-08-03 DIAGNOSIS — E78.5 HYPERLIPIDEMIA WITH TARGET LDL LESS THAN 100: ICD-10-CM

## 2022-08-03 DIAGNOSIS — N18.32 STAGE 3B CHRONIC KIDNEY DISEASE (H): ICD-10-CM

## 2022-08-03 DIAGNOSIS — Z78.9 TAKES DIETARY SUPPLEMENTS: ICD-10-CM

## 2022-08-03 LAB
ALBUMIN SERPL-MCNC: 3.8 G/DL (ref 3.4–5)
ALP SERPL-CCNC: 73 U/L (ref 40–150)
ALT SERPL W P-5'-P-CCNC: 21 U/L (ref 0–70)
ANION GAP SERPL CALCULATED.3IONS-SCNC: 11 MMOL/L (ref 3–14)
AST SERPL W P-5'-P-CCNC: 19 U/L (ref 0–45)
BILIRUB SERPL-MCNC: 0.5 MG/DL (ref 0.2–1.3)
BUN SERPL-MCNC: 23 MG/DL (ref 7–30)
CALCIUM SERPL-MCNC: 9.2 MG/DL (ref 8.5–10.1)
CHLORIDE BLD-SCNC: 104 MMOL/L (ref 94–109)
CHOLEST SERPL-MCNC: 126 MG/DL
CO2 SERPL-SCNC: 25 MMOL/L (ref 20–32)
CREAT SERPL-MCNC: 1.61 MG/DL (ref 0.66–1.25)
DEPRECATED CALCIDIOL+CALCIFEROL SERPL-MC: 113 UG/L (ref 20–75)
FASTING STATUS PATIENT QL REPORTED: YES
GFR SERPL CREATININE-BSD FRML MDRD: 42 ML/MIN/1.73M2
GLUCOSE BLD-MCNC: 113 MG/DL (ref 70–99)
HBA1C MFR BLD: 5.5 % (ref 0–5.6)
HDLC SERPL-MCNC: 40 MG/DL
LDLC SERPL CALC-MCNC: 54 MG/DL
NONHDLC SERPL-MCNC: 86 MG/DL
PHOSPHATE SERPL-MCNC: 3.1 MG/DL (ref 2.5–4.5)
POTASSIUM BLD-SCNC: 4.6 MMOL/L (ref 3.4–5.3)
PROT SERPL-MCNC: 6.9 G/DL (ref 6.8–8.8)
PTH-INTACT SERPL-MCNC: 36 PG/ML (ref 15–65)
SODIUM SERPL-SCNC: 140 MMOL/L (ref 133–144)
TRIGL SERPL-MCNC: 162 MG/DL
VIT B12 SERPL-MCNC: 525 PG/ML (ref 232–1245)

## 2022-08-03 PROCEDURE — 80061 LIPID PANEL: CPT

## 2022-08-03 PROCEDURE — 82607 VITAMIN B-12: CPT

## 2022-08-03 PROCEDURE — 36415 COLL VENOUS BLD VENIPUNCTURE: CPT

## 2022-08-03 PROCEDURE — 83970 ASSAY OF PARATHORMONE: CPT

## 2022-08-03 PROCEDURE — 82306 VITAMIN D 25 HYDROXY: CPT

## 2022-08-03 PROCEDURE — 83036 HEMOGLOBIN GLYCOSYLATED A1C: CPT

## 2022-08-03 PROCEDURE — 84100 ASSAY OF PHOSPHORUS: CPT

## 2022-08-03 PROCEDURE — 80053 COMPREHEN METABOLIC PANEL: CPT

## 2022-08-03 NOTE — TELEPHONE ENCOUNTER
Routing refill request to provider for review/approval because:  Rx started by MTM after last OV with PCP       Tahira MADSEN, Triage RN  [facility]

## 2022-08-04 ENCOUNTER — MYC MEDICAL ADVICE (OUTPATIENT)
Dept: FAMILY MEDICINE | Facility: CLINIC | Age: 82
End: 2022-08-04

## 2022-08-04 NOTE — TELEPHONE ENCOUNTER
Sent patient mychart message including provider's recommendation for Vit D. Per chart review, patient has reviewed this lab result:        Vita Arriaga RN  [facility]

## 2022-08-11 ENCOUNTER — OFFICE VISIT (OUTPATIENT)
Dept: CARDIOLOGY | Facility: CLINIC | Age: 82
End: 2022-08-11
Payer: COMMERCIAL

## 2022-08-11 VITALS
BODY MASS INDEX: 29.79 KG/M2 | WEIGHT: 208.1 LBS | HEART RATE: 97 BPM | HEIGHT: 70 IN | DIASTOLIC BLOOD PRESSURE: 79 MMHG | SYSTOLIC BLOOD PRESSURE: 143 MMHG

## 2022-08-11 VITALS
HEART RATE: 97 BPM | BODY MASS INDEX: 29.78 KG/M2 | DIASTOLIC BLOOD PRESSURE: 79 MMHG | SYSTOLIC BLOOD PRESSURE: 143 MMHG | WEIGHT: 208 LBS | HEIGHT: 70 IN

## 2022-08-11 DIAGNOSIS — R09.89 BILATERAL CAROTID BRUITS: ICD-10-CM

## 2022-08-11 DIAGNOSIS — I47.29 PAROXYSMAL VENTRICULAR TACHYCARDIA (H): ICD-10-CM

## 2022-08-11 DIAGNOSIS — I35.0 NONRHEUMATIC AORTIC VALVE STENOSIS: Primary | ICD-10-CM

## 2022-08-11 DIAGNOSIS — I10 HTN (HYPERTENSION), BENIGN: Primary | ICD-10-CM

## 2022-08-11 DIAGNOSIS — I35.0 NONRHEUMATIC AORTIC VALVE STENOSIS: ICD-10-CM

## 2022-08-11 PROCEDURE — 99214 OFFICE O/P EST MOD 30 MIN: CPT | Performed by: INTERNAL MEDICINE

## 2022-08-11 PROCEDURE — 99204 OFFICE O/P NEW MOD 45 MIN: CPT | Performed by: SURGERY

## 2022-08-11 NOTE — PATIENT INSTRUCTIONS
Your blood pressure was elevated at your appointment today.  Elevated blood pressure can increase your risk of a heart attack, stroke and heart failure.  For this reason, we feel it is important to monitor your blood pressure closely.  If you have access to a home blood pressure monitor or are able to check your blood pressure at a local pharmacy in the next week we would like you to do so and call our clinic with those readings. Please call [phone] (Mariana) and leave a message with your name, date of birth, blood pressure reading, date and location it was completed. If your blood pressure remains elevated your care team will be notified.  We appreciate being a part of your healthcare team and look forward to hearing from you soon.

## 2022-08-11 NOTE — PROGRESS NOTES
TAVR Coordinator visit:  Provided additional education regarding TAVR procedure, after being present for discussion with physician. Explained the work-up process and next steps for patient. Patient provided our direct contact number and instructed to call with any questions.     Completed frailty testing and KCCQ.   5 meter walk: 3 seconds  Frailty score: 0/5    KCCQ Results:   1a. 5  1b. 5  1c. 4  2. 5  3. 7  4. 7  5. 5  6. 4  7. 2  8a. 4  8b. 4  8c. 6    Preliminary STS Risk Score: 2%  NYHA Class: II      Mady Estrada, RN  Structural Heart Coordinator  [facility]

## 2022-08-11 NOTE — LETTER
"8/11/2022    Eliza Rogers MD  [address]  [facility] [address]    RE: Luis Daniel Gomez       Dear Colleague,     I had the pleasure of seeing Luis Daniel Gomez in the [facility].  HPI and Plan:     20 Jason is a very pleasant 81-year-old comes to structural clinic referred by Dr. Blanton for consideration aortic valve replacement.  He was seen by both myself and Dr. Balbuena.  He has a history of vascular disease moderate carotid disease and has been having some shortness of breath and fatigue over the last few months.  An echocardiogram showed progression of his aortic stenosis to now severe.  He has normal ejection fraction.  Mean gradient 47 mmHg.  Dimensionless index of 0.2.    He is at a Lexiscan done 2014 which was negative.  EKG unremarkable with chronic atrial fibrillation.  He has had no history of myocardial infarction.  He does have chronic back discomfort.  He also has stage III chronic kidney disease.  He has hypertension.    Studies were reviewed including echocardiogram.  Image quality is poor and visualization aortic valve is limited.  However numbers support severe valve stenosis.  I think the patient is a \"\" young 81-year-old and Keron could go either with surgical or transcatheter valve replacement however the patient prefers transcatheter valve replacement.  However if there was some prohibitive risk certainly he could undergo SAVR and he is willing to do so.  He will need to proceed with a cardiac catheterization for angiogram and a TAVR CT scan prior to valve conference and full discussion.  Risk and benefits of these were explained to the patient.    Today's clinic visit entailed:  Review of the result(s) of each unique test - echocardiogram, ekg  The following tests were independently interpreted by me as noted in my documentation: echocardiogram  Ordering of each unique test  No LOS data to display   Time spent doing chart review, history and exam, documentation and " "further activities per the note  Provider  Link to MDM Help Grid     The level of medical decision making during this visit was of high complexity.      No orders of the defined types were placed in this encounter.    No orders of the defined types were placed in this encounter.    There are no discontinued medications.      No diagnosis found.    CURRENT MEDICATIONS:  Current Outpatient Medications   Medication Sig Dispense Refill     albuterol (VENTOLIN HFA) 108 (90 Base) MCG/ACT inhaler INHALE 2 PUFFS INTO THE LUNGS EVERY 6 HOURS AS NEEDED FOR SHORTNESS OF BREATH OR DIFFICULT BREATHING OR WHEEZING 18 g 3     amitriptyline (ELAVIL) 25 MG tablet Take 1 tablet (25 mg) by mouth At Bedtime 90 tablet 0     ASPIRIN NOT PRESCRIBED (INTENTIONAL) Antiplatelet medication not prescribed intentionally due to Not indicated based on age       atorvastatin (LIPITOR) 20 MG tablet Take 1 tablet (20 mg) by mouth every evening 90 tablet 3     Cholecalciferol (VITAMIN D3) 5000 UNITS TABS Take 2 tablets by mouth daily       cyabnocobalamin (VITAMIN B-12) 2500 MCG sublingual tablet Place 3,000 mcg under the tongue daily  30 tablet      finasteride (PROSCAR) 5 MG tablet Take 1 tablet (5 mg) by mouth daily 90 tablet 3     fluticasone (FLONASE) 50 MCG/ACT nasal spray Spray 1-2 sprays into both nostrils daily 48 mL 3     loratadine (CLARITIN) 10 MG tablet Take 1 tablet (10 mg) by mouth daily 30 tablet 1     metoprolol tartrate (LOPRESSOR) 50 MG tablet Take 1 tablet (50 mg) by mouth 2 times daily 270 tablet 3     Multiple Vitamin (MULTI-VITAMIN) per tablet Take 1 tablet by mouth daily.       tamsulosin (FLOMAX) 0.4 MG capsule Take 0.4 mg by mouth daily       Turmeric (QC TUMERIC COMPLEX PO) Take 2 chew tab by mouth daily         ALLERGIES     Allergies   Allergen Reactions     Lisinopril Shortness Of Breath and Fatigue     Amoxicillin Itching     Duloxetine      \"I can't take it\"     Levofloxacin      \"I can't take the 500 mgs\"     " Neurontin [Gabapentin] Swelling     edema     Norvasc [Amlodipine] Swelling     edema     Oxycodone GI Disturbance     Penicillins Itching     Red Yeast Rice Extract [Monascus Purpureus Went Yeast] Hives and Itching     Tramadol Itching     Verapamil Itching     Vicodin [Hydrocodone-Acetaminophen] Itching       PAST MEDICAL HISTORY:  Past Medical History:   Diagnosis Date     Arthritis      BPH (benign prostatic hyperplasia)      CKD (chronic kidney disease) stage 3, GFR 30-59 ml/min (H)      Cluster headache 2015    Dr. Roth     Constipation      Dyslipidemia      Dyspnea     Normal nuc stress test 9-15-08, fainted once in life     Fatty liver     Ultrasound 11/11     FH: colon cancer     colonoscopy 10/10, repeat 5 years.     Former smoker     quit 2008     HTN (hypertension), benign      Peripheral neuropathy     Dr. Patricia     Spinal stenosis     Last MRI 2010, Dr. Fraire     Syncope      Type 2 diabetes mellitus (H)     diet controlled     Ureterolithiases        PAST SURGICAL HISTORY:  Past Surgical History:   Procedure Laterality Date     COLONOSCOPY       KERATOTOMY ARCUATE WITH FEMTOSECOND LASER/IMAGING FOR ATIOL Left 8/29/2016    Procedure: KERATOTOMY ARCUATE WITH FEMTOSECOND LASER/IMAGING FOR ATIOL;  Surgeon: Gerard Larson MD;  Location: [facility]     ORTHOPEDIC SURGERY       PHACOEMULSIFICATION CLEAR CORNEA WITH STANDARD INTRAOCULAR LENS IMPLANT Left 8/29/2016    Procedure: PHACOEMULSIFICATION CLEAR CORNEA WITH STANDARD INTRAOCULAR LENS IMPLANT;  Surgeon: Gerard Larson MD;  Location: [facility]     Thumb surg         FAMILY HISTORY:  Family History   Problem Relation Age of Onset     C.A.D. Father      Diabetes Father      Cancer - colorectal Mother 80     Cancer - colorectal Maternal Grandmother        SOCIAL HISTORY:  Social History     Socioeconomic History     Marital status:      Spouse name: None     Number of children: None     Years of education: None     Highest education level: None  "  Tobacco Use     Smoking status: Former Smoker     Packs/day: 1.00     Years: 56.00     Pack years: 56.00     Types: Cigarettes     Quit date: 2008     Years since quittin.6     Smokeless tobacco: Never Used     Tobacco comment: quit    Substance and Sexual Activity     Alcohol use: No     Alcohol/week: 0.0 standard drinks     Drug use: No     Sexual activity: Yes     Partners: Female   Other Topics Concern     Caffeine Concern Yes     Comment: one cup caffeine per day     Sleep Concern No     Special Diet No     Exercise Yes     Comment: walks daily 30 minutes     Parent/sibling w/ CABG, MI or angioplasty before 65F 55M? No   Social History Narrative    , 2 kids, retired. Wife (Khalida).       Review of Systems:  Skin:        Eyes:       ENT:       Respiratory:       Cardiovascular:       Gastroenterology:      Genitourinary:       Musculoskeletal:       Neurologic:       Psychiatric:       Heme/Lymph/Imm:       Endocrine:         Physical Exam:  Vitals: BP (!) 143/79   Pulse 97   Ht 1.778 m (5' 10\")   Wt 94.3 kg (208 lb)   BMI 29.84 kg/m      Constitutional:           Skin:             Head:           Eyes:           Lymph:      ENT:           Neck:           Respiratory:            Cardiac:                                                           GI:           Extremities and Muscular Skeletal:                 Neurological:           Psych:         Recent Lab Results:  LIPID RESULTS:  Lab Results   Component Value Date    CHOL 126 2022    CHOL 134 2020    HDL 40 2022    HDL 36 (L) 2020    LDL 54 2022    LDL 54 2020    TRIG 162 (H) 2022    TRIG 218 (H) 2020    CHOLHDLRATIO 2.9 11/10/2014       LIVER ENZYME RESULTS:  Lab Results   Component Value Date    AST 19 2022    AST 25 2020    ALT 21 2022    ALT 43 2020       CBC RESULTS:  Lab Results   Component Value Date    WBC 8.0 2022    WBC 9.7 2020    RBC " 4.89 08/01/2022    RBC 4.52 12/17/2020    HGB 15.9 08/01/2022    HGB 15.3 04/21/2021    HCT 47.7 08/01/2022    HCT 44.8 12/17/2020    MCV 98 08/01/2022    MCV 99 12/17/2020    MCH 32.5 08/01/2022    MCH 32.7 12/17/2020    MCHC 33.3 08/01/2022    MCHC 33.0 12/17/2020    RDW 12.4 08/01/2022    RDW 12.9 12/17/2020     08/01/2022     12/17/2020       BMP RESULTS:  Lab Results   Component Value Date     08/03/2022     04/21/2021    POTASSIUM 4.6 08/03/2022    POTASSIUM 5.1 04/21/2021    CHLORIDE 104 08/03/2022    CHLORIDE 105 04/21/2021    CO2 25 08/03/2022    CO2 29 04/21/2021    ANIONGAP 11 08/03/2022    ANIONGAP 4 04/21/2021     (H) 08/03/2022     (H) 04/21/2021    BUN 23 08/03/2022    BUN 27 04/21/2021    CR 1.61 (H) 08/03/2022    CR 1.78 (H) 04/21/2021    GFRESTIMATED 42 (L) 08/03/2022    GFRESTIMATED 35 (L) 04/21/2021    GFRESTBLACK 40 (L) 04/21/2021    SANTY 9.2 08/03/2022    SANTY 9.2 04/21/2021        A1C RESULTS:  Lab Results   Component Value Date    A1C 5.5 08/03/2022    A1C 5.5 12/17/2020       INR RESULTS:  No results found for: INR        CC  No referring provider defined for this encounter.                  TAVR Coordinator visit:  Provided additional education regarding TAVR procedure, after being present for discussion with physician. Explained the work-up process and next steps for patient. Patient provided our direct contact number and instructed to call with any questions.     Completed frailty testing and KCCQ.   5 meter walk: 3 seconds  Frailty score: 0/5    KCCQ Results:   1a. 5  1b. 5  1c. 4  2. 5  3. 7  4. 7  5. 5  6. 4  7. 2  8a. 4  8b. 4  8c. 6    Preliminary STS Risk Score: 2%  NYHA Class: II      Mady Estrada, RN  Structural Heart Coordinator  [facility]    Thank you for allowing me to participate in the care of your patient.      Sincerely,     JOANNA OLIVA MD     [facility]  cc:   No referring provider defined for this encounter.

## 2022-08-11 NOTE — PROGRESS NOTES
"HPI and Plan:     20 Jason is a very pleasant 81-year-old comes to structural clinic referred by Dr. Blanton for consideration aortic valve replacement.  He was seen by both myself and Dr. Balbuena.  He has a history of vascular disease moderate carotid disease and has been having some shortness of breath and fatigue over the last few months.  An echocardiogram showed progression of his aortic stenosis to now severe.  He has normal ejection fraction.  Mean gradient 47 mmHg.  Dimensionless index of 0.2.    He is at a Lexiscan done 2014 which was negative.  EKG unremarkable with chronic atrial fibrillation.  He has had no history of myocardial infarction.  He does have chronic back discomfort.  He also has stage III chronic kidney disease.  He has hypertension.    Studies were reviewed including echocardiogram.  Image quality is poor and visualization aortic valve is limited.  However numbers support severe valve stenosis.  I think the patient is a \"\" young 81-year-old and Keron could go either with surgical or transcatheter valve replacement however the patient prefers transcatheter valve replacement.  However if there was some prohibitive risk certainly he could undergo SAVR and he is willing to do so.  He will need to proceed with a cardiac catheterization for angiogram and a TAVR CT scan prior to valve conference and full discussion.  Risk and benefits of these were explained to the patient.    Today's clinic visit entailed:  Review of the result(s) of each unique test - echocardiogram, ekg  The following tests were independently interpreted by me as noted in my documentation: echocardiogram  Ordering of each unique test  No LOS data to display   Time spent doing chart review, history and exam, documentation and further activities per the note  Provider  Link to [facility] Help Grid     The level of medical decision making during this visit was of high complexity.      No orders of the defined types were placed in this " "encounter.    No orders of the defined types were placed in this encounter.    There are no discontinued medications.      No diagnosis found.    CURRENT MEDICATIONS:  Current Outpatient Medications   Medication Sig Dispense Refill     albuterol (VENTOLIN HFA) 108 (90 Base) MCG/ACT inhaler INHALE 2 PUFFS INTO THE LUNGS EVERY 6 HOURS AS NEEDED FOR SHORTNESS OF BREATH OR DIFFICULT BREATHING OR WHEEZING 18 g 3     amitriptyline (ELAVIL) 25 MG tablet Take 1 tablet (25 mg) by mouth At Bedtime 90 tablet 0     ASPIRIN NOT PRESCRIBED (INTENTIONAL) Antiplatelet medication not prescribed intentionally due to Not indicated based on age       atorvastatin (LIPITOR) 20 MG tablet Take 1 tablet (20 mg) by mouth every evening 90 tablet 3     Cholecalciferol (VITAMIN D3) 5000 UNITS TABS Take 2 tablets by mouth daily       cyabnocobalamin (VITAMIN B-12) 2500 MCG sublingual tablet Place 3,000 mcg under the tongue daily  30 tablet      finasteride (PROSCAR) 5 MG tablet Take 1 tablet (5 mg) by mouth daily 90 tablet 3     fluticasone (FLONASE) 50 MCG/ACT nasal spray Spray 1-2 sprays into both nostrils daily 48 mL 3     loratadine (CLARITIN) 10 MG tablet Take 1 tablet (10 mg) by mouth daily 30 tablet 1     metoprolol tartrate (LOPRESSOR) 50 MG tablet Take 1 tablet (50 mg) by mouth 2 times daily 270 tablet 3     Multiple Vitamin (MULTI-VITAMIN) per tablet Take 1 tablet by mouth daily.       tamsulosin (FLOMAX) 0.4 MG capsule Take 0.4 mg by mouth daily       Turmeric (QC TUMERIC COMPLEX PO) Take 2 chew tab by mouth daily         ALLERGIES     Allergies   Allergen Reactions     Lisinopril Shortness Of Breath and Fatigue     Amoxicillin Itching     Duloxetine      \"I can't take it\"     Levofloxacin      \"I can't take the 500 mgs\"     Neurontin [Gabapentin] Swelling     edema     Norvasc [Amlodipine] Swelling     edema     Oxycodone GI Disturbance     Penicillins Itching     Red Yeast Rice Extract [Monascus Purpureus Went Yeast] Hives and " Itching     Tramadol Itching     Verapamil Itching     Vicodin [Hydrocodone-Acetaminophen] Itching       PAST MEDICAL HISTORY:  Past Medical History:   Diagnosis Date     Arthritis      BPH (benign prostatic hyperplasia)      CKD (chronic kidney disease) stage 3, GFR 30-59 ml/min (H)      Cluster headache     Dr. Roth     Constipation      Dyslipidemia      Dyspnea     Normal nuc stress test 9-15-08, fainted once in life     Fatty liver     Ultrasound      FH: colon cancer     colonoscopy 10/10, repeat 5 years.     Former smoker     quit      HTN (hypertension), benign      Peripheral neuropathy     Dr. Patricia     Spinal stenosis     Last MRI , Dr. Fraire     Syncope      Type 2 diabetes mellitus (H)     diet controlled     Ureterolithiases        PAST SURGICAL HISTORY:  Past Surgical History:   Procedure Laterality Date     COLONOSCOPY       KERATOTOMY ARCUATE WITH FEMTOSECOND LASER/IMAGING FOR ATIOL Left 2016    Procedure: KERATOTOMY ARCUATE WITH FEMTOSECOND LASER/IMAGING FOR ATIOL;  Surgeon: Gerard Larson MD;  Location: [facility]     ORTHOPEDIC SURGERY       PHACOEMULSIFICATION CLEAR CORNEA WITH STANDARD INTRAOCULAR LENS IMPLANT Left 2016    Procedure: PHACOEMULSIFICATION CLEAR CORNEA WITH STANDARD INTRAOCULAR LENS IMPLANT;  Surgeon: Gerard Larson MD;  Location: [facility]     Thumb surg         FAMILY HISTORY:  Family History   Problem Relation Age of Onset     C.A.D. Father      Diabetes Father      Cancer - colorectal Mother 80     Cancer - colorectal Maternal Grandmother        SOCIAL HISTORY:  Social History     Socioeconomic History     Marital status:      Spouse name: None     Number of children: None     Years of education: None     Highest education level: None   Tobacco Use     Smoking status: Former Smoker     Packs/day: 1.00     Years: 56.00     Pack years: 56.00     Types: Cigarettes     Quit date: 2008     Years since quittin.6     Smokeless tobacco: Never  "Used     Tobacco comment: quit 2008   Substance and Sexual Activity     Alcohol use: No     Alcohol/week: 0.0 standard drinks     Drug use: No     Sexual activity: Yes     Partners: Female   Other Topics Concern     Caffeine Concern Yes     Comment: one cup caffeine per day     Sleep Concern No     Special Diet No     Exercise Yes     Comment: walks daily 30 minutes     Parent/sibling w/ CABG, MI or angioplasty before 65F 55M? No   Social History Narrative    , 2 kids, retired. Wife (Khalida).       Review of Systems:  Skin:        Eyes:       ENT:       Respiratory:       Cardiovascular:       Gastroenterology:      Genitourinary:       Musculoskeletal:       Neurologic:       Psychiatric:       Heme/Lymph/Imm:       Endocrine:         Physical Exam:  Vitals: BP (!) 143/79   Pulse 97   Ht 1.778 m (5' 10\")   Wt 94.3 kg (208 lb)   BMI 29.84 kg/m      Constitutional:           Skin:             Head:           Eyes:           Lymph:      ENT:           Neck:           Respiratory:            Cardiac:                                                           GI:           Extremities and Muscular Skeletal:                 Neurological:           Psych:         Recent Lab Results:  LIPID RESULTS:  Lab Results   Component Value Date    CHOL 126 08/03/2022    CHOL 134 12/17/2020    HDL 40 08/03/2022    HDL 36 (L) 12/17/2020    LDL 54 08/03/2022    LDL 54 12/17/2020    TRIG 162 (H) 08/03/2022    TRIG 218 (H) 12/17/2020    CHOLHDLRATIO 2.9 11/10/2014       LIVER ENZYME RESULTS:  Lab Results   Component Value Date    AST 19 08/03/2022    AST 25 12/17/2020    ALT 21 08/03/2022    ALT 43 12/17/2020       CBC RESULTS:  Lab Results   Component Value Date    WBC 8.0 08/01/2022    WBC 9.7 12/17/2020    RBC 4.89 08/01/2022    RBC 4.52 12/17/2020    HGB 15.9 08/01/2022    HGB 15.3 04/21/2021    HCT 47.7 08/01/2022    HCT 44.8 12/17/2020    MCV 98 08/01/2022    MCV 99 12/17/2020    MCH 32.5 08/01/2022    MCH 32.7 " 12/17/2020    MCHC 33.3 08/01/2022    MCHC 33.0 12/17/2020    RDW 12.4 08/01/2022    RDW 12.9 12/17/2020     08/01/2022     12/17/2020       BMP RESULTS:  Lab Results   Component Value Date     08/03/2022     04/21/2021    POTASSIUM 4.6 08/03/2022    POTASSIUM 5.1 04/21/2021    CHLORIDE 104 08/03/2022    CHLORIDE 105 04/21/2021    CO2 25 08/03/2022    CO2 29 04/21/2021    ANIONGAP 11 08/03/2022    ANIONGAP 4 04/21/2021     (H) 08/03/2022     (H) 04/21/2021    BUN 23 08/03/2022    BUN 27 04/21/2021    CR 1.61 (H) 08/03/2022    CR 1.78 (H) 04/21/2021    GFRESTIMATED 42 (L) 08/03/2022    GFRESTIMATED 35 (L) 04/21/2021    GFRESTBLACK 40 (L) 04/21/2021    SANTY 9.2 08/03/2022    SANTY 9.2 04/21/2021        A1C RESULTS:  Lab Results   Component Value Date    A1C 5.5 08/03/2022    A1C 5.5 12/17/2020       INR RESULTS:  No results found for: INR        CC  No referring provider defined for this encounter.

## 2022-08-11 NOTE — PROGRESS NOTES
Cardiothoracic Surgery Consult    Date of Service: 8/11/2022      REASON FOR CONSULTATION: severe symptomatic aortic valve stenosis       HISTORY OF PRESENT ILLNESS: Mr. Luis Daniel Gomez is a 81 year old who presents with fatigue and shortness of breath on exertion. He is found to have severe aortic valve stenosis on TTE which has progressed from moderate. He denies any chest pain. He does have chronic back pain but states that his shortness of breath is new and unrelated to his back pain. Denies any syncope or lightheadedness recently. Otherwise, he is a very active individual and in good health for 81.    LVEF 60-65%  SAE 0.77 cm^2  MG 47 mmHg  LVOT 2.2 cm       IMPRESSION AND PLAN: Mr. Luis Daniel Gomez is a 81 year old with severe symptomatic aortic stenosis.     Echocardiography demonstrates LVEF of 60-65%, and coronary angiography pending.      I discussed the technical details of the open surgical AVR with the patient, as well as the expected postoperative course and recovery. The risks include but are not limited to bleeding, infection, stroke, heart or graft failure, dysrhythmia, respiratory failure, kidney or liver injury, bowel or limb ischemia, and death.     Calculated STS risk for mortality with isolated aortic valve replacement is 1.9%.     He is a reasonable candidate for transcatheter aortic valve replacement or surgical valve replacement. Despite being 81, he is in good health and active individual who will tolerate recovery after surgery. I do think his risk of having worsening renal function after cardiopulmonary bypass is higher based on his CKD, for this reason, I lean towards TAVR.     I discussed the technical details. Patient understands that there is a risk of bleeding, infection, stroke, heart block requiring permanent pacemaker, cardiac perforation, aortic root rupture and aortic dissection, in addition to risk of death.     We also discussed the rare but life threatening  "complications that can occur during TAVR such as aortic rupture or dissection. These require emergency conversion to surgery, which carried a high risk of mortality (up to 50%) and morbidity, prolonged hospital stay, as well as potential loss of independence.     1) Recommend TAVR, to be scheduled by the valve clinic  2) CPB standby: full bailout    Guilherme Balbuena MD  Cardiothoracic Surgery  P: [phone]      PAST MEDICAL HISTORY:   Past Medical History:   Diagnosis Date     Arthritis      BPH (benign prostatic hyperplasia)      CKD (chronic kidney disease) stage 3, GFR 30-59 ml/min (H)      Cluster headache 2015    Dr. Roth     Constipation      Dyslipidemia      Dyspnea     Normal nuc stress test 9-15-08, fainted once in life     Fatty liver     Ultrasound 11/11     FH: colon cancer     colonoscopy 10/10, repeat 5 years.     Former smoker     quit 2008     HTN (hypertension), benign      Peripheral neuropathy     Dr. Patricia     Spinal stenosis     Last MRI 2010, Dr. Fraire     Syncope      Type 2 diabetes mellitus (H)     diet controlled     Ureterolithiases          PAST SURGICAL HISTORY:   Past Surgical History:   Procedure Laterality Date     COLONOSCOPY       KERATOTOMY ARCUATE WITH FEMTOSECOND LASER/IMAGING FOR ATIOL Left 8/29/2016    Procedure: KERATOTOMY ARCUATE WITH FEMTOSECOND LASER/IMAGING FOR ATIOL;  Surgeon: Gerard Larson MD;  Location: [facility]     ORTHOPEDIC SURGERY       PHACOEMULSIFICATION CLEAR CORNEA WITH STANDARD INTRAOCULAR LENS IMPLANT Left 8/29/2016    Procedure: PHACOEMULSIFICATION CLEAR CORNEA WITH STANDARD INTRAOCULAR LENS IMPLANT;  Surgeon: Gerard Larson MD;  Location: [facility]     Thumb surg           ALLERGIES:   Allergies   Allergen Reactions     Lisinopril Shortness Of Breath and Fatigue     Amoxicillin Itching     Duloxetine      \"I can't take it\"     Levofloxacin      \"I can't take the 500 mgs\"     Neurontin [Gabapentin] Swelling     edema     Norvasc [Amlodipine] Swelling     " edema     Oxycodone GI Disturbance     Penicillins Itching     Red Yeast Rice Extract [[place] Purpureus Went Yeast] Hives and Itching     Tramadol Itching     Verapamil Itching     Vicodin [Hydrocodone-Acetaminophen] Itching         CURRENT MEDICATIONS:  Current Outpatient Medications   Medication     albuterol (VENTOLIN HFA) 108 (90 Base) MCG/ACT inhaler     amitriptyline (ELAVIL) 25 MG tablet     ASPIRIN NOT PRESCRIBED (INTENTIONAL)     atorvastatin (LIPITOR) 20 MG tablet     Cholecalciferol (VITAMIN D3) 5000 UNITS TABS     cyabnocobalamin (VITAMIN B-12) 2500 MCG sublingual tablet     finasteride (PROSCAR) 5 MG tablet     fluticasone (FLONASE) 50 MCG/ACT nasal spray     loratadine (CLARITIN) 10 MG tablet     metoprolol tartrate (LOPRESSOR) 50 MG tablet     Multiple Vitamin (MULTI-VITAMIN) per tablet     tamsulosin (FLOMAX) 0.4 MG capsule     Turmeric (QC TUMERIC COMPLEX PO)     No current facility-administered medications for this visit.         FAMILY HISTORY:   Family History   Problem Relation Age of Onset     C.A.D. Father      Diabetes Father      Cancer - colorectal Mother 80     Cancer - colorectal Maternal Grandmother      The family history was reviewed and is not pertinent to the patient's disease/illness.      SOCIAL HISTORY:   Social History     Socioeconomic History     Marital status:      Spouse name: Not on file     Number of children: Not on file     Years of education: Not on file     Highest education level: Not on file   Occupational History     Not on file   Tobacco Use     Smoking status: Former Smoker     Packs/day: 1.00     Years: 56.00     Pack years: 56.00     Types: Cigarettes     Quit date: 2008     Years since quittin.6     Smokeless tobacco: Never Used     Tobacco comment: quit 2008   Substance and Sexual Activity     Alcohol use: No     Alcohol/week: 0.0 standard drinks     Drug use: No     Sexual activity: Yes     Partners: Female   Other Topics Concern      " Service Not Asked     Blood Transfusions Not Asked     Caffeine Concern Yes     Comment: one cup caffeine per day     Occupational Exposure Not Asked     Hobby Hazards Not Asked     Sleep Concern No     Stress Concern Not Asked     Weight Concern Not Asked     Special Diet No     Back Care Not Asked     Exercise Yes     Comment: walks daily 30 minutes     Bike Helmet Not Asked     Seat Belt Not Asked     Self-Exams Not Asked     Parent/sibling w/ CABG, MI or angioplasty before 65F 55M? No   Social History Narrative    , 2 kids, retired. Wife (Khalida).     Social Determinants of Health     Financial Resource Strain: Not on file   Food Insecurity: Not on file   Transportation Needs: Not on file   Physical Activity: Not on file   Stress: Not on file   Social Connections: Not on file   Intimate Partner Violence: Not on file   Housing Stability: Not on file         REVIEW OF SYSTEMS:  Review of Systems - Negative except shortness of breath  A complete 10 point review of systems was obtained and is negative other than the above stated complaints      PHYSICAL EXAMINATION:   Vitals: BP (!) 143/79 (BP Location: Right arm, Patient Position: Sitting, Cuff Size: Adult Regular)   Pulse 97   Ht 1.778 m (5' 10\")   Wt 94.4 kg (208 lb 1.6 oz)   BMI 29.86 kg/m    GENERAL: well developed and well nourished   HEENT: Conjunctiva anicteric and sclera clear   RESPIRATIONS: breathing comfortably, no audible wheezing  ABDOMEN: non distended  EXTREMITIES: no deformity or swelling   NEUROLOGIC: intact and symmetric with no focal deficits.   PSYCHIATRIC: alert and oriented, pleasant       LABORATORY STUDIES:   Lab Results   Component Value Date    WBC 8.0 08/01/2022    HGB 15.9 08/01/2022    HCT 47.7 08/01/2022    MCV 98 08/01/2022     08/01/2022      Lab Results   Component Value Date    BUN 23 08/03/2022     08/03/2022    CO2 25 08/03/2022       CARDIAC CATHETERIZATION: pending    TRANSTHORACIC " ECHOCARDIOGRAM: This study was personally reviewed by me.

## 2022-08-11 NOTE — PATIENT INSTRUCTIONS
Next steps:  - CT scan  - Coronary angiogram    We will call you with the result(s) of the test(s) and to discuss the plan going forward.    It was wonderful to see you! Please call with any questions or concerns: [phone]    Mady Estrada RN  Structural Heart Coordinator  [facility] [place]                Your blood pressure was elevated at your appointment today.  Elevated blood pressure can increase your risk of a heart attack, stroke and heart failure.  For this reason, we feel it is important to monitor your blood pressure closely.  If you have access to a home blood pressure monitor or are able to check your blood pressure at a local pharmacy in the next week we would like you to do so and call our clinic with those readings. Please call [phone] ([place]) and leave a message with your name, date of birth, blood pressure reading, date and location it was completed. If your blood pressure remains elevated your care team will be notified.  We appreciate being a part of your healthcare team and look forward to hearing from you soon.

## 2022-08-16 ENCOUNTER — HOSPITAL ENCOUNTER (OUTPATIENT)
Dept: CARDIOLOGY | Facility: CLINIC | Age: 82
Discharge: HOME | End: 2022-08-16
Attending: INTERNAL MEDICINE | Admitting: INTERNAL MEDICINE
Payer: COMMERCIAL

## 2022-08-16 ENCOUNTER — TELEPHONE (OUTPATIENT)
Dept: CARDIOLOGY | Facility: CLINIC | Age: 82
End: 2022-08-16

## 2022-08-16 DIAGNOSIS — I35.0 NONRHEUMATIC AORTIC VALVE STENOSIS: ICD-10-CM

## 2022-08-16 LAB
CREAT BLD-MCNC: 1.7 MG/DL (ref 0.7–1.3)
GFR SERPL CREATININE-BSD FRML MDRD: 40 ML/MIN/1.73M2

## 2022-08-16 PROCEDURE — 74174 CTA ABD&PLVS W/CONTRAST: CPT

## 2022-08-16 PROCEDURE — 74174 CTA ABD&PLVS W/CONTRAST: CPT | Mod: 26 | Performed by: INTERNAL MEDICINE

## 2022-08-16 PROCEDURE — 71275 CT ANGIOGRAPHY CHEST: CPT | Mod: 26 | Performed by: INTERNAL MEDICINE

## 2022-08-16 PROCEDURE — 250N000011 HC RX IP 250 OP 636: Performed by: INTERNAL MEDICINE

## 2022-08-16 PROCEDURE — 82565 ASSAY OF CREATININE: CPT

## 2022-08-16 RX ORDER — IOPAMIDOL 755 MG/ML
115 INJECTION, SOLUTION INTRAVASCULAR ONCE
Status: COMPLETED | OUTPATIENT
Start: 2022-08-16 | End: 2022-08-16

## 2022-08-16 RX ORDER — ACYCLOVIR 200 MG/1
0-1 CAPSULE ORAL
Status: DISCONTINUED | OUTPATIENT
Start: 2022-08-16 | End: 2022-08-17 | Stop reason: HOSPADM

## 2022-08-16 RX ORDER — DIPHENHYDRAMINE HYDROCHLORIDE 50 MG/ML
25-50 INJECTION INTRAMUSCULAR; INTRAVENOUS
Status: DISCONTINUED | OUTPATIENT
Start: 2022-08-16 | End: 2022-08-17 | Stop reason: HOSPADM

## 2022-08-16 RX ORDER — ONDANSETRON 2 MG/ML
4 INJECTION INTRAMUSCULAR; INTRAVENOUS
Status: DISCONTINUED | OUTPATIENT
Start: 2022-08-16 | End: 2022-08-17 | Stop reason: HOSPADM

## 2022-08-16 RX ORDER — METHYLPREDNISOLONE SODIUM SUCCINATE 125 MG/2ML
125 INJECTION, POWDER, LYOPHILIZED, FOR SOLUTION INTRAMUSCULAR; INTRAVENOUS
Status: DISCONTINUED | OUTPATIENT
Start: 2022-08-16 | End: 2022-08-17 | Stop reason: HOSPADM

## 2022-08-16 RX ORDER — DIPHENHYDRAMINE HCL 25 MG
25 CAPSULE ORAL
Status: DISCONTINUED | OUTPATIENT
Start: 2022-08-16 | End: 2022-08-17 | Stop reason: HOSPADM

## 2022-08-16 RX ADMIN — IOPAMIDOL 115 ML: 755 INJECTION, SOLUTION INTRAVENOUS at 11:04

## 2022-08-16 NOTE — TELEPHONE ENCOUNTER
I phoned patient to explain to him that due to his kidney function Dr. Knight would like for him to come in at 6:30 rather than 7:30 to receive extra fluids to help his kidneys clear the dye load better. He is agreeable to doing this and this has been scheduled accordingly.    I took this opportunity to review the prep for his 8/22 coronary angiogram that is part of his TAVR workup.      1. Since he is not currently taking aspirin he will take 325 Sunday night and Monday morning.    2. He will be NPO after midnight Sunday.    3 He will do a COVID home test eithewr the 20th or 21st.and take a picture of the results and bring this along.    4. He will take his temp Monday morning and call [facility] if >100.0    5. Denies any contrast dye allergies/reactions-Had CT this morning w/o any problems.    6.Advised no PDE-5 inhibitors    7.No driving for 24 hours post procedure    8. His spouse will be  and care giver.      I will call him this Friday morning to review the prep once again.

## 2022-08-18 DIAGNOSIS — I35.0 SEVERE AORTIC STENOSIS: Primary | ICD-10-CM

## 2022-08-18 RX ORDER — ASPIRIN 325 MG
325 TABLET ORAL ONCE
Status: CANCELLED | OUTPATIENT
Start: 2022-08-18 | End: 2022-08-18

## 2022-08-18 RX ORDER — SODIUM CHLORIDE 9 MG/ML
INJECTION, SOLUTION INTRAVENOUS CONTINUOUS
Status: CANCELLED | OUTPATIENT
Start: 2022-08-18

## 2022-08-18 RX ORDER — LIDOCAINE 40 MG/G
CREAM TOPICAL
Status: CANCELLED | OUTPATIENT
Start: 2022-08-18

## 2022-08-18 RX ORDER — POTASSIUM CHLORIDE 1500 MG/1
20 TABLET, EXTENDED RELEASE ORAL
Status: CANCELLED | OUTPATIENT
Start: 2022-08-18

## 2022-08-18 RX ORDER — ASPIRIN 81 MG/1
243 TABLET, CHEWABLE ORAL ONCE
Status: CANCELLED | OUTPATIENT
Start: 2022-08-18

## 2022-08-19 ENCOUNTER — TELEPHONE (OUTPATIENT)
Dept: CARDIOLOGY | Facility: CLINIC | Age: 82
End: 2022-08-19

## 2022-08-19 NOTE — TELEPHONE ENCOUNTER
Follow up call to patient to make sure all his questions are answered from our 8/16 call to review prep for 8/11 coronary angiogram.    I reviewed with him:    1. 6:30 arrival because of the need for extra fluids    2. Take aspirin 325 Sunday night and Monday morning    3. COVID home antigen test either Saturday or Sunday      All questions and concerns have been addressed to his satisfaction. He knows that he can call anytime until 4:30 today if questions today arise.

## 2022-08-22 ENCOUNTER — HOSPITAL ENCOUNTER (OUTPATIENT)
Facility: CLINIC | Age: 82
Discharge: HOME | End: 2022-08-22
Admitting: INTERNAL MEDICINE
Payer: COMMERCIAL

## 2022-08-22 VITALS
BODY MASS INDEX: 29.49 KG/M2 | HEART RATE: 68 BPM | DIASTOLIC BLOOD PRESSURE: 50 MMHG | SYSTOLIC BLOOD PRESSURE: 114 MMHG | RESPIRATION RATE: 16 BRPM | WEIGHT: 206 LBS | HEIGHT: 70 IN | TEMPERATURE: 98.5 F | OXYGEN SATURATION: 96 %

## 2022-08-22 DIAGNOSIS — R93.1 ABNORMAL FINDINGS ON DIAGNOSTIC IMAGING OF HEART AND CORONARY CIRCULATION: ICD-10-CM

## 2022-08-22 DIAGNOSIS — I35.0 NONRHEUMATIC AORTIC VALVE STENOSIS: ICD-10-CM

## 2022-08-22 DIAGNOSIS — I35.0 SEVERE AORTIC STENOSIS: ICD-10-CM

## 2022-08-22 PROBLEM — Z98.890 STATUS POST CORONARY ANGIOGRAM: Status: ACTIVE | Noted: 2022-08-22

## 2022-08-22 LAB
ANION GAP SERPL CALCULATED.3IONS-SCNC: 4 MMOL/L (ref 3–14)
APTT PPP: 24 SECONDS (ref 22–38)
BUN SERPL-MCNC: 18 MG/DL (ref 7–30)
CALCIUM SERPL-MCNC: 9.2 MG/DL (ref 8.5–10.1)
CHLORIDE BLD-SCNC: 102 MMOL/L (ref 94–109)
CO2 SERPL-SCNC: 29 MMOL/L (ref 20–32)
CREAT SERPL-MCNC: 1.61 MG/DL (ref 0.66–1.25)
ERYTHROCYTE [DISTWIDTH] IN BLOOD BY AUTOMATED COUNT: 12.1 % (ref 10–15)
GFR SERPL CREATININE-BSD FRML MDRD: 42 ML/MIN/1.73M2
GLUCOSE BLD-MCNC: 118 MG/DL (ref 70–99)
HCT VFR BLD AUTO: 44.5 % (ref 40–53)
HGB BLD-MCNC: 14.9 G/DL (ref 13.3–17.7)
INR PPP: 0.96 (ref 0.85–1.15)
MCH RBC QN AUTO: 32.3 PG (ref 26.5–33)
MCHC RBC AUTO-ENTMCNC: 33.5 G/DL (ref 31.5–36.5)
MCV RBC AUTO: 97 FL (ref 78–100)
PLATELET # BLD AUTO: 108 10E3/UL (ref 150–450)
POTASSIUM BLD-SCNC: 4.1 MMOL/L (ref 3.4–5.3)
RBC # BLD AUTO: 4.61 10E6/UL (ref 4.4–5.9)
SODIUM SERPL-SCNC: 135 MMOL/L (ref 133–144)
WBC # BLD AUTO: 8.7 10E3/UL (ref 4–11)

## 2022-08-22 PROCEDURE — C1894 INTRO/SHEATH, NON-LASER: HCPCS | Performed by: INTERNAL MEDICINE

## 2022-08-22 PROCEDURE — 258N000003 HC RX IP 258 OP 636: Performed by: INTERNAL MEDICINE

## 2022-08-22 PROCEDURE — 85027 COMPLETE CBC AUTOMATED: CPT | Performed by: INTERNAL MEDICINE

## 2022-08-22 PROCEDURE — 999N000184 HC STATISTIC TELEMETRY

## 2022-08-22 PROCEDURE — 85610 PROTHROMBIN TIME: CPT | Performed by: INTERNAL MEDICINE

## 2022-08-22 PROCEDURE — 250N000011 HC RX IP 250 OP 636: Performed by: INTERNAL MEDICINE

## 2022-08-22 PROCEDURE — 85730 THROMBOPLASTIN TIME PARTIAL: CPT | Performed by: INTERNAL MEDICINE

## 2022-08-22 PROCEDURE — 36591 DRAW BLOOD OFF VENOUS DEVICE: CPT

## 2022-08-22 PROCEDURE — C1769 GUIDE WIRE: HCPCS | Performed by: INTERNAL MEDICINE

## 2022-08-22 PROCEDURE — 80048 BASIC METABOLIC PNL TOTAL CA: CPT | Performed by: INTERNAL MEDICINE

## 2022-08-22 PROCEDURE — 999N000054 HC STATISTIC EKG NON-CHARGEABLE

## 2022-08-22 PROCEDURE — 99152 MOD SED SAME PHYS/QHP 5/>YRS: CPT | Mod: GC | Performed by: INTERNAL MEDICINE

## 2022-08-22 PROCEDURE — 93005 ELECTROCARDIOGRAM TRACING: CPT

## 2022-08-22 PROCEDURE — 93010 ELECTROCARDIOGRAM REPORT: CPT | Performed by: INTERNAL MEDICINE

## 2022-08-22 PROCEDURE — 99152 MOD SED SAME PHYS/QHP 5/>YRS: CPT | Performed by: INTERNAL MEDICINE

## 2022-08-22 PROCEDURE — 250N000013 HC RX MED GY IP 250 OP 250 PS 637: Performed by: INTERNAL MEDICINE

## 2022-08-22 PROCEDURE — 93454 CORONARY ARTERY ANGIO S&I: CPT | Mod: 26 | Performed by: INTERNAL MEDICINE

## 2022-08-22 PROCEDURE — 99153 MOD SED SAME PHYS/QHP EA: CPT | Performed by: INTERNAL MEDICINE

## 2022-08-22 PROCEDURE — 999N000071 HC STATISTIC HEART CATH LAB OR EP LAB

## 2022-08-22 PROCEDURE — 93454 CORONARY ARTERY ANGIO S&I: CPT | Performed by: INTERNAL MEDICINE

## 2022-08-22 PROCEDURE — 272N000001 HC OR GENERAL SUPPLY STERILE: Performed by: INTERNAL MEDICINE

## 2022-08-22 PROCEDURE — 250N000009 HC RX 250: Performed by: INTERNAL MEDICINE

## 2022-08-22 PROCEDURE — 36415 COLL VENOUS BLD VENIPUNCTURE: CPT | Performed by: INTERNAL MEDICINE

## 2022-08-22 RX ORDER — NALOXONE HYDROCHLORIDE 0.4 MG/ML
0.2 INJECTION, SOLUTION INTRAMUSCULAR; INTRAVENOUS; SUBCUTANEOUS
Status: DISCONTINUED | OUTPATIENT
Start: 2022-08-22 | End: 2022-08-22 | Stop reason: HOSPADM

## 2022-08-22 RX ORDER — FENTANYL CITRATE 50 UG/ML
25 INJECTION, SOLUTION INTRAMUSCULAR; INTRAVENOUS
Status: DISCONTINUED | OUTPATIENT
Start: 2022-08-22 | End: 2022-08-22 | Stop reason: HOSPADM

## 2022-08-22 RX ORDER — NICARDIPINE HYDROCHLORIDE 2.5 MG/ML
INJECTION INTRAVENOUS
Status: DISCONTINUED | OUTPATIENT
Start: 2022-08-22 | End: 2022-08-22 | Stop reason: HOSPADM

## 2022-08-22 RX ORDER — NALOXONE HYDROCHLORIDE 0.4 MG/ML
0.4 INJECTION, SOLUTION INTRAMUSCULAR; INTRAVENOUS; SUBCUTANEOUS
Status: DISCONTINUED | OUTPATIENT
Start: 2022-08-22 | End: 2022-08-22 | Stop reason: HOSPADM

## 2022-08-22 RX ORDER — ASPIRIN 325 MG
325 TABLET ORAL ONCE
Status: COMPLETED | OUTPATIENT
Start: 2022-08-22 | End: 2022-08-22

## 2022-08-22 RX ORDER — LIDOCAINE 40 MG/G
CREAM TOPICAL
Status: DISCONTINUED | OUTPATIENT
Start: 2022-08-22 | End: 2022-08-22 | Stop reason: HOSPADM

## 2022-08-22 RX ORDER — IOPAMIDOL 755 MG/ML
INJECTION, SOLUTION INTRAVASCULAR
Status: DISCONTINUED | OUTPATIENT
Start: 2022-08-22 | End: 2022-08-22 | Stop reason: HOSPADM

## 2022-08-22 RX ORDER — ASPIRIN 81 MG/1
243 TABLET, CHEWABLE ORAL ONCE
Status: COMPLETED | OUTPATIENT
Start: 2022-08-22 | End: 2022-08-22

## 2022-08-22 RX ORDER — POTASSIUM CHLORIDE 1500 MG/1
20 TABLET, EXTENDED RELEASE ORAL
Status: DISCONTINUED | OUTPATIENT
Start: 2022-08-22 | End: 2022-08-22 | Stop reason: HOSPADM

## 2022-08-22 RX ORDER — HEPARIN SODIUM 1000 [USP'U]/ML
INJECTION, SOLUTION INTRAVENOUS; SUBCUTANEOUS
Status: DISCONTINUED | OUTPATIENT
Start: 2022-08-22 | End: 2022-08-22 | Stop reason: HOSPADM

## 2022-08-22 RX ORDER — FLUMAZENIL 0.1 MG/ML
0.2 INJECTION, SOLUTION INTRAVENOUS
Status: DISCONTINUED | OUTPATIENT
Start: 2022-08-22 | End: 2022-08-22 | Stop reason: HOSPADM

## 2022-08-22 RX ORDER — SODIUM CHLORIDE 9 MG/ML
INJECTION, SOLUTION INTRAVENOUS CONTINUOUS
Status: DISCONTINUED | OUTPATIENT
Start: 2022-08-22 | End: 2022-08-22 | Stop reason: HOSPADM

## 2022-08-22 RX ORDER — ATROPINE SULFATE 0.1 MG/ML
0.5 INJECTION INTRAVENOUS
Status: DISCONTINUED | OUTPATIENT
Start: 2022-08-22 | End: 2022-08-22 | Stop reason: HOSPADM

## 2022-08-22 RX ORDER — FENTANYL CITRATE 50 UG/ML
INJECTION, SOLUTION INTRAMUSCULAR; INTRAVENOUS
Status: DISCONTINUED | OUTPATIENT
Start: 2022-08-22 | End: 2022-08-22 | Stop reason: HOSPADM

## 2022-08-22 RX ADMIN — ASPIRIN 81 MG CHEWABLE TABLET 81 MG: 81 TABLET CHEWABLE at 08:40

## 2022-08-22 RX ADMIN — SODIUM CHLORIDE: 9 INJECTION, SOLUTION INTRAVENOUS at 07:15

## 2022-08-22 ASSESSMENT — ACTIVITIES OF DAILY LIVING (ADL)
ADLS_ACUITY_SCORE: 35

## 2022-08-22 NOTE — DISCHARGE INSTRUCTIONS

## 2022-08-22 NOTE — PROGRESS NOTES
[facility] Discharge Nursing Note    Patient Information  Name: Luis Daniel Gomez  Age: 81 year old    Discharge Education:  Discharge instructions reviewed: Yes  Additional education/resources provided: n/a  Patient/patient representative verbalizes understanding: Yes  Patient discharging on new medications: No  Medication education completed: Yes    Discharge Plans:   Discharge location: home  Discharge ride contacted: Yes wife here and will drive pt home  Approximate discharge time: 1315    Discharge Criteria:  Discharge criteria met and vital signs stable: Yes, ambulatory, denies any c/o, left radial site remains soft with no bleeding or hematoma. Very pleasant, cooperative.     Patient Belongs:  Patient belongings returned to patient: Yes    Sowmya Sargent RN

## 2022-08-22 NOTE — PRE-PROCEDURE
GENERAL PRE-PROCEDURE:   Procedure:  Coronary angiogram  Date/Time:  8/22/2022 9:31 AM    Verbal consent obtained?: Yes    Written consent obtained?: Yes    Risks and benefits: Risks, benefits and alternatives were discussed    Consent given by:  Patient  Patient states understanding of procedure being performed: Yes    Patient's understanding of procedure matches consent: Yes    Procedure consent matches procedure scheduled: Yes    Expected level of sedation:  Moderate  Appropriately NPO:  Yes  ASA Class:  2  Mallampati  :  Grade 2- soft palate, base of uvula, tonsillar pillars, and portion of posterior pharyngeal wall visible  Lungs:  Lungs clear with good breath sounds bilaterally  Heart:  Normal heart sounds and rate  History & Physical reviewed:  History and physical reviewed and no updates needed  Statement of review:  I have reviewed the lab findings, diagnostic data, medications, and the plan for sedation

## 2022-08-22 NOTE — PROGRESS NOTES
[facility] Post Procedure Note    Patient Information  Name: Luis Daniel Gomez  Age: 81 year old    Post Procedure  Time patient returned to [facility]: 1000  Concerns/abnormal assessment: none  If abnormal assessment, provider notified: N/A  Plan/Other: observe/monitor until discharge. Left radial site soft with no bleeding or hematoma, 1 ml in TR Band. VSS, awake, alert, oriented, denies any c/o, ate box lunch and taking PO fluids well. Wife here at bedside. NSR.    Sowmya Sargent RN

## 2022-08-22 NOTE — PROGRESS NOTES
[facility] Admission Nursing Note    Patient Information  Name: Luis Daniel Gomez  Age: 81 year old  Reason for admission: heart cath  [facility] arrival time: 0630    Visitor Information  Name: wife Ella here  Informed of visitor restrictions: Yes  1 visitor allowed per patient   Visitor must screen negative for COVID symptoms   Visitor must wear a mask  Waiting rooms closed to visitors    Patient Admission/Assessment   Pre-procedure assessment complete: Yes  If abnormal assessment/labs, provider notified: N/A  NPO: Yes  Medications held per instructions/orders: N/A  Consent: obtained  If applicable, pregnancy test status: deferred  Patient oriented to room: Yes  Education/questions answered: Yes  Plan/other: heart cath    Discharge Planning  Discharge name/phone number: Ella [phone]  Overnight post sedation caregiver: Ella  Discharge location: home    Sowmya Sargent RN          Detail Level: Generalized Detail Level: Detailed Detail Level: Simple

## 2022-08-27 LAB
ATRIAL RATE - MUSE: 64 BPM
DIASTOLIC BLOOD PRESSURE - MUSE: NORMAL MMHG
INTERPRETATION ECG - MUSE: NORMAL
P AXIS - MUSE: 56 DEGREES
PR INTERVAL - MUSE: 202 MS
QRS DURATION - MUSE: 84 MS
QT - MUSE: 406 MS
QTC - MUSE: 418 MS
R AXIS - MUSE: 56 DEGREES
SYSTOLIC BLOOD PRESSURE - MUSE: NORMAL MMHG
T AXIS - MUSE: 74 DEGREES
VENTRICULAR RATE- MUSE: 64 BPM

## 2022-09-01 ENCOUNTER — OFFICE VISIT (OUTPATIENT)
Dept: CARDIOLOGY | Facility: CLINIC | Age: 82
End: 2022-09-01
Payer: COMMERCIAL

## 2022-09-01 VITALS — SYSTOLIC BLOOD PRESSURE: 124 MMHG | DIASTOLIC BLOOD PRESSURE: 62 MMHG

## 2022-09-01 DIAGNOSIS — I35.0 SEVERE AORTIC STENOSIS BY PRIOR ECHOCARDIOGRAM: Primary | ICD-10-CM

## 2022-09-01 PROCEDURE — 99215 OFFICE O/P EST HI 40 MIN: CPT | Performed by: NURSE PRACTITIONER

## 2022-09-01 PROCEDURE — 93005 ELECTROCARDIOGRAM TRACING: CPT | Performed by: NURSE PRACTITIONER

## 2022-09-01 RX ORDER — POLYETHYLENE GLYCOL 3350 17 G/17G
1 POWDER, FOR SOLUTION ORAL DAILY PRN
COMMUNITY

## 2022-09-01 NOTE — H&P (VIEW-ONLY)
STRUCTURAL HEART CLINIC  H&P    Referring Provider: Dr. Martinez  Primary Cardiologist: Dr. Knight     History of Present Illness  Luis Daniel Gomez who presents for pre-operative H&P in preparation for transcatheter aortic valve replacement on 9/20/2022 at [facility] with Dr. Martinez.     Patient with a history of severe aortic stenosis, characterized with an SAE 0.77 cm2, mean PG 47 mmHg, peak V 4.4 m/s and EF 60-65%. He reports symptoms of progressive dyspnea on exertion over the past few monthss. TAVR guided CT showed favorable anatomy for transfemoral approach with aortic valve calcium score of 2461. Pre-TAVR coronary angiogram showed mild non-obstructive coronary disease.     Patient was evaluated in structural heart clinic where he was deemed an appropriate TAVR candidate. He was counseled for the above procedure.      His history is otherwise significant for chronic diastolic heart failure, hypertension, hyperlipidemia, carotid artery stenosis, type 2 diabetes, and stage III chronic kidney disease.     Patient is here today and is accompanied by his wife.  He endorses progressive dyspnea over the last few months and worsening fatigue.  He otherwise denies chest pain, syncope or near syncope, or palpitations.  He has no infectious symptoms.  His blood pressure is well controlled.    Assessment and Plan     Luis Daniel Gomez presents for pre-operative H&P in preparation for a transcatheter aortic valve replacement on 9/20/2022 at [facility].       1. Severe aortic valve stenosis: Patient reports progressive exertional dyspnea over the past months. His echo shows severe aortic stenosis. He has been evaluated by the our structural heart team and has been deemed an appropriate candidate for transcatheter aortic valve replacement. We discussed the procedure in detail, including pre and post-procedure care, restrictions and follow-up. He understands risks including 5-10% risk of heart block  leading to permanent pacemaker placement, 3% risk of bleeding, infection, vascular complication, 1-3% risk of stroke and very low risk (<1%) of serious complications such as cardiac perforation, aortic root rupture, dissection or death.     All questions answered  Type and screen orders complete  COVID test ordered  Supplies for scrubbing provided  No known contrast dye allergy  No trouble with anesthesia in the past  Labs today WNL, no s/s of infection    2. Chronic diastolic heart failure, NYHA class II, Stage B: Secondary to valvular heart disease. No acute volume overload on exam, though patient does endorse significant exertional dyspnea. Not currently on diuretic therapy, recommended treatment is TAVR.    3. CAD: Pre-TAVR coronary angiogram showed mild nonobstructive coronary disease. He denies angina.     4.  Hyperlipidemia: Currently on atorvastatin 20 mg daily.    5. Hypertension: Currently on metoprolol 50 mg BID .     6.  Carotid artery disease: 50-69% stenosis of R carotid artery, <50% stenosis of left carotid.     7.  Stage III chronic kidney disease: baseline creatinine 1.6.       Medication Recommendations:  Antiplatelet: Continue ASA 81 mg perioperatively, take 324 mg morning of procedure.   BB: Continue perioperatively without interruption  Supplements: Hold morning of procedure    Patient is optimized and is acceptable candidate for the proposed procedure.  No further diagnostic evaluation is needed. Pre-procedure instructions provided in written format.     Santa Conner, CHELO, APRN, CNP  Structural Heart Nurse Practitioner  [facility]   Pager: [phone]    Current Medications:  Current Outpatient Medications   Medication Sig Dispense Refill     albuterol (VENTOLIN HFA) 108 (90 Base) MCG/ACT inhaler INHALE 2 PUFFS INTO THE LUNGS EVERY 6 HOURS AS NEEDED FOR SHORTNESS OF BREATH OR DIFFICULT BREATHING OR WHEEZING 18 g 3     amitriptyline (ELAVIL) 25 MG tablet Take 1 tablet (25 mg) by  "mouth At Bedtime 90 tablet 0     ASPIRIN NOT PRESCRIBED (INTENTIONAL) Antiplatelet medication not prescribed intentionally due to Not indicated based on age       atorvastatin (LIPITOR) 20 MG tablet Take 1 tablet (20 mg) by mouth every evening 90 tablet 3     Cholecalciferol (VITAMIN D3) 5000 UNITS TABS Take 2 tablets by mouth daily       cyabnocobalamin (VITAMIN B-12) 2500 MCG sublingual tablet Place 3,000 mcg under the tongue daily  30 tablet      finasteride (PROSCAR) 5 MG tablet Take 1 tablet (5 mg) by mouth daily 90 tablet 3     fluticasone (FLONASE) 50 MCG/ACT nasal spray Spray 1-2 sprays into both nostrils daily 48 mL 3     loratadine (CLARITIN) 10 MG tablet Take 1 tablet (10 mg) by mouth daily 30 tablet 1     metoprolol tartrate (LOPRESSOR) 50 MG tablet Take 1 tablet (50 mg) by mouth 2 times daily 270 tablet 3     Multiple Vitamin (MULTI-VITAMIN) per tablet Take 1 tablet by mouth daily.       tamsulosin (FLOMAX) 0.4 MG capsule Take 0.4 mg by mouth daily       Turmeric (QC TUMERIC COMPLEX PO) Take 2 chew tab by mouth daily         Allergies:     Allergies   Allergen Reactions     Lisinopril Shortness Of Breath and Fatigue     Amoxicillin Itching     Duloxetine      \"I can't take it\"     Levofloxacin      \"I can't take the 500 mgs\"     Neurontin [Gabapentin] Swelling     edema     Norvasc [Amlodipine] Swelling     edema     Oxycodone GI Disturbance     Penicillins Itching     Red Yeast Rice Extract [Monascus Purpureus Went Yeast] Hives and Itching     Tramadol Itching     Verapamil Itching     Vicodin [Hydrocodone-Acetaminophen] Itching       Past Medical History:  Past Medical History:   Diagnosis Date     Arthritis      BPH (benign prostatic hyperplasia)      CKD (chronic kidney disease) stage 3, GFR 30-59 ml/min (H)      Cluster headache 2015    Dr. Roth     Constipation      Dyslipidemia      Dyspnea     Normal nuc stress test 9-15-08, fainted once in life     Fatty liver     Ultrasound 11/11     FH: " colon cancer     colonoscopy 10/10, repeat 5 years.     Former smoker     quit      HTN (hypertension), benign      Peripheral neuropathy     Dr. Patricia     Spinal stenosis     Last MRI , Dr. Fraire     Syncope      Type 2 diabetes mellitus (H)     diet controlled     Ureterolithiases        Past Surgical History:  Past Surgical History:   Procedure Laterality Date     COLONOSCOPY       CV CORONARY ANGIOGRAM Bilateral 2022    Procedure: Coronary Angiogram;  Surgeon: Dave Mao MD;  Location:  HEART CARDIAC CATH LAB     KERATOTOMY ARCUATE WITH FEMTOSECOND LASER/IMAGING FOR ATIOL Left 2016    Procedure: KERATOTOMY ARCUATE WITH FEMTOSECOND LASER/IMAGING FOR ATIOL;  Surgeon: Gerard Larson MD;  Location: [facility]     ORTHOPEDIC SURGERY       PHACOEMULSIFICATION CLEAR CORNEA WITH STANDARD INTRAOCULAR LENS IMPLANT Left 2016    Procedure: PHACOEMULSIFICATION CLEAR CORNEA WITH STANDARD INTRAOCULAR LENS IMPLANT;  Surgeon: Gerard Larson MD;  Location: [facility]     Thumb surg         Family History:  Family History   Problem Relation Age of Onset     C.A.D. Father      Diabetes Father      Cancer - colorectal Mother 80     Cancer - colorectal Maternal Grandmother        Social History:  Social History     Socioeconomic History     Marital status:    Tobacco Use     Smoking status: Former Smoker     Packs/day: 1.00     Years: 56.00     Pack years: 56.00     Types: Cigarettes     Quit date: 2008     Years since quittin.6     Smokeless tobacco: Never Used     Tobacco comment: quit    Substance and Sexual Activity     Alcohol use: No     Alcohol/week: 0.0 standard drinks     Drug use: No     Sexual activity: Yes     Partners: Female   Other Topics Concern     Caffeine Concern Yes     Comment: one cup caffeine per day     Sleep Concern No     Special Diet No     Exercise Yes     Comment: walks daily 30 minutes     Parent/sibling w/ CABG, MI or angioplasty before 65F 55M? No    Social History Narrative    , 2 kids, retired. Wife (Khalida).       Review of Systems:  Constitutional: No fever, chills, or sweats. No weight gain/loss   ENT: No visual disturbance, ear ache, epistaxis, sore throat  Allergies/Immunologic: Negative.   Respiratory: No cough, hemoptysia  Cardiovascular: As per HPI  GI: No nausea, vomiting, hematemesis, melena, or hematochezia  : No urinary frequency, dysuria, or hematuria  Integument: Negative  Psychiatric: Negative  Neuro: Negative  Endocrinology: Negative   Musculoskeletal: Negative      Physical Exam:  Vitals: There were no vitals taken for this visit.   General: NAD  HEENT:  Dentition intact.    Neck: No jugular venous distension.   Heart: RRR with grade II CESAR at RUSB  Lungs: CTA.    Abdomen: Soft, nontender, nondistended.   Extremities: No clubbing, cyanosis, or edema.  The pulses are +4/4 at the radial, brachial, femoral, popliteal, DP, and PT sites bilaterally.  No bruits are noted.  Neurologic: Alert and oriented to person/place/time, normal speech, gait and affect  Skin: No petechiae, purpura or rash.      Diagnostic Studies:  ECG: sinus rhythm with HR 67  Personally reviewed and interpreted by me.    Coronary Angiogram: 8/22/2022  Mild non-obstructive disease.    Echocardiogram: 7/13/22  Interpretation Summary     Technically very difficult study.     Left ventricular systolic function is normal.The visual ejection fraction is  60-65%.  The right ventricular systolic function is normal.  The aortic valve is not well visualized.In subcostal views aortic valve  appears to have severely restricted leaflets' motion.  Severe valvular aortic stenosis-peak aortic valve velocity 4.4 m/sec, mean  gradients 47 mm hg, SAE 0.77 cm2, DI 0.2  The inferior vena cava was normal in size with preserved respiratory  variability.     Echo dated 07/08/2014 mild aortic valve stenosis.      Laboratory Studies:  Personally reviewed and interpreted by me.    LIPID  RESULTS:  Lab Results   Component Value Date    CHOL 126 08/03/2022    CHOL 134 12/17/2020    HDL 40 08/03/2022    HDL 36 (L) 12/17/2020    LDL 54 08/03/2022    LDL 54 12/17/2020    TRIG 162 (H) 08/03/2022    TRIG 218 (H) 12/17/2020    CHOLHDLRATIO 2.9 11/10/2014       LIVER ENZYME RESULTS:  Lab Results   Component Value Date    AST 19 08/03/2022    AST 25 12/17/2020    ALT 21 08/03/2022    ALT 43 12/17/2020       CBC RESULTS:  Lab Results   Component Value Date    WBC 8.7 08/22/2022    WBC 9.7 12/17/2020    RBC 4.61 08/22/2022    RBC 4.52 12/17/2020    HGB 14.9 08/22/2022    HGB 15.3 04/21/2021    HCT 44.5 08/22/2022    HCT 44.8 12/17/2020    MCV 97 08/22/2022    MCV 99 12/17/2020    MCH 32.3 08/22/2022    MCH 32.7 12/17/2020    MCHC 33.5 08/22/2022    MCHC 33.0 12/17/2020    RDW 12.1 08/22/2022    RDW 12.9 12/17/2020     (L) 08/22/2022     12/17/2020       BMP RESULTS:  Lab Results   Component Value Date     08/22/2022     04/21/2021    POTASSIUM 4.1 08/22/2022    POTASSIUM 5.1 04/21/2021    CHLORIDE 102 08/22/2022    CHLORIDE 105 04/21/2021    CO2 29 08/22/2022    CO2 29 04/21/2021    ANIONGAP 4 08/22/2022    ANIONGAP 4 04/21/2021     (H) 08/22/2022     (H) 04/21/2021    BUN 18 08/22/2022    BUN 27 04/21/2021    CR 1.61 (H) 08/22/2022    CR 1.78 (H) 04/21/2021    GFRESTIMATED 42 (L) 08/22/2022    GFRESTIMATED 40 (L) 08/16/2022    GFRESTIMATED 35 (L) 04/21/2021    GFRESTBLACK 40 (L) 04/21/2021    SANTY 9.2 08/22/2022    SANTY 9.2 04/21/2021        A1C RESULTS:  Lab Results   Component Value Date    A1C 5.5 08/03/2022    A1C 5.5 12/17/2020       INR RESULTS:  Lab Results   Component Value Date    INR 0.96 08/22/2022          98

## 2022-09-01 NOTE — PROGRESS NOTES
STRUCTURAL HEART CLINIC  H&P    Referring Provider: Dr. Martinez  Primary Cardiologist: Dr. Knight     History of Present Illness  Luis Daniel Gomez who presents for pre-operative H&P in preparation for transcatheter aortic valve replacement on 9/20/2022 at [facility] with Dr. Martinez.     Patient with a history of severe aortic stenosis, characterized with an SAE 0.77 cm2, mean PG 47 mmHg, peak V 4.4 m/s and EF 60-65%. He reports symptoms of progressive dyspnea on exertion over the past few monthss. TAVR guided CT showed favorable anatomy for transfemoral approach with aortic valve calcium score of 2461. Pre-TAVR coronary angiogram showed mild non-obstructive coronary disease.     Patient was evaluated in structural heart clinic where he was deemed an appropriate TAVR candidate. He was counseled for the above procedure.      His history is otherwise significant for chronic diastolic heart failure, hypertension, hyperlipidemia, carotid artery stenosis, type 2 diabetes, and stage III chronic kidney disease.     Patient is here today and is accompanied by his wife.  He endorses progressive dyspnea over the last few months and worsening fatigue.  He otherwise denies chest pain, syncope or near syncope, or palpitations.  He has no infectious symptoms.  His blood pressure is well controlled.    Assessment and Plan     Luis Daniel Gomez presents for pre-operative H&P in preparation for a transcatheter aortic valve replacement on 9/20/2022 at [facility].       1. Severe aortic valve stenosis: Patient reports progressive exertional dyspnea over the past months. His echo shows severe aortic stenosis. He has been evaluated by the our structural heart team and has been deemed an appropriate candidate for transcatheter aortic valve replacement. We discussed the procedure in detail, including pre and post-procedure care, restrictions and follow-up. He understands risks including 5-10% risk of heart block  leading to permanent pacemaker placement, 3% risk of bleeding, infection, vascular complication, 1-3% risk of stroke and very low risk (<1%) of serious complications such as cardiac perforation, aortic root rupture, dissection or death.     All questions answered  Type and screen orders complete  COVID test ordered  Supplies for scrubbing provided  No known contrast dye allergy  No trouble with anesthesia in the past  Labs today WNL, no s/s of infection    2. Chronic diastolic heart failure, NYHA class II, Stage B: Secondary to valvular heart disease. No acute volume overload on exam, though patient does endorse significant exertional dyspnea. Not currently on diuretic therapy, recommended treatment is TAVR.    3. CAD: Pre-TAVR coronary angiogram showed mild nonobstructive coronary disease. He denies angina.     4.  Hyperlipidemia: Currently on atorvastatin 20 mg daily.    5. Hypertension: Currently on metoprolol 50 mg BID .     6.  Carotid artery disease: 50-69% stenosis of R carotid artery, <50% stenosis of left carotid.     7.  Stage III chronic kidney disease: baseline creatinine 1.6.       Medication Recommendations:  Antiplatelet: Continue ASA 81 mg perioperatively, take 324 mg morning of procedure.   BB: Continue perioperatively without interruption  Supplements: Hold morning of procedure    Patient is optimized and is acceptable candidate for the proposed procedure.  No further diagnostic evaluation is needed. Pre-procedure instructions provided in written format.     Santa Conner, CHELO, APRN, CNP  Structural Heart Nurse Practitioner  [facility]   Pager: [phone]    Current Medications:  Current Outpatient Medications   Medication Sig Dispense Refill     albuterol (VENTOLIN HFA) 108 (90 Base) MCG/ACT inhaler INHALE 2 PUFFS INTO THE LUNGS EVERY 6 HOURS AS NEEDED FOR SHORTNESS OF BREATH OR DIFFICULT BREATHING OR WHEEZING 18 g 3     amitriptyline (ELAVIL) 25 MG tablet Take 1 tablet (25 mg) by  "mouth At Bedtime 90 tablet 0     ASPIRIN NOT PRESCRIBED (INTENTIONAL) Antiplatelet medication not prescribed intentionally due to Not indicated based on age       atorvastatin (LIPITOR) 20 MG tablet Take 1 tablet (20 mg) by mouth every evening 90 tablet 3     Cholecalciferol (VITAMIN D3) 5000 UNITS TABS Take 2 tablets by mouth daily       cyabnocobalamin (VITAMIN B-12) 2500 MCG sublingual tablet Place 3,000 mcg under the tongue daily  30 tablet      finasteride (PROSCAR) 5 MG tablet Take 1 tablet (5 mg) by mouth daily 90 tablet 3     fluticasone (FLONASE) 50 MCG/ACT nasal spray Spray 1-2 sprays into both nostrils daily 48 mL 3     loratadine (CLARITIN) 10 MG tablet Take 1 tablet (10 mg) by mouth daily 30 tablet 1     metoprolol tartrate (LOPRESSOR) 50 MG tablet Take 1 tablet (50 mg) by mouth 2 times daily 270 tablet 3     Multiple Vitamin (MULTI-VITAMIN) per tablet Take 1 tablet by mouth daily.       tamsulosin (FLOMAX) 0.4 MG capsule Take 0.4 mg by mouth daily       Turmeric (QC TUMERIC COMPLEX PO) Take 2 chew tab by mouth daily         Allergies:     Allergies   Allergen Reactions     Lisinopril Shortness Of Breath and Fatigue     Amoxicillin Itching     Duloxetine      \"I can't take it\"     Levofloxacin      \"I can't take the 500 mgs\"     Neurontin [Gabapentin] Swelling     edema     Norvasc [Amlodipine] Swelling     edema     Oxycodone GI Disturbance     Penicillins Itching     Red Yeast Rice Extract [Monascus Purpureus Went Yeast] Hives and Itching     Tramadol Itching     Verapamil Itching     Vicodin [Hydrocodone-Acetaminophen] Itching       Past Medical History:  Past Medical History:   Diagnosis Date     Arthritis      BPH (benign prostatic hyperplasia)      CKD (chronic kidney disease) stage 3, GFR 30-59 ml/min (H)      Cluster headache 2015    Dr. Roth     Constipation      Dyslipidemia      Dyspnea     Normal nuc stress test 9-15-08, fainted once in life     Fatty liver     Ultrasound 11/11     FH: " colon cancer     colonoscopy 10/10, repeat 5 years.     Former smoker     quit      HTN (hypertension), benign      Peripheral neuropathy     Dr. Patricia     Spinal stenosis     Last MRI , Dr. Fraire     Syncope      Type 2 diabetes mellitus (H)     diet controlled     Ureterolithiases        Past Surgical History:  Past Surgical History:   Procedure Laterality Date     COLONOSCOPY       CV CORONARY ANGIOGRAM Bilateral 2022    Procedure: Coronary Angiogram;  Surgeon: Dave Mao MD;  Location:  HEART CARDIAC CATH LAB     KERATOTOMY ARCUATE WITH FEMTOSECOND LASER/IMAGING FOR ATIOL Left 2016    Procedure: KERATOTOMY ARCUATE WITH FEMTOSECOND LASER/IMAGING FOR ATIOL;  Surgeon: Gerard Larson MD;  Location: [facility]     ORTHOPEDIC SURGERY       PHACOEMULSIFICATION CLEAR CORNEA WITH STANDARD INTRAOCULAR LENS IMPLANT Left 2016    Procedure: PHACOEMULSIFICATION CLEAR CORNEA WITH STANDARD INTRAOCULAR LENS IMPLANT;  Surgeon: Gerard Larson MD;  Location: [facility]     Thumb surg         Family History:  Family History   Problem Relation Age of Onset     C.A.D. Father      Diabetes Father      Cancer - colorectal Mother 80     Cancer - colorectal Maternal Grandmother        Social History:  Social History     Socioeconomic History     Marital status:    Tobacco Use     Smoking status: Former Smoker     Packs/day: 1.00     Years: 56.00     Pack years: 56.00     Types: Cigarettes     Quit date: 2008     Years since quittin.6     Smokeless tobacco: Never Used     Tobacco comment: quit    Substance and Sexual Activity     Alcohol use: No     Alcohol/week: 0.0 standard drinks     Drug use: No     Sexual activity: Yes     Partners: Female   Other Topics Concern     Caffeine Concern Yes     Comment: one cup caffeine per day     Sleep Concern No     Special Diet No     Exercise Yes     Comment: walks daily 30 minutes     Parent/sibling w/ CABG, MI or angioplasty before 65F 55M? No    Social History Narrative    , 2 kids, retired. Wife (Khalida).       Review of Systems:  Constitutional: No fever, chills, or sweats. No weight gain/loss   ENT: No visual disturbance, ear ache, epistaxis, sore throat  Allergies/Immunologic: Negative.   Respiratory: No cough, hemoptysia  Cardiovascular: As per HPI  GI: No nausea, vomiting, hematemesis, melena, or hematochezia  : No urinary frequency, dysuria, or hematuria  Integument: Negative  Psychiatric: Negative  Neuro: Negative  Endocrinology: Negative   Musculoskeletal: Negative      Physical Exam:  Vitals: There were no vitals taken for this visit.   General: NAD  HEENT:  Dentition intact.    Neck: No jugular venous distension.   Heart: RRR with grade II CESAR at RUSB  Lungs: CTA.    Abdomen: Soft, nontender, nondistended.   Extremities: No clubbing, cyanosis, or edema.  The pulses are +4/4 at the radial, brachial, femoral, popliteal, DP, and PT sites bilaterally.  No bruits are noted.  Neurologic: Alert and oriented to person/place/time, normal speech, gait and affect  Skin: No petechiae, purpura or rash.      Diagnostic Studies:  ECG: sinus rhythm with HR 67  Personally reviewed and interpreted by me.    Coronary Angiogram: 8/22/2022  Mild non-obstructive disease.    Echocardiogram: 7/13/22  Interpretation Summary     Technically very difficult study.     Left ventricular systolic function is normal.The visual ejection fraction is  60-65%.  The right ventricular systolic function is normal.  The aortic valve is not well visualized.In subcostal views aortic valve  appears to have severely restricted leaflets' motion.  Severe valvular aortic stenosis-peak aortic valve velocity 4.4 m/sec, mean  gradients 47 mm hg, SAE 0.77 cm2, DI 0.2  The inferior vena cava was normal in size with preserved respiratory  variability.     Echo dated 07/08/2014 mild aortic valve stenosis.      Laboratory Studies:  Personally reviewed and interpreted by me.    LIPID  RESULTS:  Lab Results   Component Value Date    CHOL 126 08/03/2022    CHOL 134 12/17/2020    HDL 40 08/03/2022    HDL 36 (L) 12/17/2020    LDL 54 08/03/2022    LDL 54 12/17/2020    TRIG 162 (H) 08/03/2022    TRIG 218 (H) 12/17/2020    CHOLHDLRATIO 2.9 11/10/2014       LIVER ENZYME RESULTS:  Lab Results   Component Value Date    AST 19 08/03/2022    AST 25 12/17/2020    ALT 21 08/03/2022    ALT 43 12/17/2020       CBC RESULTS:  Lab Results   Component Value Date    WBC 8.7 08/22/2022    WBC 9.7 12/17/2020    RBC 4.61 08/22/2022    RBC 4.52 12/17/2020    HGB 14.9 08/22/2022    HGB 15.3 04/21/2021    HCT 44.5 08/22/2022    HCT 44.8 12/17/2020    MCV 97 08/22/2022    MCV 99 12/17/2020    MCH 32.3 08/22/2022    MCH 32.7 12/17/2020    MCHC 33.5 08/22/2022    MCHC 33.0 12/17/2020    RDW 12.1 08/22/2022    RDW 12.9 12/17/2020     (L) 08/22/2022     12/17/2020       BMP RESULTS:  Lab Results   Component Value Date     08/22/2022     04/21/2021    POTASSIUM 4.1 08/22/2022    POTASSIUM 5.1 04/21/2021    CHLORIDE 102 08/22/2022    CHLORIDE 105 04/21/2021    CO2 29 08/22/2022    CO2 29 04/21/2021    ANIONGAP 4 08/22/2022    ANIONGAP 4 04/21/2021     (H) 08/22/2022     (H) 04/21/2021    BUN 18 08/22/2022    BUN 27 04/21/2021    CR 1.61 (H) 08/22/2022    CR 1.78 (H) 04/21/2021    GFRESTIMATED 42 (L) 08/22/2022    GFRESTIMATED 40 (L) 08/16/2022    GFRESTIMATED 35 (L) 04/21/2021    GFRESTBLACK 40 (L) 04/21/2021    SANTY 9.2 08/22/2022    SANTY 9.2 04/21/2021        A1C RESULTS:  Lab Results   Component Value Date    A1C 5.5 08/03/2022    A1C 5.5 12/17/2020       INR RESULTS:  Lab Results   Component Value Date    INR 0.96 08/22/2022

## 2022-09-01 NOTE — PATIENT INSTRUCTIONS
TAVR PRE-PROCEDURE INSTRUCTIONS:    - Your TAVR is tentatively scheduled Tuesday 09/20/2022, 2nd case. Please check-in at 0800AM at the Registration Desk in the [facility] at [facility].    - Use the Hibiclens soap I have provided you the night before and morning of the procedure. Wash from chin to toes, please avoid your face and eyes.    - Nothing to eat or drink the morning of your TAVR.     Medication instructions:  - You make take your morning medications with sips of water.   - Please hold all vitamins and supplements the morning of your procedure.  - You will need to take 325mg of aspirin the morning of your TAVR.    - COVID-19 restrictions: You will need a COVID test prior to procedure. Our  will call to arrange this. Please do your best to quarantine/isolate between this test and your procedure day. On the day of the procedure, you may have one visitor in the pre-operative area. Patient and visitor must wear face masks. Two visitors may see you when you get to your room in the Heart Center (2nd floor of [facility]).    - You will stay overnight on Tuesday night. We will monitor you overnight for signs of bleeding, stroke, as well as need for pacemaker. On Wednesday morning, you will have an echo of your new valve and work with cardiac rehab.     It was nice to see you today! Please call with any other questions, concerns, or any changes in your health: [phone].    Coco Aguilar RN  Structural Heart Coordinator  [facility]

## 2022-09-01 NOTE — LETTER
9/1/2022    Eliza Rogers MD  [address]  [place] [address]    RE: Luis Daniel Gomez       Dear Colleague,     I had the pleasure of seeing Luis Daniel Gomez in the [facility].      STRUCTURAL HEART CLINIC  H&P    Referring Provider: Dr. Martinez  Primary Cardiologist: Dr. Knight     History of Present Illness  Luis Daniel Gomez who presents for pre-operative H&P in preparation for transcatheter aortic valve replacement on 9/20/2022 at [facility] with Dr. Martinez.     Patient with a history of severe aortic stenosis, characterized with an SAE 0.77 cm2, mean PG 47 mmHg, peak V 4.4 m/s and EF 60-65%. He reports symptoms of progressive dyspnea on exertion over the past few monthss. TAVR guided CT showed favorable anatomy for transfemoral approach with aortic valve calcium score of 2461. Pre-TAVR coronary angiogram showed mild non-obstructive coronary disease.     Patient was evaluated in structural heart clinic where he was deemed an appropriate TAVR candidate. He was counseled for the above procedure.      His history is otherwise significant for chronic diastolic heart failure, hypertension, hyperlipidemia, carotid artery stenosis, type 2 diabetes, and stage III chronic kidney disease.     Patient is here today and is accompanied by his wife.  He endorses progressive dyspnea over the last few months and worsening fatigue.  He otherwise denies chest pain, syncope or near syncope, or palpitations.  He has no infectious symptoms.  His blood pressure is well controlled.    Assessment and Plan     Luis Daniel Gomez presents for pre-operative H&P in preparation for a transcatheter aortic valve replacement on 9/20/2022 at [facility].       1. Severe aortic valve stenosis: Patient reports progressive exertional dyspnea over the past months. His echo shows severe aortic stenosis. He has been evaluated by the our structural heart team and has been deemed an appropriate candidate for  transcatheter aortic valve replacement. We discussed the procedure in detail, including pre and post-procedure care, restrictions and follow-up. He understands risks including 5-10% risk of heart block leading to permanent pacemaker placement, 3% risk of bleeding, infection, vascular complication, 1-3% risk of stroke and very low risk (<1%) of serious complications such as cardiac perforation, aortic root rupture, dissection or death.     All questions answered  Type and screen orders complete  COVID test ordered  Supplies for scrubbing provided  No known contrast dye allergy  No trouble with anesthesia in the past  Labs today WNL, no s/s of infection    2. Chronic diastolic heart failure, NYHA class II, Stage B: Secondary to valvular heart disease. No acute volume overload on exam, though patient does endorse significant exertional dyspnea. Not currently on diuretic therapy, recommended treatment is TAVR.    3. CAD: Pre-TAVR coronary angiogram showed mild nonobstructive coronary disease. He denies angina.     4.  Hyperlipidemia: Currently on atorvastatin 20 mg daily.    5. Hypertension: Currently on metoprolol 50 mg BID .     6.  Carotid artery disease: 50-69% stenosis of R carotid artery, <50% stenosis of left carotid.     7.  Stage III chronic kidney disease: baseline creatinine 1.6.       Medication Recommendations:  Antiplatelet: Continue ASA 81 mg perioperatively, take 324 mg morning of procedure.   BB: Continue perioperatively without interruption  Supplements: Hold morning of procedure    Patient is optimized and is acceptable candidate for the proposed procedure.  No further diagnostic evaluation is needed. Pre-procedure instructions provided in written format.     Santa Conner, CHELO, APRN, CNP  Structural Heart Nurse Practitioner  [facility]   Pager: [phone]    Current Medications:  Current Outpatient Medications   Medication Sig Dispense Refill     albuterol (VENTOLIN HFA) 108 (90 Base)  "MCG/ACT inhaler INHALE 2 PUFFS INTO THE LUNGS EVERY 6 HOURS AS NEEDED FOR SHORTNESS OF BREATH OR DIFFICULT BREATHING OR WHEEZING 18 g 3     amitriptyline (ELAVIL) 25 MG tablet Take 1 tablet (25 mg) by mouth At Bedtime 90 tablet 0     ASPIRIN NOT PRESCRIBED (INTENTIONAL) Antiplatelet medication not prescribed intentionally due to Not indicated based on age       atorvastatin (LIPITOR) 20 MG tablet Take 1 tablet (20 mg) by mouth every evening 90 tablet 3     Cholecalciferol (VITAMIN D3) 5000 UNITS TABS Take 2 tablets by mouth daily       cyabnocobalamin (VITAMIN B-12) 2500 MCG sublingual tablet Place 3,000 mcg under the tongue daily  30 tablet      finasteride (PROSCAR) 5 MG tablet Take 1 tablet (5 mg) by mouth daily 90 tablet 3     fluticasone (FLONASE) 50 MCG/ACT nasal spray Spray 1-2 sprays into both nostrils daily 48 mL 3     loratadine (CLARITIN) 10 MG tablet Take 1 tablet (10 mg) by mouth daily 30 tablet 1     metoprolol tartrate (LOPRESSOR) 50 MG tablet Take 1 tablet (50 mg) by mouth 2 times daily 270 tablet 3     Multiple Vitamin (MULTI-VITAMIN) per tablet Take 1 tablet by mouth daily.       tamsulosin (FLOMAX) 0.4 MG capsule Take 0.4 mg by mouth daily       Turmeric (QC TUMERIC COMPLEX PO) Take 2 chew tab by mouth daily         Allergies:     Allergies   Allergen Reactions     Lisinopril Shortness Of Breath and Fatigue     Amoxicillin Itching     Duloxetine      \"I can't take it\"     Levofloxacin      \"I can't take the 500 mgs\"     Neurontin [Gabapentin] Swelling     edema     Norvasc [Amlodipine] Swelling     edema     Oxycodone GI Disturbance     Penicillins Itching     Red Yeast Rice Extract [[place]ascus Purpureus Went Yeast] Hives and Itching     Tramadol Itching     Verapamil Itching     Vicodin [Hydrocodone-Acetaminophen] Itching       Past Medical History:  Past Medical History:   Diagnosis Date     Arthritis      BPH (benign prostatic hyperplasia)      CKD (chronic kidney disease) stage 3, GFR 30-59 " ml/min (H)      Cluster headache     Dr. Roth     Constipation      Dyslipidemia      Dyspnea     Normal nuc stress test 9-15-08, fainted once in life     Fatty liver     Ultrasound      FH: colon cancer     colonoscopy 10/10, repeat 5 years.     Former smoker     quit      HTN (hypertension), benign      Peripheral neuropathy     Dr. Patricia     Spinal stenosis     Last MRI , Dr. Fraire     Syncope      Type 2 diabetes mellitus (H)     diet controlled     Ureterolithiases        Past Surgical History:  Past Surgical History:   Procedure Laterality Date     COLONOSCOPY       CV CORONARY ANGIOGRAM Bilateral 2022    Procedure: Coronary Angiogram;  Surgeon: Dave Mao MD;  Location:  HEART CARDIAC CATH LAB     KERATOTOMY ARCUATE WITH FEMTOSECOND LASER/IMAGING FOR ATIOL Left 2016    Procedure: KERATOTOMY ARCUATE WITH FEMTOSECOND LASER/IMAGING FOR ATIOL;  Surgeon: Gerard Larson MD;  Location: [facility]     ORTHOPEDIC SURGERY       PHACOEMULSIFICATION CLEAR CORNEA WITH STANDARD INTRAOCULAR LENS IMPLANT Left 2016    Procedure: PHACOEMULSIFICATION CLEAR CORNEA WITH STANDARD INTRAOCULAR LENS IMPLANT;  Surgeon: Gerard Larson MD;  Location: [facility]     Thumb surg         Family History:  Family History   Problem Relation Age of Onset     C.A.D. Father      Diabetes Father      Cancer - colorectal Mother 80     Cancer - colorectal Maternal Grandmother        Social History:  Social History     Socioeconomic History     Marital status:    Tobacco Use     Smoking status: Former Smoker     Packs/day: 1.00     Years: 56.00     Pack years: 56.00     Types: Cigarettes     Quit date: 2008     Years since quittin.6     Smokeless tobacco: Never Used     Tobacco comment: quit    Substance and Sexual Activity     Alcohol use: No     Alcohol/week: 0.0 standard drinks     Drug use: No     Sexual activity: Yes     Partners: Female   Other Topics Concern     Caffeine Concern  Yes     Comment: one cup caffeine per day     Sleep Concern No     Special Diet No     Exercise Yes     Comment: walks daily 30 minutes     Parent/sibling w/ CABG, MI or angioplasty before 65F 55M? No   Social History Narrative    , 2 kids, retired. Wife (Khalida).       Review of Systems:  Constitutional: No fever, chills, or sweats. No weight gain/loss   ENT: No visual disturbance, ear ache, epistaxis, sore throat  Allergies/Immunologic: Negative.   Respiratory: No cough, hemoptysia  Cardiovascular: As per HPI  GI: No nausea, vomiting, hematemesis, melena, or hematochezia  : No urinary frequency, dysuria, or hematuria  Integument: Negative  Psychiatric: Negative  Neuro: Negative  Endocrinology: Negative   Musculoskeletal: Negative      Physical Exam:  Vitals: There were no vitals taken for this visit.   General: NAD  HEENT:  Dentition intact.    Neck: No jugular venous distension.   Heart: RRR with grade II CESAR at RUSB  Lungs: CTA.    Abdomen: Soft, nontender, nondistended.   Extremities: No clubbing, cyanosis, or edema.  The pulses are +4/4 at the radial, brachial, femoral, popliteal, DP, and PT sites bilaterally.  No bruits are noted.  Neurologic: Alert and oriented to person/place/time, normal speech, gait and affect  Skin: No petechiae, purpura or rash.      Diagnostic Studies:  ECG: sinus rhythm with HR 67  Personally reviewed and interpreted by me.    Coronary Angiogram: 8/22/2022  Mild non-obstructive disease.    Echocardiogram: 7/13/22  Interpretation Summary     Technically very difficult study.     Left ventricular systolic function is normal.The visual ejection fraction is  60-65%.  The right ventricular systolic function is normal.  The aortic valve is not well visualized.In subcostal views aortic valve  appears to have severely restricted leaflets' motion.  Severe valvular aortic stenosis-peak aortic valve velocity 4.4 m/sec, mean  gradients 47 mm hg, SAE 0.77 cm2, DI 0.2  The inferior vena  cava was normal in size with preserved respiratory  variability.     Echo dated 07/08/2014 mild aortic valve stenosis.      Laboratory Studies:  Personally reviewed and interpreted by me.    LIPID RESULTS:  Lab Results   Component Value Date    CHOL 126 08/03/2022    CHOL 134 12/17/2020    HDL 40 08/03/2022    HDL 36 (L) 12/17/2020    LDL 54 08/03/2022    LDL 54 12/17/2020    TRIG 162 (H) 08/03/2022    TRIG 218 (H) 12/17/2020    CHOLHDLRATIO 2.9 11/10/2014       LIVER ENZYME RESULTS:  Lab Results   Component Value Date    AST 19 08/03/2022    AST 25 12/17/2020    ALT 21 08/03/2022    ALT 43 12/17/2020       CBC RESULTS:  Lab Results   Component Value Date    WBC 8.7 08/22/2022    WBC 9.7 12/17/2020    RBC 4.61 08/22/2022    RBC 4.52 12/17/2020    HGB 14.9 08/22/2022    HGB 15.3 04/21/2021    HCT 44.5 08/22/2022    HCT 44.8 12/17/2020    MCV 97 08/22/2022    MCV 99 12/17/2020    MCH 32.3 08/22/2022    MCH 32.7 12/17/2020    MCHC 33.5 08/22/2022    MCHC 33.0 12/17/2020    RDW 12.1 08/22/2022    RDW 12.9 12/17/2020     (L) 08/22/2022     12/17/2020       BMP RESULTS:  Lab Results   Component Value Date     08/22/2022     04/21/2021    POTASSIUM 4.1 08/22/2022    POTASSIUM 5.1 04/21/2021    CHLORIDE 102 08/22/2022    CHLORIDE 105 04/21/2021    CO2 29 08/22/2022    CO2 29 04/21/2021    ANIONGAP 4 08/22/2022    ANIONGAP 4 04/21/2021     (H) 08/22/2022     (H) 04/21/2021    BUN 18 08/22/2022    BUN 27 04/21/2021    CR 1.61 (H) 08/22/2022    CR 1.78 (H) 04/21/2021    GFRESTIMATED 42 (L) 08/22/2022    GFRESTIMATED 40 (L) 08/16/2022    GFRESTIMATED 35 (L) 04/21/2021    GFRESTBLACK 40 (L) 04/21/2021    SANTY 9.2 08/22/2022    SANTY 9.2 04/21/2021        A1C RESULTS:  Lab Results   Component Value Date    A1C 5.5 08/03/2022    A1C 5.5 12/17/2020       INR RESULTS:  Lab Results   Component Value Date    INR 0.96 08/22/2022             Thank you for allowing me to participate in the care of  your patient.      Sincerely,     Santa Conner [facility]  cc:   No referring provider defined for this encounter.

## 2022-09-08 ENCOUNTER — TELEPHONE (OUTPATIENT)
Dept: CARDIOLOGY | Facility: CLINIC | Age: 82
End: 2022-09-08

## 2022-09-08 DIAGNOSIS — R06.02 SHORTNESS OF BREATH: ICD-10-CM

## 2022-09-08 DIAGNOSIS — I35.0 SEVERE AORTIC STENOSIS: Primary | ICD-10-CM

## 2022-09-08 LAB
ABO/RH(D): NORMAL
ANTIBODY SCREEN: NEGATIVE
SPECIMEN EXPIRATION DATE: NORMAL

## 2022-09-09 ENCOUNTER — DOCUMENTATION ONLY (OUTPATIENT)
Dept: CARDIOLOGY | Facility: CLINIC | Age: 82
End: 2022-09-09

## 2022-09-09 ENCOUNTER — LAB (OUTPATIENT)
Dept: LAB | Facility: CLINIC | Age: 82
End: 2022-09-09
Payer: COMMERCIAL

## 2022-09-09 DIAGNOSIS — I35.0 SEVERE AORTIC STENOSIS: ICD-10-CM

## 2022-09-09 DIAGNOSIS — R06.02 SHORTNESS OF BREATH: ICD-10-CM

## 2022-09-09 LAB
ANION GAP SERPL CALCULATED.3IONS-SCNC: 6 MMOL/L (ref 3–14)
BLOOD BANK CHART COMMENT: NORMAL
BUN SERPL-MCNC: 23 MG/DL (ref 7–30)
CALCIUM SERPL-MCNC: 9.3 MG/DL (ref 8.5–10.1)
CHLORIDE BLD-SCNC: 103 MMOL/L (ref 94–109)
CO2 SERPL-SCNC: 27 MMOL/L (ref 20–32)
CREAT SERPL-MCNC: 1.41 MG/DL (ref 0.66–1.25)
ERYTHROCYTE [DISTWIDTH] IN BLOOD BY AUTOMATED COUNT: 12.2 % (ref 10–15)
GFR SERPL CREATININE-BSD FRML MDRD: 49 ML/MIN/1.73M2
GLUCOSE BLD-MCNC: 125 MG/DL (ref 70–99)
HCT VFR BLD AUTO: 44.8 % (ref 40–53)
HGB BLD-MCNC: 15.2 G/DL (ref 13.3–17.7)
INR PPP: 0.99 (ref 0.85–1.15)
MCH RBC QN AUTO: 32.6 PG (ref 26.5–33)
MCHC RBC AUTO-ENTMCNC: 33.9 G/DL (ref 31.5–36.5)
MCV RBC AUTO: 96 FL (ref 78–100)
NT-PROBNP SERPL-MCNC: 842 PG/ML (ref 0–1800)
PLATELET # BLD AUTO: 210 10E3/UL (ref 150–450)
POTASSIUM BLD-SCNC: 4.8 MMOL/L (ref 3.4–5.3)
RBC # BLD AUTO: 4.66 10E6/UL (ref 4.4–5.9)
SODIUM SERPL-SCNC: 136 MMOL/L (ref 133–144)
SPECIMEN EXPIRATION DATE: NORMAL
WBC # BLD AUTO: 8.1 10E3/UL (ref 4–11)

## 2022-09-09 PROCEDURE — 86901 BLOOD TYPING SEROLOGIC RH(D): CPT

## 2022-09-09 PROCEDURE — 85027 COMPLETE CBC AUTOMATED: CPT

## 2022-09-09 PROCEDURE — 86923 COMPATIBILITY TEST ELECTRIC: CPT | Performed by: INTERNAL MEDICINE

## 2022-09-09 PROCEDURE — 86850 RBC ANTIBODY SCREEN: CPT

## 2022-09-09 PROCEDURE — 36415 COLL VENOUS BLD VENIPUNCTURE: CPT

## 2022-09-09 PROCEDURE — 82310 ASSAY OF CALCIUM: CPT

## 2022-09-09 PROCEDURE — 85610 PROTHROMBIN TIME: CPT

## 2022-09-09 PROCEDURE — 83880 ASSAY OF NATRIURETIC PEPTIDE: CPT

## 2022-09-09 NOTE — PROGRESS NOTES
Saw patient today for face to face interaction to review what to expect the day of TAVR on 09/20/2022 and after. Provided patient with scheduled covid test information and encouraged him to call with any additional questions or concerns.    Patient has scheduled required pre-procedural labs today.

## 2022-09-15 DIAGNOSIS — I35.0 SEVERE AORTIC STENOSIS: Primary | ICD-10-CM

## 2022-09-15 RX ORDER — LIDOCAINE 40 MG/G
CREAM TOPICAL
Status: CANCELLED | OUTPATIENT
Start: 2022-09-15

## 2022-09-15 RX ORDER — ASPIRIN 325 MG
325 TABLET ORAL ONCE
Status: CANCELLED | OUTPATIENT
Start: 2022-09-15

## 2022-09-15 RX ORDER — CLINDAMYCIN PHOSPHATE 900 MG/50ML
900 INJECTION, SOLUTION INTRAVENOUS
Status: CANCELLED | OUTPATIENT
Start: 2022-09-15

## 2022-09-15 RX ORDER — SODIUM CHLORIDE 9 MG/ML
INJECTION, SOLUTION INTRAVENOUS CONTINUOUS
Status: CANCELLED | OUTPATIENT
Start: 2022-09-15

## 2022-09-16 ENCOUNTER — LAB (OUTPATIENT)
Dept: URGENT CARE | Facility: URGENT CARE | Age: 82
End: 2022-09-16
Attending: INTERNAL MEDICINE
Payer: COMMERCIAL

## 2022-09-16 DIAGNOSIS — I35.0 SEVERE AORTIC STENOSIS: ICD-10-CM

## 2022-09-16 LAB — SARS-COV-2 RNA RESP QL NAA+PROBE: NEGATIVE

## 2022-09-16 PROCEDURE — U0003 INFECTIOUS AGENT DETECTION BY NUCLEIC ACID (DNA OR RNA); SEVERE ACUTE RESPIRATORY SYNDROME CORONAVIRUS 2 (SARS-COV-2) (CORONAVIRUS DISEASE [COVID-19]), AMPLIFIED PROBE TECHNIQUE, MAKING USE OF HIGH THROUGHPUT TECHNOLOGIES AS DESCRIBED BY CMS-2020-01-R: HCPCS

## 2022-09-16 PROCEDURE — U0005 INFEC AGEN DETEC AMPLI PROBE: HCPCS

## 2022-09-20 ENCOUNTER — APPOINTMENT (OUTPATIENT)
Dept: GENERAL RADIOLOGY | Facility: CLINIC | Age: 82
DRG: 267 | End: 2022-09-20
Attending: ANESTHESIOLOGY
Payer: MEDICARE

## 2022-09-20 ENCOUNTER — HOSPITAL ENCOUNTER (INPATIENT)
Facility: CLINIC | Age: 82
LOS: 1 days | Discharge: HOME | DRG: 267 | End: 2022-09-21
Attending: INTERNAL MEDICINE | Admitting: INTERNAL MEDICINE
Payer: MEDICARE

## 2022-09-20 ENCOUNTER — ANESTHESIA EVENT (OUTPATIENT)
Dept: CARDIOLOGY | Facility: CLINIC | Age: 82
DRG: 267 | End: 2022-09-20
Payer: MEDICARE

## 2022-09-20 ENCOUNTER — ANESTHESIA (OUTPATIENT)
Dept: CARDIOLOGY | Facility: CLINIC | Age: 82
DRG: 267 | End: 2022-09-20
Payer: MEDICARE

## 2022-09-20 ENCOUNTER — HOSPITAL ENCOUNTER (OUTPATIENT)
Dept: CARDIOLOGY | Facility: CLINIC | Age: 82
Discharge: HOME | DRG: 267 | End: 2022-09-20
Attending: INTERNAL MEDICINE | Admitting: INTERNAL MEDICINE
Payer: MEDICARE

## 2022-09-20 DIAGNOSIS — Z95.2 S/P TAVR (TRANSCATHETER AORTIC VALVE REPLACEMENT): Primary | ICD-10-CM

## 2022-09-20 DIAGNOSIS — Z98.890 STATUS POST CORONARY ANGIOGRAM: ICD-10-CM

## 2022-09-20 DIAGNOSIS — I10 HTN (HYPERTENSION), BENIGN: ICD-10-CM

## 2022-09-20 DIAGNOSIS — I35.0 SEVERE AORTIC STENOSIS: ICD-10-CM

## 2022-09-20 DIAGNOSIS — I35.0 AORTIC STENOSIS, SEVERE: ICD-10-CM

## 2022-09-20 LAB
ABO/RH(D): NORMAL
ACT BLD: 114 SECONDS (ref 74–150)
ACT BLD: 309 SECONDS (ref 74–150)
BLD PROD TYP BPU: NORMAL
BLD PROD TYP BPU: NORMAL
BLOOD COMPONENT TYPE: NORMAL
BLOOD COMPONENT TYPE: NORMAL
CODING SYSTEM: NORMAL
CODING SYSTEM: NORMAL
CROSSMATCH: NORMAL
CROSSMATCH: NORMAL
GLUCOSE BLD-MCNC: 130 MG/DL (ref 70–99)
GLUCOSE BLDC GLUCOMTR-MCNC: 100 MG/DL (ref 70–99)
GLUCOSE BLDC GLUCOMTR-MCNC: 125 MG/DL (ref 70–99)
GLUCOSE BLDC GLUCOMTR-MCNC: 82 MG/DL (ref 70–99)
ISSUE DATE AND TIME: NORMAL
ISSUE DATE AND TIME: NORMAL
POTASSIUM BLD-SCNC: 4.1 MMOL/L (ref 3.4–5.3)
SPECIMEN EXPIRATION DATE: NORMAL
UNIT ABO/RH: NORMAL
UNIT ABO/RH: NORMAL
UNIT NUMBER: NORMAL
UNIT NUMBER: NORMAL
UNIT STATUS: NORMAL
UNIT STATUS: NORMAL
UNIT TYPE ISBT: 6200
UNIT TYPE ISBT: 6200

## 2022-09-20 PROCEDURE — 258N000003 HC RX IP 258 OP 636: Performed by: ANESTHESIOLOGY

## 2022-09-20 PROCEDURE — P9016 RBC LEUKOCYTES REDUCED: HCPCS | Performed by: INTERNAL MEDICINE

## 2022-09-20 PROCEDURE — 33361 REPLACE AORTIC VALVE PERQ: CPT | Mod: 62 | Performed by: THORACIC SURGERY (CARDIOTHORACIC VASCULAR SURGERY)

## 2022-09-20 PROCEDURE — 82947 ASSAY GLUCOSE BLOOD QUANT: CPT | Performed by: ANESTHESIOLOGY

## 2022-09-20 PROCEDURE — 93308 TTE F-UP OR LMTD: CPT | Mod: 26 | Performed by: INTERNAL MEDICINE

## 2022-09-20 PROCEDURE — C1730 CATH, EP, 19 OR FEW ELECT: HCPCS | Performed by: INTERNAL MEDICINE

## 2022-09-20 PROCEDURE — 210N000002 HC R&B HEART CARE

## 2022-09-20 PROCEDURE — X2A5312 CEREBRAL EMBOLIC FILTRATION, DUAL FILTER IN INNOMINATE ARTERY AND LEFT COMMON CAROTID ARTERY, PERCUTANEOUS APPROACH, NEW TECHNOLOGY GROUP 2: ICD-10-PCS | Performed by: INTERNAL MEDICINE

## 2022-09-20 PROCEDURE — 99233 SBSQ HOSP IP/OBS HIGH 50: CPT | Performed by: INTERNAL MEDICINE

## 2022-09-20 PROCEDURE — 250N000011 HC RX IP 250 OP 636: Performed by: ANESTHESIOLOGY

## 2022-09-20 PROCEDURE — C1760 CLOSURE DEV, VASC: HCPCS | Performed by: INTERNAL MEDICINE

## 2022-09-20 PROCEDURE — 36415 COLL VENOUS BLD VENIPUNCTURE: CPT | Performed by: ANESTHESIOLOGY

## 2022-09-20 PROCEDURE — C1894 INTRO/SHEATH, NON-LASER: HCPCS | Performed by: INTERNAL MEDICINE

## 2022-09-20 PROCEDURE — 85347 COAGULATION TIME ACTIVATED: CPT

## 2022-09-20 PROCEDURE — 93325 DOPPLER ECHO COLOR FLOW MAPG: CPT | Mod: 26 | Performed by: INTERNAL MEDICINE

## 2022-09-20 PROCEDURE — 250N000011 HC RX IP 250 OP 636: Performed by: INTERNAL MEDICINE

## 2022-09-20 PROCEDURE — 258N000003 HC RX IP 258 OP 636: Performed by: INTERNAL MEDICINE

## 2022-09-20 PROCEDURE — 272N000001 HC OR GENERAL SUPPLY STERILE: Performed by: INTERNAL MEDICINE

## 2022-09-20 PROCEDURE — 33361 REPLACE AORTIC VALVE PERQ: CPT | Mod: 62 | Performed by: INTERNAL MEDICINE

## 2022-09-20 PROCEDURE — 250N000013 HC RX MED GY IP 250 OP 250 PS 637: Performed by: INTERNAL MEDICINE

## 2022-09-20 PROCEDURE — 93321 DOPPLER ECHO F-UP/LMTD STD: CPT | Mod: 26 | Performed by: INTERNAL MEDICINE

## 2022-09-20 PROCEDURE — 02RF38Z REPLACEMENT OF AORTIC VALVE WITH ZOOPLASTIC TISSUE, PERCUTANEOUS APPROACH: ICD-10-PCS | Performed by: INTERNAL MEDICINE

## 2022-09-20 PROCEDURE — 93325 DOPPLER ECHO COLOR FLOW MAPG: CPT

## 2022-09-20 PROCEDURE — 93308 TTE F-UP OR LMTD: CPT

## 2022-09-20 PROCEDURE — 250N000009 HC RX 250: Performed by: ANESTHESIOLOGY

## 2022-09-20 PROCEDURE — 250N000009 HC RX 250: Performed by: INTERNAL MEDICINE

## 2022-09-20 PROCEDURE — 84132 ASSAY OF SERUM POTASSIUM: CPT | Performed by: ANESTHESIOLOGY

## 2022-09-20 PROCEDURE — 86901 BLOOD TYPING SEROLOGIC RH(D): CPT | Performed by: INTERNAL MEDICINE

## 2022-09-20 PROCEDURE — C1769 GUIDE WIRE: HCPCS | Performed by: INTERNAL MEDICINE

## 2022-09-20 PROCEDURE — 370N000017 HC ANESTHESIA TECHNICAL FEE, PER MIN: Performed by: INTERNAL MEDICINE

## 2022-09-20 PROCEDURE — 93010 ELECTROCARDIOGRAM REPORT: CPT | Performed by: INTERNAL MEDICINE

## 2022-09-20 PROCEDURE — 93005 ELECTROCARDIOGRAM TRACING: CPT

## 2022-09-20 PROCEDURE — 71045 X-RAY EXAM CHEST 1 VIEW: CPT

## 2022-09-20 PROCEDURE — 278N000051 HC OR IMPLANT GENERAL: Performed by: INTERNAL MEDICINE

## 2022-09-20 PROCEDURE — 33361 REPLACE AORTIC VALVE PERQ: CPT | Mod: Q0 | Performed by: INTERNAL MEDICINE

## 2022-09-20 RX ORDER — HYDROMORPHONE HYDROCHLORIDE 2 MG/1
2 TABLET ORAL
Status: DISCONTINUED | OUTPATIENT
Start: 2022-09-20 | End: 2022-09-21 | Stop reason: HOSPADM

## 2022-09-20 RX ORDER — ACETAMINOPHEN 500 MG
1000 TABLET ORAL EVERY 6 HOURS PRN
Status: DISCONTINUED | OUTPATIENT
Start: 2022-09-20 | End: 2022-09-21 | Stop reason: HOSPADM

## 2022-09-20 RX ORDER — ASPIRIN 325 MG
325 TABLET ORAL ONCE
Status: DISCONTINUED | OUTPATIENT
Start: 2022-09-20 | End: 2022-09-20 | Stop reason: HOSPADM

## 2022-09-20 RX ORDER — HEPARIN SODIUM 1000 [USP'U]/ML
INJECTION, SOLUTION INTRAVENOUS; SUBCUTANEOUS PRN
Status: DISCONTINUED | OUTPATIENT
Start: 2022-09-20 | End: 2022-09-20

## 2022-09-20 RX ORDER — HYDRALAZINE HYDROCHLORIDE 20 MG/ML
10 INJECTION INTRAMUSCULAR; INTRAVENOUS
Status: DISCONTINUED | OUTPATIENT
Start: 2022-09-20 | End: 2022-09-21 | Stop reason: HOSPADM

## 2022-09-20 RX ORDER — SODIUM CHLORIDE, SODIUM LACTATE, POTASSIUM CHLORIDE, CALCIUM CHLORIDE 600; 310; 30; 20 MG/100ML; MG/100ML; MG/100ML; MG/100ML
INJECTION, SOLUTION INTRAVENOUS CONTINUOUS
Status: DISCONTINUED | OUTPATIENT
Start: 2022-09-20 | End: 2022-09-20 | Stop reason: HOSPADM

## 2022-09-20 RX ORDER — PROTAMINE SULFATE 10 MG/ML
INJECTION, SOLUTION INTRAVENOUS PRN
Status: DISCONTINUED | OUTPATIENT
Start: 2022-09-20 | End: 2022-09-20

## 2022-09-20 RX ORDER — TAMSULOSIN HYDROCHLORIDE 0.4 MG/1
0.8 CAPSULE ORAL DAILY
Status: DISCONTINUED | OUTPATIENT
Start: 2022-09-21 | End: 2022-09-21 | Stop reason: HOSPADM

## 2022-09-20 RX ORDER — PROPOFOL 10 MG/ML
INJECTION, EMULSION INTRAVENOUS CONTINUOUS PRN
Status: DISCONTINUED | OUTPATIENT
Start: 2022-09-20 | End: 2022-09-20

## 2022-09-20 RX ORDER — EPHEDRINE SULFATE 50 MG/ML
INJECTION, SOLUTION INTRAMUSCULAR; INTRAVENOUS; SUBCUTANEOUS PRN
Status: DISCONTINUED | OUTPATIENT
Start: 2022-09-20 | End: 2022-09-20

## 2022-09-20 RX ORDER — ASPIRIN 81 MG/1
81 TABLET ORAL DAILY
Status: DISCONTINUED | OUTPATIENT
Start: 2022-09-21 | End: 2022-09-21 | Stop reason: HOSPADM

## 2022-09-20 RX ORDER — ATORVASTATIN CALCIUM 20 MG/1
20 TABLET, FILM COATED ORAL EVERY EVENING
Status: DISCONTINUED | OUTPATIENT
Start: 2022-09-20 | End: 2022-09-21 | Stop reason: HOSPADM

## 2022-09-20 RX ORDER — NALOXONE HYDROCHLORIDE 0.4 MG/ML
0.2 INJECTION, SOLUTION INTRAMUSCULAR; INTRAVENOUS; SUBCUTANEOUS
Status: DISCONTINUED | OUTPATIENT
Start: 2022-09-20 | End: 2022-09-21 | Stop reason: HOSPADM

## 2022-09-20 RX ORDER — IOPAMIDOL 755 MG/ML
INJECTION, SOLUTION INTRAVASCULAR
Status: DISCONTINUED | OUTPATIENT
Start: 2022-09-20 | End: 2022-09-20 | Stop reason: HOSPADM

## 2022-09-20 RX ORDER — NITROGLYCERIN 0.4 MG/1
0.4 TABLET SUBLINGUAL EVERY 5 MIN PRN
Status: DISCONTINUED | OUTPATIENT
Start: 2022-09-20 | End: 2022-09-21 | Stop reason: HOSPADM

## 2022-09-20 RX ORDER — FLUTICASONE PROPIONATE 50 MCG
1-2 SPRAY, SUSPENSION (ML) NASAL DAILY
Status: DISCONTINUED | OUTPATIENT
Start: 2022-09-21 | End: 2022-09-21 | Stop reason: HOSPADM

## 2022-09-20 RX ORDER — ONDANSETRON 2 MG/ML
INJECTION INTRAMUSCULAR; INTRAVENOUS PRN
Status: DISCONTINUED | OUTPATIENT
Start: 2022-09-20 | End: 2022-09-20

## 2022-09-20 RX ORDER — LIDOCAINE 40 MG/G
CREAM TOPICAL
Status: DISCONTINUED | OUTPATIENT
Start: 2022-09-20 | End: 2022-09-20 | Stop reason: HOSPADM

## 2022-09-20 RX ORDER — CLINDAMYCIN PHOSPHATE 900 MG/50ML
900 INJECTION, SOLUTION INTRAVENOUS
Status: COMPLETED | OUTPATIENT
Start: 2022-09-20 | End: 2022-09-20

## 2022-09-20 RX ORDER — ONDANSETRON 4 MG/1
4 TABLET, ORALLY DISINTEGRATING ORAL EVERY 6 HOURS PRN
Status: DISCONTINUED | OUTPATIENT
Start: 2022-09-20 | End: 2022-09-21 | Stop reason: HOSPADM

## 2022-09-20 RX ORDER — SODIUM CHLORIDE, SODIUM LACTATE, POTASSIUM CHLORIDE, CALCIUM CHLORIDE 600; 310; 30; 20 MG/100ML; MG/100ML; MG/100ML; MG/100ML
INJECTION, SOLUTION INTRAVENOUS CONTINUOUS PRN
Status: DISCONTINUED | OUTPATIENT
Start: 2022-09-20 | End: 2022-09-20

## 2022-09-20 RX ORDER — METOPROLOL TARTRATE 50 MG
50 TABLET ORAL 2 TIMES DAILY
Status: DISCONTINUED | OUTPATIENT
Start: 2022-09-20 | End: 2022-09-21 | Stop reason: HOSPADM

## 2022-09-20 RX ORDER — ASPIRIN 81 MG/1
81 TABLET ORAL ONCE
Status: ON HOLD | COMMUNITY
End: 2022-09-21

## 2022-09-20 RX ORDER — NALOXONE HYDROCHLORIDE 0.4 MG/ML
0.4 INJECTION, SOLUTION INTRAMUSCULAR; INTRAVENOUS; SUBCUTANEOUS
Status: DISCONTINUED | OUTPATIENT
Start: 2022-09-20 | End: 2022-09-21 | Stop reason: HOSPADM

## 2022-09-20 RX ORDER — SODIUM CHLORIDE 9 MG/ML
INJECTION, SOLUTION INTRAVENOUS CONTINUOUS
Status: DISCONTINUED | OUTPATIENT
Start: 2022-09-20 | End: 2022-09-20 | Stop reason: HOSPADM

## 2022-09-20 RX ORDER — ASPIRIN 81 MG/1
81 TABLET ORAL DAILY
Status: DISCONTINUED | OUTPATIENT
Start: 2022-09-21 | End: 2022-09-20

## 2022-09-20 RX ORDER — ONDANSETRON 2 MG/ML
4 INJECTION INTRAMUSCULAR; INTRAVENOUS EVERY 6 HOURS PRN
Status: DISCONTINUED | OUTPATIENT
Start: 2022-09-20 | End: 2022-09-21 | Stop reason: HOSPADM

## 2022-09-20 RX ADMIN — HYDROMORPHONE HYDROCHLORIDE 2 MG: 2 TABLET ORAL at 14:28

## 2022-09-20 RX ADMIN — PHENYLEPHRINE HYDROCHLORIDE 100 MCG: 10 INJECTION INTRAVENOUS at 11:42

## 2022-09-20 RX ADMIN — AMITRIPTYLINE HYDROCHLORIDE 25 MG: 25 TABLET, FILM COATED ORAL at 22:10

## 2022-09-20 RX ADMIN — PHENYLEPHRINE HYDROCHLORIDE 100 MCG: 10 INJECTION INTRAVENOUS at 10:40

## 2022-09-20 RX ADMIN — PHENYLEPHRINE HYDROCHLORIDE 150 MCG: 10 INJECTION INTRAVENOUS at 11:31

## 2022-09-20 RX ADMIN — PHENYLEPHRINE HYDROCHLORIDE 200 MCG: 10 INJECTION INTRAVENOUS at 10:52

## 2022-09-20 RX ADMIN — PROPOFOL 125 MCG/KG/MIN: 10 INJECTION, EMULSION INTRAVENOUS at 10:02

## 2022-09-20 RX ADMIN — ACETAMINOPHEN 1000 MG: 500 TABLET, FILM COATED ORAL at 15:35

## 2022-09-20 RX ADMIN — METOPROLOL TARTRATE 50 MG: 50 TABLET, FILM COATED ORAL at 20:26

## 2022-09-20 RX ADMIN — Medication 0.5 UNITS: at 10:54

## 2022-09-20 RX ADMIN — ATORVASTATIN CALCIUM 20 MG: 20 TABLET, FILM COATED ORAL at 20:26

## 2022-09-20 RX ADMIN — HEPARIN SODIUM 14000 UNITS: 1000 INJECTION INTRAVENOUS; SUBCUTANEOUS at 10:46

## 2022-09-20 RX ADMIN — ONDANSETRON 4 MG: 2 INJECTION INTRAMUSCULAR; INTRAVENOUS at 10:42

## 2022-09-20 RX ADMIN — PHENYLEPHRINE HYDROCHLORIDE 100 MCG: 10 INJECTION INTRAVENOUS at 10:47

## 2022-09-20 RX ADMIN — PHENYLEPHRINE HYDROCHLORIDE 100 MCG: 10 INJECTION INTRAVENOUS at 10:51

## 2022-09-20 RX ADMIN — DEXMEDETOMIDINE HYDROCHLORIDE 0.3 MCG/KG/HR: 100 INJECTION, SOLUTION INTRAVENOUS at 10:02

## 2022-09-20 RX ADMIN — CLINDAMYCIN PHOSPHATE 900 MG: 900 INJECTION, SOLUTION INTRAVENOUS at 08:52

## 2022-09-20 RX ADMIN — PHENYLEPHRINE HYDROCHLORIDE 100 MCG: 10 INJECTION INTRAVENOUS at 10:34

## 2022-09-20 RX ADMIN — PHENYLEPHRINE HYDROCHLORIDE 0.5 MCG/KG/MIN: 10 INJECTION INTRAVENOUS at 10:22

## 2022-09-20 RX ADMIN — SODIUM CHLORIDE: 9 INJECTION, SOLUTION INTRAVENOUS at 08:45

## 2022-09-20 RX ADMIN — DEXMEDETOMIDINE HYDROCHLORIDE 20 MCG: 100 INJECTION, SOLUTION INTRAVENOUS at 10:00

## 2022-09-20 RX ADMIN — PHENYLEPHRINE HYDROCHLORIDE 100 MCG: 10 INJECTION INTRAVENOUS at 10:43

## 2022-09-20 RX ADMIN — SODIUM CHLORIDE, SODIUM LACTATE, POTASSIUM CHLORIDE, CALCIUM CHLORIDE: 600; 310; 30; 20 INJECTION, SOLUTION INTRAVENOUS at 10:45

## 2022-09-20 RX ADMIN — Medication 5 MG: at 10:41

## 2022-09-20 RX ADMIN — PROTAMINE SULFATE 100 MG: 10 INJECTION, SOLUTION INTRAVENOUS at 11:13

## 2022-09-20 RX ADMIN — Medication 0.5 UNITS: at 10:53

## 2022-09-20 ASSESSMENT — ACTIVITIES OF DAILY LIVING (ADL)
ADLS_ACUITY_SCORE: 18

## 2022-09-20 NOTE — PROGRESS NOTES
PTA medications completed by Medication Scribe day of surgery     Medication history sources: Patient's family/friend (Shira (spouse)), Surescripts, H&P and Patient's home med list  In the past week, patient estimated taking medication this percent of the time: Greater than 90%  Adherence assessment: N/A Not Observed    Significant changes made to the medication list:  None      Additional medication history information:   None    Medication reconciliation completed by provider prior to medication history? No    Time spent in this activity: 40 minutes    The information provided in this note is only as accurate as the sources available at the time of update(s)    Prior to Admission medications    Medication Sig Last Dose Taking? Auth Provider Long Term End Date   albuterol (VENTOLIN HFA) 108 (90 Base) MCG/ACT inhaler INHALE 2 PUFFS INTO THE LUNGS EVERY 6 HOURS AS NEEDED FOR SHORTNESS OF BREATH OR DIFFICULT BREATHING OR WHEEZING  at prn Yes Josephine Jacob PA-C Yes    amitriptyline (ELAVIL) 25 MG tablet Take 1 tablet (25 mg) by mouth At Bedtime 9/19/2022 at pm Yes Eliza Rogers MD Yes    aspirin 81 MG EC tablet Take 81 mg by mouth once 9/20/2022 at am Yes Reported, Patient Yes    atorvastatin (LIPITOR) 20 MG tablet Take 1 tablet (20 mg) by mouth every evening 9/19/2022 at pm Yes Josephine Jacob PA-C Yes    cyabnocobalamin (VITAMIN B-12) 2500 MCG sublingual tablet Place 3,000 mcg under the tongue daily  9/19/2022 at am Yes Aurelia Barnes APRN CNP     finasteride (PROSCAR) 5 MG tablet Take 1 tablet (5 mg) by mouth daily 9/20/2022 at am Yes Josephine Jacob PA-C     fluticasone (FLONASE) 50 MCG/ACT nasal spray Spray 1-2 sprays into both nostrils daily 9/19/2022 at am Yes Josephine Jacob PA-C     loratadine (CLARITIN) 10 MG tablet Take 1 tablet (10 mg) by mouth daily 9/19/2022 at am Yes Josephine Jacob PA-C     metoprolol tartrate (LOPRESSOR) 50 MG tablet Take 1 tablet (50 mg) by mouth 2 times daily  9/20/2022 at am Yes Josephine Jacob PA-C Yes    Multiple Vitamin (MULTI-VITAMIN) per tablet Take 1 tablet by mouth daily. 9/19/2022 at am Yes Reported, Patient     polyethylene glycol (MIRALAX) 17 g packet Take 1 packet by mouth daily 9/19/2022 at am Yes Reported, Patient     tamsulosin (FLOMAX) 0.4 MG capsule Take 0.8 mg by mouth daily (2 x 0.4 mg = 0.8 mg) 9/20/2022 at am Yes Reported, Patient     Turmeric (QC TUMERIC COMPLEX PO) Take 2 chew tab by mouth daily 9/19/2022 at am Yes Reported, Patient       Medication history completed by:    Sean Gutiérrez CPhT  Medication [facility]

## 2022-09-20 NOTE — ANESTHESIA POSTPROCEDURE EVALUATION
Patient: Luis Daniel Gomez    Procedure: Procedure(s):  Transcatheter Aortic Valve Replacement-Femoral Approach       Anesthesia Type:  MAC    Note:     Postop Pain Control: Uneventful            Sign Out: Well controlled pain   PONV: No   Neuro/Psych: Uneventful            Sign Out: Acceptable/Baseline neuro status   Airway/Respiratory: Uneventful            Sign Out: Acceptable/Baseline resp. status   CV/Hemodynamics: Uneventful            Sign Out: Acceptable CV status; No obvious hypovolemia; No obvious fluid overload   Other NRE: NONE   DID A NON-ROUTINE EVENT OCCUR? No           Last vitals:  Vitals Value Taken Time   /56 09/20/22 1320   Temp 36.3  C (97.4  F) 09/20/22 1320   Pulse 73 09/20/22 1328   Resp 16 09/20/22 1328   SpO2 90 % 09/20/22 1328   Vitals shown include unvalidated device data.    Electronically Signed By: Naomie Cabello MD, MD  September 20, 2022  2:15 PM

## 2022-09-20 NOTE — OP NOTE
OPERATIVE DATE: 9/20/2022    PRE-OPERATIVE DIAGNOSIS:  1) Severe, symptomatic aortic stenosis  2) Chronic kidney disease stage 3B  3) Hyperlipidemia  4) Hypertension  5) Diabetes mellitus type 2  6) Carotid artery stenosis    POST-OPERATIVE DIAGNOSIS:  1) Severe, symptomatic aortic stenosis  2) Chronic kidney disease stage 3B  3) Hyperlipidemia  4) Hypertension  5) Diabetes mellitus type 2  6) Carotid artery stenosis    PROCEDURE:  1) Temporary pacemaker insertion  2) Iliofemoral artery angiography  3) Ascending aorta angiography  4) Left heart catheterization  5) Successful left transfemoral transcatheter aortic valve replacement with Anaay 29 mm Felicia 3 valve  6) Arteriotomy closure    SURGEON: Braeden John MD    CARDIOLOGIST: Brian Martinez MD and Ryan Slaughter MD    ANESTHESIA: Monitored anesthesia care with local analgesia    ESTIMATED BLOOD LOSS: 20 mL    INDICATIONS:  Mr. Luis Daniel Gomez is a 81 year old year old male with severe, symptomatic aortic stenosis. We were asked to evaluate for transcatheter aortic valve replacement. Risks and benefits of the operation were explained to the patient and their family including, but not limited to, bleeding, infection, stroke and even death. They understood these risks and agreed to proceed electively.    OPERATIVE REPORT:  The patient was transferred to the operating room and positioned supine on the OR table. Monitored anesthesia care was begun. The patients chest, abdomen and bilateral lower extremities were clipped, prepped and draped in sterile fashion. A pre-procedure time-out was performed confirming the correct patient, correct site and correct procedure.    Intravenous heparin was administered. Arterial access of the right and left femoral arteries and right common femoral vein was performed by interventional cardiology. The side used for delivery was prepared for percutaneous closure at completion.    A temporary transvenous pacemaker was inserted and  tested with good capture.    A Lunderquist wire was advanced to support the Anaya sheath insertion. A pigtail catheter was advanced to the aortic root and angiogram performed to confirm optimal implant angle. The pigtail was placed in the non-coronary cusp.    The LV was accessed using an AL-1 catheter over a straight wire. The pigtail catheter was advanced into the LV. The pigtail catheter was used to advance a Lunderquist Safari wire into the LV and the pigtail catheter was removed.    The 29 mm S3 bioprosthetic valve was positioned across the native aortic valve and deployed using rapid pacing.    Transthoracic echocardiography showed no paravalvular insufficiency.    The deployment system and wires were removed. The femoral access was made hemostatic.    A final iliofemoral angiogram showed no extravasation or stenosis.    The patient was then transferred to the recovery area in stable condition.    All needle, sponge and instrument counts were correct at the end of the case.    Braeden John MD  Cardiothoracic Surgery  [phone]

## 2022-09-20 NOTE — ANESTHESIA PREPROCEDURE EVALUATION
"Anesthesia Pre-Procedure Evaluation    Patient: Luis Daniel Gomez   MRN: [id number] : 1940        Procedure : Procedure(s):  Transcatheter Aortic Valve Replacement-Femoral Approach          Past Medical History:   Diagnosis Date     Arthritis      BPH (benign prostatic hyperplasia)      CKD (chronic kidney disease) stage 3, GFR 30-59 ml/min (H)      Cluster headache     Dr. Roth     Constipation      Dyslipidemia      Dyspnea     Normal nuc stress test 9-15-08, fainted once in life     Fatty liver     Ultrasound      FH: colon cancer     colonoscopy 10/10, repeat 5 years.     Former smoker     quit      HTN (hypertension), benign      Peripheral neuropathy     Dr. Patricia     Spinal stenosis     Last MRI , Dr. Fraire     Syncope      Type 2 diabetes mellitus (H)     diet controlled     Ureterolithiases       Past Surgical History:   Procedure Laterality Date     COLONOSCOPY       CV CORONARY ANGIOGRAM Bilateral 2022    Procedure: Coronary Angiogram;  Surgeon: Dave Mao MD;  Location:  [facility]     KERATOTOMY ARCUATE WITH FEMTOSECOND LASER/IMAGING FOR ATIOL Left 2016    Procedure: KERATOTOMY ARCUATE WITH FEMTOSECOND LASER/IMAGING FOR ATIOL;  Surgeon: Gerard Larson MD;  Location: [facility]     ORTHOPEDIC SURGERY       PHACOEMULSIFICATION CLEAR CORNEA WITH STANDARD INTRAOCULAR LENS IMPLANT Left 2016    Procedure: PHACOEMULSIFICATION CLEAR CORNEA WITH STANDARD INTRAOCULAR LENS IMPLANT;  Surgeon: Gerard Larson MD;  Location: [facility]     Thumb surg        Allergies   Allergen Reactions     Lisinopril Shortness Of Breath and Fatigue     Amoxicillin Itching     Duloxetine      \"I can't take it\"     Levofloxacin      \"I can't take the 500 mgs\"     Neurontin [Gabapentin] Swelling     edema     Norvasc [Amlodipine] Swelling     edema     Oxycodone GI Disturbance     Penicillins Itching     Red Yeast Rice Extract [Monascus Purpureus Went Yeast] Hives and " Itching     Tramadol Itching     Verapamil Itching     Vicodin [Hydrocodone-Acetaminophen] Itching      Social History     Tobacco Use     Smoking status: Former Smoker     Packs/day: 1.00     Years: 56.00     Pack years: 56.00     Types: Cigarettes     Quit date: 2008     Years since quittin.7     Smokeless tobacco: Never Used     Tobacco comment: quit 2008   Substance Use Topics     Alcohol use: No     Alcohol/week: 0.0 standard drinks      Wt Readings from Last 1 Encounters:   22 93.8 kg (206 lb 11.2 oz)        Anesthesia Evaluation   Pt has had prior anesthetic.     No history of anesthetic complications       ROS/MED HX  ENT/Pulmonary:       Neurologic: Comment: Spinal stenosis      Cardiovascular:     (+) Dyslipidemia hypertension-----fainting (syncope). valvular problems/murmurs type: AS     METS/Exercise Tolerance:     Hematologic:       Musculoskeletal:       GI/Hepatic:     (+) liver disease,     Renal/Genitourinary:     (+) renal disease, type: CRI,     Endo:     (+) type II DM,     Psychiatric/Substance Use:       Infectious Disease:       Malignancy:       Other:            Physical Exam    Airway        Mallampati: II   TM distance: > 3 FB   Neck ROM: full   Mouth opening: > 3 cm    Respiratory Devices and Support         Dental  no notable dental history         Cardiovascular   cardiovascular exam normal          Pulmonary   pulmonary exam normal                OUTSIDE LABS:  CBC:   Lab Results   Component Value Date    WBC 8.1 2022    WBC 8.7 2022    HGB 15.2 2022    HGB 14.9 2022    HCT 44.8 2022    HCT 44.5 2022     2022     (L) 2022     BMP:   Lab Results   Component Value Date     2022     2022    POTASSIUM 4.8 2022    POTASSIUM 4.1 2022    CHLORIDE 103 2022    CHLORIDE 102 2022    CO2 27 2022    CO2 29 2022    BUN 23 2022    BUN 18 2022    CR 1.41  (H) 09/09/2022    CR 1.61 (H) 08/22/2022     (H) 09/09/2022     (H) 08/22/2022     COAGS:   Lab Results   Component Value Date    PTT 24 08/22/2022    INR 0.99 09/09/2022     POC: No results found for: BGM, HCG, HCGS  HEPATIC:   Lab Results   Component Value Date    ALBUMIN 3.8 08/03/2022    PROTTOTAL 6.9 08/03/2022    ALT 21 08/03/2022    AST 19 08/03/2022    ALKPHOS 73 08/03/2022    BILITOTAL 0.5 08/03/2022     OTHER:   Lab Results   Component Value Date    A1C 5.5 08/03/2022    SANTY 9.3 09/09/2022    PHOS 3.1 08/03/2022    TSH 2.97 12/17/2020    T4 0.95 12/11/2019    SED 15 06/12/2012       Anesthesia Plan    ASA Status:  4   NPO Status:  NPO Appropriate    Anesthesia Type: MAC.     - Reason for MAC: straight local not clinically adequate              Consents    Anesthesia Plan(s) and associated risks, benefits, and realistic alternatives discussed. Questions answered and patient/representative(s) expressed understanding.    - Discussed:     - Discussed with:  Patient         Postoperative Care    Pain management: Multi-modal analgesia.   PONV prophylaxis: Ondansetron (or other 5HT-3), Background Propofol Infusion     Comments:                Naomie Cabello MD, MD

## 2022-09-20 NOTE — ANESTHESIA CARE TRANSFER NOTE
Patient: Luis Daniel Gomez    Procedure: Procedure(s):  Transcatheter Aortic Valve Replacement-Femoral Approach       Diagnosis: valvular disease  Diagnosis Additional Information: No value filed.    Anesthesia Type:   MAC     Note:    Oropharynx: oropharynx clear of all foreign objects  Level of Consciousness: awake  Oxygen Supplementation: room air    Independent Airway: airway patency satisfactory and stable  Dentition: dentition unchanged  Vital Signs Stable: post-procedure vital signs reviewed and stable  Report to RN Given: handoff report given  Patient transferred to: PACU  Comments: At end of procedure, spontaneous respirations, patient alert to voice, able to follow commands. Patient breathing room air to PACU. SpO2, NiBP, and EKG monitors and alarms on and functioning, report on patient's clinical status given to PACU RN, RN questions answered.  Handoff Report: Identifed the Patient, Identified the Reponsible Provider, Reviewed the pertinent medical history, Discussed the surgical course, Reviewed Intra-OP anesthesia mangement and issues during anesthesia, Set expectations for post-procedure period and Allowed opportunity for questions and acknowledgement of understanding      Vitals:  Vitals Value Taken Time   BP 94/80 09/20/22 1144   Temp 97.7    Pulse 79 09/20/22 1146   Resp 18 09/20/22 1146   SpO2 92 % 09/20/22 1146   Vitals shown include unvalidated device data.    Electronically Signed By: Christine Marie Volp Hodgkins, CRNA, APRN CRNA  September 20, 2022  11:48 AM

## 2022-09-20 NOTE — H&P
[facility]    History and Physical  Hospitalist       Date of Admission:  9/20/2022    Assessment & Plan   Luis Daniel Gomez is a 81 year old male with PMH of chronic diastolic heart failure, severe aortic stenosis, HTN, DM2, CKD3, who is being admitted following a TAVR.    Severe aortic stenosis s/p TAVR  Patient with progressive dyspnea prior to admission and severe aortic stenosis on imaging. Underwent TAVR with this morning, left femoral access, reportedly uncomplicated. Patient appears to be doing well post-operatively.  - Bed rest for six hours. Post-TAVR protocol per nurses  - TAVR team will continue to follow   - Continue baby aspirin   - Antiplatelet per TAVR team   - Echocardiogram probably tomorrow    Chronic diastolic heart failure secondary to aortic stenosis  CAD, HTN, HLD  Monitor volume status following TAVR  - See above for management  - Continue PTA statin and metoprolol    DM2: A1c 5.5%. Not on any antidiabetic meds PTA.  - No glucose checks needed    CKD3: Baseline Creatinine around 1.4-1.6  - Check BMP in AM    Insomnia: Continue PTA amitriptyline    COVID-19 ASYMPTOMATIC testing negative 9/16/2022, does not need repeat testing.    DVT Prophylaxis: Pneumatic Compression Devices  Code Status: Full Code    Disposition: Expected discharge tomorrow to home    Srinivas Mendieta MD, MD    Primary Care Physician   Eliza Rogers    Chief Complaint   Post-TAVR    History is obtained from the patient  Case discussed with ED provider    History of Present Illness   Luis Daniel Gomez is a 81 year old male who presents following an uncomplicated TAVR procedure earlier today. He is currently bedrest. He feels well and has no complaints. He denies any pain or discomfort. His wife is at bedside and updated as well.    Past Medical History    I have reviewed this patient's medical history and updated it with pertinent information if needed.   Past Medical History:   Diagnosis Date      Aortic stenosis, severe 9/20/2022     Arthritis      BPH (benign prostatic hyperplasia)      CKD (chronic kidney disease) stage 3, GFR 30-59 ml/min (H)      Cluster headache 2015    Dr. Roth     Constipation      Dyslipidemia      Dyspnea     Normal nuc stress test 9-15-08, fainted once in life     Fatty liver     Ultrasound 11/11     FH: colon cancer     colonoscopy 10/10, repeat 5 years.     Former smoker     quit 2008     HTN (hypertension), benign      Peripheral neuropathy     Dr. Patricia     Spinal stenosis     Last MRI 2010, Dr. Fraire     Syncope      Type 2 diabetes mellitus (H)     diet controlled     Ureterolithiases        Past Surgical History   I have reviewed this patient's surgical history and updated it with pertinent information if needed.  Past Surgical History:   Procedure Laterality Date     COLONOSCOPY       CV CORONARY ANGIOGRAM Bilateral 8/22/2022    Procedure: Coronary Angiogram;  Surgeon: Dave Mao MD;  Location: [facility] CARDIAC CATH LAB     KERATOTOMY ARCUATE WITH FEMTOSECOND LASER/IMAGING FOR ATIOL Left 8/29/2016    Procedure: KERATOTOMY ARCUATE WITH FEMTOSECOND LASER/IMAGING FOR ATIOL;  Surgeon: Gerard Larson MD;  Location: [facility]     ORTHOPEDIC SURGERY       PHACOEMULSIFICATION CLEAR CORNEA WITH STANDARD INTRAOCULAR LENS IMPLANT Left 8/29/2016    Procedure: PHACOEMULSIFICATION CLEAR CORNEA WITH STANDARD INTRAOCULAR LENS IMPLANT;  Surgeon: Gerard Larson MD;  Location: [facility]     Thumb surg         Prior to Admission Medications   Prior to Admission Medications   Prescriptions Last Dose Informant Patient Reported? Taking?   Multiple Vitamin (MULTI-VITAMIN) per tablet 9/19/2022 at am Spouse/Significant Other Yes Yes   Sig: Take 1 tablet by mouth daily.   Turmeric (QC TUMERIC COMPLEX PO) 9/19/2022 at am Spouse/Significant Other Yes Yes   Sig: Take 2 chew tab by mouth daily   albuterol (VENTOLIN HFA) 108 (90 Base) MCG/ACT inhaler  at prn Spouse/Significant Other No Yes  "  Sig: INHALE 2 PUFFS INTO THE LUNGS EVERY 6 HOURS AS NEEDED FOR SHORTNESS OF BREATH OR DIFFICULT BREATHING OR WHEEZING   amitriptyline (ELAVIL) 25 MG tablet 9/19/2022 at pm Spouse/Significant Other No Yes   Sig: Take 1 tablet (25 mg) by mouth At Bedtime   aspirin 81 MG EC tablet 9/20/2022 at am Spouse/Significant Other Yes Yes   Sig: Take 81 mg by mouth once   atorvastatin (LIPITOR) 20 MG tablet 9/19/2022 at pm Spouse/Significant Other No Yes   Sig: Take 1 tablet (20 mg) by mouth every evening   cyabnocobalamin (VITAMIN B-12) 2500 MCG sublingual tablet 9/19/2022 at am Spouse/Significant Other Yes Yes   Sig: Place 3,000 mcg under the tongue daily    finasteride (PROSCAR) 5 MG tablet 9/20/2022 at am Spouse/Significant Other No Yes   Sig: Take 1 tablet (5 mg) by mouth daily   fluticasone (FLONASE) 50 MCG/ACT nasal spray 9/19/2022 at am Spouse/Significant Other No Yes   Sig: Spray 1-2 sprays into both nostrils daily   loratadine (CLARITIN) 10 MG tablet 9/19/2022 at am Spouse/Significant Other Yes Yes   Sig: Take 1 tablet (10 mg) by mouth daily   metoprolol tartrate (LOPRESSOR) 50 MG tablet 9/20/2022 at am Spouse/Significant Other No Yes   Sig: Take 1 tablet (50 mg) by mouth 2 times daily   polyethylene glycol (MIRALAX) 17 g packet 9/19/2022 at am Spouse/Significant Other Yes Yes   Sig: Take 1 packet by mouth daily   tamsulosin (FLOMAX) 0.4 MG capsule 9/20/2022 at am Spouse/Significant Other Yes Yes   Sig: Take 0.8 mg by mouth daily (2 x 0.4 mg = 0.8 mg)      Facility-Administered Medications: None     Allergies   Allergies   Allergen Reactions     Lisinopril Shortness Of Breath and Fatigue     Amoxicillin Itching     Duloxetine      \"I can't take it\"     Levofloxacin      \"I can't take the 500 mgs\"     Neurontin [Gabapentin] Swelling     edema     Norvasc [Amlodipine] Swelling     edema     Oxycodone GI Disturbance     Penicillins Itching     Red Yeast Rice Extract [Monascus Purpureus Went Yeast] Hives and Itching     " Tramadol Itching     Verapamil Itching     Vicodin [Hydrocodone-Acetaminophen] Itching       Social History   I have reviewed this patient's social history and updated it with pertinent information if needed. Luis Daniel Gomez  reports that he quit smoking about 14 years ago. His smoking use included cigarettes. He has a 56.00 pack-year smoking history. He has never used smokeless tobacco. He reports that he does not drink alcohol and does not use drugs.    Family History   I have reviewed this patient's family history and updated it with pertinent information if needed.   Family History   Problem Relation Age of Onset     C.A.D. Father      Diabetes Father      Cancer - colorectal Mother 80     Cancer - colorectal Maternal Grandmother        Review of Systems   The 10 point Review of Systems is negative other than noted in the HPI or here.    Physical Exam   Temp: 97.4  F (36.3  C) Temp src: Temporal BP: 129/54 Pulse: 78   Resp: 11 SpO2: 96 % O2 Device: None (Room air)    Vital Signs with Ranges  Temp:  [97  F (36.1  C)-97.7  F (36.5  C)] 97.4  F (36.3  C)  Pulse:  [73-79] 78  Resp:  [10-40] 11  BP: ()/(42-67) 129/54  SpO2:  [89 %-96 %] 96 %  206 lbs 11.2 oz    Constitutional: Male in NAD  Eyes: PERRL, nonicteric, normal ocular movements  HEENT: Normocephalic, atraumatic, oral mucosa moist  Respiratory: CTAB anterolaterally  Cardiovascular: RRR, normal S1/2, no m/r/g  GI: No organomegaly, normoactive bowel sounds, nontender, nondistended  Vascular: No lower extremity pitting edema  Skin: No rashes, bilateral femoral puncture sites with dressings c/d/i  Musculoskeletal: Moves all extremities  Neurologic: A&Ox3  Psychiatric: Appropriate affect and mood    Data   Data reviewed today:  I personally reviewed labwork.  Recent Labs   Lab 09/20/22  0848   POTASSIUM 4.1   *       Imaging:  Recent Results (from the past 24 hour(s))   XR Chest Port 1 View    Narrative    XR CHEST PORT 1 VIEW 9/20/2022 1:00  PM    HISTORY: coughing up blood    COMPARISON: 1/4/22.      Impression    IMPRESSION: No infiltrate, pleural effusion or pneumothorax. Normal  heart size. TAVR. Mildly tortuous thoracic aorta with atherosclerotic  calcifications.    GRACE HERRON MD         SYSTEM ID:  [id number]

## 2022-09-20 NOTE — OR NURSING
Pt coughing jamaal red blood in pacu with no respiratory issues. Pt reports cough over past week w/o blood.

## 2022-09-20 NOTE — PLAN OF CARE
A&O x4. PO dilaudid given for back pain during bedrest. VSS, on RA. Up w/ SBA. Tele: SR. Neuros intact. BL groin sites WDL. Off bedrest at 1730. Bg checked. Plan for CR, echo and probable discharge tomorrow. Continue to monitor.

## 2022-09-20 NOTE — Clinical Note
Potential access sites were evaluated for patency using ultrasound.   The left femoral artery and left femoral vein were selected. Access was obtained under with Sonosite guidance using a micropuncture 21 gauge needle with direct visualization of needle entry.

## 2022-09-20 NOTE — OR NURSING
Santa HOYOS and Dr Cabello both at bedside to assess pt. Portable chest xray per md order. Pt remains calm and alert w/o complaint, vss.

## 2022-09-21 ENCOUNTER — APPOINTMENT (OUTPATIENT)
Dept: PHYSICAL THERAPY | Facility: CLINIC | Age: 82
DRG: 267 | End: 2022-09-21
Attending: NURSE PRACTITIONER
Payer: MEDICARE

## 2022-09-21 ENCOUNTER — APPOINTMENT (OUTPATIENT)
Dept: CARDIOLOGY | Facility: CLINIC | Age: 82
DRG: 267 | End: 2022-09-21
Attending: NURSE PRACTITIONER
Payer: MEDICARE

## 2022-09-21 VITALS
BODY MASS INDEX: 29.49 KG/M2 | SYSTOLIC BLOOD PRESSURE: 144 MMHG | HEART RATE: 89 BPM | WEIGHT: 206 LBS | HEIGHT: 70 IN | TEMPERATURE: 98.7 F | RESPIRATION RATE: 16 BRPM | OXYGEN SATURATION: 94 % | DIASTOLIC BLOOD PRESSURE: 59 MMHG

## 2022-09-21 DIAGNOSIS — Z95.2 S/P TAVR (TRANSCATHETER AORTIC VALVE REPLACEMENT): Primary | ICD-10-CM

## 2022-09-21 LAB
ANION GAP SERPL CALCULATED.3IONS-SCNC: 7 MMOL/L (ref 3–14)
BUN SERPL-MCNC: 23 MG/DL (ref 7–30)
CALCIUM SERPL-MCNC: 8.7 MG/DL (ref 8.5–10.1)
CHLORIDE BLD-SCNC: 104 MMOL/L (ref 94–109)
CO2 SERPL-SCNC: 24 MMOL/L (ref 20–32)
CREAT SERPL-MCNC: 1.54 MG/DL (ref 0.66–1.25)
ERYTHROCYTE [DISTWIDTH] IN BLOOD BY AUTOMATED COUNT: 12.5 % (ref 10–15)
GFR SERPL CREATININE-BSD FRML MDRD: 44 ML/MIN/1.73M2
GLUCOSE BLD-MCNC: 119 MG/DL (ref 70–99)
HCT VFR BLD AUTO: 38.1 % (ref 40–53)
HGB BLD-MCNC: 12.8 G/DL (ref 13.3–17.7)
LVEF ECHO: NORMAL
MAGNESIUM SERPL-MCNC: 2.2 MG/DL (ref 1.6–2.3)
MCH RBC QN AUTO: 32.8 PG (ref 26.5–33)
MCHC RBC AUTO-ENTMCNC: 33.6 G/DL (ref 31.5–36.5)
MCV RBC AUTO: 98 FL (ref 78–100)
PHOSPHATE SERPL-MCNC: 2.8 MG/DL (ref 2.5–4.5)
PLATELET # BLD AUTO: 171 10E3/UL (ref 150–450)
POTASSIUM BLD-SCNC: 4.4 MMOL/L (ref 3.4–5.3)
RBC # BLD AUTO: 3.9 10E6/UL (ref 4.4–5.9)
SODIUM SERPL-SCNC: 135 MMOL/L (ref 133–144)
WBC # BLD AUTO: 14.6 10E3/UL (ref 4–11)

## 2022-09-21 PROCEDURE — 80048 BASIC METABOLIC PNL TOTAL CA: CPT | Performed by: INTERNAL MEDICINE

## 2022-09-21 PROCEDURE — 93005 ELECTROCARDIOGRAM TRACING: CPT

## 2022-09-21 PROCEDURE — 99207 PR SC NO CHARGE VISIT: CPT | Performed by: INTERNAL MEDICINE

## 2022-09-21 PROCEDURE — 85027 COMPLETE CBC AUTOMATED: CPT | Performed by: NURSE PRACTITIONER

## 2022-09-21 PROCEDURE — 93325 DOPPLER ECHO COLOR FLOW MAPG: CPT

## 2022-09-21 PROCEDURE — 999N000096 CARDIAC MOBILE TELEMETRY MONITOR

## 2022-09-21 PROCEDURE — 93308 TTE F-UP OR LMTD: CPT | Mod: 26 | Performed by: INTERNAL MEDICINE

## 2022-09-21 PROCEDURE — 36415 COLL VENOUS BLD VENIPUNCTURE: CPT | Performed by: INTERNAL MEDICINE

## 2022-09-21 PROCEDURE — 97530 THERAPEUTIC ACTIVITIES: CPT | Mod: GP

## 2022-09-21 PROCEDURE — 250N000011 HC RX IP 250 OP 636: Performed by: NURSE PRACTITIONER

## 2022-09-21 PROCEDURE — 97110 THERAPEUTIC EXERCISES: CPT | Mod: GP

## 2022-09-21 PROCEDURE — 83735 ASSAY OF MAGNESIUM: CPT | Performed by: NURSE PRACTITIONER

## 2022-09-21 PROCEDURE — 93228 REMOTE 30 DAY ECG REV/REPORT: CPT | Performed by: INTERNAL MEDICINE

## 2022-09-21 PROCEDURE — 93321 DOPPLER ECHO F-UP/LMTD STD: CPT

## 2022-09-21 PROCEDURE — 84100 ASSAY OF PHOSPHORUS: CPT | Performed by: NURSE PRACTITIONER

## 2022-09-21 PROCEDURE — 93321 DOPPLER ECHO F-UP/LMTD STD: CPT | Mod: 26 | Performed by: INTERNAL MEDICINE

## 2022-09-21 PROCEDURE — 99232 SBSQ HOSP IP/OBS MODERATE 35: CPT | Mod: 25 | Performed by: INTERNAL MEDICINE

## 2022-09-21 PROCEDURE — 93325 DOPPLER ECHO COLOR FLOW MAPG: CPT | Mod: 26 | Performed by: INTERNAL MEDICINE

## 2022-09-21 PROCEDURE — 97161 PT EVAL LOW COMPLEX 20 MIN: CPT | Mod: GP

## 2022-09-21 PROCEDURE — 93010 ELECTROCARDIOGRAM REPORT: CPT | Performed by: INTERNAL MEDICINE

## 2022-09-21 PROCEDURE — 250N000013 HC RX MED GY IP 250 OP 250 PS 637: Performed by: INTERNAL MEDICINE

## 2022-09-21 RX ORDER — FUROSEMIDE 20 MG
20 TABLET ORAL DAILY
Status: DISCONTINUED | OUTPATIENT
Start: 2022-09-22 | End: 2022-09-21 | Stop reason: HOSPADM

## 2022-09-21 RX ORDER — FUROSEMIDE 10 MG/ML
40 INJECTION INTRAMUSCULAR; INTRAVENOUS ONCE
Status: COMPLETED | OUTPATIENT
Start: 2022-09-21 | End: 2022-09-21

## 2022-09-21 RX ORDER — FUROSEMIDE 20 MG
20 TABLET ORAL DAILY
Qty: 90 TABLET | Refills: 1 | Status: SHIPPED | OUTPATIENT
Start: 2022-09-22 | End: 2023-10-03

## 2022-09-21 RX ORDER — NITROGLYCERIN 0.4 MG/1
TABLET SUBLINGUAL
Qty: 10 TABLET | Refills: 0 | Status: SHIPPED | OUTPATIENT
Start: 2022-09-21

## 2022-09-21 RX ADMIN — METOPROLOL TARTRATE 50 MG: 50 TABLET, FILM COATED ORAL at 07:46

## 2022-09-21 RX ADMIN — ACETAMINOPHEN 1000 MG: 500 TABLET, FILM COATED ORAL at 08:01

## 2022-09-21 RX ADMIN — FUROSEMIDE 40 MG: 10 INJECTION, SOLUTION INTRAMUSCULAR; INTRAVENOUS at 10:33

## 2022-09-21 RX ADMIN — ASPIRIN 81 MG: 81 TABLET, COATED ORAL at 07:47

## 2022-09-21 RX ADMIN — TAMSULOSIN HYDROCHLORIDE 0.8 MG: 0.4 CAPSULE ORAL at 07:47

## 2022-09-21 ASSESSMENT — ACTIVITIES OF DAILY LIVING (ADL)
ADLS_ACUITY_SCORE: 18

## 2022-09-21 NOTE — DISCHARGE INSTRUCTIONS
TAVR Discharge Instructions  You have just had your aortic valve replaced with a new biological tissue valve. You should feel better after your surgery, but complete recovery may take several weeks. Please follow these instructions carefully and please call your valve coordinator with any questions or concerns.      CONTACT INFORMATION:   [facility]:  [phone] (7 days a week)  Scheduling and general questions - Denice ([phone])  Valve Coordinators - Coco RN ([phone]), Bekah RN ([phone]), and Daylin RN ([phone])    AFTER YOU GO HOME:  Drink extra fluids for 2 days.  You may resume your normal diet.  Do not smoke.  Do not drink alcohol.  Relax and take it easy.  Do not make any important or legal decisions.    FOR 1 WEEK AFTER TAVR:  Do not drive or operate machines at home or at work. No driving until you return for your 1 week follow-up appointment. You and the provider will discuss this at the appointment.   Refrain from sexual intercourse for 1 week.  You may shower the day after your procedure. Do NOT take a bath, or use a hot tub or pool for at least 1 week. Do NOT scrub the site. Do not use lotion or powder near the puncture site.  Do not lift more than 10 pounds.   Do not do any heavy activity such as exercise, lifting, or straining.  No housework, yard work, or any activities that make you sweat.    CARE OF WRIST AND GROIN SITES:  For 2 days, when you cough, sneeze, laugh or move your bowels, hold your hand over the puncture site and press firmly on/above the site.  Remove the bandage after 24 hours. If there is minor oozing, apply another bandage and remove it after 12 hours.  It is normal to have bruising or pea size lump at the site.  If you have a wrist site puncture: Do not use your hand or arm to support your weight (such as rising from a chair) or bend your wrist (such as lifting a garage door) for 1 week.  No stooping or squatting for 1 week.      BLEEDING:  If you start bleeding from the site in your wrist:  Sit down and press firmly on/above the site with your fingers for 10 minutes.   Once bleeding stops, keep your arm still for 2 hours.   Call [facility] or valve coordinator as soon as you can.  If you start bleeding from the site in your groin:  Lie down flat and press firmly on/above the site for 10 minutes.  Once bleeding stops lay flat for 2 hours.   Call [facility] or valve coordinator as soon as you can.  Call 911 right away if you have heavy bleeding or bleeding that does not stop.    MEDICINES:  You may be started on an anti-platelet medication, such as Plavix or aspirin. Please call the [facility] with any questions regarding this medication.  If you have stopped any medicines, check with your provider about when to restart them.  You will require lifetime prophylactic antibiotics for dental cleanings and dental work after your TAVR, please inquire with the Heart Clinic prior to your scheduled cleanings.     FOLLOW-UP APPOINTMENTS:  Follow-up with a Structural Heart advanced practice provider (NP or PA) at [facility] in 7-10 days after your procedure.  You will also follow-up at [facility] 1 month after your procedure which will include a visit with an advanced practice provider an echocardiogram (echo), an electrocardiogram (ECG), and lab work. This follow-up should be scheduled for you prior to you leaving the hospital.  Card[facility] Rehab will contact you for follow up care. You can enroll at a site convenient to you.  We will have you see your primary cardiologist about 6 months after your TAVR.  We will see you 1 year after your TAVR with a visit with an advanced practice provider (NP or PA) an echocardiogram (echo), electrocardiogram (ECG), and lab work.     CALL THE CLINIC IF:   You have increased pain or a large or growing hard lump  around the site.  The site is red, swollen, hot, or tender.  Blood or fluid is draining from the site.  You have chills or a fever greater than 101 F (38 C).  Your arm or leg feels numb, cool, or changes color.  You have hives, a rash, or unusual itching.  New pain in the back or belly that you cannot control with Tylenol.  Any questions or concerns.    OTHER INSTRUCTIONS:  You will receive a card with your new valve information in the mail, directly from the . Retain this card with your health care records.    DENTAL WORK:  You must have antibiotics before all dental work to prevent endocarditis, or an infection on your new heart valve. Please call the valve coordinators to get a prescription for this. Please do not schedule any dental cleanings for at least 3-6 months after your TAVR.

## 2022-09-21 NOTE — PLAN OF CARE
A&Ox4. Post TAVR protocol. No CP or SOB. BP >140, HR 80-90s. Controlled a-fib. Groin sites soft. Pedal pulses palpable. Voided. No sleep reported during the night. Patient has baseline insomnia. No signs of delirium. Plan for possible discharge today.

## 2022-09-21 NOTE — PROGRESS NOTES
[facility]  Structural Heart Progress Note     Date of Service: 09/21/22     HPI  Luis Daniel Gomez is a 81 year old male who was admitted on 9/20/2022 for elective transcatheter aortic valve replacement. Prior to procedure patient noted to have SAE 0.7 cm2, MG 47 mmHg, and peak velocity 4.4 m/s. LVEF 60-65%. Other notable past medical history is significant for chronic diastolic heart failure, hypertension, hyperlipidemia, carotid artery disease, type II diabetes, and stage III chronic kidney disease.     Assessment:   1) Severe aortic stenosis s/p TAVR with 29 mm Anaya Felicia 3 valve   - via LCFA, no vascular access complications   - No conduction abnormalities  - EKG shows underlying sinus rhythm with progression of 1st degree AV block  - Neuros intact  - On aspirin 81 mg daily for antiplatelet therapy     2) Chronic diastolic heart failure, NYHA class I/II  - NTpBNP 842, LVEDP 30 mmHg   - Appears euvolemic on exam, not on diuretic therapy     3) Hyperlipidemia  - LDL 54  - On atorvastatin 20 mg daily     4) Hypertension   - Well-controlled on PTA metoprolol tartrate 50 mg BID     5) Carotid artery disease   - Moderate (50-69%) stenosis of R carotid artery, < 50% stenosis of L carotid artery     6) CKD, stage III  - Cr 1.54 today, baseline 1.5-1.6    7) Type II diabetes   - A1C 5.8    Plan:  1. Obtain echocardiogram today.   2. Given IV lasix 40 mg once for elevated LVEDP.  3. Start oral lasix 20 mg daily tomorrow.   4. Continue aspirin 81 mg daily for antiplatelet therapy lifelong.   5. Given underlying 1st degree AVB, 30-day event monitor (MCOT) at discharge.  6. Outpatient cardiac rehab.   7. Lifelong antibiotic prophylaxis prior to any dental procedure.    8. Pending echo findings, okay to discharge from a cardiology standpoint.  9. Follow-up with structural clinic in 1 week with repeat BMP.         Santa Conner, CHELO, APRN, CNP  Page: [phone]    Interval History: No acute  events overnight. Denies chest pain, SOB, syncope or near syncope. No palpitations. No PND or orthopnea. Bilateral groin sites intact. Neuro's intact. Hemodynamically stable. Cr 1.54, Hgb 12.8.      Physical Exam   Temp: 98.7  F (37.1  C) Temp src: Oral BP: 133/57 Pulse: 77   Resp: 16 SpO2: 94 % O2 Device: None (Room air)    Vitals:    09/20/22 0832 09/21/22 0539   Weight: 93.8 kg (206 lb 11.2 oz) 93.4 kg (206 lb)     Vital Signs with Ranges  Temp:  [97.4  F (36.3  C)-98.7  F (37.1  C)] 98.7  F (37.1  C)  Pulse:  [] 68  Resp:  [7-44] 16  BP: ()/(42-62) 121/62  SpO2:  [89 %-98 %] 94 %  I/O last 3 completed shifts:  In: 700 [I.V.:700]  Out: -     Constitutional: awake and alert  Eyes: pupils equal, round and reactive to light  Respiratory: no increased work of breathing, CTA bilaterally   Cardiovascular: grade I/II CESAR and normal S1 and S2  GI: soft, nontender  Skin: no bruising or bleeding and bilateral groin sites intact without bleeding or bruit  Musculoskeletal: no edema, full ROM   Neuropsychiatric: General: normal, calm and normal eye contact  Level of consciousness: alert / normal  Orientation: oriented to self, place, time and situation    Medications       amitriptyline  25 mg Oral At Bedtime     aspirin  81 mg Oral Daily     atorvastatin  20 mg Oral QPM     fluticasone  1-2 spray Both Nostrils Daily     metoprolol tartrate  50 mg Oral BID     tamsulosin  0.8 mg Oral Daily       Data   Results for orders placed or performed during the hospital encounter of 09/20/22 (from the past 24 hour(s))   Activated clotting time celite, POCT   Result Value Ref Range    Activated Clotting Time (Celite) POCT 309 (H) 74 - 150 seconds   Activated clotting time celite, POCT   Result Value Ref Range    Activated Clotting Time (Celite) POCT 114 74 - 150 seconds   XR Chest Port 1 View    Narrative    XR CHEST PORT 1 VIEW 9/20/2022 1:00 PM    HISTORY: coughing up blood    COMPARISON: 1/4/22.      Impression     IMPRESSION: No infiltrate, pleural effusion or pneumothorax. Normal  heart size. TAVR. Mildly tortuous thoracic aorta with atherosclerotic  calcifications.    GRACE HERRON MD         SYSTEM ID:  [id number]   EKG 12-lead, tracing only   Result Value Ref Range    Systolic Blood Pressure  mmHg    Diastolic Blood Pressure  mmHg    Ventricular Rate 71 BPM    Atrial Rate 71 BPM    [place] Interval 214 ms    QRS Duration 78 ms     ms    QTc 436 ms    P Axis 76 degrees    R AXIS 44 degrees    T Axis 73 degrees    Interpretation ECG       Sinus rhythm with 1st degree A-V block  Otherwise normal ECG  When compared with ECG of 22-AUG-2022 07:30,  Criteria for Septal infarct are no longer Present     Glucose by meter   Result Value Ref Range    GLUCOSE BY METER POCT 100 (H) 70 - 99 mg/dL   Glucose by meter   Result Value Ref Range    GLUCOSE BY METER POCT 82 70 - 99 mg/dL   Glucose by meter   Result Value Ref Range    GLUCOSE BY METER POCT 125 (H) 70 - 99 mg/dL   CBC with platelets   Result Value Ref Range    WBC Count 14.6 (H) 4.0 - 11.0 10e3/uL    RBC Count 3.90 (L) 4.40 - 5.90 10e6/uL    Hemoglobin 12.8 (L) 13.3 - 17.7 g/dL    Hematocrit 38.1 (L) 40.0 - 53.0 %    MCV 98 78 - 100 fL    MCH 32.8 26.5 - 33.0 pg    MCHC 33.6 31.5 - 36.5 g/dL    RDW 12.5 10.0 - 15.0 %    Platelet Count 171 150 - 450 10e3/uL   Magnesium   Result Value Ref Range    Magnesium 2.2 1.6 - 2.3 mg/dL   Phosphorus   Result Value Ref Range    Phosphorus 2.8 2.5 - 4.5 mg/dL   Basic metabolic panel   Result Value Ref Range    Sodium 135 133 - 144 mmol/L    Potassium 4.4 3.4 - 5.3 mmol/L    Chloride 104 94 - 109 mmol/L    Carbon Dioxide (CO2) 24 20 - 32 mmol/L    Anion Gap 7 3 - 14 mmol/L    Urea Nitrogen 23 7 - 30 mg/dL    Creatinine 1.54 (H) 0.66 - 1.25 mg/dL    Calcium 8.7 8.5 - 10.1 mg/dL    Glucose 119 (H) 70 - 99 mg/dL    GFR Estimate 44 (L) >60 mL/min/1.73m2   EKG 12-lead, tracing only   Result Value Ref Range    Systolic Blood Pressure  mmHg     Diastolic Blood Pressure  mmHg    Ventricular Rate 80 BPM    Atrial Rate 80 BPM    VT Interval 212 ms    QRS Duration 78 ms     ms    QTc 435 ms    P Axis 80 degrees    R AXIS 30 degrees    T Axis 76 degrees    Interpretation ECG       Sinus rhythm with 1st degree A-V block  Otherwise normal ECG  When compared with ECG of 20-SEP-2022 13:56, (unconfirmed)  No significant change was found

## 2022-09-21 NOTE — PLAN OF CARE
Cardiac Rehab Discharge Summary    Reason for therapy discharge:    All goals and outcomes met, no further needs identified.    Progress towards therapy goal(s). See goals on Care Plan in Epic electronic health record for goal details.  Goals met    Therapy recommendation(s):    Continue home walking program and attend outpatient CR phase II       Karina Ahumada, PT

## 2022-09-21 NOTE — PROGRESS NOTES
Pt denied pain. vitals stable. IV removed w/o s/s of infection. Reviewed/gave d/c instructions including med's, and F/u's. Pt verbalized understanding. Leaving unit with all belongings in wheelchair. New RX sent with pt. transport provided by pts wife.

## 2022-09-21 NOTE — PROGRESS NOTES
09/21/22 0918   Quick Adds   Type of Visit Initial PT Evaluation   Living Environment   People in Home spouse   Current Living Arrangements condominium   Home Accessibility no concerns   Transportation Anticipated car, drives self;family or friend will provide   Self-Care   Usual Activity Tolerance good   Current Activity Tolerance moderate   Regular Exercise Yes   Activity/Exercise Type walking   Exercise Amount/Frequency 20 mins   Equipment Currently Used at Home none   Fall history within last six months no   Activity/Exercise/Self-Care Comment Independent at home w/ wife, she is able to help as needed. Pt likes to mall walk, up to 20 minutes.   General Information   Onset of Illness/Injury or Date of Surgery 09/20/22   Referring Physician Santa Conner, CNP   Patient/Family Therapy Goals Statement (PT) to feel better   Pertinent History of Current Problem (include personal factors and/or comorbidities that impact the POC) Per chart: Luis Daniel Gomez is a 81 year old male who was admitted on 9/20/2022 for elective transcatheter aortic valve replacement. Prior to procedure patient noted to have SAE 0.7 cm2, MG 47 mmHg, and peak velocity 4.4 m/s. LVEF 60-65%. Other notable past medical history is significant for chronic diastolic heart failure, hypertension, hyperlipidemia, carotid artery disease, type II diabetes, and stage III chronic kidney disease.   Existing Precautions/Restrictions fall;cardiac   Weight-Bearing Status - LLE full weight-bearing   Weight-Bearing Status - RLE full weight-bearing   Cognition   Affect/Mental Status (Cognition) WNL   Pain Assessment   Patient Currently in Pain Yes, see Vital Sign flowsheet  (chronic back pain, B foot neuropathy)   Integumentary/Edema   Integumentary/Edema no deficits were identifed   Posture    Posture Protracted shoulders   Range of Motion (ROM)   Range of Motion ROM is WFL   Strength (Manual Muscle Testing)   Strength (Manual Muscle Testing) strength is WFL    Bed Mobility   Bed Mobility no deficits identified   Transfers   Comment, (Transfers) steady to no device   Gait/Stairs (Locomotion)   [place] Level (Gait) supervision   Distance in Feet (Required for LE Total Joints) 100ft   Pattern (Gait) step-through   Comment, (Gait/Stairs) steady without device, occasionally reaching out for wall railing. Tolerated well otherwise   Balance   Balance no deficits were identified   Sensory Examination   Sensory Perception Comments B LE neuropathy   Coordination   Coordination no deficits were identified   Muscle Tone   Muscle Tone no deficits were identified   Clinical Impression   Criteria for Skilled Therapeutic Intervention Yes, treatment indicated   PT Diagnosis (PT) impaired functional mobility   Influenced by the following impairments decreased activity tolerance   Functional limitations due to impairments ambulation   Clinical Presentation (PT Evaluation Complexity) Stable/Uncomplicated   Clinical Presentation Rationale clinical judgement   Clinical Decision Making (Complexity) low complexity   Planned Therapy Interventions (PT) gait training;home exercise program;patient/family education;strengthening;transfer training;progressive activity/exercise   Risk & Benefits of therapy have been explained evaluation/treatment results reviewed;care plan/treatment goals reviewed;risks/benefits reviewed;current/potential barriers reviewed;participants voiced agreement with care plan;participants included;patient   PT Discharge Planning   PT Discharge Recommendation (DC Rec) home with assist;home with outpatient cardiac rehab   PT Rationale for DC Rec Patient presents w/ somewhat decreased functional mobility compared to normal. He is able to tolerate hallway and treadmill ambulation without a device, anticipated to be able to continue working on functional endurance with home walking program and outpatient cardiac rehab.   PT Brief overview of current status SUP   Total  Evaluation Time   Total Evaluation Time (Minutes) 14   Physical Therapy Goals   PT Frequency 2x/day   PT Predicted Duration/Target Date for Goal Attainment 09/28/22   PT Goals Cardiac Phase 1   PT: Understanding of cardiac education to maximize quality of life, condition management, and health outcomes Patient;Caregiver;Verbalize;Demonstrate   PT: Perform aerobic activity with stable cardiovascular response continuous;5 minutes;treadmill   PT: Functional/aerobic ambulation tolerance with stable cardiovascular response in order to return to home and community environment Independent;200 feet   PT: Navigation of stairs simulating home set up with stable cardiovascular response in order to return to home and community environment   (n/a)

## 2022-09-21 NOTE — DISCHARGE SUMMARY
[facility]  Hospitalist Discharge Summary      Date of Admission:  9/20/2022  Date of Discharge:  9/21/2022  2:24 PM  Discharging Provider: Nighat House PA-C  Discharge Service: Hospitalist Service    Discharge Diagnoses   Severe aortic stenosis s/p TAVR  Chronic diastolic heart failure secondary to aortic stenosis    Follow-ups Needed After Discharge   Follow-up with structural clinic in 1 week with repeat BMP.    Cardiac rehab.     Discharge Disposition   Discharged to home  Condition at discharge: Stable    Hospital Course   Luis Daniel Gomez is a 81 year old male with PMH of chronic diastolic heart failure, severe aortic stenosis, HTN, DM2, CKD3, who was admitted following transcatheter aortic valve replacement.      Severe aortic stenosis s/p TAVR  Patient with progressive dyspnea prior to admission and severe aortic stenosis on imaging. Underwent TAVR 9/20/22, left femoral access, reportedly uncomplicated. Mr. Gomez had no post-operative complications.   - TAVR team will continue to follow                - ASA 81mg daily for antiplatelet therapy     Chronic diastolic heart failure, NYHA Class I/II secondary to aortic stenosis  CAD, HTN, HLD  - remained euvolemic on exam - not on diuretic therapy  - LDL 54 - continued atorvastatin 20mg daily  - well-controlled hypertension; continue metoprolol tartrate 50mg BID    Carotid artery disease   - Moderate (50-69%) stenosis of R carotid artery, < 50% stenosis of L carotid artery  - outpatient follow-up     DM2: A1c 5.5%. Not on any antidiabetic meds PTA.  - No glucose checks needed     CKD3: Baseline Creatinine around 1.4-1.6  - Cr 1.54 day of discharge.      Insomnia: Continue PTA amitriptyline     COVID-19 ASYMPTOMATIC testing negative 9/16/2022.     DVT Prophylaxis: Pneumatic Compression Devices    Consultations This Hospital Stay   CARDIAC REHAB IP CONSULT  HOSPITALIST IP CONSULT    Code Status   Full Code    Time Spent on this  Encounter   I, Nighat House PA-C, personally saw the patient today and spent greater than 30 minutes discharging this patient.       Nighat House PA-C  [facility] COR[facility]  [address], [address]  [place] [address]  Phone: [phone]  ______________________________________________________________________    Physical Exam   Vital Signs: Temp: 98.7  F (37.1  C) Temp src: Oral BP: (!) 144/59 Pulse: 89   Resp: 16 SpO2: 94 % O2 Device: None (Room air)    Weight: 206 lbs 0 oz  Constitutional: WD/WN, adult,  male; lying comfortably in bed in NAD.   HEENT: Normocephalic, atraumatic, oral mucosa moist, hearing grossly intact  Respiratory: CTAB anterolaterally  Cardiovascular: RRR, normal S1/2  GI: nontender, nondistended  Vascular: No lower extremity pitting edema  Skin: No rashes, bilateral femoral puncture sites with dressings c/d/i  Musculoskeletal: Moves all extremities  Neurologic: A&Ox3  Psychiatric: Appropriate affect and mood       Primary Care Physician   Eliza Rogers    Discharge Orders      CARDIAC REHAB REFERRAL      Follow-up and recommended labs and tests     Outpatient cardiac rehab.   Follow-up with structural clinic in 1 week with repeat BMP.     Activity    Your activity upon discharge: as tolerated per cardiology recs and protocols.     Reason for your hospital stay    Elective transcatheter aortic valve replacement.     Monitor and record    Weigh yourself every morning     When to contact your care team    Contact your care team If increased shortness of breath, If unable to lie down for sleep due to symptoms, Increased swelling in your ankles or legs, and Dizziness or lightheadedness     Discharge Follow Up - with Primary Care clinic within 3-5 days (RN to schedule prior to d/c for HE/[facility] primary care     Add follow up information to the AVS prior to printing     Low Saturated Fat Na <2400 mg       Significant Results and Procedures    Results for orders placed or performed during the hospital encounter of 22   XR Chest Port 1 View    Narrative    XR CHEST PORT 1 VIEW 2022 1:00 PM    HISTORY: coughing up blood    COMPARISON: 22.      Impression    IMPRESSION: No infiltrate, pleural effusion or pneumothorax. Normal  heart size. TAVR. Mildly tortuous thoracic aorta with atherosclerotic  calcifications.    GRACE HERRON MD         SYSTEM ID:  [id number]   Echo Limited     Value    LVEF  65-70%    Narrative    [id number]  [id number]  [id number]  [id number]^KAYLA^JIM^ANGELLA     [facility]  Echocardiography Laboratory  [address] [place] [address]     Name: CHANDA BARBA  MRN: [id number]  : 1940  Study Date: 2022 08:22 AM  Age: 81 yrs  Gender: Male  Patient Location: [facility]  Reason For Study: Aortic Valve Replacement  Ordering Physician: JIM GARZA  Referring Physician: JOANNA OLIVA  Performed By: Emiliano Vivas     BSA: 2.1 m2  Height: 70 in  Weight: 206 lb  HR: 79  BP: 129/54 mmHg  ______________________________________________________________________________  Procedure  Limited Portable Echo Adult.  ______________________________________________________________________________  Interpretation Summary     Technically challenging study due to patient body habitus.     Status post TAVR with a 29 mm Anaya Felicia 3 bioprosthetic valve in the  aortic position. Normal gradients, Vmax 1.9 m/s, mean gradient 6 mmHg, DI  0.68. No obvious aortic insufficiency, short-axis view not seen.     Left ventricular systolic function is normal to borderline hyperdynamic. The  visual ejection fraction is 65-70%.  Right ventricle is not well visualized, however it appears mildly dilated and  global systolic function is probably normal, appears similar to 2022.  ______________________________________________________________________________  Left Ventricle  The left ventricle is not well visualized.  Left ventricular systolic function  is normal. The visual ejection fraction is 65-70%. Regional wall motion  abnormalities cannot be excluded due to limited visualization.     Right Ventricle  The right ventricle is not well visualized. Right ventricle is not well  visualized, however it appears mildly dilated and global systolic function is  probably normal.     Aortic Valve  There is a bioprosthetic aortic valve. Status post TAVR with a 29 mm Anaya  Felicia 3 bioprosthetic valve in the aortic position. Normal gradients, Vmax  1.9 m/s, mean gradient 6 mmHg, DI 0.68. No obvious aortic insufficiency,  short-axis view not seen.     Pericardium  There is no pericardial effusion.     ______________________________________________________________________________  Doppler Measurements & Calculations     Ao V2 max: 188.7 cm/sec  Ao max P.0 mmHg  Ao V2 mean: 115.3 cm/sec  Ao mean P.2 mmHg  Ao V2 VTI: 35.7 cm  LV V1 max P.6 mmHg  LV V1 max: 128.5 cm/sec  LV V1 VTI: 27.1 cm  AV Adilson Ratio (DI): 0.68     ______________________________________________________________________________  Report approved by: Tish Medellin 2022 10:28 AM         Cardiac Catheterization    Narrative      Successful TAVR with 29 mm Anaya Ultra Valve with no PVL            Discharge Medications   Current Discharge Medication List      START taking these medications    Details   furosemide (LASIX) 20 MG tablet Take 1 tablet (20 mg) by mouth daily  Qty: 90 tablet, Refills: 1    Associated Diagnoses: S/P TAVR (transcatheter aortic valve replacement); HTN (hypertension), benign      nitroGLYcerin (NITROSTAT) 0.4 MG sublingual tablet For chest pain place 1 tablet under the tongue every 5 minutes for 3 doses. If symptoms persist 5 minutes after 1st dose call 911.  Qty: 10 tablet, Refills: 0    Associated Diagnoses: S/P TAVR (transcatheter aortic valve replacement)         CONTINUE these medications which have CHANGED    Details   aspirin  (ASA) 81 MG EC tablet Take 1 tablet (81 mg) by mouth daily  Qty: 90 tablet, Refills: 3    Associated Diagnoses: Severe aortic stenosis; Aortic stenosis, severe; Status post coronary angiogram         CONTINUE these medications which have NOT CHANGED    Details   albuterol (VENTOLIN HFA) 108 (90 Base) MCG/ACT inhaler INHALE 2 PUFFS INTO THE LUNGS EVERY 6 HOURS AS NEEDED FOR SHORTNESS OF BREATH OR DIFFICULT BREATHING OR WHEEZING  Qty: 18 g, Refills: 3    Comments: Pharmacy may dispense brand covered by insurance (Proair, or proventil or ventolin or generic albuterol inhaler)  Associated Diagnoses: Acute bronchospasm      amitriptyline (ELAVIL) 25 MG tablet Take 1 tablet (25 mg) by mouth At Bedtime  Qty: 90 tablet, Refills: 0    Comments: Schedule follow up office visit for future refills  Associated Diagnoses: Insomnia, unspecified type      atorvastatin (LIPITOR) 20 MG tablet Take 1 tablet (20 mg) by mouth every evening  Qty: 90 tablet, Refills: 3    Associated Diagnoses: Hyperlipidemia with target LDL less than 100      cyabnocobalamin (VITAMIN B-12) 2500 MCG sublingual tablet Place 3,000 mcg under the tongue daily   Qty: 30 tablet      finasteride (PROSCAR) 5 MG tablet Take 1 tablet (5 mg) by mouth daily  Qty: 90 tablet, Refills: 3    Associated Diagnoses: Benign non-nodular prostatic hyperplasia with lower urinary tract symptoms      fluticasone (FLONASE) 50 MCG/ACT nasal spray Spray 1-2 sprays into both nostrils daily  Qty: 48 mL, Refills: 3    Associated Diagnoses: Chronic nasal congestion      loratadine (CLARITIN) 10 MG tablet Take 1 tablet (10 mg) by mouth daily  Qty: 30 tablet, Refills: 1      metoprolol tartrate (LOPRESSOR) 50 MG tablet Take 1 tablet (50 mg) by mouth 2 times daily  Qty: 270 tablet, Refills: 3    Associated Diagnoses: HTN (hypertension), benign      Multiple Vitamin (MULTI-VITAMIN) per tablet Take 1 tablet by mouth daily.      polyethylene glycol (MIRALAX) 17 g packet Take 1 packet by mouth  "daily      tamsulosin (FLOMAX) 0.4 MG capsule Take 0.8 mg by mouth daily (2 x 0.4 mg = 0.8 mg)      Turmeric (QC TUMERIC COMPLEX PO) Take 2 chew tab by mouth daily           Allergies   Allergies   Allergen Reactions     Lisinopril Shortness Of Breath and Fatigue     Amoxicillin Itching     Duloxetine      \"I can't take it\"     Levofloxacin      \"I can't take the 500 mgs\"     Neurontin [Gabapentin] Swelling     edema     Norvasc [Amlodipine] Swelling     edema     Oxycodone GI Disturbance     Penicillins Itching     Red Yeast Rice Extract [[place] Purpureus Went Yeast] Hives and Itching     Tramadol Itching     Verapamil Itching     Vicodin [Hydrocodone-Acetaminophen] Itching     "

## 2022-09-22 ENCOUNTER — PATIENT OUTREACH (OUTPATIENT)
Dept: CARE COORDINATION | Facility: CLINIC | Age: 82
End: 2022-09-22

## 2022-09-22 NOTE — PROGRESS NOTES
"Clinic Care Coordination Contact  [facility]: Post-Discharge Note  SITUATION                                                      Admission:    Admission Date: 09/20/22   Reason for Admission: TAVR  Discharge:   Discharge Date: 09/21/22  Discharge Diagnosis: Severe aortic stenosis s/p TAVR, Chronic diastolic heart failure secondary to aortic stenosis, CAD, HTN, HLD, DM2, CKD3, Insomnia    BACKGROUND                                                      Per hospital discharge summary and inpatient provider notes:    Luis Daniel Gomez is a 81 year old male who presents following an uncomplicated TAVR procedure earlier today. He is currently bedrest. He feels well and has no complaints. He denies any pain or discomfort. His wife is at bedside and updated as well.    ASSESSMENT      Discharge Assessment  How are you doing now that you are home?: \"Doing quite well, the device that monitors my hear beeps every now and then\" (Pt stated he has the phone number for the company that makes the device. CHW also provided phone number for [facility] to call with questions about the beeping noise the monitor makes)  How are your symptoms? (Red Flag symptoms escalate to triage hotline per guidelines): Improved  Do you feel your condition is stable enough to be safe at home until your provider visit?: Yes  Does the patient have their discharge instructions? : Yes  Does the patient have questions regarding their discharge instructions? : No  Were you started on any new medications or were there changes to any of your previous medications? : Yes  Does the patient have all of their medications?: Yes  Do you have questions regarding any of your medications? : No  Do you have all of your needed medical supplies or equipment (DME)?  (i.e. oxygen tank, CPAP, cane, etc.): Yes  Discharge follow-up appointment scheduled within 14 calendar days? : Yes  Discharge Follow Up Appointment Date: 09/26/22  Discharge Follow Up " Appointment Scheduled with?: Primary Care Provider    Post-op ([facility] CTA Only)  If the patient had a surgery or procedure, do they have any questions for a nurse?: No    PLAN                                                      Outpatient Plan:      Outpatient cardiac rehab.  Follow-up with structural clinic in 1 week with repeat BMP.    Future Appointments   Date Time Provider Department Center   9/26/2022 11:30 AM Joaquín Sorto APRN CNP CSFPIM CS   9/28/2022  1:10 PM [facility]   9/29/2022  1:00 PM 2, Sh Cardiac Rehab SHCR [facility] [place]   10/19/2022 10:00 AM [id number] SHCVCV CVIMG   10/19/2022 11:15 AM PINZON LAB SHCLB [facility] [place]   10/19/2022  1:10 PM [facility]   10/26/2022 11:30 AM Eliza Rogers MD CSFPIM CS   1/4/2023  8:15 AM CS LAB CSLABR CS   1/11/2023 11:30 AM Eliza Rogers MD CSFPIM CS         For any urgent concerns, please contact our 24 hour nurse triage line: [phone] ([phone])       Linsey Solomon  Community Health Worker  [facility]  Ph: [phone]

## 2022-09-25 ENCOUNTER — TELEPHONE (OUTPATIENT)
Dept: VASCULAR ULTRASOUND | Facility: CLINIC | Age: 82
End: 2022-09-25

## 2022-09-25 LAB
ATRIAL RATE - MUSE: 71 BPM
ATRIAL RATE - MUSE: 80 BPM
DIASTOLIC BLOOD PRESSURE - MUSE: NORMAL MMHG
DIASTOLIC BLOOD PRESSURE - MUSE: NORMAL MMHG
INTERPRETATION ECG - MUSE: NORMAL
INTERPRETATION ECG - MUSE: NORMAL
P AXIS - MUSE: 76 DEGREES
P AXIS - MUSE: 80 DEGREES
PR INTERVAL - MUSE: 212 MS
PR INTERVAL - MUSE: 214 MS
QRS DURATION - MUSE: 78 MS
QRS DURATION - MUSE: 78 MS
QT - MUSE: 378 MS
QT - MUSE: 402 MS
QTC - MUSE: 435 MS
QTC - MUSE: 436 MS
R AXIS - MUSE: 30 DEGREES
R AXIS - MUSE: 44 DEGREES
SYSTOLIC BLOOD PRESSURE - MUSE: NORMAL MMHG
SYSTOLIC BLOOD PRESSURE - MUSE: NORMAL MMHG
T AXIS - MUSE: 73 DEGREES
T AXIS - MUSE: 76 DEGREES
VENTRICULAR RATE- MUSE: 71 BPM
VENTRICULAR RATE- MUSE: 80 BPM

## 2022-09-25 NOTE — TELEPHONE ENCOUNTER
Was called by Shamar about episode of AF with  bpm at 1918 pm yesterday. Patient not anticoagulated. I left a staff notification to his primary cardiologist Dr. Knight. Called patient and left voicemail.     Osvaldo Dee MD.

## 2022-09-26 ENCOUNTER — OFFICE VISIT (OUTPATIENT)
Dept: FAMILY MEDICINE | Facility: CLINIC | Age: 82
End: 2022-09-26
Payer: COMMERCIAL

## 2022-09-26 ENCOUNTER — TELEPHONE (OUTPATIENT)
Dept: CARDIOLOGY | Facility: CLINIC | Age: 82
End: 2022-09-26

## 2022-09-26 VITALS
TEMPERATURE: 97.7 F | BODY MASS INDEX: 29.49 KG/M2 | WEIGHT: 206 LBS | HEIGHT: 70 IN | HEART RATE: 80 BPM | DIASTOLIC BLOOD PRESSURE: 77 MMHG | RESPIRATION RATE: 16 BRPM | OXYGEN SATURATION: 93 % | SYSTOLIC BLOOD PRESSURE: 148 MMHG

## 2022-09-26 DIAGNOSIS — R05.9 COUGH: ICD-10-CM

## 2022-09-26 DIAGNOSIS — Z95.2 S/P TAVR (TRANSCATHETER AORTIC VALVE REPLACEMENT): ICD-10-CM

## 2022-09-26 DIAGNOSIS — Z09 HOSPITAL DISCHARGE FOLLOW-UP: Primary | ICD-10-CM

## 2022-09-26 PROCEDURE — U0003 INFECTIOUS AGENT DETECTION BY NUCLEIC ACID (DNA OR RNA); SEVERE ACUTE RESPIRATORY SYNDROME CORONAVIRUS 2 (SARS-COV-2) (CORONAVIRUS DISEASE [COVID-19]), AMPLIFIED PROBE TECHNIQUE, MAKING USE OF HIGH THROUGHPUT TECHNOLOGIES AS DESCRIBED BY CMS-2020-01-R: HCPCS | Performed by: NURSE PRACTITIONER

## 2022-09-26 PROCEDURE — U0005 INFEC AGEN DETEC AMPLI PROBE: HCPCS | Performed by: NURSE PRACTITIONER

## 2022-09-26 PROCEDURE — 99213 OFFICE O/P EST LOW 20 MIN: CPT | Performed by: NURSE PRACTITIONER

## 2022-09-26 ASSESSMENT — PAIN SCALES - GENERAL: PAINLEVEL: NO PAIN (0)

## 2022-09-26 NOTE — PROGRESS NOTES
"  Assessment & Plan   Problem List Items Addressed This Visit    None     Visit Diagnoses     Hospital discharge follow-up    -  Primary    S/P TAVR (transcatheter aortic valve replacement)        Cough        Relevant Orders    Symptomatic; Unknown COVID-19 Virus (Coronavirus) by PCR Nasopharyngeal         Doing really well since discharge after TAVR. Has really no complaints other than a mild cough that is not new. He has just very slight wheezing of RLL but denies SOB. We will test for covid just to be sure. Otherwise he feels comfortable continuing without further intervention. Of note, he has an event monitor that showed afib. He does have known afib so this is not new.  He will follow-up with cardiology in 2 days as planned.       HARSHA Dorantes CNP  [facility] is a 81 year old accompanied by his spouse, presenting for the following health issues:  Hospital F/U      HPI     Post Discharge Outreach 9/22/2022   Admission Date 9/20/2022   Reason for Admission TAVR   Discharge Date 9/21/2022   Discharge Diagnosis Severe aortic stenosis s/p TAVR, Chronic diastolic heart failure secondary to aortic stenosis, CAD, HTN, HLD, DM2, CKD3, Insomnia   How are you doing now that you are home? \"Doing quite well, the device that monitors my hear beeps every now and then\"   How are your symptoms? (Red Flag symptoms escalate to triage hotline per guidelines) Improved   Do you feel your condition is stable enough to be safe at home until your provider visit? Yes   Does the patient have their discharge instructions?  Yes   Does the patient have questions regarding their discharge instructions?  No   Were you started on any new medications or were there changes to any of your previous medications?  Yes   Does the patient have all of their medications? Yes   Do you have questions regarding any of your medications?  No   Do you have all of your needed medical supplies or equipment " "(DME)?  (i.e. oxygen tank, CPAP, cane, etc.) Yes   Discharge follow-up appointment scheduled within 14 calendar days?  Yes   Discharge Follow Up Appointment Date 9/26/2022   Discharge Follow Up Appointment Scheduled with? Primary Care Provider     Hospital Follow-up Visit:    Hospital/Nursing Home/IP Rehab Facility: [facility]  Date of Admission: 09/20/2022  Date of Discharge: 09/21/2022  Reason(s) for Admission:        Severe aortic stenosis s/p TAVR  Chronic diastolic heart failure secondary to aortic stenosis    Was your hospitalization related to COVID-19? No   Problems taking medications regularly:  None  Medication changes since discharge: None  Problems adhering to non-medication therapy:  None    Summary of hospitalization:  [facility] discharge summary reviewed  Diagnostic Tests/Treatments reviewed.  Follow up needed: none  Other Healthcare Providers Involved in Patient s Care:         Specialist appointment - cardiology  Update since discharge: improved.   Post Medication Reconciliation Status: Discharge medications reconciled, continue medications without change      Plan of care communicated with patient and family       Annette had a TAVR and is feeling really good since discharge home. He has a slight cough that isn't new.    No chest heaviness, SOB       Has a history of afib.   He currently has an event monitor for 30 days. He got a call that the monitor found afib. The pt is currently on 81mg ASA       Review of Systems   Detailed as above         Objective    BP (!) 148/77 (BP Location: Right arm, Patient Position: Sitting, Cuff Size: Adult Regular)   Pulse 80   Temp 97.7  F (36.5  C) (Temporal)   Resp 16   Ht 1.778 m (5' 10\")   Wt 93.4 kg (206 lb)   SpO2 93%   BMI 29.56 kg/m    Body mass index is 29.56 kg/m .  Physical Exam  Constitutional:       Appearance: Normal appearance.   Cardiovascular:      Rate and Rhythm: Normal rate and regular rhythm. "   Pulmonary:      Effort: Pulmonary effort is normal.      Breath sounds: Wheezing (faint right lower lobe) present.      Comments: Single dry cough noted  Neurological:      Mental Status: He is alert.   Psychiatric:         Mood and Affect: Mood normal.

## 2022-09-26 NOTE — TELEPHONE ENCOUNTER
Received event monitor strip from patient's [employer] monitor for new onset afib with rate of 109bpm. Called patient to discuss. He states that he was asymptomatic with this. He notes that he has had afib in the past and experiences irregular heart rhythms on and off for many years. He is not on anticoagulation.   Updated Santa Conner NP of finding. Patient is already scheduled to see Santa 9/28/22.

## 2022-09-27 LAB — SARS-COV-2 RNA RESP QL NAA+PROBE: NEGATIVE

## 2022-09-28 ENCOUNTER — LAB (OUTPATIENT)
Dept: LAB | Facility: CLINIC | Age: 82
End: 2022-09-28
Payer: COMMERCIAL

## 2022-09-28 ENCOUNTER — OFFICE VISIT (OUTPATIENT)
Dept: CARDIOLOGY | Facility: CLINIC | Age: 82
End: 2022-09-28
Payer: COMMERCIAL

## 2022-09-28 VITALS
HEART RATE: 96 BPM | SYSTOLIC BLOOD PRESSURE: 130 MMHG | OXYGEN SATURATION: 95 % | BODY MASS INDEX: 29.42 KG/M2 | HEIGHT: 70 IN | WEIGHT: 205.5 LBS | DIASTOLIC BLOOD PRESSURE: 62 MMHG

## 2022-09-28 DIAGNOSIS — Z95.2 S/P TAVR (TRANSCATHETER AORTIC VALVE REPLACEMENT): Primary | ICD-10-CM

## 2022-09-28 DIAGNOSIS — I48.91 ATRIAL FIBRILLATION, UNSPECIFIED TYPE (H): ICD-10-CM

## 2022-09-28 DIAGNOSIS — Z95.2 S/P TAVR (TRANSCATHETER AORTIC VALVE REPLACEMENT): ICD-10-CM

## 2022-09-28 LAB
ANION GAP SERPL CALCULATED.3IONS-SCNC: 6 MMOL/L (ref 3–14)
BUN SERPL-MCNC: 20 MG/DL (ref 7–30)
CALCIUM SERPL-MCNC: 9.6 MG/DL (ref 8.5–10.1)
CHLORIDE BLD-SCNC: 100 MMOL/L (ref 94–109)
CO2 SERPL-SCNC: 31 MMOL/L (ref 20–32)
CREAT SERPL-MCNC: 1.44 MG/DL (ref 0.66–1.25)
ERYTHROCYTE [DISTWIDTH] IN BLOOD BY AUTOMATED COUNT: 12 % (ref 10–15)
GFR SERPL CREATININE-BSD FRML MDRD: 48 ML/MIN/1.73M2
GLUCOSE BLD-MCNC: 96 MG/DL (ref 70–99)
HCT VFR BLD AUTO: 43.2 % (ref 40–53)
HGB BLD-MCNC: 14.5 G/DL (ref 13.3–17.7)
MCH RBC QN AUTO: 32.4 PG (ref 26.5–33)
MCHC RBC AUTO-ENTMCNC: 33.6 G/DL (ref 31.5–36.5)
MCV RBC AUTO: 96 FL (ref 78–100)
PLATELET # BLD AUTO: 266 10E3/UL (ref 150–450)
POTASSIUM BLD-SCNC: 4.6 MMOL/L (ref 3.4–5.3)
RBC # BLD AUTO: 4.48 10E6/UL (ref 4.4–5.9)
SODIUM SERPL-SCNC: 137 MMOL/L (ref 133–144)
WBC # BLD AUTO: 12.2 10E3/UL (ref 4–11)

## 2022-09-28 PROCEDURE — 36415 COLL VENOUS BLD VENIPUNCTURE: CPT | Performed by: INTERNAL MEDICINE

## 2022-09-28 PROCEDURE — 99214 OFFICE O/P EST MOD 30 MIN: CPT | Performed by: NURSE PRACTITIONER

## 2022-09-28 PROCEDURE — 85027 COMPLETE CBC AUTOMATED: CPT | Performed by: INTERNAL MEDICINE

## 2022-09-28 PROCEDURE — 80048 BASIC METABOLIC PNL TOTAL CA: CPT | Performed by: INTERNAL MEDICINE

## 2022-09-28 PROCEDURE — 93000 ELECTROCARDIOGRAM COMPLETE: CPT | Performed by: NURSE PRACTITIONER

## 2022-09-28 NOTE — PROGRESS NOTES
Cardiology Clinic Progress Note  Luis Daniel Gomez MRN# [id number]   YOB: 1940 Age: 81 year old     Primary cardiologist: Dr. Knight    Reason for visit: 1-week post-TAVR    History of presenting illness:    Luis Daniel Gomez is a pleasant 81 year old patient with past medical history significant for hypertension, hyperlipidemia, carotid artery stenosis, chronic diastolic heart failure, type 2 diabetes, stage III chronic kidney disease, and severe aortic stenosis who underwent transcatheter aortic valve replacement with 29 mm Anaya MICHELINE 3 valve on 9/20/2022.  He is here today for his 1 week follow-up.    There were no periprocedural complications.  Post procedure echocardiogram showed normal LV function and normal prosthetic aortic valve gradient, and no paravalvular leak.  His LVEDP was 30 mmHg. He was appropriately diuresed with IV Lasix and started on oral Lasix 20 mg daily. He was sent home on 30-day event monitor for underlying first-degree AV block.  He is on aspirin 81 mg daily for antiplatelet therapy.    Since that time, his event monitor reported atrial fibrillation which is new for him.  Although he tells me he was diagnosed with atrial fibrillation in 80s, but I have no records to confirm this.  He is asymptomatic.  He otherwise denies any chest discomfort, dyspnea on exertion, syncope or near syncope, or palpitations.  He reports a significant improvement in his breathing and activity levels.  His blood pressure is well controlled.    EKG today shows sinus rhythm with a heart rate of 74.    His labs today show creatinine 1.44, normal electrolytes, and normal hemoglobin and platelet levels.         Assessment and Plan:     ASSESSMENT:    1. Severe aortic stenosis s/p TAVR. Tolerating activity without symptoms, on aspirin 81 mg daily.   2. Atrial fibrillation. [id number] of 5 (age++, HTN +, vascular disease +, DM+). No history of unexplained falls, seizures, tripping, syncope. No history of  significant abnormal bleeding or known contraindication to anticoagulation. We discussed the pathophysiology and natural history of afib. Rate vs rhythm control strategy and indication for anticoagulation. He has no history of abnormal bleeding or fall risk.   3. Hypertension. Well-controlled on current regimen.   4. Hyperlipidemia. LDL 54 on atorvastatin 20 mg daily.   5. Chronic diastolic heart failure, NYHA class I. Appears compensated on exam, on lasix 20 mg daily.   6. Carotid artery disease. 50-69% stenosis of R carotid artery, <50% stenosis of left carotid.   7. Stage III chronic kidney disease. Baseline cr 1.5-1.6.   8. Type II diabetes. Diet controlled.       PLAN:     1. Start apixaban 5 mg BID for stroke prophylaxis.   2. STOP aspirin 81 mg daily.  3. We discussed the need for lifelong antibiotic prophylaxis prior to any dental procedure.  4. Start patient cardiac rehab.  5. Follow-up with me in 1 month with repeat echocardiogram, EKG, and labs at that time.         Santa Conner, CHELO, APRN, CNP  Page: [phone] (8a-5p M-F)    Orders this Visit:  Orders Placed This Encounter   Procedures     EKG 12-lead complete w/read - Clinics (performed today)     Orders Placed This Encounter   Medications     apixaban ANTICOAGULANT (ELIQUIS) 5 MG tablet     Sig: Take 1 tablet (5 mg) by mouth 2 times daily     Dispense:  180 tablet     Refill:  3     Medications Discontinued During This Encounter   Medication Reason     aspirin (ASA) 81 MG EC tablet        Today's clinic visit entailed:  Review of the result(s) of each unique test - event monitor, echo, labs, ekg  Ordering of each unique test  Prescription drug management  35 minutes spent on the date of the encounter doing chart review, review of test results, interpretation of tests, patient visit and documentation   Provider  Link to [facility] Help Grid     The level of medical decision making during this visit was of moderate complexity.           Review of Systems:  "    Review of Systems:  Skin:        Eyes:       ENT:       Respiratory:       Cardiovascular:       Gastroenterology:      Genitourinary:       Musculoskeletal:       Neurologic:       Psychiatric:       Heme/Lymph/Imm:       Endocrine:                 Physical Exam:   Vitals: /62   Pulse 96   Ht 1.778 m (5' 10\")   Wt 93.2 kg (205 lb 8 oz)   SpO2 95%   BMI 29.49 kg/m    Constitutional:  cooperative        Skin:  warm and dry to the touch        Head:  normocephalic        Eyes:  pupils equal and round        ENT:  not assessed this visit        Neck:  JVP normal        Chest:  clear to auscultation;normal symmetry        Cardiac: regular rhythm;normal S1 and S2       systolic murmur;grade 1          Abdomen:  abdomen soft        Vascular: pulses full and equal                                      Extremities and Back:  no edema        Neurological:  no gross motor deficits;affect appropriate             Medications:     Current Outpatient Medications   Medication Sig Dispense Refill     albuterol (VENTOLIN HFA) 108 (90 Base) MCG/ACT inhaler INHALE 2 PUFFS INTO THE LUNGS EVERY 6 HOURS AS NEEDED FOR SHORTNESS OF BREATH OR DIFFICULT BREATHING OR WHEEZING 18 g 3     amitriptyline (ELAVIL) 25 MG tablet Take 1 tablet (25 mg) by mouth At Bedtime 90 tablet 0     apixaban ANTICOAGULANT (ELIQUIS) 5 MG tablet Take 1 tablet (5 mg) by mouth 2 times daily 180 tablet 3     atorvastatin (LIPITOR) 20 MG tablet Take 1 tablet (20 mg) by mouth every evening 90 tablet 3     cyabnocobalamin (VITAMIN B-12) 2500 MCG sublingual tablet Place 3,000 mcg under the tongue daily  30 tablet      finasteride (PROSCAR) 5 MG tablet Take 1 tablet (5 mg) by mouth daily 90 tablet 3     fluticasone (FLONASE) 50 MCG/ACT nasal spray Spray 1-2 sprays into both nostrils daily 48 mL 3     furosemide (LASIX) 20 MG tablet Take 1 tablet (20 mg) by mouth daily 90 tablet 1     loratadine (CLARITIN) 10 MG tablet Take 1 tablet (10 mg) by mouth daily 30 " tablet 1     metoprolol tartrate (LOPRESSOR) 50 MG tablet Take 1 tablet (50 mg) by mouth 2 times daily 270 tablet 3     Multiple Vitamin (MULTI-VITAMIN) per tablet Take 1 tablet by mouth daily.       nitroGLYcerin (NITROSTAT) 0.4 MG sublingual tablet For chest pain place 1 tablet under the tongue every 5 minutes for 3 doses. If symptoms persist 5 minutes after 1st dose call 911. 10 tablet 0     polyethylene glycol (MIRALAX) 17 g packet Take 1 packet by mouth daily       tamsulosin (FLOMAX) 0.4 MG capsule Take 0.8 mg by mouth daily (2 x 0.4 mg = 0.8 mg)  **pt reports taking 2 a day**       Turmeric (QC TUMERIC COMPLEX PO) Take 2 chew tab by mouth daily         Family History   Problem Relation Age of Onset     C.A.D. Father      Diabetes Father      Cancer - colorectal Mother 80     Cancer - colorectal Maternal Grandmother        Social History     Socioeconomic History     Marital status:      Spouse name: Not on file     Number of children: Not on file     Years of education: Not on file     Highest education level: Not on file   Occupational History     Not on file   Tobacco Use     Smoking status: Former Smoker     Packs/day: 1.00     Years: 56.00     Pack years: 56.00     Types: Cigarettes     Quit date: 2008     Years since quittin.7     Smokeless tobacco: Never Used     Tobacco comment: quit    Substance and Sexual Activity     Alcohol use: No     Alcohol/week: 0.0 standard drinks     Drug use: No     Sexual activity: Yes     Partners: Female   Other Topics Concern      Service Not Asked     Blood Transfusions Not Asked     Caffeine Concern Yes     Comment: one cup caffeine per day     Occupational Exposure Not Asked     Hobby Hazards Not Asked     Sleep Concern No     Stress Concern Not Asked     Weight Concern Not Asked     Special Diet No     Back Care Not Asked     Exercise Yes     Comment: walks daily 30 minutes     Bike Helmet Not Asked     Seat Belt Not Asked     Self-Exams  Not Asked     Parent/sibling w/ CABG, MI or angioplasty before 65F 55M? No   Social History Narrative    , 2 kids, retired. Wife (Khalida).     Social Determinants of Health     Financial Resource Strain: Not on file   Food Insecurity: Not on file   Transportation Needs: Not on file   Physical Activity: Not on file   Stress: Not on file   Social Connections: Not on file   Intimate Partner Violence: Not on file   Housing Stability: Not on file            Past Medical History:     Past Medical History:   Diagnosis Date     Aortic stenosis, severe 9/20/2022     Arthritis      BPH (benign prostatic hyperplasia)      CKD (chronic kidney disease) stage 3, GFR 30-59 ml/min (H)      Cluster headache 2015    Dr. Roth     Constipation      Dyslipidemia      Dyspnea     Normal nuc stress test 9-15-08, fainted once in life     Fatty liver     Ultrasound 11/11     FH: colon cancer     colonoscopy 10/10, repeat 5 years.     Former smoker     quit 2008     HTN (hypertension), benign      Peripheral neuropathy     Dr. Patricia     Spinal stenosis     Last MRI 2010, Dr. Fraire     Syncope      Type 2 diabetes mellitus (H)     diet controlled     Ureterolithiases               Past Surgical History:     Past Surgical History:   Procedure Laterality Date     COLONOSCOPY       CV CORONARY ANGIOGRAM Bilateral 8/22/2022    Procedure: Coronary Angiogram;  Surgeon: Dave Mao MD;  Location: [facility] CARDIAC CATH LAB     CV TRANSCATHETER AORTIC VALVE REPLACEMENT-FEMORAL APPROACH N/A 9/20/2022    Procedure: Transcatheter Aortic Valve Replacement-Femoral Approach;  Surgeon: Wilson Martinez MD;  Location: [facility] CARDIAC CATH LAB     KERATOTOMY ARCUATE WITH FEMTOSECOND LASER/IMAGING FOR ATIOL Left 8/29/2016    Procedure: KERATOTOMY ARCUATE WITH FEMTOSECOND LASER/IMAGING FOR ATIOL;  Surgeon: Gerard Larson MD;  Location: [facility]     ORTHOPEDIC SURGERY       PHACOEMULSIFICATION CLEAR CORNEA WITH STANDARD INTRAOCULAR  LENS IMPLANT Left 8/29/2016    Procedure: PHACOEMULSIFICATION CLEAR CORNEA WITH STANDARD INTRAOCULAR LENS IMPLANT;  Surgeon: Saras[place], Gerard PRESTON MD;  Location: [facility] surg                Allergies:   Lisinopril, Amoxicillin, Duloxetine, Levofloxacin, Neurontin [gabapentin], Norvasc [amlodipine], Oxycodone, Penicillins, Red yeast rice extract [monascus purpureus went yeast], Tramadol, Verapamil, and Vicodin [hydrocodone-acetaminophen]       Data:   All laboratory data reviewed:    Recent Labs   Lab Test 09/09/22  1358 08/03/22  0808 12/28/21  0824 12/17/20  0901 12/11/19  0716 12/12/18  0715 12/08/17  0734 06/26/17  0000 11/16/16  1530   LDL  --  54 84 54 74   < > 47  --   --    HDL  --  40 47 36* 41   < > 40  --   --    NHDL  --  86 97 98 101   < > 95  --   --    CHOL  --  126 144 134 142   < > 135   < >  --    TRIG  --  162* 67 218* 137   < > 241*   < >  --    TSH  --   --   --  2.97 4.53*  --  3.83  --   --    NTBNP 842  --   --   --   --   --   --   --  209    < > = values in this interval not displayed.       Lab Results   Component Value Date    WBC 12.2 (H) 09/28/2022    WBC 9.7 12/17/2020    RBC 4.48 09/28/2022    RBC 4.52 12/17/2020    HGB 14.5 09/28/2022    HGB 15.3 04/21/2021    HCT 43.2 09/28/2022    HCT 44.8 12/17/2020    MCV 96 09/28/2022    MCV 99 12/17/2020    MCH 32.4 09/28/2022    MCH 32.7 12/17/2020    MCHC 33.6 09/28/2022    MCHC 33.0 12/17/2020    RDW 12.0 09/28/2022    RDW 12.9 12/17/2020     09/28/2022     12/17/2020       Lab Results   Component Value Date     09/28/2022     04/21/2021    POTASSIUM 4.6 09/28/2022    POTASSIUM 5.1 04/21/2021    CHLORIDE 100 09/28/2022    CHLORIDE 105 04/21/2021    CO2 31 09/28/2022    CO2 29 04/21/2021    ANIONGAP 6 09/28/2022    ANIONGAP 4 04/21/2021    GLC 96 09/28/2022     (H) 04/21/2021    BUN 20 09/28/2022    BUN 27 04/21/2021    CR 1.44 (H) 09/28/2022    CR 1.78 (H) 04/21/2021    GFRESTIMATED 48 (L) 09/28/2022     GFRESTIMATED 40 (L) 08/16/2022    GFRESTIMATED 35 (L) 04/21/2021    GFRESTBLACK 40 (L) 04/21/2021    SANTY 9.6 09/28/2022    SANTY 9.2 04/21/2021      Lab Results   Component Value Date    AST 19 08/03/2022    AST 25 12/17/2020    ALT 21 08/03/2022    ALT 43 12/17/2020       Lab Results   Component Value Date    A1C 5.5 08/03/2022    A1C 5.5 12/17/2020       Lab Results   Component Value Date    INR 0.99 09/09/2022    INR 0.96 08/22/2022

## 2022-09-28 NOTE — LETTER
9/28/2022    Eliza Rogers MD  [address]  [facility] [address]    RE: Luis Daniel Gomez       Dear Colleague,     I had the pleasure of seeing Luis Daniel Gomez in the [facility].  Cardiology Clinic Progress Note  Luis Daniel Gomez MRN# [id number]   YOB: 1940 Age: 81 year old     Primary cardiologist: Dr. Knight    Reason for visit: 1-week post-TAVR    History of presenting illness:    Luis Daniel Gomez is a pleasant 81 year old patient with past medical history significant for hypertension, hyperlipidemia, carotid artery stenosis, chronic diastolic heart failure, type 2 diabetes, stage III chronic kidney disease, and severe aortic stenosis who underwent transcatheter aortic valve replacement with 29 mm Anaya MICHELINE 3 valve on 9/20/2022.  He is here today for his 1 week follow-up.    There were no periprocedural complications.  Post procedure echocardiogram showed normal LV function and normal prosthetic aortic valve gradient, and no paravalvular leak.  His LVEDP was 30 mmHg. He was appropriately diuresed with IV Lasix and started on oral Lasix 20 mg daily. He was sent home on 30-day event monitor for underlying first-degree AV block.  He is on aspirin 81 mg daily for antiplatelet therapy.    Since that time, his event monitor reported atrial fibrillation which is new for him.  Although he tells me he was diagnosed with atrial fibrillation in 80s, but I have no records to confirm this.  He is asymptomatic.  He otherwise denies any chest discomfort, dyspnea on exertion, syncope or near syncope, or palpitations.  He reports a significant improvement in his breathing and activity levels.  His blood pressure is well controlled.    EKG today shows sinus rhythm with a heart rate of 74.    His labs today show creatinine 1.44, normal electrolytes, and normal hemoglobin and platelet levels.         Assessment and Plan:     ASSESSMENT:    1. Severe aortic stenosis s/p TAVR. Tolerating  activity without symptoms, on aspirin 81 mg daily.   2. Atrial fibrillation. [id number] of 5 (age++, HTN +, vascular disease +, DM+). No history of unexplained falls, seizures, tripping, syncope. No history of significant abnormal bleeding or known contraindication to anticoagulation. We discussed the pathophysiology and natural history of afib. Rate vs rhythm control strategy and indication for anticoagulation. He has no history of abnormal bleeding or fall risk.   3. Hypertension. Well-controlled on current regimen.   4. Hyperlipidemia. LDL 54 on atorvastatin 20 mg daily.   5. Chronic diastolic heart failure, NYHA class I. Appears compensated on exam, on lasix 20 mg daily.   6. Carotid artery disease. 50-69% stenosis of R carotid artery, <50% stenosis of left carotid.   7. Stage III chronic kidney disease. Baseline cr 1.5-1.6.   8. Type II diabetes. Diet controlled.       PLAN:     1. Start apixaban 5 mg BID for stroke prophylaxis.   2. STOP aspirin 81 mg daily.  3. We discussed the need for lifelong antibiotic prophylaxis prior to any dental procedure.  4. Start patient cardiac rehab.  5. Follow-up with me in 1 month with repeat echocardiogram, EKG, and labs at that time.         Santa Conner DNP, APRN, CNP  Page: [phone] (8a-5p M-F)    Orders this Visit:  Orders Placed This Encounter   Procedures     EKG 12-lead complete w/read - Clinics (performed today)     Orders Placed This Encounter   Medications     apixaban ANTICOAGULANT (ELIQUIS) 5 MG tablet     Sig: Take 1 tablet (5 mg) by mouth 2 times daily     Dispense:  180 tablet     Refill:  3     Medications Discontinued During This Encounter   Medication Reason     aspirin (ASA) 81 MG EC tablet        Today's clinic visit entailed:  Review of the result(s) of each unique test - event monitor, echo, labs, ekg  Ordering of each unique test  Prescription drug management  35 minutes spent on the date of the encounter doing chart review, review of test  "results, interpretation of tests, patient visit and documentation   Provider  Link to [facility] Help Grid     The level of medical decision making during this visit was of moderate complexity.           Review of Systems:     Review of Systems:  Skin:        Eyes:       ENT:       Respiratory:       Cardiovascular:       Gastroenterology:      Genitourinary:       Musculoskeletal:       Neurologic:       Psychiatric:       Heme/Lymph/Imm:       Endocrine:                 Physical Exam:   Vitals: /62   Pulse 96   Ht 1.778 m (5' 10\")   Wt 93.2 kg (205 lb 8 oz)   SpO2 95%   BMI 29.49 kg/m    Constitutional:  cooperative        Skin:  warm and dry to the touch        Head:  normocephalic        Eyes:  pupils equal and round        ENT:  not assessed this visit        Neck:  JVP normal        Chest:  clear to auscultation;normal symmetry        Cardiac: regular rhythm;normal S1 and S2       systolic murmur;grade 1          Abdomen:  abdomen soft        Vascular: pulses full and equal                                      Extremities and Back:  no edema        Neurological:  no gross motor deficits;affect appropriate             Medications:     Current Outpatient Medications   Medication Sig Dispense Refill     albuterol (VENTOLIN HFA) 108 (90 Base) MCG/ACT inhaler INHALE 2 PUFFS INTO THE LUNGS EVERY 6 HOURS AS NEEDED FOR SHORTNESS OF BREATH OR DIFFICULT BREATHING OR WHEEZING 18 g 3     amitriptyline (ELAVIL) 25 MG tablet Take 1 tablet (25 mg) by mouth At Bedtime 90 tablet 0     apixaban ANTICOAGULANT (ELIQUIS) 5 MG tablet Take 1 tablet (5 mg) by mouth 2 times daily 180 tablet 3     atorvastatin (LIPITOR) 20 MG tablet Take 1 tablet (20 mg) by mouth every evening 90 tablet 3     cyabnocobalamin (VITAMIN B-12) 2500 MCG sublingual tablet Place 3,000 mcg under the tongue daily  30 tablet      finasteride (PROSCAR) 5 MG tablet Take 1 tablet (5 mg) by mouth daily 90 tablet 3     fluticasone (FLONASE) 50 MCG/ACT nasal " spray Spray 1-2 sprays into both nostrils daily 48 mL 3     furosemide (LASIX) 20 MG tablet Take 1 tablet (20 mg) by mouth daily 90 tablet 1     loratadine (CLARITIN) 10 MG tablet Take 1 tablet (10 mg) by mouth daily 30 tablet 1     metoprolol tartrate (LOPRESSOR) 50 MG tablet Take 1 tablet (50 mg) by mouth 2 times daily 270 tablet 3     Multiple Vitamin (MULTI-VITAMIN) per tablet Take 1 tablet by mouth daily.       nitroGLYcerin (NITROSTAT) 0.4 MG sublingual tablet For chest pain place 1 tablet under the tongue every 5 minutes for 3 doses. If symptoms persist 5 minutes after 1st dose call 911. 10 tablet 0     polyethylene glycol (MIRALAX) 17 g packet Take 1 packet by mouth daily       tamsulosin (FLOMAX) 0.4 MG capsule Take 0.8 mg by mouth daily (2 x 0.4 mg = 0.8 mg)  **pt reports taking 2 a day**       Turmeric (QC TUMERIC COMPLEX PO) Take 2 chew tab by mouth daily         Family History   Problem Relation Age of Onset     C.A.D. Father      Diabetes Father      Cancer - colorectal Mother 80     Cancer - colorectal Maternal Grandmother        Social History     Socioeconomic History     Marital status:      Spouse name: Not on file     Number of children: Not on file     Years of education: Not on file     Highest education level: Not on file   Occupational History     Not on file   Tobacco Use     Smoking status: Former Smoker     Packs/day: 1.00     Years: 56.00     Pack years: 56.00     Types: Cigarettes     Quit date: 2008     Years since quittin.7     Smokeless tobacco: Never Used     Tobacco comment: quit    Substance and Sexual Activity     Alcohol use: No     Alcohol/week: 0.0 standard drinks     Drug use: No     Sexual activity: Yes     Partners: Female   Other Topics Concern      Service Not Asked     Blood Transfusions Not Asked     Caffeine Concern Yes     Comment: one cup caffeine per day     Occupational Exposure Not Asked     Hobby Hazards Not Asked     Sleep Concern No      Stress Concern Not Asked     Weight Concern Not Asked     Special Diet No     Back Care Not Asked     Exercise Yes     Comment: walks daily 30 minutes     Bike Helmet Not Asked     Seat Belt Not Asked     Self-Exams Not Asked     Parent/sibling w/ CABG, MI or angioplasty before 65F 55M? No   Social History Narrative    , 2 kids, retired. Wife (Khalida).     Social Determinants of Health     Financial Resource Strain: Not on file   Food Insecurity: Not on file   Transportation Needs: Not on file   Physical Activity: Not on file   Stress: Not on file   Social Connections: Not on file   Intimate Partner Violence: Not on file   Housing Stability: Not on file            Past Medical History:     Past Medical History:   Diagnosis Date     Aortic stenosis, severe 9/20/2022     Arthritis      BPH (benign prostatic hyperplasia)      CKD (chronic kidney disease) stage 3, GFR 30-59 ml/min (H)      Cluster headache 2015    Dr. Roth     Constipation      Dyslipidemia      Dyspnea     Normal nuc stress test 9-15-08, fainted once in life     Fatty liver     Ultrasound 11/11     FH: colon cancer     colonoscopy 10/10, repeat 5 years.     Former smoker     quit 2008     HTN (hypertension), benign      Peripheral neuropathy     Dr. Patricia     Spinal stenosis     Last MRI 2010, Dr. Fraire     Syncope      Type 2 diabetes mellitus (H)     diet controlled     Ureterolithiases               Past Surgical History:     Past Surgical History:   Procedure Laterality Date     COLONOSCOPY       CV CORONARY ANGIOGRAM Bilateral 8/22/2022    Procedure: Coronary Angiogram;  Surgeon: Dave Mao MD;  Location: [facility] CARDIAC CATH LAB     CV TRANSCATHETER AORTIC VALVE REPLACEMENT-FEMORAL APPROACH N/A 9/20/2022    Procedure: Transcatheter Aortic Valve Replacement-Femoral Approach;  Surgeon: Wilson Martinez MD;  Location: [facility] CARDIAC CATH LAB     KERATOTOMY ARCUATE WITH FEMTOSECOND LASER/IMAGING FOR ATIOL Left  8/29/2016    Procedure: KERATOTOMY ARCUATE WITH FEMTOSECOND LASER/IMAGING FOR ATIOL;  Surgeon: Gerard Larson MD;  Location: [facility]     ORTHOPEDIC SURGERY       PHACOEMULSIFICATION CLEAR CORNEA WITH STANDARD INTRAOCULAR LENS IMPLANT Left 8/29/2016    Procedure: PHACOEMULSIFICATION CLEAR CORNEA WITH STANDARD INTRAOCULAR LENS IMPLANT;  Surgeon: Gerard Larson MD;  Location: [facility]     Thumb surg                Allergies:   Lisinopril, Amoxicillin, Duloxetine, Levofloxacin, Neurontin [gabapentin], Norvasc [amlodipine], Oxycodone, Penicillins, Red yeast rice extract [monascus purpureus went yeast], Tramadol, Verapamil, and Vicodin [hydrocodone-acetaminophen]       Data:   All laboratory data reviewed:    Recent Labs   Lab Test 09/09/22  1358 08/03/22  0808 12/28/21  0824 12/17/20  0901 12/11/19  0716 12/12/18  0715 12/08/17  0734 06/26/17  0000 11/16/16  1530   LDL  --  54 84 54 74   < > 47  --   --    HDL  --  40 47 36* 41   < > 40  --   --    NHDL  --  86 97 98 101   < > 95  --   --    CHOL  --  126 144 134 142   < > 135   < >  --    TRIG  --  162* 67 218* 137   < > 241*   < >  --    TSH  --   --   --  2.97 4.53*  --  3.83  --   --    NTBNP 842  --   --   --   --   --   --   --  209    < > = values in this interval not displayed.       Lab Results   Component Value Date    WBC 12.2 (H) 09/28/2022    WBC 9.7 12/17/2020    RBC 4.48 09/28/2022    RBC 4.52 12/17/2020    HGB 14.5 09/28/2022    HGB 15.3 04/21/2021    HCT 43.2 09/28/2022    HCT 44.8 12/17/2020    MCV 96 09/28/2022    MCV 99 12/17/2020    MCH 32.4 09/28/2022    MCH 32.7 12/17/2020    MCHC 33.6 09/28/2022    MCHC 33.0 12/17/2020    RDW 12.0 09/28/2022    RDW 12.9 12/17/2020     09/28/2022     12/17/2020       Lab Results   Component Value Date     09/28/2022     04/21/2021    POTASSIUM 4.6 09/28/2022    POTASSIUM 5.1 04/21/2021    CHLORIDE 100 09/28/2022    CHLORIDE 105 04/21/2021    CO2 31 09/28/2022    CO2 29 04/21/2021     ANIONGAP 6 09/28/2022    ANIONGAP 4 04/21/2021    GLC 96 09/28/2022     (H) 04/21/2021    BUN 20 09/28/2022    BUN 27 04/21/2021    CR 1.44 (H) 09/28/2022    CR 1.78 (H) 04/21/2021    GFRESTIMATED 48 (L) 09/28/2022    GFRESTIMATED 40 (L) 08/16/2022    GFRESTIMATED 35 (L) 04/21/2021    GFRESTBLACK 40 (L) 04/21/2021    SANTY 9.6 09/28/2022    SANTY 9.2 04/21/2021      Lab Results   Component Value Date    AST 19 08/03/2022    AST 25 12/17/2020    ALT 21 08/03/2022    ALT 43 12/17/2020       Lab Results   Component Value Date    A1C 5.5 08/03/2022    A1C 5.5 12/17/2020       Lab Results   Component Value Date    INR 0.99 09/09/2022    INR 0.96 08/22/2022     Thank you for allowing me to participate in the care of your patient.      Sincerely,     Santa Conner, [facility]  cc:   No referring provider defined for this encounter.

## 2022-09-28 NOTE — PATIENT INSTRUCTIONS
Today's Plan:     1) STOP aspirin, START apixaban (Eliquis) 5 mg twice daily.     2) Follow-up with me in 1 month.       Coco RN ([phone]), Margarita RN ([phone]), and Daylin GLOVER ([phone])    Scheduling phone number: [phone]    Reminder: Please bring in all current medications, over the counter supplements and vitamin bottles to your next appointment.-    It was a pleasure seeing you today!     Santa Conner, ALBERTO  9/28/2022    -

## 2022-09-29 ENCOUNTER — HOSPITAL ENCOUNTER (OUTPATIENT)
Dept: CARDIAC REHAB | Facility: CLINIC | Age: 82
Discharge: HOME | End: 2022-09-29
Attending: NURSE PRACTITIONER
Payer: COMMERCIAL

## 2022-09-29 DIAGNOSIS — Z95.2 S/P TAVR (TRANSCATHETER AORTIC VALVE REPLACEMENT): ICD-10-CM

## 2022-09-29 PROCEDURE — 93798 PHYS/QHP OP CAR RHAB W/ECG: CPT

## 2022-09-29 PROCEDURE — 93797 PHYS/QHP OP CAR RHAB WO ECG: CPT

## 2022-10-03 ENCOUNTER — HOSPITAL ENCOUNTER (OUTPATIENT)
Dept: CARDIAC REHAB | Facility: CLINIC | Age: 82
Discharge: HOME | End: 2022-10-03
Attending: INTERNAL MEDICINE
Payer: COMMERCIAL

## 2022-10-03 PROCEDURE — 93798 PHYS/QHP OP CAR RHAB W/ECG: CPT | Performed by: REHABILITATION PRACTITIONER

## 2022-10-06 ENCOUNTER — HOSPITAL ENCOUNTER (OUTPATIENT)
Dept: CARDIAC REHAB | Facility: CLINIC | Age: 82
Discharge: HOME | End: 2022-10-06
Attending: INTERNAL MEDICINE
Payer: COMMERCIAL

## 2022-10-06 PROCEDURE — 93798 PHYS/QHP OP CAR RHAB W/ECG: CPT | Performed by: REHABILITATION PRACTITIONER

## 2022-10-10 ENCOUNTER — HOSPITAL ENCOUNTER (OUTPATIENT)
Dept: CARDIAC REHAB | Facility: CLINIC | Age: 82
Discharge: HOME | End: 2022-10-10
Attending: INTERNAL MEDICINE
Payer: COMMERCIAL

## 2022-10-10 ENCOUNTER — HEALTH MAINTENANCE LETTER (OUTPATIENT)
Age: 82
End: 2022-10-10

## 2022-10-10 DIAGNOSIS — Z95.2 S/P TAVR (TRANSCATHETER AORTIC VALVE REPLACEMENT): Primary | ICD-10-CM

## 2022-10-10 PROCEDURE — 93798 PHYS/QHP OP CAR RHAB W/ECG: CPT

## 2022-10-13 ENCOUNTER — HOSPITAL ENCOUNTER (OUTPATIENT)
Dept: CARDIAC REHAB | Facility: CLINIC | Age: 82
Discharge: HOME | End: 2022-10-13
Attending: INTERNAL MEDICINE
Payer: COMMERCIAL

## 2022-10-13 PROCEDURE — 93798 PHYS/QHP OP CAR RHAB W/ECG: CPT | Performed by: OCCUPATIONAL THERAPIST

## 2022-10-18 ENCOUNTER — HOSPITAL ENCOUNTER (OUTPATIENT)
Dept: CARDIAC REHAB | Facility: CLINIC | Age: 82
Discharge: HOME | End: 2022-10-18
Attending: INTERNAL MEDICINE
Payer: COMMERCIAL

## 2022-10-18 PROCEDURE — 93798 PHYS/QHP OP CAR RHAB W/ECG: CPT | Performed by: OCCUPATIONAL THERAPIST

## 2022-10-19 ENCOUNTER — HOSPITAL ENCOUNTER (OUTPATIENT)
Dept: CARDIOLOGY | Facility: CLINIC | Age: 82
Discharge: HOME | End: 2022-10-19
Attending: INTERNAL MEDICINE | Admitting: INTERNAL MEDICINE
Payer: COMMERCIAL

## 2022-10-19 ENCOUNTER — OFFICE VISIT (OUTPATIENT)
Dept: CARDIOLOGY | Facility: CLINIC | Age: 82
End: 2022-10-19
Attending: INTERNAL MEDICINE
Payer: COMMERCIAL

## 2022-10-19 ENCOUNTER — LAB (OUTPATIENT)
Dept: LAB | Facility: CLINIC | Age: 82
End: 2022-10-19
Attending: INTERNAL MEDICINE
Payer: COMMERCIAL

## 2022-10-19 VITALS
DIASTOLIC BLOOD PRESSURE: 84 MMHG | WEIGHT: 204 LBS | BODY MASS INDEX: 29.2 KG/M2 | HEIGHT: 70 IN | HEART RATE: 71 BPM | SYSTOLIC BLOOD PRESSURE: 142 MMHG | OXYGEN SATURATION: 97 %

## 2022-10-19 DIAGNOSIS — Z95.2 S/P TAVR (TRANSCATHETER AORTIC VALVE REPLACEMENT): ICD-10-CM

## 2022-10-19 DIAGNOSIS — Z95.2 S/P TAVR (TRANSCATHETER AORTIC VALVE REPLACEMENT): Primary | ICD-10-CM

## 2022-10-19 LAB
ANION GAP SERPL CALCULATED.3IONS-SCNC: 8 MMOL/L (ref 3–14)
BUN SERPL-MCNC: 27 MG/DL (ref 7–30)
CALCIUM SERPL-MCNC: 9.6 MG/DL (ref 8.5–10.1)
CHLORIDE BLD-SCNC: 101 MMOL/L (ref 94–109)
CO2 SERPL-SCNC: 29 MMOL/L (ref 20–32)
CREAT SERPL-MCNC: 1.48 MG/DL (ref 0.66–1.25)
ERYTHROCYTE [DISTWIDTH] IN BLOOD BY AUTOMATED COUNT: 12.2 % (ref 10–15)
GFR SERPL CREATININE-BSD FRML MDRD: 47 ML/MIN/1.73M2
GLUCOSE BLD-MCNC: 116 MG/DL (ref 70–99)
HCT VFR BLD AUTO: 42.3 % (ref 40–53)
HGB BLD-MCNC: 14.3 G/DL (ref 13.3–17.7)
LVEF ECHO: NORMAL
MCH RBC QN AUTO: 33 PG (ref 26.5–33)
MCHC RBC AUTO-ENTMCNC: 33.8 G/DL (ref 31.5–36.5)
MCV RBC AUTO: 98 FL (ref 78–100)
PLATELET # BLD AUTO: 174 10E3/UL (ref 150–450)
POTASSIUM BLD-SCNC: 4.3 MMOL/L (ref 3.4–5.3)
RBC # BLD AUTO: 4.33 10E6/UL (ref 4.4–5.9)
SODIUM SERPL-SCNC: 138 MMOL/L (ref 133–144)
WBC # BLD AUTO: 8.9 10E3/UL (ref 4–11)

## 2022-10-19 PROCEDURE — 99214 OFFICE O/P EST MOD 30 MIN: CPT | Performed by: NURSE PRACTITIONER

## 2022-10-19 PROCEDURE — 255N000002 HC RX 255 OP 636: Performed by: INTERNAL MEDICINE

## 2022-10-19 PROCEDURE — 85027 COMPLETE CBC AUTOMATED: CPT | Performed by: INTERNAL MEDICINE

## 2022-10-19 PROCEDURE — 36415 COLL VENOUS BLD VENIPUNCTURE: CPT | Performed by: INTERNAL MEDICINE

## 2022-10-19 PROCEDURE — 999N000208 ECHOCARDIOGRAM COMPLETE

## 2022-10-19 PROCEDURE — 93000 ELECTROCARDIOGRAM COMPLETE: CPT | Performed by: INTERNAL MEDICINE

## 2022-10-19 PROCEDURE — 93306 TTE W/DOPPLER COMPLETE: CPT | Mod: 26 | Performed by: INTERNAL MEDICINE

## 2022-10-19 PROCEDURE — 80048 BASIC METABOLIC PNL TOTAL CA: CPT | Performed by: INTERNAL MEDICINE

## 2022-10-19 RX ADMIN — HUMAN ALBUMIN MICROSPHERES AND PERFLUTREN 9 ML: 10; .22 INJECTION, SOLUTION INTRAVENOUS at 10:55

## 2022-10-19 NOTE — PROGRESS NOTES
Cardiology Clinic Progress Note  Luis Daniel Gomez MRN# [id number]   YOB: 1940 Age: 81 year old     Primary cardiologist: Dr. Knight    Reason for visit: 1-month TAVR follow-up     History of presenting illness:    Luis Daniel Gomez is a pleasant 81 year old patient with past medical history significant for hypertension, hyperlipidemia, carotid artery stenosis, chronic diastolic heart failure, type 2 diabetes, stage III chronic kidney disease, and severe aortic stenosis s/p TAVR with 29 mm Anaya MICHELINE 3 valve on 9/20/2022, who is here today for his 1 month follow-up.    In regards to his TAVR, there were no periprocedural complications.  Post-procedure echocardiogram showed normal LV function and normal prosthetic aortic valve gradient, and no paravalvular leak.  His LVEDP was 30 mmHg. He was appropriately diuresed with IV Lasix and started on oral Lasix 20 mg daily. He was sent home on 30-day event monitor for underlying first-degree AV block.  He is on aspirin 81 mg daily for antiplatelet therapy.    In the interim, his event monitor reported atrial fibrillation which is new for him.  He has a [id number] score of 5.  As such, he was started on apixaban 5 mg twice daily for stroke prophylaxis.     Repeat echocardiogram today shows well-seated bioprosthetic valve with stable gradients, no AI, and preserved LV function.    Today the patient is here with his wife.  He continues to do well from a cardiovascular standpoint.  He continues to report increased energy levels and has noticed a significant improvement in his dyspnea.  He reports intermittent right lower extremity fatigue. He has no resting pain, numbness or tingling.  He is tolerating cardiac rehab without any symptoms. He otherwise denies any chest pain, syncope or near syncope, or palpitations.  He has no PND orthopnea.  No bleeding or pain at his groin sites.    He is slightly hypertensive today on 2 separate attempts.  His EKG shows sinus  rhythm heart rate of 72.  His weight is stable.    Labs drawn prior to this visit show creatinine 1.48, GFR 47, normal electrolytes.  His hemoglobin is 14.3 and platelets of 174.           Assessment and Plan:     ASSESSMENT:    1. Severe aortic stenosis s/p TAVR with 29 mm ES3. Tolerating activity without symptoms, on aspirin 81 mg daily.   2. Atrial fibrillation. [id number] of 5 (age++, HTN +, vascular disease +, DM+). On apixaban 5 mg for stroke prophylaxis, metoprolol tartrate 50 mg BID. EKG today shows SR.   3. Hypertension. Slightly hypertensive today, but well-controlled at rehab and at home with -120's.  On metoprolol tartrate 50 mg twice daily.  4. Hyperlipidemia. LDL 54 on atorvastatin 20 mg daily.   5. Chronic diastolic heart failure, NYHA class I. Appears compensated on exam, on lasix 20 mg daily.   6. Carotid artery disease. 50-69% stenosis of R carotid artery, <50% stenosis of left carotid.   7. Stage III chronic kidney disease.  Stable, baseline cr 1.5-1.6.   8. Type II diabetes. Diet controlled.   9. Lower extremity weakness. Intermittent, no resting pain, numbness or tingling. Pulses intact.       PLAN:     1. Continue apixaban 5 mg twice daily for anticoagulation.  2. We discussed the need for lifelong antibiotic prophylaxis prior to any dental procedure.  3. Continue outpatient cardiac rehab.  4. We discussed ABIs for lower extremity weakness, patient would to hold off for now as he feels it may be more musculoskeletal. If ongoing weakness, would recommend further evaluation with MARIA VICTORIA or ultrasound.   5. Follow-up with Dr. Knight in approximately 6 months, sooner if needed.         Santa Conner, CHELO, APRN, CNP  Page: [phone] (8a-5p M-F)    Orders this Visit:  No orders of the defined types were placed in this encounter.    No orders of the defined types were placed in this encounter.    There are no discontinued medications.    Today's clinic visit entailed:  Review of the result(s) of  "each unique test - echo, EKG, labs  Ordering of each unique test  Prescription drug management  35 minutes spent on the date of the encounter doing chart review, review of test results, patient visit and documentation   Provider  Link to [facility] Help Grid     The level of medical decision making during this visit was of moderate complexity.           Review of Systems:     Review of Systems:  Skin:  not assessed     Eyes:  not assessed    ENT:  not assessed    Respiratory:  Negative    Cardiovascular:  Negative    Gastroenterology: not assessed    Genitourinary:  not assessed    Musculoskeletal:  not assessed    Neurologic:  not assessed    Psychiatric:  not assessed    Heme/Lymph/Imm:  not assessed    Endocrine:  Negative              Physical Exam:   Vitals: BP (!) 142/84   Pulse 71   Ht 1.778 m (5' 10\")   Wt 92.5 kg (204 lb)   SpO2 97%   BMI 29.27 kg/m    Constitutional:  cooperative        Skin:  warm and dry to the touch        Head:  normocephalic        Eyes:  pupils equal and round        ENT:  no pallor or cyanosis        Neck:  JVP normal        Chest:  clear to auscultation;normal symmetry        Cardiac: regular rhythm;normal S1 and S2       systolic ejection murmur;grade 1          Abdomen:  abdomen soft        Vascular: pulses full and equal                                 no bruit or hematoma at groin sites    Extremities and Back:  no edema        Neurological:  affect appropriate;no gross motor deficits             Medications:     Current Outpatient Medications   Medication Sig Dispense Refill     albuterol (VENTOLIN HFA) 108 (90 Base) MCG/ACT inhaler INHALE 2 PUFFS INTO THE LUNGS EVERY 6 HOURS AS NEEDED FOR SHORTNESS OF BREATH OR DIFFICULT BREATHING OR WHEEZING 18 g 3     amitriptyline (ELAVIL) 25 MG tablet Take 1 tablet (25 mg) by mouth At Bedtime 90 tablet 0     apixaban ANTICOAGULANT (ELIQUIS) 5 MG tablet Take 1 tablet (5 mg) by mouth 2 times daily 180 tablet 3     atorvastatin (LIPITOR) 20 " MG tablet Take 1 tablet (20 mg) by mouth every evening 90 tablet 3     cyabnocobalamin (VITAMIN B-12) 2500 MCG sublingual tablet Place 3,000 mcg under the tongue daily  30 tablet      finasteride (PROSCAR) 5 MG tablet Take 1 tablet (5 mg) by mouth daily 90 tablet 3     fluticasone (FLONASE) 50 MCG/ACT nasal spray Spray 1-2 sprays into both nostrils daily 48 mL 3     furosemide (LASIX) 20 MG tablet Take 1 tablet (20 mg) by mouth daily 90 tablet 1     loratadine (CLARITIN) 10 MG tablet Take 1 tablet (10 mg) by mouth daily 30 tablet 1     metoprolol tartrate (LOPRESSOR) 50 MG tablet Take 1 tablet (50 mg) by mouth 2 times daily 270 tablet 3     Multiple Vitamin (MULTI-VITAMIN) per tablet Take 1 tablet by mouth daily.       nitroGLYcerin (NITROSTAT) 0.4 MG sublingual tablet For chest pain place 1 tablet under the tongue every 5 minutes for 3 doses. If symptoms persist 5 minutes after 1st dose call 911. 10 tablet 0     polyethylene glycol (MIRALAX) 17 g packet Take 1 packet by mouth daily       tamsulosin (FLOMAX) 0.4 MG capsule Take 0.8 mg by mouth daily (2 x 0.4 mg = 0.8 mg)  **pt reports taking 2 a day**       Turmeric (QC TUMERIC COMPLEX PO) Take 2 chew tab by mouth daily         Family History   Problem Relation Age of Onset     C.A.D. Father      Diabetes Father      Cancer - colorectal Mother 80     Cancer - colorectal Maternal Grandmother        Social History     Socioeconomic History     Marital status:      Spouse name: Not on file     Number of children: Not on file     Years of education: Not on file     Highest education level: Not on file   Occupational History     Not on file   Tobacco Use     Smoking status: Former     Packs/day: 1.00     Years: 56.00     Pack years: 56.00     Types: Cigarettes     Quit date: 2008     Years since quittin.8     Smokeless tobacco: Never     Tobacco comments:     quit    Substance and Sexual Activity     Alcohol use: No     Alcohol/week: 0.0 standard  drinks     Drug use: No     Sexual activity: Yes     Partners: Female   Other Topics Concern      Service Not Asked     Blood Transfusions Not Asked     Caffeine Concern Yes     Comment: one cup caffeine per day     Occupational Exposure Not Asked     Hobby Hazards Not Asked     Sleep Concern No     Stress Concern Not Asked     Weight Concern Not Asked     Special Diet No     Back Care Not Asked     Exercise Yes     Comment: walks daily 30 minutes     Bike Helmet Not Asked     Seat Belt Not Asked     Self-Exams Not Asked     Parent/sibling w/ CABG, MI or angioplasty before 65F 55M? No   Social History Narrative    , 2 kids, retired. Wife (Khalida).     Social Determinants of Health     Financial Resource Strain: Not on file   Food Insecurity: Not on file   Transportation Needs: Not on file   Physical Activity: Not on file   Stress: Not on file   Social Connections: Not on file   Intimate Partner Violence: Not on file   Housing Stability: Not on file            Past Medical History:     Past Medical History:   Diagnosis Date     Aortic stenosis, severe 9/20/2022     Arthritis      BPH (benign prostatic hyperplasia)      CKD (chronic kidney disease) stage 3, GFR 30-59 ml/min (H)      Cluster headache 2015    Dr. Roth     Constipation      Dyslipidemia      Dyspnea     Normal nuc stress test 9-15-08, fainted once in life     Fatty liver     Ultrasound 11/11     FH: colon cancer     colonoscopy 10/10, repeat 5 years.     Former smoker     quit 2008     HTN (hypertension), benign      Peripheral neuropathy     Dr. Patricia     Spinal stenosis     Last MRI 2010, Dr. Fraire     Syncope      Type 2 diabetes mellitus (H)     diet controlled     Ureterolithiases               Past Surgical History:     Past Surgical History:   Procedure Laterality Date     COLONOSCOPY       CV CORONARY ANGIOGRAM Bilateral 8/22/2022    Procedure: Coronary Angiogram;  Surgeon: Dave Mao MD;  [facility]     CV TRANSCATHETER AORTIC VALVE REPLACEMENT-FEMORAL APPROACH N/A 9/20/2022    Procedure: Transcatheter Aortic Valve Replacement-Femoral Approach;  Surgeon: Wilson Martinez MD;  Location:  [facility]     KERATOTOMY ARCUATE WITH FEMTOSECOND LASER/IMAGING FOR ATIOL Left 8/29/2016    Procedure: KERATOTOMY ARCUATE WITH FEMTOSECOND LASER/IMAGING FOR ATIOL;  Surgeon: Gerard Larson MD;  Location: [facility]     ORTHOPEDIC SURGERY       PHACOEMULSIFICATION CLEAR CORNEA WITH STANDARD INTRAOCULAR LENS IMPLANT Left 8/29/2016    Procedure: PHACOEMULSIFICATION CLEAR CORNEA WITH STANDARD INTRAOCULAR LENS IMPLANT;  Surgeon: Gerard Larson MD;  Location: [facility]     Thumb surg                Allergies:   Lisinopril, Amoxicillin, Duloxetine, Levofloxacin, Neurontin [gabapentin], Norvasc [amlodipine], Oxycodone, Penicillins, Red yeast rice extract [monascus purpureus went yeast], Tramadol, Verapamil, and Vicodin [hydrocodone-acetaminophen]       Data:   All laboratory data reviewed:    Recent Labs   Lab Test 09/09/22  1358 08/03/22  0808 12/28/21  0824 12/17/20  0901 12/11/19  0716 12/12/18  0715 12/08/17  0734 06/26/17  0000 11/16/16  1530   LDL  --  54 84 54 74   < > 47  --   --    HDL  --  40 47 36* 41   < > 40  --   --    NHDL  --  86 97 98 101   < > 95  --   --    CHOL  --  126 144 134 142   < > 135   < >  --    TRIG  --  162* 67 218* 137   < > 241*   < >  --    TSH  --   --   --  2.97 4.53*  --  3.83  --   --    NTBNP 842  --   --   --   --   --   --   --  209    < > = values in this interval not displayed.       Lab Results   Component Value Date    WBC 8.9 10/19/2022    WBC 9.7 12/17/2020    RBC 4.33 (L) 10/19/2022    RBC 4.52 12/17/2020    HGB 14.3 10/19/2022    HGB 15.3 04/21/2021    HCT 42.3 10/19/2022    HCT 44.8 12/17/2020    MCV 98 10/19/2022    MCV 99 12/17/2020    MCH 33.0 10/19/2022    MCH 32.7 12/17/2020    MCHC 33.8 10/19/2022    MCHC 33.0 12/17/2020    RDW 12.2 10/19/2022    RDW 12.9  12/17/2020     10/19/2022     12/17/2020       Lab Results   Component Value Date     10/19/2022     04/21/2021    POTASSIUM 4.3 10/19/2022    POTASSIUM 5.1 04/21/2021    CHLORIDE 101 10/19/2022    CHLORIDE 105 04/21/2021    CO2 29 10/19/2022    CO2 29 04/21/2021    ANIONGAP 8 10/19/2022    ANIONGAP 4 04/21/2021     (H) 10/19/2022     (H) 04/21/2021    BUN 27 10/19/2022    BUN 27 04/21/2021    CR 1.48 (H) 10/19/2022    CR 1.78 (H) 04/21/2021    GFRESTIMATED 47 (L) 10/19/2022    GFRESTIMATED 40 (L) 08/16/2022    GFRESTIMATED 35 (L) 04/21/2021    GFRESTBLACK 40 (L) 04/21/2021    SANTY 9.6 10/19/2022    SANTY 9.2 04/21/2021      Lab Results   Component Value Date    AST 19 08/03/2022    AST 25 12/17/2020    ALT 21 08/03/2022    ALT 43 12/17/2020       Lab Results   Component Value Date    A1C 5.5 08/03/2022    A1C 5.5 12/17/2020       Lab Results   Component Value Date    INR 0.99 09/09/2022    INR 0.96 08/22/2022       Addendum:   1. 5  2. 4  3. 5  4. 5  5. 7  6. 7  7. 5  8. 5  9. 5  10. 5  11. 5  12. 5

## 2022-10-19 NOTE — PATIENT INSTRUCTIONS
Today's Plan:     1) Follow-up with Dr. Knight in about 6 months.       Coco RN ([phone]), Bekah RN ([phone]), and Daylin GLOVER ([phone])    Scheduling phone number: [phone]    Reminder: Please bring in all current medications, over the counter supplements and vitamin bottles to your next appointment.-    It was a pleasure seeing you today!     Santa Conner CNP  10/19/2022    -

## 2022-10-19 NOTE — LETTER
10/19/2022    Eliza Rogers MD  [address]  [facility] [address]    RE: Luis Daniel Gomez       Dear Colleague,     I had the pleasure of seeing Luis Daniel Gomez in the [facility].  Cardiology Clinic Progress Note  Luis Daniel Gomez MRN# [id number]   YOB: 1940 Age: 81 year old     Primary cardiologist: Dr. Knight    Reason for visit: 1-month TAVR follow-up     History of presenting illness:    Luis Daniel Gomez is a pleasant 81 year old patient with past medical history significant for hypertension, hyperlipidemia, carotid artery stenosis, chronic diastolic heart failure, type 2 diabetes, stage III chronic kidney disease, and severe aortic stenosis s/p TAVR with 29 mm Anaya MICHELINE 3 valve on 9/20/2022, who is here today for his 1 month follow-up.    In regards to his TAVR, there were no periprocedural complications.  Post-procedure echocardiogram showed normal LV function and normal prosthetic aortic valve gradient, and no paravalvular leak.  His LVEDP was 30 mmHg. He was appropriately diuresed with IV Lasix and started on oral Lasix 20 mg daily. He was sent home on 30-day event monitor for underlying first-degree AV block.  He is on aspirin 81 mg daily for antiplatelet therapy.    In the interim, his event monitor reported atrial fibrillation which is new for him.  He has a [id number] score of 5.  As such, he was started on apixaban 5 mg twice daily for stroke prophylaxis.     Repeat echocardiogram today shows well-seated bioprosthetic valve with stable gradients, no AI, and preserved LV function.    Today the patient is here with his wife.  He continues to do well from a cardiovascular standpoint.  He continues to report increased energy levels and has noticed a significant improvement in his dyspnea.  He reports intermittent right lower extremity fatigue. He has no resting pain, numbness or tingling.  He is tolerating cardiac rehab without any symptoms. He otherwise denies  any chest pain, syncope or near syncope, or palpitations.  He has no PND orthopnea.  No bleeding or pain at his groin sites.    He is slightly hypertensive today on 2 separate attempts.  His EKG shows sinus rhythm heart rate of 72.  His weight is stable.    Labs drawn prior to this visit show creatinine 1.48, GFR 47, normal electrolytes.  His hemoglobin is 14.3 and platelets of 174.           Assessment and Plan:     ASSESSMENT:    1. Severe aortic stenosis s/p TAVR with 29 mm ES3. Tolerating activity without symptoms, on aspirin 81 mg daily.   2. Atrial fibrillation. [id number] of 5 (age++, HTN +, vascular disease +, DM+). On apixaban 5 mg for stroke prophylaxis, metoprolol tartrate 50 mg BID. EKG today shows SR.   3. Hypertension. Slightly hypertensive today, but well-controlled at rehab and at home with -120's.  On metoprolol tartrate 50 mg twice daily.  4. Hyperlipidemia. LDL 54 on atorvastatin 20 mg daily.   5. Chronic diastolic heart failure, NYHA class I. Appears compensated on exam, on lasix 20 mg daily.   6. Carotid artery disease. 50-69% stenosis of R carotid artery, <50% stenosis of left carotid.   7. Stage III chronic kidney disease.  Stable, baseline cr 1.5-1.6.   8. Type II diabetes. Diet controlled.   9. Lower extremity weakness. Intermittent, no resting pain, numbness or tingling. Pulses intact.       PLAN:     1. Continue apixaban 5 mg twice daily for anticoagulation.  2. We discussed the need for lifelong antibiotic prophylaxis prior to any dental procedure.  3. Continue outpatient cardiac rehab.  4. We discussed ABIs for lower extremity weakness, patient would to hold off for now as he feels it may be more musculoskeletal. If ongoing weakness, would recommend further evaluation with MARIA VICTORIA or ultrasound.   5. Follow-up with Dr. Knight in approximately 6 months, sooner if needed.         Santa Conner, CHELO, APRN, CNP  Page: [phone] (8a-5p M-F)    Orders this Visit:  No orders of the defined  "types were placed in this encounter.    No orders of the defined types were placed in this encounter.    There are no discontinued medications.    Today's clinic visit entailed:  Review of the result(s) of each unique test - echo, EKG, labs  Ordering of each unique test  Prescription drug management  35 minutes spent on the date of the encounter doing chart review, review of test results, patient visit and documentation   Provider  Link to [facility] Help Grid     The level of medical decision making during this visit was of moderate complexity.           Review of Systems:     Review of Systems:  Skin:  not assessed     Eyes:  not assessed    ENT:  not assessed    Respiratory:  Negative    Cardiovascular:  Negative    Gastroenterology: not assessed    Genitourinary:  not assessed    Musculoskeletal:  not assessed    Neurologic:  not assessed    Psychiatric:  not assessed    Heme/Lymph/Imm:  not assessed    Endocrine:  Negative              Physical Exam:   Vitals: BP (!) 142/84   Pulse 71   Ht 1.778 m (5' 10\")   Wt 92.5 kg (204 lb)   SpO2 97%   BMI 29.27 kg/m    Constitutional:  cooperative        Skin:  warm and dry to the touch        Head:  normocephalic        Eyes:  pupils equal and round        ENT:  no pallor or cyanosis        Neck:  JVP normal        Chest:  clear to auscultation;normal symmetry        Cardiac: regular rhythm;normal S1 and S2       systolic ejection murmur;grade 1          Abdomen:  abdomen soft        Vascular: pulses full and equal                                 no bruit or hematoma at groin sites    Extremities and Back:  no edema        Neurological:  affect appropriate;no gross motor deficits             Medications:     Current Outpatient Medications   Medication Sig Dispense Refill     albuterol (VENTOLIN HFA) 108 (90 Base) MCG/ACT inhaler INHALE 2 PUFFS INTO THE LUNGS EVERY 6 HOURS AS NEEDED FOR SHORTNESS OF BREATH OR DIFFICULT BREATHING OR WHEEZING 18 g 3     amitriptyline " (ELAVIL) 25 MG tablet Take 1 tablet (25 mg) by mouth At Bedtime 90 tablet 0     apixaban ANTICOAGULANT (ELIQUIS) 5 MG tablet Take 1 tablet (5 mg) by mouth 2 times daily 180 tablet 3     atorvastatin (LIPITOR) 20 MG tablet Take 1 tablet (20 mg) by mouth every evening 90 tablet 3     cyabnocobalamin (VITAMIN B-12) 2500 MCG sublingual tablet Place 3,000 mcg under the tongue daily  30 tablet      finasteride (PROSCAR) 5 MG tablet Take 1 tablet (5 mg) by mouth daily 90 tablet 3     fluticasone (FLONASE) 50 MCG/ACT nasal spray Spray 1-2 sprays into both nostrils daily 48 mL 3     furosemide (LASIX) 20 MG tablet Take 1 tablet (20 mg) by mouth daily 90 tablet 1     loratadine (CLARITIN) 10 MG tablet Take 1 tablet (10 mg) by mouth daily 30 tablet 1     metoprolol tartrate (LOPRESSOR) 50 MG tablet Take 1 tablet (50 mg) by mouth 2 times daily 270 tablet 3     Multiple Vitamin (MULTI-VITAMIN) per tablet Take 1 tablet by mouth daily.       nitroGLYcerin (NITROSTAT) 0.4 MG sublingual tablet For chest pain place 1 tablet under the tongue every 5 minutes for 3 doses. If symptoms persist 5 minutes after 1st dose call 911. 10 tablet 0     polyethylene glycol (MIRALAX) 17 g packet Take 1 packet by mouth daily       tamsulosin (FLOMAX) 0.4 MG capsule Take 0.8 mg by mouth daily (2 x 0.4 mg = 0.8 mg)  **pt reports taking 2 a day**       Turmeric (QC TUMERIC COMPLEX PO) Take 2 chew tab by mouth daily         Family History   Problem Relation Age of Onset     C.A.D. Father      Diabetes Father      Cancer - colorectal Mother 80     Cancer - colorectal Maternal Grandmother        Social History     Socioeconomic History     Marital status:      Spouse name: Not on file     Number of children: Not on file     Years of education: Not on file     Highest education level: Not on file   Occupational History     Not on file   Tobacco Use     Smoking status: Former     Packs/day: 1.00     Years: 56.00     Pack years: 56.00     Types:  Cigarettes     Quit date: 2008     Years since quittin.8     Smokeless tobacco: Never     Tobacco comments:     quit 2008   Substance and Sexual Activity     Alcohol use: No     Alcohol/week: 0.0 standard drinks     Drug use: No     Sexual activity: Yes     Partners: Female   Other Topics Concern      Service Not Asked     Blood Transfusions Not Asked     Caffeine Concern Yes     Comment: one cup caffeine per day     Occupational Exposure Not Asked     Hobby Hazards Not Asked     Sleep Concern No     Stress Concern Not Asked     Weight Concern Not Asked     Special Diet No     Back Care Not Asked     Exercise Yes     Comment: walks daily 30 minutes     Bike Helmet Not Asked     Seat Belt Not Asked     Self-Exams Not Asked     Parent/sibling w/ CABG, MI or angioplasty before 65F 55M? No   Social History Narrative    , 2 kids, retired. Wife (Khalida).     Social Determinants of Health     Financial Resource Strain: Not on file   Food Insecurity: Not on file   Transportation Needs: Not on file   Physical Activity: Not on file   Stress: Not on file   Social Connections: Not on file   Intimate Partner Violence: Not on file   Housing Stability: Not on file            Past Medical History:     Past Medical History:   Diagnosis Date     Aortic stenosis, severe 2022     Arthritis      BPH (benign prostatic hyperplasia)      CKD (chronic kidney disease) stage 3, GFR 30-59 ml/min (H)      Cluster headache     Dr. Roth     Constipation      Dyslipidemia      Dyspnea     Normal nuc stress test 9-15-08, fainted once in life     Fatty liver     Ultrasound      FH: colon cancer     colonoscopy 10/10, repeat 5 years.     Former smoker     quit      HTN (hypertension), benign      Peripheral neuropathy     Dr. Patricia     Spinal stenosis     Last MRI , Dr. Fraire     Syncope      Type 2 diabetes mellitus (H)     diet controlled     Ureterolithiases               Past Surgical History:      Past Surgical History:   Procedure Laterality Date     COLONOSCOPY       CV CORONARY ANGIOGRAM Bilateral 8/22/2022    Procedure: Coronary Angiogram;  Surgeon: Dave Mao MD;  Location: [facility] CARDIAC CATH LAB     CV TRANSCATHETER AORTIC VALVE REPLACEMENT-FEMORAL APPROACH N/A 9/20/2022    Procedure: Transcatheter Aortic Valve Replacement-Femoral Approach;  Surgeon: Wilson Martinez MD;  Location: [facility] CARDIAC CATH LAB     KERATOTOMY ARCUATE WITH FEMTOSECOND LASER/IMAGING FOR ATIOL Left 8/29/2016    Procedure: KERATOTOMY ARCUATE WITH FEMTOSECOND LASER/IMAGING FOR ATIOL;  Surgeon: Gerard Larson MD;  Location: [facility]     ORTHOPEDIC SURGERY       PHACOEMULSIFICATION CLEAR CORNEA WITH STANDARD INTRAOCULAR LENS IMPLANT Left 8/29/2016    Procedure: PHACOEMULSIFICATION CLEAR CORNEA WITH STANDARD INTRAOCULAR LENS IMPLANT;  Surgeon: Gerard Larson MD;  Location: [facility]     Thumb surg                Allergies:   Lisinopril, Amoxicillin, Duloxetine, Levofloxacin, Neurontin [gabapentin], Norvasc [amlodipine], Oxycodone, Penicillins, Red yeast rice extract [monascus purpureus went yeast], Tramadol, Verapamil, and Vicodin [hydrocodone-acetaminophen]       Data:   All laboratory data reviewed:    Recent Labs   Lab Test 09/09/22  1358 08/03/22  0808 12/28/21  0824 12/17/20  0901 12/11/19  0716 12/12/18  0715 12/08/17  0734 06/26/17  0000 11/16/16  1530   LDL  --  54 84 54 74   < > 47  --   --    HDL  --  40 47 36* 41   < > 40  --   --    NHDL  --  86 97 98 101   < > 95  --   --    CHOL  --  126 144 134 142   < > 135   < >  --    TRIG  --  162* 67 218* 137   < > 241*   < >  --    TSH  --   --   --  2.97 4.53*  --  3.83  --   --    NTBNP 842  --   --   --   --   --   --   --  209    < > = values in this interval not displayed.       Lab Results   Component Value Date    WBC 8.9 10/19/2022    WBC 9.7 12/17/2020    RBC 4.33 (L) 10/19/2022    RBC 4.52 12/17/2020    HGB 14.3 10/19/2022    HGB 15.3  04/21/2021    HCT 42.3 10/19/2022    HCT 44.8 12/17/2020    MCV 98 10/19/2022    MCV 99 12/17/2020    MCH 33.0 10/19/2022    MCH 32.7 12/17/2020    MCHC 33.8 10/19/2022    MCHC 33.0 12/17/2020    RDW 12.2 10/19/2022    RDW 12.9 12/17/2020     10/19/2022     12/17/2020       Lab Results   Component Value Date     10/19/2022     04/21/2021    POTASSIUM 4.3 10/19/2022    POTASSIUM 5.1 04/21/2021    CHLORIDE 101 10/19/2022    CHLORIDE 105 04/21/2021    CO2 29 10/19/2022    CO2 29 04/21/2021    ANIONGAP 8 10/19/2022    ANIONGAP 4 04/21/2021     (H) 10/19/2022     (H) 04/21/2021    BUN 27 10/19/2022    BUN 27 04/21/2021    CR 1.48 (H) 10/19/2022    CR 1.78 (H) 04/21/2021    GFRESTIMATED 47 (L) 10/19/2022    GFRESTIMATED 40 (L) 08/16/2022    GFRESTIMATED 35 (L) 04/21/2021    GFRESTBLACK 40 (L) 04/21/2021    SANTY 9.6 10/19/2022    SANTY 9.2 04/21/2021      Lab Results   Component Value Date    AST 19 08/03/2022    AST 25 12/17/2020    ALT 21 08/03/2022    ALT 43 12/17/2020       Lab Results   Component Value Date    A1C 5.5 08/03/2022    A1C 5.5 12/17/2020       Lab Results   Component Value Date    INR 0.99 09/09/2022    INR 0.96 08/22/2022       Addendum:   1. 5  2. 4  3. 5  4. 5  5. 7  6. 7  7. 5  8. 5  9. 5  10. 5  11. 5  12. 5        Thank you for allowing me to participate in the care of your patient.      Sincerely,     Santa Conner, [facility]  cc:   Wilson aMrtinez MD  [address]  [place] [id number]

## 2022-10-20 ENCOUNTER — HOSPITAL ENCOUNTER (OUTPATIENT)
Dept: CARDIAC REHAB | Facility: CLINIC | Age: 82
Discharge: HOME | End: 2022-10-20
Attending: INTERNAL MEDICINE
Payer: COMMERCIAL

## 2022-10-20 PROCEDURE — 93798 PHYS/QHP OP CAR RHAB W/ECG: CPT | Performed by: REHABILITATION PRACTITIONER

## 2022-10-26 ENCOUNTER — OFFICE VISIT (OUTPATIENT)
Dept: FAMILY MEDICINE | Facility: CLINIC | Age: 82
End: 2022-10-26
Payer: COMMERCIAL

## 2022-10-26 ENCOUNTER — TELEPHONE (OUTPATIENT)
Dept: FAMILY MEDICINE | Facility: CLINIC | Age: 82
End: 2022-10-26

## 2022-10-26 VITALS
BODY MASS INDEX: 29.25 KG/M2 | HEART RATE: 80 BPM | RESPIRATION RATE: 17 BRPM | WEIGHT: 204.3 LBS | HEIGHT: 70 IN | TEMPERATURE: 96.6 F | SYSTOLIC BLOOD PRESSURE: 144 MMHG | OXYGEN SATURATION: 93 % | DIASTOLIC BLOOD PRESSURE: 74 MMHG

## 2022-10-26 DIAGNOSIS — N18.31 STAGE 3A CHRONIC KIDNEY DISEASE (H): ICD-10-CM

## 2022-10-26 DIAGNOSIS — I47.29 PAROXYSMAL VENTRICULAR TACHYCARDIA (H): ICD-10-CM

## 2022-10-26 DIAGNOSIS — I10 HTN (HYPERTENSION), BENIGN: ICD-10-CM

## 2022-10-26 DIAGNOSIS — N39.43 BENIGN PROSTATIC HYPERPLASIA WITH POST-VOID DRIBBLING: ICD-10-CM

## 2022-10-26 DIAGNOSIS — Z12.11 SCREEN FOR COLON CANCER: ICD-10-CM

## 2022-10-26 DIAGNOSIS — Z23 NEED FOR PROPHYLACTIC VACCINATION AND INOCULATION AGAINST INFLUENZA: ICD-10-CM

## 2022-10-26 DIAGNOSIS — G62.9 PERIPHERAL POLYNEUROPATHY: ICD-10-CM

## 2022-10-26 DIAGNOSIS — Z95.2 S/P TAVR (TRANSCATHETER AORTIC VALVE REPLACEMENT): ICD-10-CM

## 2022-10-26 DIAGNOSIS — Z09 HOSPITAL DISCHARGE FOLLOW-UP: Primary | ICD-10-CM

## 2022-10-26 DIAGNOSIS — M48.07 SPINAL STENOSIS OF LUMBOSACRAL REGION: ICD-10-CM

## 2022-10-26 DIAGNOSIS — N40.1 BENIGN PROSTATIC HYPERPLASIA WITH POST-VOID DRIBBLING: ICD-10-CM

## 2022-10-26 PROCEDURE — 99417 PROLNG OP E/M EACH 15 MIN: CPT | Mod: 25 | Performed by: INTERNAL MEDICINE

## 2022-10-26 PROCEDURE — 99215 OFFICE O/P EST HI 40 MIN: CPT | Mod: 25 | Performed by: INTERNAL MEDICINE

## 2022-10-26 PROCEDURE — 90662 IIV NO PRSV INCREASED AG IM: CPT | Performed by: INTERNAL MEDICINE

## 2022-10-26 PROCEDURE — G0008 ADMIN INFLUENZA VIRUS VAC: HCPCS | Performed by: INTERNAL MEDICINE

## 2022-10-26 RX ORDER — GABAPENTIN 100 MG/1
CAPSULE ORAL
Qty: 60 CAPSULE | Refills: 1 | Status: SHIPPED | OUTPATIENT
Start: 2022-10-26 | End: 2022-12-22

## 2022-10-26 ASSESSMENT — PAIN SCALES - GENERAL: PAINLEVEL: NO PAIN (0)

## 2022-10-26 NOTE — Clinical Note
Please abstract the following data from this visit with this patient into the appropriate field in Epic:  Tests that can be patient reported without a hard copy:  Eye exam with ophthalmology on this date: 11/24/2021 at [facility]

## 2022-10-26 NOTE — TELEPHONE ENCOUNTER
Gabapentin  Received: Today  Leo Winston MARILIN Rogers, Eliza Hartman MD  Hi Dr. Rogers,     You are right, no concerns with gabapentin as far as being nephrotoxic however gabapentin is excreted renally so patients with impaired renal function will be at risk of toxicity with gabapentin.     I calculated the patient's CrCl and when using his adjusted body weight found his CrCl to be 41.6 with that CrCl a MAX dose recommendation would be 900 mg/day in 2 to 3 divided doses. So I agree with you that twice daily dosing would be ideal.     As far as edema, peripheral edema (2 to 8%) is a common adverse event with gabapentin especially in the elderly population.     So with all of that information I think it would be reasonable to start at the dose you have of gabapentin 100mg twice daily and titrate for effectiveness while continuing to monitor for edema.     Please let me know if you have any follow up questions,     Mariann Winston, PharmD   Medication Therapy Management Resident   [phone]

## 2022-10-26 NOTE — PROGRESS NOTES
Assessment & Plan   Problem List Items Addressed This Visit        Nervous and Auditory    Peripheral neuropathy    Relevant Medications    gabapentin (NEURONTIN) 100 MG capsule    Other Relevant Orders    Spine  Referral       Circulatory    HTN (hypertension), benign    Paroxysmal ventricular tachycardia       Urinary    CKD (chronic kidney disease) stage 3, GFR 30-59 ml/min (H)    BPH (benign prostatic hyperplasia)   Other Visit Diagnoses     Hospital discharge follow-up    -  Primary    Screen for colon cancer        Spinal stenosis of lumbosacral region        Relevant Medications    gabapentin (NEURONTIN) 100 MG capsule    Other Relevant Orders    Spine  Referral    Need for prophylactic vaccination and inoculation against influenza        Relevant Orders    INFLUENZA, QUAD, HIGH DOSE, PF, 65YR + (FLUZONE HD) (Completed)    S/P TAVR (transcatheter aortic valve replacement)             Reviewed all cardiology notes, cardiothoracic notes, imaging studies done, history of coronary artery disease and carotid artery disease, continue on maximal medical therapy/management.  He has some newly diagnosed pA. fib by event monitor and he is maintained on apixaban while tolerating.  Denies any signs or symptoms of bleeding.  Reviewed all lab results as chronic kidney disease but stable post cath and TAVR.  Continue follow-up with cardiology.  As for the spinal stenosis we will reinitiate small dose of gabapentin given listed allergy/side effects of leg edema, advised will adjust dose according to creatinine clearance but reassured  gabapentin does not affect her kidney function per se.  We will start with 100 mg dose once daily for 3 days then twice daily dose and titrate up as tolerated.  Discussed side effects of medication including but not limited to dizziness or drowsiness.  Can also cause leg edema which we will monitor closely.  He is on low-dose of amlodipine that can exacerbate such as  well.  Keep well-hydrated.  Referred to spine clinic as well.  Patient is due for colon cancer screening/ diagnostic. repeat was due in 2021, he would like to postpone for now given that he does have history of adenomatous polyps which I encouraged him to repeat.  He will need to stop the DOAC for 2 days prior to procedure.  He has back symptoms that he describes as pain has right-sided sciatica-like symptoms as well as pseudoclaudication/neurogenic claudication due to the spinal stenosis.  We will continue follow-up on carotid Doppler ultrasound probably in the next year or so.  Labs due in the next couple months.  Follow-up in 3 months and as needed.  All questions answered.         See Patient Instructions    Return in about 2 months (around 12/26/2022), or if symptoms worsen or fail to improve, for As needed and if symptoms worsen.  TOTAL TIME SPENT WAS 76 MINUTES REVIEW OF RECORDS AND EXAM / OFFICE VISIT.   Eliza Rogers MD  [facility] ANA Peña is a 81 year old, presenting for the following health issues:  Establish Care (Update: 5-6 weeks ago heart valve surgery), Health Maintenance (Last Eye Exam done on  11/24/2021 at [facility] Imm/Inj (/Wants flu shot), and Imm/Inj (Flu Shot)      History of Present Illness       Vascular Disease:  He presents for follow up of vascular disease.  He takes daily aspirin.    He eats 0-1 servings of fruits and vegetables daily.He consumes 1 sweetened beverage(s) daily.He exercises with enough effort to increase his heart rate 20 to 29 minutes per day.  He exercises with enough effort to increase his heart rate 4 days per week.   He is taking medications regularly.     Patient presenting for follow-up, patient was referred back to cardiology for evaluation of dizzy spells given his history of aortic valve stenosis and underlying dysrhythmias, with recommendations of repeat echo and reestablishing with cardiology.   "Patient was found to have severe aortic valve stenosis, underwent further work-up including coronary CT angiogram , carotid Doppler ultrasound which showed moderate carotid artery disease on the right 50 to 69% narrowing, and underwent cardiac cath that showed mild coronary artery disease with a range of 20 to 30% obstruction, then underwent TAVR back in September,.  Currently patient is doing well, denies any recurrence of palpitations or dizzy spells.  Denies any shortness of breath ,dyspnea, chest pain presyncope or syncope.  Still complaining of low back pain due to spinal stenosis moderate to severe, exacerbates mostly when climbing up stairs or even walking is better while in the food store, he is bending over the food carrier.  Had history in the past of legs/ ankle swelling.  He was at some point on gabapentin that was discontinued due to leg edema he attributes to kidney problem.  He has chronic kidney disease as well that is stable.  Denies any focal neurologic deficit or leg weakness.  He has history of BPH ;stable.  Denies any other systemic complaints.  Patient was thankful to this doctor that we referred him to cardiology and that such a decision by this provider have \"SAVED HIS LIFE\".    Review of Systems   Constitutional, HEENT, cardiovascular, pulmonary, gi and gu systems are negative, except as otherwise noted.      Objective    BP (!) 144/74   Pulse 80   Temp (!) 96.6  F (35.9  C) (Tympanic)   Resp 17   Ht 1.778 m (5' 10\")   Wt 92.7 kg (204 lb 4.8 oz)   SpO2 93%   BMI 29.31 kg/m    Body mass index is 29.31 kg/m .  Physical Exam   GENERAL: healthy, alert and no distress  EYES: Eyes grossly normal to inspection, PERRL and conjunctivae and sclerae normal  NECK: no adenopathy, no asymmetry, masses, or scars and thyroid normal to palpation  RESP: lungs clear to auscultation - no rales, minimal rhonchi.  No wheezes  CV: IRregular irregular rate and rhythm, normal S1 S2, no S3 or S4, no murmur, " click or rub, no peripheral edema and peripheral pulses strong and brisk  ABDOMEN: soft, nontender, no hepatosplenomegaly, no masses and bowel sounds normal  MS: no gross musculoskeletal defects noted, no edema  SKIN: no suspicious lesions or rashes.  Multiple seborrheic keratosis locations on the back.  NEURO: Normal strength and tone, mentation intact and speech normal  PSYCH: mentation appears normal, affect normal/[facility]    Hospital Outpatient Visit on 10/19/2022   Component Date Value Ref Range Status     LVEF  10/19/2022 60-65%   Final

## 2022-12-13 ENCOUNTER — OFFICE VISIT (OUTPATIENT)
Dept: PHYSICAL MEDICINE AND REHAB | Facility: CLINIC | Age: 82
End: 2022-12-13
Attending: INTERNAL MEDICINE
Payer: COMMERCIAL

## 2022-12-13 VITALS
DIASTOLIC BLOOD PRESSURE: 77 MMHG | BODY MASS INDEX: 29.2 KG/M2 | OXYGEN SATURATION: 96 % | WEIGHT: 204 LBS | HEART RATE: 74 BPM | SYSTOLIC BLOOD PRESSURE: 129 MMHG | HEIGHT: 70 IN

## 2022-12-13 DIAGNOSIS — G62.9 PERIPHERAL POLYNEUROPATHY: ICD-10-CM

## 2022-12-13 DIAGNOSIS — M54.50 CHRONIC LEFT-SIDED LOW BACK PAIN WITHOUT SCIATICA: ICD-10-CM

## 2022-12-13 DIAGNOSIS — M48.07 SPINAL STENOSIS OF LUMBOSACRAL REGION: Primary | ICD-10-CM

## 2022-12-13 DIAGNOSIS — G89.29 CHRONIC LEFT-SIDED LOW BACK PAIN WITHOUT SCIATICA: ICD-10-CM

## 2022-12-13 PROCEDURE — 99204 OFFICE O/P NEW MOD 45 MIN: CPT | Performed by: PHYSICAL MEDICINE & REHABILITATION

## 2022-12-13 ASSESSMENT — PAIN SCALES - GENERAL: PAINLEVEL: SEVERE PAIN (7)

## 2022-12-13 NOTE — PROGRESS NOTES
HISTORY OF PRESENT ILLNESS:    Luis Daniel Gomez is a 81 year old male who presents with a chief complaint of low back pain.     Patient reports chronic back pain that started 15 years ago.  Pain has been stable but not improving and constant discomfort.  Pain is mostly in low back more on the left side compared to right.  Described as intermittent aching pain around 7/10 on most days.  It is worse with standing, walking, twisting such as playing golf.  Improves with as needed Tylenol and pain resolves when he is sitting.  He can walk up to 1 block before needing a break and rests for 5-10 minutes for pain to resolve and continues to walk.  He denied radiating pain or weakness in lower extremities.  He is concerned as he would like to stay fit and continue to play golf.      Separately he reports numbness and tingling in both feet that initially started below the ankle and progressed up to mid calf associated with decreased sensation.  His PCP started him on gabapentin and currently on 200 mg daily but did not notice any changes in his numbness and pain.    Current functional status: Functional independent but pain limits leisure activities and sleep as he is using a recliner    PRIOR INJURIES/TREATMENT:   Ice/Heat: None  Brace: None  Physical Therapy: no significant benefit       - Current Pain Medications -   Tylenol as needed  Gabapentin managed by PCP and pharmacy, maximum recommended dose due to CKD would be 300 mg/day and 2-3 divided doses  Previously on gabapentin, significant side effects were lower extremity swelling and drowsiness    - Prior/Trialed Pain Medications -   Topicals: Recently purchased over-the-counter patches  NSAIDs: Avoiding due to cardiac and renal issues  TCAs: None  SNRIs: None  Muscle Relaxers: None  Opioids: None    Prior Procedures:       Steroid injections 10 years ago with no benefit, unsure what kind of injection    Prior Related Surgery: No previous spine surgeries  Other  (acupuncture, OMT, CMM, TENS, DME, etc.):   Seen chiropractor with no benefit    Specialists Seen - (with most recent, available notes and clinic visits reviewed):  PCP Dr. Rogers 10/26/2022  Referred to spine clinic as well.  Patient is due for colon cancer screening/ diagnostic. repeat was due in 2021, he would like to postpone for now given that he does have history of adenomatous polyps which I encouraged him to repeat.  He will need to stop the DOAC for 2 days prior to procedure.  He has back symptoms that he describes as pain has right-sided sciatica-like symptoms as well as pseudoclaudication/neurogenic claudication due to the spinal stenosis.  We will continue follow-up on carotid Doppler ultrasound probably in the next year or so.  Labs due in the next couple months.  Follow-up in 3 months and as needed.  All questions answered.       IMAGING:   MRI 4/2016      Review Of Systems:   Contsitutional: No Fever, chills  HEENT: No HA, blurry vision  Respiratory: No Cough, SOB  Cardiovascular: No CP, palpitations   Gastrointestinal: continent of bowel  Genitourinary: continent of bladder  Musculoskeletal: as per HPI  Neurologic: as per HPI  Endocrine: denies DM (no polyuria, polydipsia, polyphagia), no major unintentional weight gain/loss, hair growth/loss, etc  Psychiatric: mood is stable      Medical History:  He  has a past medical history of Aortic stenosis, severe (9/20/2022), Arthritis, BPH (benign prostatic hyperplasia), CKD (chronic kidney disease) stage 3, GFR 30-59 ml/min (H), Cluster headache (2015), Constipation, Dyslipidemia, Dyspnea, Fatty liver, FH: colon cancer, Former smoker, HTN (hypertension), benign, Peripheral neuropathy, Spinal stenosis, Syncope, Type 2 diabetes mellitus (H), and Ureterolithiases.    He has no past medical history of Cancer (H), Cerebral infarction (H), Congestive heart failure (H), COPD (chronic obstructive pulmonary disease) (H), Depressive disorder, History of blood  "transfusion, Thyroid disease, or Uncomplicated asthma.     He  has a past surgical history that includes Thumb surg; orthopedic surgery; Keratotomy Arcuate With Femtosecond Laser/Imaging For Atiol (Left, 8/29/2016); Phacoemulsification clear cornea with standard intraocular lens implant (Left, 8/29/2016); colonoscopy; Coronary Angiogram (Bilateral, 8/22/2022); and Transcatheter Aortic Valve Replacement-Femoral Approach (N/A, 9/20/2022).        Family History  His family history includes C.A.D. in his father; Cancer - colorectal in his maternal grandmother; Cancer - colorectal (age of onset: 80) in his mother; Diabetes in his father.     Social History:  Work: retired  History of any legal related pain issues: none  Current living situation: Lives independently in [place]  His  reports that he quit smoking about 14 years ago. His smoking use included cigarettes. He has a 56.00 pack-year smoking history. He has never used smokeless tobacco. He reports that he does not drink alcohol and does not use drugs.      Current Medications:   He has a current medication list which includes the following prescription(s): albuterol, amitriptyline, apixaban anticoagulant, atorvastatin, vitamin b-12, finasteride, fluticasone, furosemide, gabapentin, loratadine, metoprolol tartrate, multi-vitamin, nitroglycerin, polyethylene glycol, tamsulosin, and turmeric.       Allergies:    -- Lisinopril -- Shortness Of Breath and Fatigue   -- Amoxicillin -- Itching   -- Duloxetine     --  \"I can't take it\"   -- Levofloxacin     --  \"I can't take the 500 mgs\"   -- Neurontin [Gabapentin] -- Swelling    --  edema   -- Norvasc [Amlodipine] -- Swelling    --  edema   -- Oxycodone -- GI Disturbance   -- Penicillins -- Itching   -- Red Yeast Rice Extract [Monascus Purpureus Went Yeast] -- Hives and Itching   -- Tramadol -- Itching   -- Verapamil -- Itching   -- Vicodin [Hydrocodone-Acetaminophen] -- Itching    PHYSICAL EXAMINATION:  /77   Pulse " "74   Ht 1.778 m (5' 10\")   Wt 92.5 kg (204 lb)   SpO2 96%   BMI 29.27 kg/m      General: Pleasant, straightforward, WDWN individual.  Mental Status: Pleasant, direct, appropriate mood and affect  Resp: breathing is unlabored without audible wheeze  Vascular: Palpable pedal and radial pulses, no cyanosis, no venous stasis changes  Heme: no visible ecchymosis or erythema on extremities  Skin: No notable rash    INSPECTION:  There is no erythema, ecchymosis, deformity, asymmetry, or abnormality of the low back.    Neurologic:  STRENGTH:  Hip flexion:  Right 5/5.  Left 5/5.  Hip abduction:  Right 5/5.  Left 5/5.  Hip external rotation:  Right 5/5.  Left 5/5.  Hip extension:  Right 5/5.  Left 5/5.  Knee extension:  Right 5/5.  Left 5/5.  Knee flexion:  Right 5/5.  Left 5/5.  Ankle dorsiflexion:  Right 5/5.  Left 5/5.  Great toe dorsiflexion:  Right 5/5.  Left 5/5.  Ankle plantar flexion:  Right 5/5.  Left 5/5.    SENSATION:  Intact to light touch along right L2, L3, L4, L5, S1, S2.  Intact to light touch along left L2, L3, L4, L5, S1, S2.    REFLEXES:  Patellar (L2-L4):  Right +2.  Left +2.  Achilles (S1-S2):  Right +2.  Left +2.  Babinski:  Right absent.  Left absent.  Forced ankle dorsiflexion (clonus):  Right absent.  Left absent.      Musculoskeletal:  LUMBOPELVIC PALPATION:  Lumbar Spinous Processes:  No tenderness.  Lumbar Paraspinals:  Right -ve.  Left -ve.  Iliac Crest:  Right -ve.  Left -ve.  Sacroiliac Joint:  Right -ve.  Left -ve.  Ischial Tuberosity:  Right -ve.  Left -ve.  Greater Trochanter:  Right -ve.  Left -ve.  Coccyx:  -ve.    THORACOLUMBAR RANGE OF MOTION:  Forward flexion (85 ):  70.  Extension (30 ):  30.  Lateral bending right (30 ):  30.  Lateral bending left (30 ):  30.  Twisting right with extension:  Pain present on left side.  Twisting left with extension:  Pain present on left side.    LUMBOPELVIC SPECIAL TESTS:  Log roll:  Right -ve.  Left -ve.  Straight leg raise:  Right -ve.  Left " -ve.  Crossover straight leg raise:  Right -ve.  Left -ve.  Braggard's:  Right -ve.  Left -ve.  Resisted straight leg raise:  Right -ve.  Left -ve.  CHANEL:  Right -ve.  Left -ve.  FADIR:  Right -ve.  Left -ve.  Hip scour:  Right -ve.  Left -ve.  Lateral sacral compression:  Right -ve.  Left -ve.  Sacral thrust:  Right -ve.  Left -ve.    ASSESSMENT:  Luis Daniel Gomez is a pleasant 81 year old who presents with chronic low back pain.    #.  Chronic bilateral low back pain (L>R) with atypical lumbar spinal stenosis v underlying lumbar facet arthropathy.  Patient presents with longstanding history of low back pain that is worse with standing improved with leaning forward without radiation to bilateral lower extremities or calf cramping.  He had a previous MRI showing mild central stenosis 2016.       Complicating co-morbidities include:   #.  Severe aortic stenosis status post TAVR currently on apixaban indefinitely  #.  CKD, avoiding NSAIDs and on systemic oral anti-coagulation.     PLAN:  -Investigations: MRI lumbar spine due to constant progressive nature of pain  - Medications: Continue with current medications including gabapentin optimization and renal dosing as managed by PCP for neuropathic pain  #. Physical Therapy: Referral for golf physical therapy, 1 of patient's goals was to be more physically active and he enjoys playing golf  #. Interventions: None at this time.  Discussed with patient if he does not improve at next visit can consider steroid injections but will have to discuss with cardiology as he is on anticoagulations.  #. RTC: following MRI to discuss imaging and evaluation of pain after therapy       Ready to learn, no apparent learning barriers.  Education provided on treatment plan according to patient's preferred learning style.  Patient verbalizes understanding.     Felipa Rebollar  PGY 4  Physical Medicine and Rehabilitation  [facility]  Pager: [phone]    Patient was seen and  examined with attending physician Dr. Dinorah Ho     I was physically present for the E/M service provided. I agree with the House Officer note and plan, which I have reviewed and edited where appropriate.  ________________________________   Dinorah Ho MD  Department of Physical Medicine & Rehabilitation

## 2022-12-13 NOTE — PATIENT INSTRUCTIONS
- Referral for golf physical therapy is placed   - Order is placed for lumbar MRI   - In the future, we can consider an injection but we would need to discuss with your cardiologist because of your blood thinners  - Continue to take Gabapentin for your nerve pain   - Follow up after MRI

## 2022-12-13 NOTE — LETTER
12/13/2022         RE: Luis Daniel Gomez  [address]  [place] [address]        Dear Colleague,    Thank you for referring your patient, Luis Daniel Gomez, to the [facility]. Please see a copy of my visit note below.    HISTORY OF PRESENT ILLNESS:    Luis Daniel Gomez is a 81 year old male who presents with a chief complaint of low back pain.     Patient reports chronic back pain that started 15 years ago.  Pain has been stable but not improving and constant discomfort.  Pain is mostly in low back more on the left side compared to right.  Described as intermittent aching pain around 7/10 on most days.  It is worse with standing, walking, twisting such as playing golf.  Improves with as needed Tylenol and pain resolves when he is sitting.  He can walk up to 1 block before needing a break and rests for 5-10 minutes for pain to resolve and continues to walk.  He denied radiating pain or weakness in lower extremities.  He is concerned as he would like to stay fit and continue to play golf.      Separately he reports numbness and tingling in both feet that initially started below the ankle and progressed up to mid calf associated with decreased sensation.  His PCP started him on gabapentin and currently on 200 mg daily but did not notice any changes in his numbness and pain.    Current functional status: Functional independent but pain limits leisure activities and sleep as he is using a recliner    PRIOR INJURIES/TREATMENT:   Ice/Heat: None  Brace: None  Physical Therapy: no significant benefit       - Current Pain Medications -   Tylenol as needed  Gabapentin managed by PCP and pharmacy, maximum recommended dose due to CKD would be 300 mg/day and 2-3 divided doses  Previously on gabapentin, significant side effects were lower extremity swelling and drowsiness    - Prior/Trialed Pain Medications -   Topicals: Recently purchased over-the-counter patches  NSAIDs: Avoiding due to cardiac and  renal issues  TCAs: None  SNRIs: None  Muscle Relaxers: None  Opioids: None    Prior Procedures:       Steroid injections 10 years ago with no benefit, unsure what kind of injection    Prior Related Surgery: No previous spine surgeries  Other (acupuncture, OMT, CMM, TENS, DME, etc.):   Seen chiropractor with no benefit    Specialists Seen - (with most recent, available notes and clinic visits reviewed):  PCP Dr. Rogers 10/26/2022  Referred to spine clinic as well.  Patient is due for colon cancer screening/ diagnostic. repeat was due in 2021, he would like to postpone for now given that he does have history of adenomatous polyps which I encouraged him to repeat.  He will need to stop the DOAC for 2 days prior to procedure.  He has back symptoms that he describes as pain has right-sided sciatica-like symptoms as well as pseudoclaudication/neurogenic claudication due to the spinal stenosis.  We will continue follow-up on carotid Doppler ultrasound probably in the next year or so.  Labs due in the next couple months.  Follow-up in 3 months and as needed.  All questions answered.       IMAGING:   MRI 4/2016      Review Of Systems:   Contsitutional: No Fever, chills  HEENT: No HA, blurry vision  Respiratory: No Cough, SOB  Cardiovascular: No CP, palpitations   Gastrointestinal: continent of bowel  Genitourinary: continent of bladder  Musculoskeletal: as per HPI  Neurologic: as per HPI  Endocrine: denies DM (no polyuria, polydipsia, polyphagia), no major unintentional weight gain/loss, hair growth/loss, etc  Psychiatric: mood is stable      Medical History:  He  has a past medical history of Aortic stenosis, severe (9/20/2022), Arthritis, BPH (benign prostatic hyperplasia), CKD (chronic kidney disease) stage 3, GFR 30-59 ml/min (H), Cluster headache (2015), Constipation, Dyslipidemia, Dyspnea, Fatty liver, FH: colon cancer, Former smoker, HTN (hypertension), benign, Peripheral neuropathy, Spinal stenosis, Syncope, Type 2  "diabetes mellitus (H), and Ureterolithiases.    He has no past medical history of Cancer (H), Cerebral infarction (H), Congestive heart failure (H), COPD (chronic obstructive pulmonary disease) (H), Depressive disorder, History of blood transfusion, Thyroid disease, or Uncomplicated asthma.     He  has a past surgical history that includes Thumb surg; orthopedic surgery; Keratotomy Arcuate With Femtosecond Laser/Imaging For Atiol (Left, 8/29/2016); Phacoemulsification clear cornea with standard intraocular lens implant (Left, 8/29/2016); colonoscopy; Coronary Angiogram (Bilateral, 8/22/2022); and Transcatheter Aortic Valve Replacement-Femoral Approach (N/A, 9/20/2022).        Family History  His family history includes C.A.D. in his father; Cancer - colorectal in his maternal grandmother; Cancer - colorectal (age of onset: 80) in his mother; Diabetes in his father.     Social History:  Work: retired  History of any legal related pain issues: none  Current living situation: Lives independently in [place]  His  reports that he quit smoking about 14 years ago. His smoking use included cigarettes. He has a 56.00 pack-year smoking history. He has never used smokeless tobacco. He reports that he does not drink alcohol and does not use drugs.      Current Medications:   He has a current medication list which includes the following prescription(s): albuterol, amitriptyline, apixaban anticoagulant, atorvastatin, vitamin b-12, finasteride, fluticasone, furosemide, gabapentin, loratadine, metoprolol tartrate, multi-vitamin, nitroglycerin, polyethylene glycol, tamsulosin, and turmeric.       Allergies:    -- Lisinopril -- Shortness Of Breath and Fatigue   -- Amoxicillin -- Itching   -- Duloxetine     --  \"I can't take it\"   -- Levofloxacin     --  \"I can't take the 500 mgs\"   -- Neurontin [Gabapentin] -- Swelling    --  edema   -- Norvasc [Amlodipine] -- Swelling    --  edema   -- Oxycodone -- GI Disturbance   -- Penicillins -- " "Itching   -- Red Yeast Rice Extract [Monascus Purpureus Went Yeast] -- Hives and Itching   -- Tramadol -- Itching   -- Verapamil -- Itching   -- Vicodin [Hydrocodone-Acetaminophen] -- Itching    PHYSICAL EXAMINATION:  /77   Pulse 74   Ht 1.778 m (5' 10\")   Wt 92.5 kg (204 lb)   SpO2 96%   BMI 29.27 kg/m      General: Pleasant, straightforward, WDWN individual.  Mental Status: Pleasant, direct, appropriate mood and affect  Resp: breathing is unlabored without audible wheeze  Vascular: Palpable pedal and radial pulses, no cyanosis, no venous stasis changes  Heme: no visible ecchymosis or erythema on extremities  Skin: No notable rash    INSPECTION:  There is no erythema, ecchymosis, deformity, asymmetry, or abnormality of the low back.    Neurologic:  STRENGTH:  Hip flexion:  Right 5/5.  Left 5/5.  Hip abduction:  Right 5/5.  Left 5/5.  Hip external rotation:  Right 5/5.  Left 5/5.  Hip extension:  Right 5/5.  Left 5/5.  Knee extension:  Right 5/5.  Left 5/5.  Knee flexion:  Right 5/5.  Left 5/5.  Ankle dorsiflexion:  Right 5/5.  Left 5/5.  Great toe dorsiflexion:  Right 5/5.  Left 5/5.  Ankle plantar flexion:  Right 5/5.  Left 5/5.    SENSATION:  Intact to light touch along right L2, L3, L4, L5, S1, S2.  Intact to light touch along left L2, L3, L4, L5, S1, S2.    REFLEXES:  Patellar (L2-L4):  Right +2.  Left +2.  Achilles (S1-S2):  Right +2.  Left +2.  Babinski:  Right absent.  Left absent.  Forced ankle dorsiflexion (clonus):  Right absent.  Left absent.      Musculoskeletal:  LUMBOPELVIC PALPATION:  Lumbar Spinous Processes:  No tenderness.  Lumbar Paraspinals:  Right -ve.  Left -ve.  Iliac Crest:  Right -ve.  Left -ve.  Sacroiliac Joint:  Right -ve.  Left -ve.  Ischial Tuberosity:  Right -ve.  Left -ve.  Greater Trochanter:  Right -ve.  Left -ve.  Coccyx:  -ve.    THORACOLUMBAR RANGE OF MOTION:  Forward flexion (85 ):  70.  Extension (30 ):  30.  Lateral bending right (30 ):  30.  Lateral bending left " (30 ):  30.  Twisting right with extension:  Pain present on left side.  Twisting left with extension:  Pain present on left side.    LUMBOPELVIC SPECIAL TESTS:  Log roll:  Right -ve.  Left -ve.  Straight leg raise:  Right -ve.  Left -ve.  Crossover straight leg raise:  Right -ve.  Left -ve.  Braggard's:  Right -ve.  Left -ve.  Resisted straight leg raise:  Right -ve.  Left -ve.  CHANEL:  Right -ve.  Left -ve.  FADIR:  Right -ve.  Left -ve.  Hip scour:  Right -ve.  Left -ve.  Lateral sacral compression:  Right -ve.  Left -ve.  Sacral thrust:  Right -ve.  Left -ve.    ASSESSMENT:  Luis Daniel Gomez is a pleasant 81 year old who presents with chronic low back pain.    #.  Chronic bilateral low back pain (L>R) with atypical lumbar spinal stenosis v underlying lumbar facet arthropathy.  Patient presents with longstanding history of low back pain that is worse with standing improved with leaning forward without radiation to bilateral lower extremities or calf cramping.  He had a previous MRI showing mild central stenosis 2016.       Complicating co-morbidities include:   #.  Severe aortic stenosis status post TAVR currently on apixaban indefinitely  #.  CKD, avoiding NSAIDs and on systemic oral anti-coagulation.     PLAN:  -Investigations: MRI lumbar spine due to constant progressive nature of pain  - Medications: Continue with current medications including gabapentin optimization and renal dosing as managed by PCP for neuropathic pain  #. Physical Therapy: Referral for golf physical therapy, 1 of patient's goals was to be more physically active and he enjoys playing golf  #. Interventions: None at this time.  Discussed with patient if he does not improve at next visit can consider steroid injections but will have to discuss with cardiology as he is on anticoagulations.  #. RTC: following MRI to discuss imaging and evaluation of pain after therapy       Ready to learn, no apparent learning barriers.  Education provided on  treatment plan according to patient's preferred learning style.  Patient verbalizes understanding.     Felipa Smooth  PGY 4  Physical Medicine and Rehabilitation  [facility]  Pager: [phone]    Patient was seen and examined with attending physician Dr. Dinorah Ho     I was physically present for the E/M service provided. I agree with the House Officer note and plan, which I have reviewed and edited where appropriate.  ________________________________   Dinorah Ho MD  Department of Physical Medicine & Rehabilitation            Again, thank you for allowing me to participate in the care of your patient.        Sincerely,        Dinorah Ho MD

## 2022-12-13 NOTE — NURSING NOTE
"Luis Daniel Gomez is a 81 year old male who presents for:  Chief Complaint   Patient presents with     Consult     Spinal stenosis of lumbosacral region        Initial Vitals:  /77   Pulse 74   Ht 5' 10\" (1.778 m)   Wt 204 lb (92.5 kg)   SpO2 96%   BMI 29.27 kg/m   Estimated body mass index is 29.27 kg/m  as calculated from the following:    Height as of this encounter: 5' 10\" (1.778 m).    Weight as of this encounter: 204 lb (92.5 kg).. Body surface area is 2.14 meters squared. BP completed using cuff size: large  Severe Pain (7)    Nursing Comments:     Mihir Reynoso MA    "

## 2022-12-21 DIAGNOSIS — M48.07 SPINAL STENOSIS OF LUMBOSACRAL REGION: ICD-10-CM

## 2022-12-21 DIAGNOSIS — G62.9 PERIPHERAL POLYNEUROPATHY: ICD-10-CM

## 2022-12-21 RX ORDER — GABAPENTIN 100 MG/1
CAPSULE ORAL
Qty: 60 CAPSULE | Refills: 1 | OUTPATIENT
Start: 2022-12-21

## 2022-12-21 NOTE — TELEPHONE ENCOUNTER
Covering Kamil  Allergy to gabapentin listed  Can you confirm that patient is taking this drug without adverse affect?

## 2022-12-22 ENCOUNTER — HOSPITAL ENCOUNTER (OUTPATIENT)
Dept: MRI IMAGING | Facility: CLINIC | Age: 82
Discharge: HOME | End: 2022-12-22
Attending: PHYSICAL MEDICINE & REHABILITATION | Admitting: PHYSICAL MEDICINE & REHABILITATION
Payer: COMMERCIAL

## 2022-12-22 DIAGNOSIS — G62.9 PERIPHERAL POLYNEUROPATHY: ICD-10-CM

## 2022-12-22 DIAGNOSIS — M48.07 SPINAL STENOSIS OF LUMBOSACRAL REGION: ICD-10-CM

## 2022-12-22 PROCEDURE — 72148 MRI LUMBAR SPINE W/O DYE: CPT

## 2022-12-22 NOTE — TELEPHONE ENCOUNTER
Spoke with patient     States he is taking a small dosage for pain     A few years ago a larger amount was prescribed (he thinks 1000mg) and couldn't tolerate it, had some swelling on ankles. No other side effects or concerns. No allergies to it.     Taking 100mg twice a day- and tolerating well for back pain     Tahira MADSEN, Triage RN  [facility]

## 2022-12-23 RX ORDER — GABAPENTIN 100 MG/1
100 CAPSULE ORAL 2 TIMES DAILY
Qty: 60 CAPSULE | Refills: 1 | Status: SHIPPED | OUTPATIENT
Start: 2022-12-23 | End: 2023-04-13

## 2023-01-04 ENCOUNTER — LAB (OUTPATIENT)
Dept: LAB | Facility: CLINIC | Age: 83
End: 2023-01-04
Payer: COMMERCIAL

## 2023-01-04 ENCOUNTER — DOCUMENTATION ONLY (OUTPATIENT)
Dept: LAB | Facility: CLINIC | Age: 83
End: 2023-01-04

## 2023-01-04 DIAGNOSIS — Z95.2 S/P TAVR (TRANSCATHETER AORTIC VALVE REPLACEMENT): ICD-10-CM

## 2023-01-04 DIAGNOSIS — E78.5 HYPERLIPIDEMIA WITH TARGET LDL LESS THAN 100: ICD-10-CM

## 2023-01-04 DIAGNOSIS — E78.5 HYPERLIPIDEMIA WITH TARGET LDL LESS THAN 100: Primary | ICD-10-CM

## 2023-01-04 LAB
ANION GAP SERPL CALCULATED.3IONS-SCNC: 12 MMOL/L (ref 7–15)
AST SERPL W P-5'-P-CCNC: 26 U/L (ref 10–50)
BUN SERPL-MCNC: 27.9 MG/DL (ref 8–23)
CALCIUM SERPL-MCNC: 9.3 MG/DL (ref 8.8–10.2)
CHLORIDE SERPL-SCNC: 101 MMOL/L (ref 98–107)
CHOLEST SERPL-MCNC: 167 MG/DL
CREAT SERPL-MCNC: 1.8 MG/DL (ref 0.67–1.17)
DEPRECATED HCO3 PLAS-SCNC: 27 MMOL/L (ref 22–29)
ERYTHROCYTE [DISTWIDTH] IN BLOOD BY AUTOMATED COUNT: 12 % (ref 10–15)
GFR SERPL CREATININE-BSD FRML MDRD: 37 ML/MIN/1.73M2
GLUCOSE SERPL-MCNC: 119 MG/DL (ref 70–99)
HCT VFR BLD AUTO: 45.3 % (ref 40–53)
HDLC SERPL-MCNC: 38 MG/DL
HGB BLD-MCNC: 15 G/DL (ref 13.3–17.7)
LDLC SERPL CALC-MCNC: 77 MG/DL
MCH RBC QN AUTO: 32.3 PG (ref 26.5–33)
MCHC RBC AUTO-ENTMCNC: 33.1 G/DL (ref 31.5–36.5)
MCV RBC AUTO: 97 FL (ref 78–100)
NONHDLC SERPL-MCNC: 129 MG/DL
PLATELET # BLD AUTO: 182 10E3/UL (ref 150–450)
POTASSIUM SERPL-SCNC: 4.7 MMOL/L (ref 3.4–5.3)
RBC # BLD AUTO: 4.65 10E6/UL (ref 4.4–5.9)
SODIUM SERPL-SCNC: 140 MMOL/L (ref 136–145)
TRIGL SERPL-MCNC: 260 MG/DL
WBC # BLD AUTO: 9.7 10E3/UL (ref 4–11)

## 2023-01-04 PROCEDURE — 84450 TRANSFERASE (AST) (SGOT): CPT

## 2023-01-04 PROCEDURE — 36415 COLL VENOUS BLD VENIPUNCTURE: CPT

## 2023-01-04 PROCEDURE — 80061 LIPID PANEL: CPT

## 2023-01-04 NOTE — PROGRESS NOTES
Pt has a physical coming unit(s)[. Please order labs as needed. Basic and cbc were done for another .

## 2023-01-10 DIAGNOSIS — Z95.2 S/P TAVR (TRANSCATHETER AORTIC VALVE REPLACEMENT): Primary | ICD-10-CM

## 2023-01-11 ENCOUNTER — OFFICE VISIT (OUTPATIENT)
Dept: FAMILY MEDICINE | Facility: CLINIC | Age: 83
End: 2023-01-11
Payer: COMMERCIAL

## 2023-01-11 VITALS
TEMPERATURE: 96.6 F | RESPIRATION RATE: 16 BRPM | DIASTOLIC BLOOD PRESSURE: 79 MMHG | HEIGHT: 70 IN | WEIGHT: 206 LBS | HEART RATE: 66 BPM | BODY MASS INDEX: 29.49 KG/M2 | OXYGEN SATURATION: 96 % | SYSTOLIC BLOOD PRESSURE: 130 MMHG

## 2023-01-11 DIAGNOSIS — I50.32 CHRONIC DIASTOLIC (CONGESTIVE) HEART FAILURE (H): ICD-10-CM

## 2023-01-11 DIAGNOSIS — G47.00 INSOMNIA, UNSPECIFIED TYPE: ICD-10-CM

## 2023-01-11 DIAGNOSIS — I48.91 ATRIAL FIBRILLATION, UNSPECIFIED TYPE (H): ICD-10-CM

## 2023-01-11 DIAGNOSIS — J44.9 CHRONIC OBSTRUCTIVE PULMONARY DISEASE, UNSPECIFIED COPD TYPE (H): ICD-10-CM

## 2023-01-11 DIAGNOSIS — Z12.11 SCREEN FOR COLON CANCER: ICD-10-CM

## 2023-01-11 DIAGNOSIS — Z00.00 ROUTINE HISTORY AND PHYSICAL EXAMINATION OF ADULT: Primary | ICD-10-CM

## 2023-01-11 DIAGNOSIS — E53.8 VITAMIN B12 DEFICIENCY (NON ANEMIC): ICD-10-CM

## 2023-01-11 DIAGNOSIS — Z95.2 S/P TAVR (TRANSCATHETER AORTIC VALVE REPLACEMENT): ICD-10-CM

## 2023-01-11 DIAGNOSIS — N18.4 CKD (CHRONIC KIDNEY DISEASE) STAGE 4, GFR 15-29 ML/MIN (H): ICD-10-CM

## 2023-01-11 LAB
ANION GAP SERPL CALCULATED.3IONS-SCNC: 13 MMOL/L (ref 7–15)
BUN SERPL-MCNC: 30.1 MG/DL (ref 8–23)
CALCIUM SERPL-MCNC: 10.3 MG/DL (ref 8.8–10.2)
CHLORIDE SERPL-SCNC: 99 MMOL/L (ref 98–107)
CREAT SERPL-MCNC: 1.82 MG/DL (ref 0.67–1.17)
CREAT UR-MCNC: 46.9 MG/DL
DEPRECATED HCO3 PLAS-SCNC: 26 MMOL/L (ref 22–29)
ERYTHROCYTE [DISTWIDTH] IN BLOOD BY AUTOMATED COUNT: 11.9 % (ref 10–15)
GFR SERPL CREATININE-BSD FRML MDRD: 36 ML/MIN/1.73M2
GLUCOSE SERPL-MCNC: 124 MG/DL (ref 70–99)
HCT VFR BLD AUTO: 46 % (ref 40–53)
HGB BLD-MCNC: 15.4 G/DL (ref 13.3–17.7)
MCH RBC QN AUTO: 32.2 PG (ref 26.5–33)
MCHC RBC AUTO-ENTMCNC: 33.5 G/DL (ref 31.5–36.5)
MCV RBC AUTO: 96 FL (ref 78–100)
MICROALBUMIN UR-MCNC: <12 MG/L
MICROALBUMIN/CREAT UR: NORMAL MG/G{CREAT}
NT-PROBNP SERPL-MCNC: 244 PG/ML (ref 0–1800)
PLATELET # BLD AUTO: 182 10E3/UL (ref 150–450)
POTASSIUM SERPL-SCNC: 4.8 MMOL/L (ref 3.4–5.3)
RBC # BLD AUTO: 4.79 10E6/UL (ref 4.4–5.9)
SODIUM SERPL-SCNC: 138 MMOL/L (ref 136–145)
VIT B12 SERPL-MCNC: 634 PG/ML (ref 232–1245)
WBC # BLD AUTO: 9.8 10E3/UL (ref 4–11)

## 2023-01-11 PROCEDURE — 82570 ASSAY OF URINE CREATININE: CPT | Performed by: INTERNAL MEDICINE

## 2023-01-11 PROCEDURE — 82043 UR ALBUMIN QUANTITATIVE: CPT | Performed by: INTERNAL MEDICINE

## 2023-01-11 PROCEDURE — 83880 ASSAY OF NATRIURETIC PEPTIDE: CPT | Performed by: INTERNAL MEDICINE

## 2023-01-11 PROCEDURE — 85027 COMPLETE CBC AUTOMATED: CPT | Performed by: INTERNAL MEDICINE

## 2023-01-11 PROCEDURE — 99214 OFFICE O/P EST MOD 30 MIN: CPT | Mod: 25 | Performed by: INTERNAL MEDICINE

## 2023-01-11 PROCEDURE — 36415 COLL VENOUS BLD VENIPUNCTURE: CPT | Performed by: INTERNAL MEDICINE

## 2023-01-11 PROCEDURE — G0439 PPPS, SUBSEQ VISIT: HCPCS | Performed by: INTERNAL MEDICINE

## 2023-01-11 PROCEDURE — 82607 VITAMIN B-12: CPT | Performed by: INTERNAL MEDICINE

## 2023-01-11 PROCEDURE — 80048 BASIC METABOLIC PNL TOTAL CA: CPT | Performed by: INTERNAL MEDICINE

## 2023-01-11 RX ORDER — TIOTROPIUM BROMIDE 18 UG/1
18 CAPSULE ORAL; RESPIRATORY (INHALATION) DAILY
Qty: 18 CAPSULE | Refills: 1 | Status: SHIPPED | OUTPATIENT
Start: 2023-01-11 | End: 2023-01-12

## 2023-01-11 ASSESSMENT — ENCOUNTER SYMPTOMS
HEADACHES: 0
NAUSEA: 0
COUGH: 0
ARTHRALGIAS: 0
ABDOMINAL PAIN: 0
FREQUENCY: 1
WEAKNESS: 0
SHORTNESS OF BREATH: 0
JOINT SWELLING: 0
HEMATOCHEZIA: 0
EYE PAIN: 0
FEVER: 0
DIARRHEA: 0
SORE THROAT: 0
MYALGIAS: 0
PALPITATIONS: 0
HEARTBURN: 0
DYSURIA: 0
NERVOUS/ANXIOUS: 0
PARESTHESIAS: 0
CHILLS: 0
CONSTIPATION: 0
HEMATURIA: 0

## 2023-01-11 ASSESSMENT — PAIN SCALES - GENERAL: PAINLEVEL: EXTREME PAIN (8)

## 2023-01-11 ASSESSMENT — ACTIVITIES OF DAILY LIVING (ADL): CURRENT_FUNCTION: NO ASSISTANCE NEEDED

## 2023-01-11 NOTE — PATIENT INSTRUCTIONS
Amitriptyline take 1 tablet alternating with half a tablet every other day for 2 weeks then after 2 weeks take only half tablet daily for another 2 weeks and then stop      Tamsulosin take only 1 tablet a day instead of 2, if weak void stream then go back to 2 tablets a day.       Call  With blood pressure readings : let the doctor know his blood pressure pleasant 90 or you are dizzy    Follow-up with Dr. Megan Rossi medical therapy management.    Schedule colonoscopy    Start Spiriva inhaler once a day for your breathing once a day in addition to as needed albuterol nebulized

## 2023-01-11 NOTE — PROGRESS NOTES
"SUBJECTIVE:   Casey is a 82 year old who presents for Preventive Visit.    Patient has been advised of split billing requirements and indicates understanding: Yes  Are you in the first 12 months of your Medicare coverage?  No    Healthy Habits:     In general, how would you rate your overall health?  Good    Frequency of exercise:  6-7 days/week    Duration of exercise:  15-30 minutes    Do you usually eat at least 4 servings of fruit and vegetables a day, include whole grains    & fiber and avoid regularly eating high fat or \"junk\" foods?  No    Taking medications regularly:  Yes    Medication side effects:  Lightheadedness    Ability to successfully perform activities of daily living:  No assistance needed    Home Safety:  No safety concerns identified    Hearing Impairment:  No hearing concerns    In the past 6 months, have you been bothered by leaking of urine?  No    In general, how would you rate your overall mental or emotional health?  Excellent      PHQ-2 Total Score: 0    Additional concerns today:  Yes      Have you ever done Advance Care Planning? (For example, a Health Directive, POLST, or a discussion with a medical provider or your loved ones about your wishes): No, advance care planning information given to patient to review.  Patient declined advance care planning discussion at this time.       Fall risk  Fallen 2 or more times in the past year?: No  Any fall with injury in the past year?: No    Cognitive Screening   1) Repeat 3 items (Leader, Season, Table)    2) Clock draw: NORMAL  3) 3 item recall: Recalls 3 objects  Results: 3 items recalled: COGNITIVE IMPAIRMENT LESS LIKELY    Mini-CogTM Copyright NILESH Dickey. Licensed by the author for use in [facility]; reprinted with permission (jose a@.[facility]). All rights reserved.      Do you have sleep apnea, excessive snoring or daytime drowsiness?: no    Reviewed and updated as needed this visit by clinical staff   Tobacco  Allergies  Meds  " Problems   Surg Hx  Fam Hx          Reviewed and updated as needed this visit by Provider      Problems   Surg Hx  Fam Hx         Social History     Tobacco Use     Smoking status: Former     Packs/day: 1.00     Years: 56.00     Pack years: 56.00     Types: Cigarettes     Quit date: 1/1/2008     Years since quitting: 15.0     Smokeless tobacco: Never     Tobacco comments:     quit 2008   Substance Use Topics     Alcohol use: No     Alcohol/week: 0.0 standard drinks     If you drink alcohol do you typically have >3 drinks per day or >7 drinks per week? No    Alcohol Use 1/11/2023   Prescreen: >3 drinks/day or >7 drinks/week? No   Prescreen: >3 drinks/day or >7 drinks/week? -   No flowsheet data found.      Current providers sharing in care for this patient include:   Patient Care Team:  Eliza Rogers MD as PCP - General (Internal Medicine)  Megan Rossi, PharmD as Pharmacist (Pharmacist)  Eliza Rogers MD as Assigned PCP  Megan Rossi PharmD as Assigned MTM Pharmacist  Speaker, BIB Nguyen as Cardiac Rehabilitation Therapist  Santa oCnner CNP as Assigned Heart and Vascular Provider  Dinorah Ho MD as Assigned Neuroscience Provider    The following health maintenance items are reviewed in Epic and correct as of today:  Health Maintenance   Topic Date Due     HF ACTION PLAN  Never done     SPIROMETRY  Never done     COPD ACTION PLAN  Never done     COLORECTAL CANCER SCREENING  01/05/2019     A1C  02/03/2023     BMP  07/11/2023     ALT  08/03/2023     LIPID  01/04/2024     MEDICARE ANNUAL WELLNESS VISIT  01/11/2024     MICROALBUMIN  01/11/2024     ANNUAL REVIEW OF HM ORDERS  01/11/2024     FALL RISK ASSESSMENT  01/11/2024     CBC  01/11/2024     HEMOGLOBIN  01/11/2024     DTAP/TDAP/TD IMMUNIZATION (6 - Td or Tdap) 05/14/2024     ADVANCE CARE PLANNING  01/11/2028     TSH W/FREE T4 REFLEX  Completed     PHQ-2 (once per calendar year)  Completed     INFLUENZA VACCINE  Completed      Pneumococcal Vaccine: 65+ Years  Completed     URINALYSIS  Completed     ZOSTER IMMUNIZATION  Completed     COVID-19 Vaccine  Completed     IPV IMMUNIZATION  Aged Out     MENINGITIS IMMUNIZATION  Aged Out     DIABETIC FOOT EXAM  Discontinued     EYE EXAM  Discontinued     Lab work is in process  Labs reviewed in EPIC  BP Readings from Last 3 Encounters:   01/11/23 130/79   12/13/22 129/77   10/26/22 (!) 144/74    Wt Readings from Last 3 Encounters:   01/11/23 93.4 kg (206 lb)   12/13/22 92.5 kg (204 lb)   10/26/22 92.7 kg (204 lb 4.8 oz)                  Patient Active Problem List   Diagnosis     CKD (chronic kidney disease) stage 3, GFR 30-59 ml/min (H)     BPH (benign prostatic hyperplasia)     HTN (hypertension), benign     Advanced directives, counseling/discussion     Kidney stone     Former smoker     Peripheral neuropathy     Syncope     Paroxysmal ventricular tachycardia     Vascular bruit     Hyperlipidemia with target LDL less than 100     Intractable episodic cluster headache     Type 2 diabetes mellitus with diabetic nephropathy (H)     Status post coronary angiogram     Aortic stenosis, severe     Chronic diastolic (congestive) heart failure (H)     Atrial fibrillation, unspecified type (H)     Past Surgical History:   Procedure Laterality Date     COLONOSCOPY       CV CORONARY ANGIOGRAM Bilateral 8/22/2022    Procedure: Coronary Angiogram;  Surgeon: Dave Mao MD;  Location: [facility] CARDIAC CATH LAB     CV TRANSCATHETER AORTIC VALVE REPLACEMENT-FEMORAL APPROACH N/A 9/20/2022    Procedure: Transcatheter Aortic Valve Replacement-Femoral Approach;  Surgeon: Wilson Martinez MD;  Location: [facility] CARDIAC CATH LAB     KERATOTOMY ARCUATE WITH FEMTOSECOND LASER/IMAGING FOR ATIOL Left 8/29/2016    Procedure: KERATOTOMY ARCUATE WITH FEMTOSECOND LASER/IMAGING FOR ATIOL;  Surgeon: Gerard Larson MD;  Location: [facility]     ORTHOPEDIC SURGERY       PHACOEMULSIFICATION CLEAR CORNEA WITH  STANDARD INTRAOCULAR LENS IMPLANT Left 8/29/2016    Procedure: PHACOEMULSIFICATION CLEAR CORNEA WITH STANDARD INTRAOCULAR LENS IMPLANT;  Surgeon: Marsha, Gerard PRESTON MD;  Location: [facility] surg         Social History     Tobacco Use     Smoking status: Former     Packs/day: 1.00     Years: 56.00     Pack years: 56.00     Types: Cigarettes     Quit date: 1/1/2008     Years since quitting: 15.0     Smokeless tobacco: Never     Tobacco comments:     quit 2008   Substance Use Topics     Alcohol use: No     Alcohol/week: 0.0 standard drinks     Family History   Problem Relation Age of Onset     C.A.D. Father      Diabetes Father      Cancer - colorectal Mother 80     Cancer - colorectal Maternal Grandmother          Current Outpatient Medications   Medication Sig Dispense Refill     albuterol (VENTOLIN HFA) 108 (90 Base) MCG/ACT inhaler INHALE 2 PUFFS INTO THE LUNGS EVERY 6 HOURS AS NEEDED FOR SHORTNESS OF BREATH OR DIFFICULT BREATHING OR WHEEZING 18 g 3     amitriptyline (ELAVIL) 25 MG tablet Take half tablet daily 90 tablet 0     apixaban ANTICOAGULANT (ELIQUIS) 5 MG tablet Take 1 tablet (5 mg) by mouth 2 times daily 180 tablet 3     atorvastatin (LIPITOR) 20 MG tablet Take 1 tablet (20 mg) by mouth every evening 90 tablet 3     cyabnocobalamin (VITAMIN B-12) 2500 MCG sublingual tablet Place 3,000 mcg under the tongue daily  30 tablet      finasteride (PROSCAR) 5 MG tablet Take 1 tablet (5 mg) by mouth daily 90 tablet 3     fluticasone (FLONASE) 50 MCG/ACT nasal spray Spray 1-2 sprays into both nostrils daily 48 mL 3     furosemide (LASIX) 20 MG tablet Take 1 tablet (20 mg) by mouth daily 90 tablet 1     gabapentin (NEURONTIN) 100 MG capsule Take 1 capsule (100 mg) by mouth 2 times daily 60 capsule 1     loratadine (CLARITIN) 10 MG tablet Take 1 tablet (10 mg) by mouth daily 30 tablet 1     metoprolol tartrate (LOPRESSOR) 50 MG tablet Take 1 tablet (50 mg) by mouth 2 times daily 270 tablet 3     Multiple Vitamin  "(MULTI-VITAMIN) per tablet Take 1 tablet by mouth daily.       nitroGLYcerin (NITROSTAT) 0.4 MG sublingual tablet For chest pain place 1 tablet under the tongue every 5 minutes for 3 doses. If symptoms persist 5 minutes after 1st dose call 911. 10 tablet 0     polyethylene glycol (MIRALAX) 17 g packet Take 1 packet by mouth daily       tamsulosin (FLOMAX) 0.4 MG capsule Take 0.8 mg by mouth daily (2 x 0.4 mg = 0.8 mg)  **pt reports taking 2 a day**       tiotropium (SPIRIVA) 18 MCG inhaled capsule Inhale 1 capsule (18 mcg) into the lungs daily 30 capsule 1     Turmeric (QC TUMERIC COMPLEX PO) Take 2 chew tab by mouth daily       Allergies   Allergen Reactions     Lisinopril Shortness Of Breath and Fatigue     Amoxicillin Itching     Duloxetine      \"I can't take it\"     Levofloxacin      \"I can't take the 500 mgs\"     Neurontin [Gabapentin] Swelling     edema     Norvasc [Amlodipine] Swelling     edema     Oxycodone GI Disturbance     Penicillins Itching     Red Yeast Rice Extract [[place] Purpureus Went Yeast] Hives and Itching     Tramadol Itching     Verapamil Itching     Vicodin [Hydrocodone-Acetaminophen] Itching     Recent Labs   Lab Test 01/11/23  1227 01/04/23  0759 08/16/22  1020 08/03/22  0808 08/01/22  1039 12/28/21  0824 04/21/21  1332 12/17/20  0901 12/11/19  0716   A1C  --   --   --  5.5  --  5.8*  --  5.5 5.5   LDL  --  77  --  54  --  84  --  54 74   HDL  --  38*  --  40  --  47  --  36* 41   TRIG  --  260*  --  162*  --  67  --  218* 137   ALT  --   --   --  21  --  26  --  43 28   CR 1.82* 1.80*   < > 1.61*   < > 1.67* 1.78* 1.79* 1.62*   GFRESTIMATED 36* 37*   < > 42*   < > 40* 35* 35* 39*   GFRESTBLACK  --   --   --   --   --   --  40* 40* 45*   POTASSIUM 4.8 4.7   < > 4.6   < > 5.1 5.1 4.4 4.7   TSH  --   --   --   --   --   --   --  2.97 4.53*    < > = values in this interval not displayed.      Pneumonia Vaccine:For adults 65 years or older who do not have an immunocompromising condition, " "cerebrospinal fluid leak, or cochlear implant and want to receive PPSV23 ONLY: Administer 1 dose of PPSV23. Anyone who received any doses of PPSV23 before age 65 should receive 1 final dose of the vaccine at age 65 or older. Administer this last dose at least 5 years after the prior PPSV23 dose.        Review of Systems  Constitutional, HEENT, cardiovascular, pulmonary, GI, , musculoskeletal, neuro, skin, endocrine and psych systems are negative, except as otherwise noted.    OBJECTIVE:   /79 (BP Location: Right arm, Patient Position: Sitting, Cuff Size: Adult Large)   Pulse 66   Temp (!) 96.6  F (35.9  C)   Resp 16   Ht 1.778 m (5' 10\")   Wt 93.4 kg (206 lb)   SpO2 96%   BMI 29.56 kg/m   Estimated body mass index is 29.56 kg/m  as calculated from the following:    Height as of this encounter: 1.778 m (5' 10\").    Weight as of this encounter: 93.4 kg (206 lb).  Physical Exam  GENERAL: healthy, alert and no distress  EYES: Eyes grossly normal to inspection, PERRL and conjunctivae and sclerae normal  HENT: ear canals some wax bilaterally and TM's normal, nose and mouth without ulcers or lesions  NECK: no adenopathy, no asymmetry, masses, or scars and thyroid normal to palpation  RESP: Diffuse end expiratory wheeze mainly mid to lower bases bilateral no crackles no rales.  No use of accessory muscles.  Good chest rise.  CV: regular rate and rhythm, normal S1 S2, no S3 or S4, no murmur, click or rub, no peripheral edema and peripheral pulses strong  ABDOMEN: soft, nontender, no hepatosplenomegaly, no masses and bowel sounds normal  MS: no gross musculoskeletal defects noted, no edema  SKIN: no suspicious lesions or rashes  NEURO: Normal strength and tone, mentation intact and speech normal  PSYCH: mentation appears normal, affect normal/bright    Diagnostic Test Results:  Labs reviewed in Epic    ASSESSMENT / PLAN:   Luis Daniel was seen today for physical.    Diagnoses and all orders for this " visit:    Routine history and physical examination of adult    Screen for colon cancer  -     Colonoscopy Screening  Referral; Future    Insomnia, unspecified type  Comments:  Improved, wean amitriptyline to OFF schedule given for weaning    Orders:  -     amitriptyline (ELAVIL) 25 MG tablet; Take half tablet daily    Vitamin B12 deficiency (non anemic)  -     Vitamin B12    S/P TAVR (transcatheter aortic valve replacement)  -     CBC with platelets  -     Basic metabolic panel    CKD (chronic kidney disease) stage 4, GFR 15-29 ml/min (H)  -     Albumin Random Urine Quantitative with Creat Ratio    Chronic diastolic (congestive) heart failure (H)  -     BNP-N terminal pro    Atrial fibrillation, unspecified type (H)    Chronic obstructive pulmonary disease, unspecified COPD type (H)  -     Discontinue: tiotropium (SPIRIVA) 18 MCG inhaled capsule; Inhale 1 capsule (18 mcg) into the lungs daily  -     tiotropium (SPIRIVA) 18 MCG inhaled capsule; Inhale 1 capsule (18 mcg) into the lungs daily    Other orders  -     REVIEW OF HEALTH MAINTENANCE PROTOCOL ORDERS    Wants to wean amitriptyline he has been sleeping well does not need medication given schedule for weaning.  Occasional orthostatic dizziness episode, advised to take tamsulosin 1 tab instead of twice symptoms can cause orthostatic hypertension.   -Controlled  -Continue current medications.  Advised patient to call us with blood pressure readings and follow-up with MTM.  Has wheezing on exam history of smoking, not in distress oxygen 96% on room air, we will start him on Spiriva once daily may benefit from doing pulmonary function tests .  Continue follow-up with cardiology.  Hemodynamics blood pressure stable.  Continues care for evaluation and assistance.  Follow-up in 3 months and as needed  Would advise that he stop turmeric given increased risk of bleeding she is on Eliquis.  Discussed due to his chronic kidney disease stage IV increased risk of  "his medications  Will be careful increasing any medication such as gabapentin given his underlying kidney disease we will check with MTM.  Also uses gabapentin may exacerbate such.  Continue follow-up with cardiology, remains on Lasix daily  On chronic anticoagulation and beta-blocker diagnosis of A. fib.  Start on Spiriva daily consider pulmonary function test.    Patient has been advised of split billing requirements and indicates understanding: Yes      COUNSELING:  Reviewed preventive health counseling, as reflected in patient instructions       Regular exercise       Healthy diet/nutrition       Vision screening       Hearing screening       Dental care       Bladder control       Fall risk prevention       Alcohol Use       BMI:   Estimated body mass index is 29.56 kg/m  as calculated from the following:    Height as of this encounter: 1.778 m (5' 10\").    Weight as of this encounter: 93.4 kg (206 lb).   Weight management plan: Discussed healthy diet and exercise guidelines      He reports that he quit smoking about 15 years ago. His smoking use included cigarettes. He has a 56.00 pack-year smoking history. He has never used smokeless tobacco.      Appropriate preventive services were discussed with this patient, including applicable screening as appropriate for cardiovascular disease, diabetes, osteopenia/osteoporosis, and glaucoma.  As appropriate for age/gender, discussed screening for colorectal cancer, prostate cancer, breast cancer, and cervical cancer. Checklist reviewing preventive services available has been given to the patient.    Reviewed patients plan of care and provided an AVS. The Basic Care Plan (routine screening as documented in Health Maintenance) for Luis Daniel meets the Care Plan requirement. This Care Plan has been established and reviewed with the Patient.          Eliza Rogers MD  [facility]    Identified Health Risks:  "

## 2023-01-12 ENCOUNTER — TELEPHONE (OUTPATIENT)
Dept: FAMILY MEDICINE | Facility: CLINIC | Age: 83
End: 2023-01-12
Payer: COMMERCIAL

## 2023-01-12 RX ORDER — TIOTROPIUM BROMIDE 18 UG/1
18 CAPSULE ORAL; RESPIRATORY (INHALATION) DAILY
Qty: 30 CAPSULE | Refills: 1 | Status: SHIPPED | OUTPATIENT
Start: 2023-01-12 | End: 2023-04-17

## 2023-01-12 NOTE — TELEPHONE ENCOUNTER
Disp Refills Start End EMERALD   tiotropium (SPIRIVA) 18 MCG inhaled capsule 18 capsule 1 1/11/2023  --   Sig - Route: Inhale 1 capsule (18 mcg) into the lungs daily - Inhalation     Pharmacy states they cannot split open boxes of Spiriva, pharmacy requesting qty be changed from 18 to 30

## 2023-01-30 ENCOUNTER — TELEPHONE (OUTPATIENT)
Dept: CARDIOLOGY | Facility: CLINIC | Age: 83
End: 2023-01-30
Payer: COMMERCIAL

## 2023-01-30 DIAGNOSIS — Z95.2 S/P TAVR (TRANSCATHETER AORTIC VALVE REPLACEMENT): Primary | ICD-10-CM

## 2023-01-30 RX ORDER — AZITHROMYCIN 250 MG/1
500 TABLET, FILM COATED ORAL ONCE
Qty: 2 TABLET | Refills: 11 | Status: SHIPPED | OUTPATIENT
Start: 2023-01-30 | End: 2023-01-30

## 2023-01-30 RX ORDER — AZITHROMYCIN 250 MG/1
500 TABLET, FILM COATED ORAL ONCE
Qty: 2 TABLET | Refills: 0 | Status: SHIPPED | OUTPATIENT
Start: 2023-01-30 | End: 2023-01-30

## 2023-02-15 ENCOUNTER — TELEPHONE (OUTPATIENT)
Dept: GASTROENTEROLOGY | Facility: CLINIC | Age: 83
End: 2023-02-15
Payer: COMMERCIAL

## 2023-02-15 NOTE — TELEPHONE ENCOUNTER
Screening Questions  BLUE  KIND OF PREP RED  LOCATION [review exclusion criteria] GREEN  SEDATION TYPE        Y Are you active on mychart?      Eliza Rogers MD   Ordering/Referring Provider?        MEDICA What type of coverage do you have?      N Have you had a positive covid test in the last 14 days?     29.6 1. BMI  [BMI 40+ - review exclusion criteria]    Y  2. Are you able to give consent for your medical care? [IF NO,RN REVIEW]          N  3. Are you taking any prescription pain medications on a routine schedule   (ex narcotics: oxycodone, roxicodone, oxycontin,  and percocet)? [RN Review]        NA  3a. EXTENDED PREP What kind of prescription?     N 4. Do you have any chemical dependencies such as alcohol, street drugs, or methadone?        **If yes 3- 5 , please schedule with MAC sedation.**          IF YES TO ANY 6 - 10 - HOSPITAL SETTING ONLY.     N 6.   Do you need assistance transferring?     N 7.   Have you had a heart or lung transplant?    N 8.   Are you currently on dialysis?   N 9.   Do you use daily home oxygen?   N 10. Do you take nitroglycerin?   10a. NA If yes, how often?     11. [FEMALES]  NA Are you currently pregnant?    11a. NA If yes, how many weeks? [ Greater than 12 weeks, OR NEEDED]    N 12. Do you have Pulmonary Hypertension? *NEED PAC APPT AT [facility] w/ MAC*     N 13. [review exclusion criteria]  Do you have any implantable devices in your body (pacemaker, defib, LVAD)?    N 14. In the past 6 months, have you had any heart related issues including cardiomyopathy or heart attack?     14a. NA If yes, did it require cardiac stenting if so when?     N 15. Have you had a stroke or Transient ischemic attack (TIA - aka  mini stroke ) within 6 months?      N 16. Do you have mod to severe Obstructive Sleep Apnea?  [Hospital only]    N 17. Do you have SEVERE AND UNCONTROLLED asthma? *NEED PAC APPT AT [facility] w/MAC*     N 18. Are you currently taking any blood thinners?     18a. If yes,  "inform patient to \"follow up w/ ordering provider for bridging instructions.\"    N 19. Do you take the medication Phentermine?    19a. If yes, \"Hold for 7 days before procedure.  Please consult your prescribing provider if you have questions about holding this medication.\"     N  20. Do you have chronic kidney disease?      N, BORDERLINE  21. Do you have a diagnosis of diabetes?     N  22. On a regular basis do you go 3-5 days between bowel movements?      23. Preferred LOCAL Pharmacy for Pre Prescription    [ LIST ONLY ONE PHARMACY]     [id number] - [place], [place] - [address] & [facility]        - CLOSING REMINDERS -    Informed patient they will need an adult    Cannot take any type of public or medical transportation alone    Conscious Sedation- Needs  for 6 hours after the procedure       MAC/General-Needs  for 24 hours after procedure    Pre-Procedure Covid test to be completed [[facility] PCR Testing Required]    Confirmed Nurse will call to complete assessment       - SCHEDULING DETAILS -  N Hospital Setting Required? If yes, what is the exclusion?: JUAN RG  Surgeon    3/30  Date of Procedure  Lower Endoscopy [Colonoscopy]  Type of Procedure Scheduled  [facility]   STANDARD GOLYTELY-If you answer yes to questions #8, #20, #21Which Colonoscopy Prep was Sent?     MODERATE Sedation Type     N PAC / Pre-op Required                 "

## 2023-03-07 ENCOUNTER — VIRTUAL VISIT (OUTPATIENT)
Dept: PHARMACY | Facility: CLINIC | Age: 83
End: 2023-03-07
Payer: COMMERCIAL

## 2023-03-07 DIAGNOSIS — M54.9 BACK PAIN, UNSPECIFIED BACK LOCATION, UNSPECIFIED BACK PAIN LATERALITY, UNSPECIFIED CHRONICITY: ICD-10-CM

## 2023-03-07 DIAGNOSIS — I10 HTN (HYPERTENSION), BENIGN: ICD-10-CM

## 2023-03-07 DIAGNOSIS — Z95.2 S/P TAVR (TRANSCATHETER AORTIC VALVE REPLACEMENT): ICD-10-CM

## 2023-03-07 DIAGNOSIS — G47.00 INSOMNIA, UNSPECIFIED TYPE: Primary | ICD-10-CM

## 2023-03-07 DIAGNOSIS — E78.5 HYPERLIPIDEMIA WITH TARGET LDL LESS THAN 100: ICD-10-CM

## 2023-03-07 DIAGNOSIS — J44.9 CHRONIC OBSTRUCTIVE PULMONARY DISEASE, UNSPECIFIED COPD TYPE (H): ICD-10-CM

## 2023-03-07 DIAGNOSIS — J30.2 SEASONAL ALLERGIC RHINITIS, UNSPECIFIED TRIGGER: ICD-10-CM

## 2023-03-07 DIAGNOSIS — Z78.9 TAKES DIETARY SUPPLEMENTS: ICD-10-CM

## 2023-03-07 DIAGNOSIS — K59.00 CONSTIPATION, UNSPECIFIED CONSTIPATION TYPE: ICD-10-CM

## 2023-03-07 DIAGNOSIS — N39.43 BENIGN PROSTATIC HYPERPLASIA WITH POST-VOID DRIBBLING: ICD-10-CM

## 2023-03-07 DIAGNOSIS — I48.91 ATRIAL FIBRILLATION, UNSPECIFIED TYPE (H): ICD-10-CM

## 2023-03-07 DIAGNOSIS — N40.1 BENIGN PROSTATIC HYPERPLASIA WITH POST-VOID DRIBBLING: ICD-10-CM

## 2023-03-07 DIAGNOSIS — G62.9 PERIPHERAL POLYNEUROPATHY: ICD-10-CM

## 2023-03-07 PROCEDURE — 99607 MTMS BY PHARM ADDL 15 MIN: CPT | Performed by: PHARMACIST

## 2023-03-07 PROCEDURE — 99605 MTMS BY PHARM NP 15 MIN: CPT | Performed by: PHARMACIST

## 2023-03-07 RX ORDER — ALBUTEROL SULFATE 90 UG/1
AEROSOL, METERED RESPIRATORY (INHALATION)
Qty: 18 G | Refills: 3 | Status: SHIPPED | OUTPATIENT
Start: 2023-03-07 | End: 2024-01-03

## 2023-03-07 RX ORDER — DIPHENHYDRAMINE HCL 25 MG
25 TABLET ORAL DAILY PRN
COMMUNITY
End: 2023-10-03

## 2023-03-07 RX ORDER — ASPIRIN 81 MG/1
81 TABLET ORAL DAILY
COMMUNITY
End: 2023-03-07

## 2023-03-07 RX ORDER — CHOLECALCIFEROL (VITAMIN D3) 50 MCG
1 TABLET ORAL DAILY
COMMUNITY
End: 2024-10-01

## 2023-03-07 NOTE — LETTER
March 12, 2023  Luis Daniel Gomez  [address]  [place] [address]    Dear Mr. Gomez, TAMEKA [facility]     Thank you for talking with me on Mar 7, 2023 about your health and medications. As a follow-up to our conversation, I have included two documents:      1. Your Recommended To-Do List has steps you should take to get the best results from your medications.  2. Your Medication List will help you keep track of your medications and how to take them.    If you want to talk about these documents, please call Megan Rossi PharmD at phone: [phone], Monday-Friday 8-4:30pm.    I look forward to working with you and your doctors to make sure your medications work well for you.    Sincerely,  Megan Rossi PharmD  [facility] Pharmacist, [facility]

## 2023-03-07 NOTE — LETTER
My COPD Action Plan     Name: Luis Daniel Gomez    YOB: 1940   Date: 3/12/2023    My doctor: Megan Rossi, PharmD   My clinic: [address] [address] [phone]  My Controller Medicine: Tiotropium (Spiriva)   Dose: 18mcg daily     My Rescue Medicine: Albuterol (Proair/Ventolin/Proventil) inhaler   Dose: 2 puffs every 4-6 hours as needed     My Flare Up Medicine: None       Make sure you've had your pneumonia   vaccines.          GREEN ZONE       Doing well today      Usual level of activity and exercise    Usual amount of cough and mucus    No shortness of breath    Usual level of health (thinking clearly, sleeping well, feel like eating) Actions:      Take daily medicines    Use oxygen as prescribed    Follow regular exercise and diet plan    Avoid cigarette smoke and other irritants that harm the lungs           YELLOW ZONE          Having a bad day or flare up      Short of breath more than usual    A lot more sputum (mucus) than usual    Sputum looks yellow, green, tan, brown or bloody    More coughing or wheezing    Fever or chills    Less energy; trouble completing activities    Trouble thinking or focusing    Using quick relief inhaler or nebulizer more often    Poor sleep; symptoms wake me up    Do not feel like eating Actions:      Get plenty of rest    Take daily medicines    Use quick relief inhaler every 4 hours    If you use oxygen, call you doctor to see if you should adjust your oxygen    Do breathing exercises or other things to help you relax    Let a loved one, friend or neighbor know you are feeling worse    Call your care team if you have 2 or more symptoms.  Start taking steroids or antibiotics if directed by your care team           RED ZONE       Need medical care now      Severe shortness of breath (feel you can't breathe)    Fever, chills    Not enough breath to do any activity    Trouble coughing up  mucus, walking or talking    Blood in mucus    Frequent coughing   Rescue medicines are not working    Not able to sleep because of breathing    Feel confused or drowsy    Chest pain    Actions:      Call your health care team.  If you cannot reach your care team, call 911 or go to the emergency room.        Annual Reminders:  Meet with Care Team, Flu Shot every Fall  Pharmacy: [id number] - [place], [place] - [address]

## 2023-03-07 NOTE — PROGRESS NOTES
Medication Therapy Management (MTM) Encounter    ASSESSMENT:                            Medication Adherence/Access: No issues identified    Insomnia/Neuropathy/Back Pain: Improved, but patient feels gabapentin is contributing to itching.  He's already taking loratadine daily, would benefit from discontinuing diphenhydramine if possible due to potential anticholinergic side effects.    BPH: Stable.    COPD: Needed additional education regarding differences between rescue and controller inhalers.  Would benefit from using Spiriva daily, and albuterol as needed.  Due for COPD action plan.    Allergies:  Stable.    Hypertension/Atrial Fibrillation/s/p TAVR:  Stable.    Hyperlipidemia: Stable.    Constipation:  Stable.    Supplements:  Stable.     PLAN:                            1.  Reviewed differences between rescue and controller inhalers.  Recommended using Spiriva 18mcg daily, and albuterol as needed.  2.  COPD action plan completed and sent to patient via [employer].  3.  Recommended avoiding Benadryl (diphenhydramine) due to risk of anticholinergic side effects including constipation, dizziness, and falls.  Could try changing loratadine to cetirizine to see if this is more helpful for itching.    Follow-up: Return in about 6 months (around 9/7/2023) for Follow-up Medication Review.    SUBJECTIVE/OBJECTIVE:                          Luis Daniel Gomez is a 82 year old male called for a follow-up visit.  Today's visit is a follow-up MTM visit from 7/18/2022.     Reason for visit: Routine follow-up.    Tobacco: He reports that he quit smoking about 15 years ago. His smoking use included cigarettes. He has a 56.00 pack-year smoking history. He has never used smokeless tobacco.  Alcohol: none    Medication Adherence/Access: no issues reported    Insomnia/Neuropathy/Back Pain: Casey is no longer using amitriptyline, but is using gabapentin 300mg at bedtime and has also been taking diphenhydramine 25mg at bedtime as needed for  "itching.  He feels gabapentin has been helpful for neuropathy symptoms, but he thinks it has contributed to itching.  He reports dizziness has resolved - he's feeling \"better than ever,\" particularly following TAVR in September.    BPH: Patient is taking finasteride 5mg daily and tamsulosin 0.8mg daily (dose increased again since our last visit).  He reports having a slow and intermittent urinary stream, doesn't feel like he's completely urinating his bladder but does feel medications have been moderately effective.  No other side effects reported.    COPD: Current medications: albuterol MDI as needed and Spiriva 18mcg - has also been using as needed.     Patient is not experiencing side effects.   Patient reports the following symptoms: none.  Patient does not have an COPD Action Plan on file.     Allergies:  Current regimen includes loratadine 10mg daily and Flonase nasal spray daily as needed (currently using).  He reports allergies are stable with this regimen.  He denies side effects.    Hypertension/Atrial Fibrillation/s/p TAVR: Current medications include Eliquis 5mg twice daily (new since our last visit), furosemide 20mg daily and metoprolol 50mg twice daily.  He also has sublingual nitroglycerin available for use, but has not needed to use.  Patient reports no current medication side effects  BP Readings from Last 3 Encounters:   01/11/23 130/79   12/13/22 129/77   10/26/22 (!) 144/74       Hyperlipidemia: Current therapy includes atoravastatin 20mg daily.  Patient reports no significant myalgias or other side effects.  The ASCVD Risk score (Padmaja DK, et al., 2019) failed to calculate for the following reasons:    The 2019 ASCVD risk score is only valid for ages 40 to 79   Recent Labs   Lab Test 01/04/23  0759 08/03/22  0808   CHOL 167 126   HDL 38* 40   LDL 77 54   TRIG 260* 162*     Constipation:  Casey uses Miralax 17g daily as needed, which he reports is effective.  No side effects " reported.    Supplements:  Current supplements include Vitamin D 2000international unit(s) daily (dose reduced since our last visit), Vitamin B12 2500mcg daily and a daily multivitamin.  Vitamin D Deficiency Screening Results:  Lab Results   Component Value Date    VITDT 113 (H) 08/03/2022      Lab Results   Component Value Date    B12 634 01/11/2023      Today's Vitals: There were no vitals taken for this visit.  ----------------    I spent 20 minutes with this patient today. A copy of the visit note was provided to the patient's provider(s).    A summary of these recommendations was sent via [employer].    Megan Rossi, PharmD, BCACP  Medication Therapy Management Provider  Pager: [phone]     Telemedicine Visit Details  Type of service:  Telephone visit  Start Time: 1:04 PM  End Time: 1:24 PM     Medication Therapy Recommendations  Chronic obstructive pulmonary disease, unspecified COPD type (H)    Current Medication: tiotropium (SPIRIVA) 18 MCG inhaled capsule   Rationale: Does not understand instructions - Adherence - Adherence   Recommendation: Provide Education   Status: Patient Agreed - Adherence/Education         Insomnia, unspecified type    Current Medication: diphenhydrAMINE (BENADRYL ALLERGY) 25 MG tablet   Rationale: Unsafe medication for the patient - Adverse medication event - Safety   Recommendation: Change Medication - Could stop and change loratadine to cetirizine   Status: Accepted - no CPA Needed

## 2023-03-07 NOTE — LETTER
_  Medication List        Prepared on: Mar 7, 2023     Bring your Medication List when you go to the doctor, hospital, or   emergency room. And, share it with your family or caregivers.     Note any changes to how you take your medications.  Cross out medications when you no longer use them.    Medication How I take it Why I use it Prescriber   albuterol (VENTOLIN HFA) 108 (90 Base) MCG/ACT inhaler INHALE 2 PUFFS INTO THE LUNGS EVERY 6 HOURS AS NEEDED FOR SHORTNESS OF BREATH OR DIFFICULT BREATHING OR WHEEZING COPD Eliza Rogers MD   apixaban ANTICOAGULANT (ELIQUIS) 5 MG tablet Take 1 tablet (5 mg) by mouth 2 times daily Atrial fibrillation, unspecified type (H) [place], CNP   atorvastatin (LIPITOR) 20 MG tablet Take 1 tablet (20 mg) by mouth every evening Hyperlipidemia with Target LDL Less than 100 Josephine Jacob PA-C   Cyanocobalamin (VITAMIN B-12 SL) Place 2,500 mcg under the tongue daily General Health  Self   diphenhydrAMINE (BENADRYL ALLERGY) 25 MG tablet Take 25 mg by mouth daily as needed for itching Itching Patient Reported   finasteride (PROSCAR) 5 MG tablet Take 1 tablet (5 mg) by mouth daily Benign non-nodular prostatic hyperplasia with lower urinary tract symptoms Josephine Jacob PA-C   fluticasone (FLONASE) 50 MCG/ACT nasal spray Spray 1-2 sprays into both nostrils daily Chronic Nasal Congestion Josephine Jacob PA-C   furosemide (LASIX) 20 MG tablet Take 1 tablet (20 mg) by mouth daily S/P TAVR (transcatheter aortic valve replacement); HTN (Hypertension), Benign Nighat House PA-C   gabapentin (NEURONTIN) 100 MG capsule Take 1 capsule (100 mg) by mouth 2 times daily Peripheral polyneuropathy; Spinal stenosis of lumbosacral region Alfonso Inman MD   loratadine (CLARITIN) 10 MG tablet Take 1 tablet (10 mg) by mouth daily Allergies Self   metoprolol tartrate (LOPRESSOR) 50 MG tablet Take 1 tablet (50 mg) by mouth 2 times daily HTN (Hypertension), Benign Josephine Jacob  SOLANGE   Multiple Vitamin (MULTI-VITAMIN) per tablet Take 1 tablet by mouth daily. General Health  Self`   nitroGLYcerin (NITROSTAT) 0.4 MG sublingual tablet For chest pain place 1 tablet under the tongue every 5 minutes for 3 doses. If symptoms persist 5 minutes after 1st dose call 911. S/P TAVR (transcatheter aortic valve replacement) Nighat House PA-C   polyethylene glycol (MIRALAX) 17 g packet Take 1 packet by mouth daily as needed Constipation Self   tamsulosin (FLOMAX) 0.4 MG capsule Take 0.4 mg by mouth daily BPH Patient Reported   tiotropium (SPIRIVA) 18 MCG inhaled capsule Inhale 1 capsule (18 mcg) into the lungs daily Chronic obstructive pulmonary disease, unspecified COPD type (H) Eliza Rogers MD   vitamin D3 (CHOLECALCIFEROL) 50 mcg (2000 units) tablet Take 1 tablet by mouth daily General Health  Self         Add new medications, over-the-counter drugs, herbals, vitamins, or  minerals in the blank rows below.    Medication How I take it Why I use it Prescriber                                      Allergies:      lisinopril; amoxicillin; duloxetine; levofloxacin; neurontin [gabapentin]; norvasc [amlodipine]; oxycodone; penicillins; red yeast rice extract [monascus purpureus went yeast]; tramadol; verapamil; vicodin [hydrocodone-acetaminophen]        Side effects I have had:               Other Information:              My notes and questions:

## 2023-03-07 NOTE — LETTER
"Recommended To-Do List      Prepared on: Mar 7, 2023       You can get the best results from your medications by completing the items on this \"To-Do List.\"      Bring your To-Do List when you go to your doctor. And, share it with your family or caregivers.    My To-Do List:  What we talked about: What I should do:   The importance of taking your medication as intended    Education: We discussed the difference between controller/maintenance inhalers and rescue inhalers.  Spiriva is your maintenance/controller inhaler - use this every day.  Albuterol is your rescue inhaler - use this as needed.           What we talked about: What I should do:   An issue with your medication    Change the medication you are taking from Benadryl Allergy and loratadine to Zyretec (cetirizine)          What we talked about: What I should do:                       "

## 2023-03-12 NOTE — PATIENT INSTRUCTIONS
"Recommendations from today's MTM visit:                                                    MTM (medication therapy management) is a service provided by a clinical pharmacist designed to help you get the most of out of your medicines.   Today we reviewed what your medicines are for, how to know if they are working, that your medicines are safe and how to make your medicine regimen as easy as possible.      We reviewed the differences between maintenance/controller inhalers and rescue inhalers.  Spiriva is your maintenance/controller inhaler - you should use this daily regardless of what symptoms you're having.  Albuterol is your rescue inhaler and is used only if needed.  I'd recommend trying to not use Benadryl (diphenhydramine).  If you continue to have itching, you could stop Benadryl (diphenhydramine) and loratadine and try Zyrtec (cetirizine) instead.    Follow-up: Return in about 6 months (around 9/7/2023) for Follow-up Medication Review.    It was great speaking with you today.  I value your experience and would be very thankful for your time in providing feedback in our clinic survey. In the next few days, you may receive an email or text message from [facility] [employer] with a link to a survey related to your  clinical pharmacist.\"     To schedule another MTM appointment, please call the clinic directly or you may call the MTM scheduling line at [phone] or toll-free at [phone].     My Clinical Pharmacist's contact information:                                                      Please feel free to contact me with any questions or concerns you have.      Megan Rossi, [place], [facility]  Medication Therapy Management Provider  Pager: [phone]    "

## 2023-03-14 ENCOUNTER — TELEPHONE (OUTPATIENT)
Dept: CARDIOLOGY | Facility: CLINIC | Age: 83
End: 2023-03-14
Payer: COMMERCIAL

## 2023-03-14 NOTE — TELEPHONE ENCOUNTER
Called and spoke with patient's wife. They have not yet talked to the team performing his colonoscopy about holding his eliquis. Asked that they discuss eliquis hold with them. Reviewed we can get cardiology approval for hold if needed from his GI team.

## 2023-03-14 NOTE — TELEPHONE ENCOUNTER
Health Call Center    Phone Message    May a detailed message be left on voicemail: yes     Reason for Call: Other: Patient is having a a colonoscopy 3/30 and they are to check and see if he needs to stop his Eliquis. Please call spouse to discuss.      Action Taken: Other: cardiology    Travel Screening: Not Applicable   Thank you!  [facility]

## 2023-03-22 RX ORDER — BISACODYL 5 MG/1
TABLET, DELAYED RELEASE ORAL
Qty: 4 TABLET | Refills: 0 | Status: SHIPPED | OUTPATIENT
Start: 2023-03-22 | End: 2023-10-03

## 2023-03-25 ENCOUNTER — HEALTH MAINTENANCE LETTER (OUTPATIENT)
Age: 83
End: 2023-03-25

## 2023-03-30 ENCOUNTER — HOSPITAL ENCOUNTER (OUTPATIENT)
Facility: CLINIC | Age: 83
Discharge: HOME | End: 2023-03-30
Attending: INTERNAL MEDICINE | Admitting: INTERNAL MEDICINE
Payer: COMMERCIAL

## 2023-03-30 VITALS
OXYGEN SATURATION: 93 % | DIASTOLIC BLOOD PRESSURE: 76 MMHG | RESPIRATION RATE: 12 BRPM | HEART RATE: 96 BPM | SYSTOLIC BLOOD PRESSURE: 128 MMHG

## 2023-03-30 DIAGNOSIS — Z12.11 ENCOUNTER FOR SCREENING COLONOSCOPY: Primary | ICD-10-CM

## 2023-03-30 LAB — COLONOSCOPY: NORMAL

## 2023-03-30 PROCEDURE — 88305 TISSUE EXAM BY PATHOLOGIST: CPT | Mod: TC | Performed by: INTERNAL MEDICINE

## 2023-03-30 PROCEDURE — G0500 MOD SEDAT ENDO SERVICE >5YRS: HCPCS | Performed by: INTERNAL MEDICINE

## 2023-03-30 PROCEDURE — 250N000011 HC RX IP 250 OP 636: Performed by: INTERNAL MEDICINE

## 2023-03-30 PROCEDURE — 45380 COLONOSCOPY AND BIOPSY: CPT | Mod: PT | Performed by: INTERNAL MEDICINE

## 2023-03-30 RX ORDER — FENTANYL CITRATE 50 UG/ML
INJECTION, SOLUTION INTRAMUSCULAR; INTRAVENOUS PRN
Status: DISCONTINUED | OUTPATIENT
Start: 2023-03-30 | End: 2023-03-30 | Stop reason: HOSPADM

## 2023-03-30 ASSESSMENT — ACTIVITIES OF DAILY LIVING (ADL): ADLS_ACUITY_SCORE: 35

## 2023-03-30 NOTE — H&P
"[facility]  Pre-Endoscopy History and Physical     Luis Daniel Gomez MRN# [id number]   YOB: 1940 Age: 82 year old     Date of Procedure: 3/30/2023  Primary care provider: Eliza Rogers  Type of Endoscopy: colonoscopy  Reason for Procedure: Screening  Type of Anesthesia Anticipated: Moderate Sedation    HPI:    Luis Daniel is a 82 year old male who will be undergoing the above procedure.      A history and physical has been performed. The patient's medications and allergies have been reviewed. The risks and benefits of the procedure and the sedation options and risks were discussed with the patient.  All questions were answered and informed consent was obtained.      He denies a personal or family history of anesthesia complications or bleeding disorders.     Allergies   Allergen Reactions     Lisinopril Shortness Of Breath and Fatigue     Amoxicillin Itching     Duloxetine      \"I can't take it\"     Levofloxacin      \"I can't take the 500 mgs\"     Neurontin [Gabapentin] Swelling     edema     Norvasc [Amlodipine] Swelling     edema     Oxycodone GI Disturbance     Penicillins Itching     Red Yeast Rice Extract [Monascus Purpureus Went Yeast] Hives and Itching     Tramadol Itching     Verapamil Itching     Vicodin [Hydrocodone-Acetaminophen] Itching        Prior to Admission Medications   Prescriptions Last Dose Informant Patient Reported? Taking?   Cyanocobalamin (VITAMIN B-12 SL) 3/29/2023 Spouse/Significant Other Yes Yes   Sig: Place 2,500 mcg under the tongue daily   Multiple Vitamin (MULTI-VITAMIN) per tablet Past Week Spouse/Significant Other Yes Yes   Sig: Take 1 tablet by mouth daily.   albuterol (VENTOLIN HFA) 108 (90 Base) MCG/ACT inhaler Past Week  No Yes   Sig: INHALE 2 PUFFS INTO THE LUNGS EVERY 6 HOURS AS NEEDED FOR SHORTNESS OF BREATH OR DIFFICULT BREATHING OR WHEEZING   apixaban ANTICOAGULANT (ELIQUIS) 5 MG tablet 3/28/2023  No No   Sig: Take 1 tablet (5 mg) " by mouth 2 times daily   atorvastatin (LIPITOR) 20 MG tablet 3/29/2023 Spouse/Significant Other No Yes   Sig: Take 1 tablet (20 mg) by mouth every evening   diphenhydrAMINE (BENADRYL) 25 MG tablet Past Month  Yes Yes   Sig: Take 25 mg by mouth daily as needed for itching   finasteride (PROSCAR) 5 MG tablet 3/29/2023 Spouse/Significant Other No Yes   Sig: Take 1 tablet (5 mg) by mouth daily   fluticasone (FLONASE) 50 MCG/ACT nasal spray 3/29/2023 Spouse/Significant Other No Yes   Sig: Spray 1-2 sprays into both nostrils daily   furosemide (LASIX) 20 MG tablet 3/28/2023  No Yes   Sig: Take 1 tablet (20 mg) by mouth daily   gabapentin (NEURONTIN) 100 MG capsule 3/29/2023  No Yes   Sig: Take 1 capsule (100 mg) by mouth 2 times daily   Patient taking differently: Take 300 mg by mouth At Bedtime   loratadine (CLARITIN) 10 MG tablet 3/29/2023 Spouse/Significant Other Yes Yes   Sig: Take 1 tablet (10 mg) by mouth daily   metoprolol tartrate (LOPRESSOR) 50 MG tablet 3/29/2023 Spouse/Significant Other No Yes   Sig: Take 1 tablet (50 mg) by mouth 2 times daily   nitroGLYcerin (NITROSTAT) 0.4 MG sublingual tablet   No No   Sig: For chest pain place 1 tablet under the tongue every 5 minutes for 3 doses. If symptoms persist 5 minutes after 1st dose call 911.   polyethylene glycol (MIRALAX) 17 g packet 3/29/2023 Spouse/Significant Other Yes Yes   Sig: Take 1 packet by mouth daily as needed   tamsulosin (FLOMAX) 0.4 MG capsule 3/29/2023 Spouse/Significant Other Yes Yes   Sig: Take 0.4 mg by mouth daily   tiotropium (SPIRIVA) 18 MCG inhaled capsule 3/29/2023  No Yes   Sig: Inhale 1 capsule (18 mcg) into the lungs daily   vitamin D3 (CHOLECALCIFEROL) 50 mcg (2000 units) tablet 3/29/2023  Yes Yes   Sig: Take 1 tablet by mouth daily      Facility-Administered Medications: None       Patient Active Problem List   Diagnosis     CKD (chronic kidney disease) stage 3, GFR 30-59 ml/min (H)     BPH (benign prostatic hyperplasia)     HTN  (hypertension), benign     Advanced directives, counseling/discussion     Kidney stone     Former smoker     Peripheral neuropathy     Syncope     Paroxysmal ventricular tachycardia     Vascular bruit     Hyperlipidemia with target LDL less than 100     Intractable episodic cluster headache     Type 2 diabetes mellitus with diabetic nephropathy (H)     Status post coronary angiogram     Aortic stenosis, severe     Chronic diastolic (congestive) heart failure (H)     Atrial fibrillation, unspecified type (H)        Past Medical History:   Diagnosis Date     Aortic stenosis, severe 9/20/2022     Arthritis      BPH (benign prostatic hyperplasia)      Chronic diastolic (congestive) heart failure (H) 1/11/2023     CKD (chronic kidney disease) stage 3, GFR 30-59 ml/min (H)      Cluster headache 2015    Dr. Roth     Constipation      Dyslipidemia      Dyspnea     Normal nuc stress test 9-15-08, fainted once in life     Fatty liver     Ultrasound 11/11     FH: colon cancer     colonoscopy 10/10, repeat 5 years.     Former smoker     quit 2008     HTN (hypertension), benign      Peripheral neuropathy     Dr. Patricia     Spinal stenosis     Last MRI 2010, Dr. Fraire     Syncope      Type 2 diabetes mellitus (H)     diet controlled     Ureterolithiases         Past Surgical History:   Procedure Laterality Date     COLONOSCOPY       CV CORONARY ANGIOGRAM Bilateral 8/22/2022    Procedure: Coronary Angiogram;  Surgeon: Dave Mao MD;  Location: [facility] CARDIAC CATH LAB     CV TRANSCATHETER AORTIC VALVE REPLACEMENT-FEMORAL APPROACH N/A 9/20/2022    Procedure: Transcatheter Aortic Valve Replacement-Femoral Approach;  Surgeon: Wilson Martinez MD;  Location: [facility] CARDIAC CATH LAB     KERATOTOMY ARCUATE WITH FEMTOSECOND LASER/IMAGING FOR ATIOL Left 8/29/2016    Procedure: KERATOTOMY ARCUATE WITH FEMTOSECOND LASER/IMAGING FOR ATIOL;  Surgeon: Gerard Larson MD;  Location: [facility]     ORTHOPEDIC SURGERY        "PHACOEMULSIFICATION CLEAR CORNEA WITH STANDARD INTRAOCULAR LENS IMPLANT Left 8/29/2016    Procedure: PHACOEMULSIFICATION CLEAR CORNEA WITH STANDARD INTRAOCULAR LENS IMPLANT;  Surgeon: Gerard Larson MD;  Location: [facility] surg         Social History     Tobacco Use     Smoking status: Former     Packs/day: 1.00     Years: 56.00     Pack years: 56.00     Types: Cigarettes     Quit date: 1/1/2008     Years since quitting: 15.2     Smokeless tobacco: Never     Tobacco comments:     quit 2008   Substance Use Topics     Alcohol use: No     Alcohol/week: 0.0 standard drinks       Family History   Problem Relation Age of Onset     C.A.D. Father      Diabetes Father      Cancer - colorectal Mother 80     Cancer - colorectal Maternal Grandmother        REVIEW OF SYSTEMS:     5 point ROS negative except as noted above in HPI, including Gen., Resp., CV, GI &  system review.      PHYSICAL EXAM:   BP (!) 169/82   Pulse 98   Resp 16   SpO2 97%  Estimated body mass index is 29.56 kg/m  as calculated from the following:    Height as of 1/11/23: 1.778 m (5' 10\").    Weight as of 1/11/23: 93.4 kg (206 lb).   GENERAL APPEARANCE: healthy, alert and no distress  MENTAL STATUS: alert  AIRWAY EXAM: Mallampatti Class I (visualization of the soft palate, fauces, uvula, anterior and posterior pillars)  RESP: lungs clear to auscultation - no rales, rhonchi or wheezes  CV: regular rates and rhythm      DIAGNOSTICS:    Not indicated      IMPRESSION   ASA Class 1 - Healthy patient, no medical problems        PLAN:       Plan for colonoscopy. We discussed the risks, benefits and alternatives and the patient wished to proceed.    The above has been forwarded to the consulting provider.      Signed Electronically by: Adi Geiger MD,MD  March 30, 2023      [facility]  Ph: [phone] Fax: [phone]    "

## 2023-03-31 LAB
PATH REPORT.COMMENTS IMP SPEC: NORMAL
PATH REPORT.COMMENTS IMP SPEC: NORMAL
PATH REPORT.FINAL DX SPEC: NORMAL
PATH REPORT.GROSS SPEC: NORMAL
PATH REPORT.MICROSCOPIC SPEC OTHER STN: NORMAL
PATH REPORT.RELEVANT HX SPEC: NORMAL
PHOTO IMAGE: NORMAL

## 2023-03-31 PROCEDURE — 88305 TISSUE EXAM BY PATHOLOGIST: CPT | Mod: 26 | Performed by: PATHOLOGY

## 2023-04-13 DIAGNOSIS — G62.9 PERIPHERAL POLYNEUROPATHY: ICD-10-CM

## 2023-04-13 DIAGNOSIS — M48.07 SPINAL STENOSIS OF LUMBOSACRAL REGION: ICD-10-CM

## 2023-04-13 RX ORDER — GABAPENTIN 100 MG/1
100 CAPSULE ORAL 2 TIMES DAILY
Qty: 60 CAPSULE | Refills: 1 | Status: SHIPPED | OUTPATIENT
Start: 2023-04-13 | End: 2023-10-03

## 2023-04-16 DIAGNOSIS — J44.9 CHRONIC OBSTRUCTIVE PULMONARY DISEASE, UNSPECIFIED COPD TYPE (H): ICD-10-CM

## 2023-04-17 RX ORDER — TIOTROPIUM BROMIDE 18 UG/1
CAPSULE ORAL; RESPIRATORY (INHALATION)
Qty: 30 CAPSULE | Refills: 1 | Status: SHIPPED | OUTPATIENT
Start: 2023-04-17 | End: 2023-08-22

## 2023-04-19 ENCOUNTER — LAB (OUTPATIENT)
Dept: LAB | Facility: CLINIC | Age: 83
End: 2023-04-19
Payer: COMMERCIAL

## 2023-04-19 DIAGNOSIS — E11.21 TYPE 2 DIABETES MELLITUS WITH DIABETIC NEPHROPATHY (H): ICD-10-CM

## 2023-04-19 LAB
HBA1C MFR BLD: 5.6 % (ref 0–5.6)
TSH SERPL DL<=0.005 MIU/L-ACNC: 2.87 UIU/ML (ref 0.3–4.2)

## 2023-04-19 PROCEDURE — 84443 ASSAY THYROID STIM HORMONE: CPT

## 2023-04-19 PROCEDURE — 36415 COLL VENOUS BLD VENIPUNCTURE: CPT

## 2023-04-19 PROCEDURE — 83036 HEMOGLOBIN GLYCOSYLATED A1C: CPT

## 2023-06-15 ENCOUNTER — TELEPHONE (OUTPATIENT)
Dept: CARDIOLOGY | Facility: CLINIC | Age: 83
End: 2023-06-15
Payer: COMMERCIAL

## 2023-06-15 NOTE — TELEPHONE ENCOUNTER
Writer called and spoke with Casey.  He is asking very appropriate questions, as he is concerned about his chronic kidney disease. He does say that he has been reading about Lasix, and that it's not good for people who have kidney disease.   Nothing else has changed.       He monitors his weight - no change in months.  BP, pulse, oximeter at home, those are all stable.   He does not feel his heart is irregular or has any skipped beats.   He is wondering if he needs to continue the Lasix, or if he can reduce the dose or change to something else.    Writer reviewed his labs.  Creatinine has been stable over past year in the upper 1.6 - 1.8 range.  GFR  mid-30s, which actually is a decrease from 2022, but was mid 30s all of 2021.  Potassium 4.6 - 4.7.   His PCP Dr. Eliza Rogers is carefully monitoring labs as well.    Casey would like to ask Dr. Knight for his advice regarding the Lasix.   Writer will route Casey's question to Dr. Knight, and then reply to Casey via [employer] as requested.    Mariana Marmolejo RN on 6/15/2023 at 1:39 PM

## 2023-06-16 DIAGNOSIS — G62.9 NEUROPATHY: Primary | ICD-10-CM

## 2023-06-16 DIAGNOSIS — G89.29 CHRONIC BACK PAIN, UNSPECIFIED BACK LOCATION, UNSPECIFIED BACK PAIN LATERALITY: ICD-10-CM

## 2023-06-16 DIAGNOSIS — M54.9 CHRONIC BACK PAIN, UNSPECIFIED BACK LOCATION, UNSPECIFIED BACK PAIN LATERALITY: ICD-10-CM

## 2023-06-16 RX ORDER — GABAPENTIN 100 MG/1
100 CAPSULE ORAL 4 TIMES DAILY
Qty: 360 CAPSULE | Refills: 1 | Status: SHIPPED | OUTPATIENT
Start: 2023-06-16 | End: 2023-10-03

## 2023-06-16 NOTE — TELEPHONE ENCOUNTER
Writer had very lengthy phone call with Casey.  He is asking appropriate questions about his kidney functions\CKD3, and about Lasix in general.   He also asked about his neuropathy wondering if it's related to his kidney disease or the valve replacement\disease.    Writer answered as best as possible, and by the time the conversation was over, Casey was able to verbalize back the answers.  He has a better understanding of the use of Lasix, and how he can learn to manage it.  He understands that he can choose to skip a day, cut the dose in half, or stop it altogether and see what happens.   He already monitors weight, BP and HR, as well as watches for LE edema.  Instructed him to record if and how much Lasix he takes each day, along with his vitals.    [facility] every 6 months for the prescriptions he gets filled there.  He wants to know if they are drawing it and we are drawing it, does he need both?  Writer explained that as long as we are getting the labwork we need [facility], he does not need to have them repeated here.  We can [facility] from the [facility] in Care Everywhere.   He would just need to let us know that he had them drawn [facility].   Agree with Casey that it would be OK to do that, rather than doubling up on blood draws etc.    Casey will either call in to PCP or us, or send us a MyChart message when [facility].    Invited Casey to call us or send us a [facility] message with updates.    Mariana Marmolejo RN on 6/16/2023 at 11:29 AM

## 2023-07-07 ENCOUNTER — OFFICE VISIT (OUTPATIENT)
Dept: CARDIOLOGY | Facility: CLINIC | Age: 83
End: 2023-07-07
Payer: COMMERCIAL

## 2023-07-07 VITALS
HEIGHT: 70 IN | DIASTOLIC BLOOD PRESSURE: 81 MMHG | OXYGEN SATURATION: 94 % | WEIGHT: 208 LBS | SYSTOLIC BLOOD PRESSURE: 138 MMHG | BODY MASS INDEX: 29.78 KG/M2 | HEART RATE: 65 BPM

## 2023-07-07 DIAGNOSIS — I35.0 MILD AORTIC STENOSIS: ICD-10-CM

## 2023-07-07 DIAGNOSIS — I10 HTN (HYPERTENSION), BENIGN: ICD-10-CM

## 2023-07-07 DIAGNOSIS — Z87.898 HISTORY OF DIZZINESS: ICD-10-CM

## 2023-07-07 DIAGNOSIS — I65.23 BILATERAL CAROTID ARTERY STENOSIS: ICD-10-CM

## 2023-07-07 DIAGNOSIS — E78.5 HYPERLIPIDEMIA WITH TARGET LDL LESS THAN 100: ICD-10-CM

## 2023-07-07 DIAGNOSIS — I47.29 PAROXYSMAL VENTRICULAR TACHYCARDIA (H): ICD-10-CM

## 2023-07-07 PROCEDURE — 99214 OFFICE O/P EST MOD 30 MIN: CPT | Performed by: INTERNAL MEDICINE

## 2023-07-07 RX ORDER — UBIDECARENONE 100 MG
100 CAPSULE ORAL DAILY
COMMUNITY
End: 2023-10-03

## 2023-07-07 RX ORDER — MULTIVITAMIN WITH IRON
1 TABLET ORAL DAILY
COMMUNITY
End: 2023-10-03

## 2023-07-07 NOTE — LETTER
7/7/2023    Eliza Rogers MD  [address]  [facility] [address]    RE: Luis Daniel Gomez       Dear Colleague,     I had the pleasure of seeing Luis Daniel Gomez in the [facility].  HPI and Plan:   It is my pleasure to see this very pleasant 82-year-old gentleman, accompanied by his spouse, who returns for follow-up of aortic valve disease.    He had severe aortic stenosis and a 29 mm Anaya CPN valve was placed in September 2022.  Preoperative angiography did not show any significant coronary artery disease.    He has hypertension and also stage III chronic renal failure as a result.    He has been labeled as diabetic which she denies.  I would tend to agree with him as HbA1c was 5.6.    He has peripheral neuropathy.    He has paroxysmal atrial fibrillation and is on Eliquis 5 mg p.o. twice daily.  He reports no issues with easy bruising or bleeding.    He feels well at this time.  He has no cardiac complaints.  He appears younger than stated age.    Cardiac examination is unremarkable.    Last echocardiogram showed a mean gradient of 6 mm across the aortic valve without any significant regurgitation.  Left ventricular systolic function is normal.    Went through his list of medications and we discussed the necessity of all his cardiac medications.  I told him apixaban is a lifelong medication due to his high [id number] score.  Metoprolol would be desirable for rate control should he develop atrial fibrillation.    As he is not diabetic and has no history of CVA or any significant coronary artery disease I think atorvastatin is highly optional.    There is mention of diastolic heart failure in the past and he is on 20 mg of Lasix.  I doubt that this dose would achieve much given his renal function.    Think he can hold his Lasix for the time being.  I did advise  self monitoring.  Should he gain weight more than 3 pounds in 1 day or if his blood pressure is above 150/90 then he should  restart Lasix.    Overall doing well, we will see again in 6 months time with repeat echocardiography prior to clinic visit.    Orders Placed This Encounter   Procedures    Follow-Up with Cardiology    Echocardiogram Complete       Orders Placed This Encounter   Medications    vitamin C with B complex (B COMPLEX-C) tablet     Sig: Take 1 tablet by mouth daily    co-enzyme Q-10 100 MG CAPS capsule     Sig: Take 100 mg by mouth daily       Encounter Diagnoses   Name Primary?    HTN (hypertension), benign     Paroxysmal ventricular tachycardia (H)     Hyperlipidemia with target LDL less than 100     History of dizziness     Mild aortic stenosis     Bilateral carotid artery stenosis        CURRENT MEDICATIONS:  Current Outpatient Medications   Medication Sig Dispense Refill    albuterol (VENTOLIN HFA) 108 (90 Base) MCG/ACT inhaler INHALE 2 PUFFS INTO THE LUNGS EVERY 6 HOURS AS NEEDED FOR SHORTNESS OF BREATH OR DIFFICULT BREATHING OR WHEEZING 18 g 3    atorvastatin (LIPITOR) 20 MG tablet Take 1 tablet (20 mg) by mouth every evening 90 tablet 3    co-enzyme Q-10 100 MG CAPS capsule Take 100 mg by mouth daily      Cyanocobalamin (VITAMIN B-12 SL) Place 2,500 mcg under the tongue daily 30 tablet     finasteride (PROSCAR) 5 MG tablet Take 1 tablet (5 mg) by mouth daily 90 tablet 3    fluticasone (FLONASE) 50 MCG/ACT nasal spray Spray 1-2 sprays into both nostrils daily 48 mL 3    furosemide (LASIX) 20 MG tablet Take 1 tablet (20 mg) by mouth daily 90 tablet 1    gabapentin (NEURONTIN) 100 MG capsule Take 1 capsule (100 mg) by mouth 2 times daily (Patient taking differently: Take 100 mg by mouth daily) 60 capsule 1    loratadine (CLARITIN) 10 MG tablet Take 1 tablet (10 mg) by mouth daily 30 tablet 1    metoprolol tartrate (LOPRESSOR) 50 MG tablet Take 1 tablet (50 mg) by mouth 2 times daily 270 tablet 3    Multiple Vitamin (MULTI-VITAMIN) per tablet Take 1 tablet by mouth daily.      polyethylene glycol (MIRALAX) 17 g packet  "Take 1 packet by mouth daily as needed      SPIRIVA HANDIHALER 18 MCG inhaled capsule INHALE THE CONTENTS OF 1 CAPSULE(18 MCG) INTO THE LUNGS DAILY 30 capsule 1    tamsulosin (FLOMAX) 0.4 MG capsule Take 0.4 mg by mouth daily      vitamin C with B complex (B COMPLEX-C) tablet Take 1 tablet by mouth daily      vitamin D3 (CHOLECALCIFEROL) 50 mcg (2000 units) tablet Take 1 tablet by mouth daily      apixaban ANTICOAGULANT (ELIQUIS) 5 MG tablet Take 1 tablet (5 mg) by mouth 2 times daily 180 tablet 3    bisacodyl (DULCOLAX) 5 MG EC tablet Take 2 tablets at 3 pm the day before your procedure. If your procedure is before 11 am, take 2 additional tablets at 11 pm. If your procedure is after 11 am, take 2 additional tablets at 6 am. For additional instructions refer to your colonoscopy prep instructions. 4 tablet 0    diphenhydrAMINE (BENADRYL) 25 MG tablet Take 25 mg by mouth daily as needed for itching      gabapentin (NEURONTIN) 100 MG capsule Take 1 capsule (100 mg) by mouth 4 times daily 360 capsule 1    nitroGLYcerin (NITROSTAT) 0.4 MG sublingual tablet For chest pain place 1 tablet under the tongue every 5 minutes for 3 doses. If symptoms persist 5 minutes after 1st dose call 911. (Patient not taking: Reported on 7/7/2023) 10 tablet 0    polyethylene glycol (GOLYTELY) 236 g suspension The night before the exam at 6 pm drink an 8-ounce glass every 15 minutes until the jug is half empty. If you arrive before 11 AM: Drink the other half of the Golytely jug at 11 PM night before procedure. If you arrive after 11 AM: Drink the other half of the Golytely jug at 6 AM day of procedure. For additional instructions refer to your colonoscopy prep instructions. 4000 mL 0       ALLERGIES     Allergies   Allergen Reactions    Lisinopril Shortness Of Breath and Fatigue    Amoxicillin Itching    Duloxetine      \"I can't take it\"    Levofloxacin      \"I can't take the 500 mgs\"    Neurontin [Gabapentin] Swelling     edema    Norvasc " [Amlodipine] Swelling     edema    Oxycodone GI Disturbance    Penicillins Itching    Red Yeast Rice Extract [[place]ascus Purpureus Went Yeast] Hives and Itching    Tramadol Itching    Verapamil Itching    Vicodin [Hydrocodone-Acetaminophen] Itching       PAST MEDICAL HISTORY:  Past Medical History:   Diagnosis Date    Aortic stenosis, severe 9/20/2022    Arthritis     BPH (benign prostatic hyperplasia)     Chronic diastolic (congestive) heart failure (H) 1/11/2023    CKD (chronic kidney disease) stage 3, GFR 30-59 ml/min (H)     Cluster headache 2015    Dr. Roth    Constipation     Dyslipidemia     Dyspnea     Normal nuc stress test 9-15-08, fainted once in life    Fatty liver     Ultrasound 11/11    FH: colon cancer     colonoscopy 10/10, repeat 5 years.    Former smoker     quit 2008    HTN (hypertension), benign     Peripheral neuropathy     Dr. Patricia    Spinal stenosis     Last MRI 2010, Dr. Fraire    Syncope     Type 2 diabetes mellitus (H)     diet controlled    Ureterolithiases        PAST SURGICAL HISTORY:  Past Surgical History:   Procedure Laterality Date    COLONOSCOPY      COLONOSCOPY N/A 3/30/2023    Procedure: COLONOSCOPY, WITH POLYPECTOMY AND BIOPSY;  Surgeon: Adi Geiger MD;  Location:  GI    CV CORONARY ANGIOGRAM Bilateral 8/22/2022    Procedure: Coronary Angiogram;  Surgeon: Dave Mao MD;  Location: [facility] CARDIAC CATH LAB    CV TRANSCATHETER AORTIC VALVE REPLACEMENT-FEMORAL APPROACH N/A 9/20/2022    Procedure: Transcatheter Aortic Valve Replacement-Femoral Approach;  Surgeon: Wilson Martinez MD;  Location: [facility] CARDIAC CATH LAB    KERATOTOMY ARCUATE WITH FEMTOSECOND LASER/IMAGING FOR ATIOL Left 8/29/2016    Procedure: KERATOTOMY ARCUATE WITH FEMTOSECOND LASER/IMAGING FOR ATIOL;  Surgeon: Gerard Larson MD;  Location: [facility]    ORTHOPEDIC SURGERY      PHACOEMULSIFICATION CLEAR CORNEA WITH STANDARD INTRAOCULAR LENS IMPLANT Left 8/29/2016    Procedure:  "PHACOEMULSIFICATION CLEAR CORNEA WITH STANDARD INTRAOCULAR LENS IMPLANT;  Surgeon: Gerard Larson MD;  Location: [facility]    Thumb surg         FAMILY HISTORY:  Family History   Problem Relation Age of Onset    C.A.D. Father     Diabetes Father     Cancer - colorectal Mother 80    Cancer - colorectal Maternal Grandmother        SOCIAL HISTORY:  Social History     Socioeconomic History    Marital status:      Spouse name: None    Number of children: None    Years of education: None    Highest education level: None   Tobacco Use    Smoking status: Former     Packs/day: 1.00     Years: 56.00     Pack years: 56.00     Types: Cigarettes     Quit date: 1/1/2008     Years since quitting: 15.5    Smokeless tobacco: Never    Tobacco comments:     quit 2008   Substance and Sexual Activity    Alcohol use: No     Alcohol/week: 0.0 standard drinks of alcohol    Drug use: No    Sexual activity: Yes     Partners: Female   Other Topics Concern    Caffeine Concern Yes     Comment: one cup caffeine per day    Sleep Concern No    Special Diet No    Exercise Yes     Comment: walks daily 30 minutes    Parent/sibling w/ CABG, MI or angioplasty before 65F 55M? No   Social History Narrative    , 2 kids, retired. Wife (Khalida).       Review of Systems:  Skin:        Eyes:       ENT:       Respiratory:  Negative    Cardiovascular:  Negative for;palpitations;chest pain;lightheadedness;dizziness;syncope or near-syncope Positive for;edema;fatigue  Gastroenterology:      Genitourinary:       Musculoskeletal:       Neurologic:       Psychiatric:       Heme/Lymph/Imm:       Endocrine:  Negative      Physical Exam:  Vitals: /81   Pulse 65   Ht 1.778 m (5' 10\")   Wt 94.3 kg (208 lb)   SpO2 94%   BMI 29.84 kg/m      Constitutional:  cooperative        Skin:  warm and dry to the touch          Head:  normocephalic        Eyes:  pupils equal and round        Lymph:No Cervical lymphadenopathy present     ENT:  no pallor or " cyanosis        Neck:  JVP normal bilateral carotid bruit      Respiratory:  clear to auscultation;normal symmetry         Cardiac: regular rhythm;normal S1 and S2;apical impulse not displaced                pulses full and equal pulses below the femoral arteries are diminished                                 no bruit or hematoma at groin sites    GI:  abdomen soft        Extremities and Muscular Skeletal:  no edema              Neurological:  affect appropriate;no gross motor deficits        Psych:  Alert and Oriented x 3        Recent Lab Results:  LIPID RESULTS:  Lab Results   Component Value Date    CHOL 167 01/04/2023    CHOL 134 12/17/2020    HDL 38 (L) 01/04/2023    HDL 36 (L) 12/17/2020    LDL 77 01/04/2023    LDL 54 12/17/2020    TRIG 260 (H) 01/04/2023    TRIG 218 (H) 12/17/2020    CHOLHDLRATIO 2.9 11/10/2014       LIVER ENZYME RESULTS:  Lab Results   Component Value Date    AST 26 01/04/2023    AST 25 12/17/2020    ALT 21 08/03/2022    ALT 43 12/17/2020       CBC RESULTS:  Lab Results   Component Value Date    WBC 9.8 01/11/2023    WBC 9.7 12/17/2020    RBC 4.79 01/11/2023    RBC 4.52 12/17/2020    HGB 15.4 01/11/2023    HGB 15.3 04/21/2021    HCT 46.0 01/11/2023    HCT 44.8 12/17/2020    MCV 96 01/11/2023    MCV 99 12/17/2020    MCH 32.2 01/11/2023    MCH 32.7 12/17/2020    MCHC 33.5 01/11/2023    MCHC 33.0 12/17/2020    RDW 11.9 01/11/2023    RDW 12.9 12/17/2020     01/11/2023     12/17/2020       BMP RESULTS:  Lab Results   Component Value Date     01/11/2023     04/21/2021    POTASSIUM 4.8 01/11/2023    POTASSIUM 4.3 10/19/2022    POTASSIUM 5.1 04/21/2021    CHLORIDE 99 01/11/2023    CHLORIDE 101 10/19/2022    CHLORIDE 105 04/21/2021    CO2 26 01/11/2023    CO2 29 10/19/2022    CO2 29 04/21/2021    ANIONGAP 13 01/11/2023    ANIONGAP 8 10/19/2022    ANIONGAP 4 04/21/2021     (H) 01/11/2023     (H) 10/19/2022     (H) 04/21/2021    BUN 30.1 (H) 01/11/2023     BUN 27 10/19/2022    BUN 27 04/21/2021    CR 1.82 (H) 01/11/2023    CR 1.78 (H) 04/21/2021    GFRESTIMATED 36 (L) 01/11/2023    GFRESTIMATED 40 (L) 08/16/2022    GFRESTIMATED 35 (L) 04/21/2021    GFRESTBLACK 40 (L) 04/21/2021    SANTY 10.3 (H) 01/11/2023    SANTY 9.2 04/21/2021        A1C RESULTS:  Lab Results   Component Value Date    A1C 5.6 04/19/2023    A1C 5.5 12/17/2020       INR RESULTS:  Lab Results   Component Value Date    INR 0.99 09/09/2022    INR 0.96 08/22/2022           CC  Latasha Knight MD  [address]  [place] [address]    Thank you for allowing me to participate in the care of your patient.      Sincerely,     DR LATASHA KNIGHT MD     [facility]

## 2023-07-07 NOTE — PROGRESS NOTES
HPI and Plan:   It is my pleasure to see this very pleasant 82-year-old gentleman, accompanied by his spouse, who returns for follow-up of aortic valve disease.    He had severe aortic stenosis and a 29 mm Anaya CPN valve was placed in September 2022.  Preoperative angiography did not show any significant coronary artery disease.    He has hypertension and also stage III chronic renal failure as a result.    He has been labeled as diabetic which she denies.  I would tend to agree with him as HbA1c was 5.6.    He has peripheral neuropathy.    He has paroxysmal atrial fibrillation and is on Eliquis 5 mg p.o. twice daily.  He reports no issues with easy bruising or bleeding.    He feels well at this time.  He has no cardiac complaints.  He appears younger than stated age.    Cardiac examination is unremarkable.    Last echocardiogram showed a mean gradient of 6 mm across the aortic valve without any significant regurgitation.  Left ventricular systolic function is normal.    Went through his list of medications and we discussed the necessity of all his cardiac medications.  I told him apixaban is a lifelong medication due to his high [id number] score.  Metoprolol would be desirable for rate control should he develop atrial fibrillation.    As he is not diabetic and has no history of CVA or any significant coronary artery disease I think atorvastatin is highly optional.    There is mention of diastolic heart failure in the past and he is on 20 mg of Lasix.  I doubt that this dose would achieve much given his renal function.    Think he can hold his Lasix for the time being.  I did advise  self monitoring.  Should he gain weight more than 3 pounds in 1 day or if his blood pressure is above 150/90 then he should restart Lasix.    Overall doing well, we will see again in 6 months time with repeat echocardiography prior to clinic visit.    Orders Placed This Encounter   Procedures     Follow-Up with Cardiology      Echocardiogram Complete       Orders Placed This Encounter   Medications     vitamin C with B complex (B COMPLEX-C) tablet     Sig: Take 1 tablet by mouth daily     co-enzyme Q-10 100 MG CAPS capsule     Sig: Take 100 mg by mouth daily       Encounter Diagnoses   Name Primary?     HTN (hypertension), benign      Paroxysmal ventricular tachycardia (H)      Hyperlipidemia with target LDL less than 100      History of dizziness      Mild aortic stenosis      Bilateral carotid artery stenosis        CURRENT MEDICATIONS:  Current Outpatient Medications   Medication Sig Dispense Refill     albuterol (VENTOLIN HFA) 108 (90 Base) MCG/ACT inhaler INHALE 2 PUFFS INTO THE LUNGS EVERY 6 HOURS AS NEEDED FOR SHORTNESS OF BREATH OR DIFFICULT BREATHING OR WHEEZING 18 g 3     atorvastatin (LIPITOR) 20 MG tablet Take 1 tablet (20 mg) by mouth every evening 90 tablet 3     co-enzyme Q-10 100 MG CAPS capsule Take 100 mg by mouth daily       Cyanocobalamin (VITAMIN B-12 SL) Place 2,500 mcg under the tongue daily 30 tablet      finasteride (PROSCAR) 5 MG tablet Take 1 tablet (5 mg) by mouth daily 90 tablet 3     fluticasone (FLONASE) 50 MCG/ACT nasal spray Spray 1-2 sprays into both nostrils daily 48 mL 3     furosemide (LASIX) 20 MG tablet Take 1 tablet (20 mg) by mouth daily 90 tablet 1     gabapentin (NEURONTIN) 100 MG capsule Take 1 capsule (100 mg) by mouth 2 times daily (Patient taking differently: Take 100 mg by mouth daily) 60 capsule 1     loratadine (CLARITIN) 10 MG tablet Take 1 tablet (10 mg) by mouth daily 30 tablet 1     metoprolol tartrate (LOPRESSOR) 50 MG tablet Take 1 tablet (50 mg) by mouth 2 times daily 270 tablet 3     Multiple Vitamin (MULTI-VITAMIN) per tablet Take 1 tablet by mouth daily.       polyethylene glycol (MIRALAX) 17 g packet Take 1 packet by mouth daily as needed       SPIRIVA HANDIHALER 18 MCG inhaled capsule INHALE THE CONTENTS OF 1 CAPSULE(18 MCG) INTO THE LUNGS DAILY 30 capsule 1     tamsulosin  "(FLOMAX) 0.4 MG capsule Take 0.4 mg by mouth daily       vitamin C with B complex (B COMPLEX-C) tablet Take 1 tablet by mouth daily       vitamin D3 (CHOLECALCIFEROL) 50 mcg (2000 units) tablet Take 1 tablet by mouth daily       apixaban ANTICOAGULANT (ELIQUIS) 5 MG tablet Take 1 tablet (5 mg) by mouth 2 times daily 180 tablet 3     bisacodyl (DULCOLAX) 5 MG EC tablet Take 2 tablets at 3 pm the day before your procedure. If your procedure is before 11 am, take 2 additional tablets at 11 pm. If your procedure is after 11 am, take 2 additional tablets at 6 am. For additional instructions refer to your colonoscopy prep instructions. 4 tablet 0     diphenhydrAMINE (BENADRYL) 25 MG tablet Take 25 mg by mouth daily as needed for itching       gabapentin (NEURONTIN) 100 MG capsule Take 1 capsule (100 mg) by mouth 4 times daily 360 capsule 1     nitroGLYcerin (NITROSTAT) 0.4 MG sublingual tablet For chest pain place 1 tablet under the tongue every 5 minutes for 3 doses. If symptoms persist 5 minutes after 1st dose call 911. (Patient not taking: Reported on 7/7/2023) 10 tablet 0     polyethylene glycol (GOLYTELY) 236 g suspension The night before the exam at 6 pm drink an 8-ounce glass every 15 minutes until the jug is half empty. If you arrive before 11 AM: Drink the other half of the Golytely jug at 11 PM night before procedure. If you arrive after 11 AM: Drink the other half of the Golytely jug at 6 AM day of procedure. For additional instructions refer to your colonoscopy prep instructions. 4000 mL 0       ALLERGIES     Allergies   Allergen Reactions     Lisinopril Shortness Of Breath and Fatigue     Amoxicillin Itching     Duloxetine      \"I can't take it\"     Levofloxacin      \"I can't take the 500 mgs\"     Neurontin [Gabapentin] Swelling     edema     Norvasc [Amlodipine] Swelling     edema     Oxycodone GI Disturbance     Penicillins Itching     Red Yeast Rice Extract [[place]ascus Purpureus Went Yeast] Hives and Itching "     Tramadol Itching     Verapamil Itching     Vicodin [Hydrocodone-Acetaminophen] Itching       PAST MEDICAL HISTORY:  Past Medical History:   Diagnosis Date     Aortic stenosis, severe 9/20/2022     Arthritis      BPH (benign prostatic hyperplasia)      Chronic diastolic (congestive) heart failure (H) 1/11/2023     CKD (chronic kidney disease) stage 3, GFR 30-59 ml/min (H)      Cluster headache 2015    Dr. Rtoh     Constipation      Dyslipidemia      Dyspnea     Normal nuc stress test 9-15-08, fainted once in life     Fatty liver     Ultrasound 11/11     FH: colon cancer     colonoscopy 10/10, repeat 5 years.     Former smoker     quit 2008     HTN (hypertension), benign      Peripheral neuropathy     Dr. Patricia     Spinal stenosis     Last MRI 2010, Dr. Fraire     Syncope      Type 2 diabetes mellitus (H)     diet controlled     Ureterolithiases        PAST SURGICAL HISTORY:  Past Surgical History:   Procedure Laterality Date     COLONOSCOPY       COLONOSCOPY N/A 3/30/2023    Procedure: COLONOSCOPY, WITH POLYPECTOMY AND BIOPSY;  Surgeon: Adi Geiger MD;  Location:  GI     CV CORONARY ANGIOGRAM Bilateral 8/22/2022    Procedure: Coronary Angiogram;  Surgeon: Dave Mao MD;  Location: [facility] CARDIAC CATH LAB     CV TRANSCATHETER AORTIC VALVE REPLACEMENT-FEMORAL APPROACH N/A 9/20/2022    Procedure: Transcatheter Aortic Valve Replacement-Femoral Approach;  Surgeon: Wilson Martinez MD;  Location: [facility] CARDIAC CATH LAB     KERATOTOMY ARCUATE WITH FEMTOSECOND LASER/IMAGING FOR ATIOL Left 8/29/2016    Procedure: KERATOTOMY ARCUATE WITH FEMTOSECOND LASER/IMAGING FOR ATIOL;  Surgeon: Gerard Larson MD;  Location: [facility]     ORTHOPEDIC SURGERY       PHACOEMULSIFICATION CLEAR CORNEA WITH STANDARD INTRAOCULAR LENS IMPLANT Left 8/29/2016    Procedure: PHACOEMULSIFICATION CLEAR CORNEA WITH STANDARD INTRAOCULAR LENS IMPLANT;  Surgeon: Gerard Larson MD;  Location: [facility]     Thumb surg    "      FAMILY HISTORY:  Family History   Problem Relation Age of Onset     ADRIAN Father      Diabetes Father      Cancer - colorectal Mother 80     Cancer - colorectal Maternal Grandmother        SOCIAL HISTORY:  Social History     Socioeconomic History     Marital status:      Spouse name: None     Number of children: None     Years of education: None     Highest education level: None   Tobacco Use     Smoking status: Former     Packs/day: 1.00     Years: 56.00     Pack years: 56.00     Types: Cigarettes     Quit date: 1/1/2008     Years since quitting: 15.5     Smokeless tobacco: Never     Tobacco comments:     quit 2008   Substance and Sexual Activity     Alcohol use: No     Alcohol/week: 0.0 standard drinks of alcohol     Drug use: No     Sexual activity: Yes     Partners: Female   Other Topics Concern     Caffeine Concern Yes     Comment: one cup caffeine per day     Sleep Concern No     Special Diet No     Exercise Yes     Comment: walks daily 30 minutes     Parent/sibling w/ CABG, MI or angioplasty before 65F 55M? No   Social History Narrative    , 2 kids, retired. Wife (Khalida).       Review of Systems:  Skin:        Eyes:       ENT:       Respiratory:  Negative    Cardiovascular:  Negative for;palpitations;chest pain;lightheadedness;dizziness;syncope or near-syncope Positive for;edema;fatigue  Gastroenterology:      Genitourinary:       Musculoskeletal:       Neurologic:       Psychiatric:       Heme/Lymph/Imm:       Endocrine:  Negative      Physical Exam:  Vitals: /81   Pulse 65   Ht 1.778 m (5' 10\")   Wt 94.3 kg (208 lb)   SpO2 94%   BMI 29.84 kg/m      Constitutional:  cooperative        Skin:  warm and dry to the touch          Head:  normocephalic        Eyes:  pupils equal and round        Lymph:No Cervical lymphadenopathy present     ENT:  no pallor or cyanosis        Neck:  JVP normal bilateral carotid bruit      Respiratory:  clear to auscultation;normal symmetry     "     Cardiac: regular rhythm;normal S1 and S2;apical impulse not displaced                pulses full and equal pulses below the femoral arteries are diminished                                 no bruit or hematoma at groin sites    GI:  abdomen soft        Extremities and Muscular Skeletal:  no edema              Neurological:  affect appropriate;no gross motor deficits        Psych:  Alert and Oriented x 3        Recent Lab Results:  LIPID RESULTS:  Lab Results   Component Value Date    CHOL 167 01/04/2023    CHOL 134 12/17/2020    HDL 38 (L) 01/04/2023    HDL 36 (L) 12/17/2020    LDL 77 01/04/2023    LDL 54 12/17/2020    TRIG 260 (H) 01/04/2023    TRIG 218 (H) 12/17/2020    CHOLHDLRATIO 2.9 11/10/2014       LIVER ENZYME RESULTS:  Lab Results   Component Value Date    AST 26 01/04/2023    AST 25 12/17/2020    ALT 21 08/03/2022    ALT 43 12/17/2020       CBC RESULTS:  Lab Results   Component Value Date    WBC 9.8 01/11/2023    WBC 9.7 12/17/2020    RBC 4.79 01/11/2023    RBC 4.52 12/17/2020    HGB 15.4 01/11/2023    HGB 15.3 04/21/2021    HCT 46.0 01/11/2023    HCT 44.8 12/17/2020    MCV 96 01/11/2023    MCV 99 12/17/2020    MCH 32.2 01/11/2023    MCH 32.7 12/17/2020    MCHC 33.5 01/11/2023    MCHC 33.0 12/17/2020    RDW 11.9 01/11/2023    RDW 12.9 12/17/2020     01/11/2023     12/17/2020       BMP RESULTS:  Lab Results   Component Value Date     01/11/2023     04/21/2021    POTASSIUM 4.8 01/11/2023    POTASSIUM 4.3 10/19/2022    POTASSIUM 5.1 04/21/2021    CHLORIDE 99 01/11/2023    CHLORIDE 101 10/19/2022    CHLORIDE 105 04/21/2021    CO2 26 01/11/2023    CO2 29 10/19/2022    CO2 29 04/21/2021    ANIONGAP 13 01/11/2023    ANIONGAP 8 10/19/2022    ANIONGAP 4 04/21/2021     (H) 01/11/2023     (H) 10/19/2022     (H) 04/21/2021    BUN 30.1 (H) 01/11/2023    BUN 27 10/19/2022    BUN 27 04/21/2021    CR 1.82 (H) 01/11/2023    CR 1.78 (H) 04/21/2021    GFRESTIMATED 36 (L)  01/11/2023    GFRESTIMATED 40 (L) 08/16/2022    GFRESTIMATED 35 (L) 04/21/2021    GFRESTBLACK 40 (L) 04/21/2021    SANTY 10.3 (H) 01/11/2023    SANTY 9.2 04/21/2021        A1C RESULTS:  Lab Results   Component Value Date    A1C 5.6 04/19/2023    A1C 5.5 12/17/2020       INR RESULTS:  Lab Results   Component Value Date    INR 0.99 09/09/2022    INR 0.96 08/22/2022           CC  Adeel Knight MD  [address]  [place] [id number]

## 2023-08-22 DIAGNOSIS — J44.9 CHRONIC OBSTRUCTIVE PULMONARY DISEASE, UNSPECIFIED COPD TYPE (H): ICD-10-CM

## 2023-08-22 RX ORDER — TIOTROPIUM BROMIDE 18 UG/1
CAPSULE ORAL; RESPIRATORY (INHALATION)
Qty: 30 CAPSULE | Refills: 1 | Status: SHIPPED | OUTPATIENT
Start: 2023-08-22 | End: 2023-12-15

## 2023-10-03 ENCOUNTER — VIRTUAL VISIT (OUTPATIENT)
Dept: PHARMACY | Facility: CLINIC | Age: 83
End: 2023-10-03
Payer: COMMERCIAL

## 2023-10-03 DIAGNOSIS — J44.9 CHRONIC OBSTRUCTIVE PULMONARY DISEASE, UNSPECIFIED COPD TYPE (H): ICD-10-CM

## 2023-10-03 DIAGNOSIS — Z78.9 TAKES DIETARY SUPPLEMENTS: ICD-10-CM

## 2023-10-03 DIAGNOSIS — J30.2 SEASONAL ALLERGIC RHINITIS, UNSPECIFIED TRIGGER: ICD-10-CM

## 2023-10-03 DIAGNOSIS — K59.00 CONSTIPATION, UNSPECIFIED CONSTIPATION TYPE: ICD-10-CM

## 2023-10-03 DIAGNOSIS — N40.1 BENIGN PROSTATIC HYPERPLASIA WITH POST-VOID DRIBBLING: Primary | ICD-10-CM

## 2023-10-03 DIAGNOSIS — Z95.2 S/P TAVR (TRANSCATHETER AORTIC VALVE REPLACEMENT): ICD-10-CM

## 2023-10-03 DIAGNOSIS — I10 HTN (HYPERTENSION), BENIGN: ICD-10-CM

## 2023-10-03 DIAGNOSIS — I48.91 ATRIAL FIBRILLATION, UNSPECIFIED TYPE (H): ICD-10-CM

## 2023-10-03 DIAGNOSIS — N39.43 BENIGN PROSTATIC HYPERPLASIA WITH POST-VOID DRIBBLING: Primary | ICD-10-CM

## 2023-10-03 DIAGNOSIS — E78.5 HYPERLIPIDEMIA WITH TARGET LDL LESS THAN 100: ICD-10-CM

## 2023-10-03 PROCEDURE — 99607 MTMS BY PHARM ADDL 15 MIN: CPT | Performed by: PHARMACIST

## 2023-10-03 PROCEDURE — 99606 MTMS BY PHARM EST 15 MIN: CPT | Performed by: PHARMACIST

## 2023-10-03 RX ORDER — CETIRIZINE HYDROCHLORIDE 10 MG/1
10 TABLET ORAL DAILY
COMMUNITY

## 2023-10-03 RX ORDER — PERPHENAZINE 16 MG
600 TABLET ORAL 2 TIMES DAILY
COMMUNITY

## 2023-10-03 NOTE — PROGRESS NOTES
Medication Therapy Management (MTM) Encounter    ASSESSMENT:                            Medication Adherence/Access: No issues identified    BPH:   Stable.    COPD:   Stable.    Allergies:  Stable.    Hypertension/Atrial Fibrillation/s/p TAVR:    Stable, agree with reduced dose for Eliquis given SrCr >1.5mg/dL and age >81yo.    Hyperlipidemia:   Stable.    Constipation:    Stable.    Supplements:    Stable.     PLAN:                            Continue current medication regimen.     Follow-up: Return in about 1 year (around 10/3/2024) for Follow-up Medication Review.    SUBJECTIVE/OBJECTIVE:                          Casey Gomez is a 82 year old male called for a follow-up visit from 3/7/2023.  He's joined by his wife, Shira, for our visit today.     Reason for visit: Routine follow-up.    Tobacco: He reports that he quit smoking about 15 years ago. His smoking use included cigarettes. He has a 56.00 pack-year smoking history. He has never used smokeless tobacco.  Alcohol: none    Medication Adherence/Access: no issues reported    BPH:   Finasteride 5mg daily   Tamsulosin 0.4mg daily  He reports having a slow and intermittent urinary stream, doesn't feel like he's completely urinating his bladder but does feel medications have been moderately effective.  No other side effects reported.    COPD:   Albuterol MDI as needed - generally using about once a day  Spiriva 18mcg daily  Patient is not experiencing side effects.   Patient reports the following symptoms: none.    Allergies:    Zyrtec 10mg daily  Flonase nasal spray daily   He reports allergies are stable with this regimen.  He denies side effects.    Hypertension/Atrial Fibrillation/s/p TAVR:   Eliquis 2.5mg twice daily ([facility] to [facility])  Metoprolol tartrate 50mg twice daily  Sublingual nitroglycerin as needed - has never needed to use.  Patient reports no current medication side effects.  Cardiology discontinued furosemide,  he's noticed no changes off of this.  BP Readings from Last 3 Encounters:   07/07/23 138/81   03/30/23 128/76   01/11/23 130/79        Hyperlipidemia:   Atoravastatin 20mg daily  Patient reports no significant myalgias or other side effects.  Recent Labs   Lab Test 01/04/23  0759 08/03/22  0808   CHOL 167 126   HDL 38* 40   LDL 77 54   TRIG 260* 162*      Constipation:    Miralax 17g daily as needed  He reports this is is effective.  No side effects reported.    Supplements:    Alpha lipoic acid 600mg twice daily - started taking to help with neuropathy  Vitamin D 2000 international unit(s) daily (dose reduced since last lab check)  Vitamin B12 2500mcg daily  Daily multivitamin  No issues reported    Vitamin D Deficiency Screening Results:  Lab Results   Component Value Date    VITDT 113 (H) 08/03/2022      Lab Results   Component Value Date    B12 634 01/11/2023      Today's Vitals: There were no vitals taken for this visit.  ----------------    I spent 16 minutes with this patient today. A copy of the visit note was provided to the patient's provider(s).    A summary of these recommendations was sent via [employer].    Megan Rossi, Vern, BCACP  Medication Therapy Management Provider  Pager: [phone]     Telemedicine Visit Details  Type of service:  Telephone visit  Start Time: 3:05 PM  End Time: 3:21 PM     Medication Therapy Recommendations  No medication therapy recommendations to display

## 2023-10-03 NOTE — PATIENT INSTRUCTIONS
"Recommendations from today's MTM visit:                                                    MTM (medication therapy management) is a service provided by a clinical pharmacist designed to help you get the most of out of your medicines.   Today we reviewed what your medicines are for, how to know if they are working, that your medicines are safe and how to make your medicine regimen as easy as possible.      Continue current medication regimen.     Follow-up: Return in about 1 year (around 10/3/2024) for Follow-up Medication Review.    It was great speaking with you today.  I value your experience and would be very thankful for your time in providing feedback in our clinic survey. In the next few days, you may receive an email or text message from [employer] with a link to a survey related to your  clinical pharmacist.\"     To schedule another MTM appointment, please call the clinic directly or you may call the MTM scheduling line at [phone] or toll-free at [phone].     My Clinical Pharmacist's contact information:                                                      Please feel free to contact me with any questions or concerns you have.      Megan Rossi, Vern, [facility]  Medication Therapy Management Provider  Pager: [phone]    "

## 2023-10-29 ENCOUNTER — HEALTH MAINTENANCE LETTER (OUTPATIENT)
Age: 83
End: 2023-10-29

## 2023-12-15 DIAGNOSIS — J44.9 CHRONIC OBSTRUCTIVE PULMONARY DISEASE, UNSPECIFIED COPD TYPE (H): ICD-10-CM

## 2023-12-15 RX ORDER — TIOTROPIUM BROMIDE 18 UG/1
CAPSULE ORAL; RESPIRATORY (INHALATION)
Qty: 30 CAPSULE | Refills: 1 | OUTPATIENT
Start: 2023-12-15

## 2023-12-15 RX ORDER — TIOTROPIUM BROMIDE 18 UG/1
CAPSULE ORAL; RESPIRATORY (INHALATION)
Qty: 90 CAPSULE | Refills: 0 | Status: SHIPPED | OUTPATIENT
Start: 2023-12-15 | End: 2024-04-16

## 2024-01-01 DIAGNOSIS — J30.2 SEASONAL ALLERGIC RHINITIS, UNSPECIFIED TRIGGER: Primary | ICD-10-CM

## 2024-01-03 RX ORDER — ALBUTEROL SULFATE 90 UG/1
AEROSOL, METERED RESPIRATORY (INHALATION)
Qty: 8.5 G | Refills: 3 | Status: SHIPPED | OUTPATIENT
Start: 2024-01-03

## 2024-01-03 NOTE — TELEPHONE ENCOUNTER
Appointments in Next Year      Jan 17, 2024  9:30 AM  LAB with CS LAB  [facility] ([facility] ) [phone]     Jan 24, 2024  1:30 PM  (Arrive by 1:10 PM)  Annual Wellness Visit with Eliza Rogers MD  [facility] ([facility] ) [phone]          RT Justice (R)

## 2024-01-17 ENCOUNTER — LAB (OUTPATIENT)
Dept: LAB | Facility: CLINIC | Age: 84
End: 2024-01-17
Payer: COMMERCIAL

## 2024-01-17 DIAGNOSIS — I50.32 CHRONIC DIASTOLIC (CONGESTIVE) HEART FAILURE (H): ICD-10-CM

## 2024-01-17 DIAGNOSIS — E11.21 TYPE 2 DIABETES MELLITUS WITH DIABETIC NEPHROPATHY (H): ICD-10-CM

## 2024-01-17 DIAGNOSIS — E78.5 HYPERLIPIDEMIA WITH TARGET LDL LESS THAN 100: ICD-10-CM

## 2024-01-17 DIAGNOSIS — N18.30 CKD (CHRONIC KIDNEY DISEASE) STAGE 3, GFR 30-59 ML/MIN (H): Primary | ICD-10-CM

## 2024-01-17 LAB
ALT SERPL W P-5'-P-CCNC: 21 U/L (ref 0–70)
ANION GAP SERPL CALCULATED.3IONS-SCNC: 12 MMOL/L (ref 7–15)
BUN SERPL-MCNC: 20.7 MG/DL (ref 8–23)
CALCIUM SERPL-MCNC: 9.3 MG/DL (ref 8.8–10.2)
CHLORIDE SERPL-SCNC: 101 MMOL/L (ref 98–107)
CHOLEST SERPL-MCNC: 222 MG/DL
CREAT SERPL-MCNC: 1.74 MG/DL (ref 0.67–1.17)
CREAT UR-MCNC: 137 MG/DL
DEPRECATED HCO3 PLAS-SCNC: 26 MMOL/L (ref 22–29)
EGFRCR SERPLBLD CKD-EPI 2021: 38 ML/MIN/1.73M2
ERYTHROCYTE [DISTWIDTH] IN BLOOD BY AUTOMATED COUNT: 12.2 % (ref 10–15)
FASTING STATUS PATIENT QL REPORTED: YES
GLUCOSE SERPL-MCNC: 118 MG/DL (ref 70–99)
HBA1C MFR BLD: 5.5 % (ref 0–5.6)
HCT VFR BLD AUTO: 47.3 % (ref 40–53)
HDLC SERPL-MCNC: 37 MG/DL
HGB BLD-MCNC: 15.4 G/DL (ref 13.3–17.7)
LDLC SERPL CALC-MCNC: 137 MG/DL
MCH RBC QN AUTO: 31.9 PG (ref 26.5–33)
MCHC RBC AUTO-ENTMCNC: 32.6 G/DL (ref 31.5–36.5)
MCV RBC AUTO: 98 FL (ref 78–100)
MICROALBUMIN UR-MCNC: 16.3 MG/L
MICROALBUMIN/CREAT UR: 11.9 MG/G CR (ref 0–17)
NONHDLC SERPL-MCNC: 185 MG/DL
PLATELET # BLD AUTO: 188 10E3/UL (ref 150–450)
POTASSIUM SERPL-SCNC: 4.9 MMOL/L (ref 3.4–5.3)
RBC # BLD AUTO: 4.83 10E6/UL (ref 4.4–5.9)
SODIUM SERPL-SCNC: 139 MMOL/L (ref 135–145)
TRIGL SERPL-MCNC: 240 MG/DL
WBC # BLD AUTO: 8.8 10E3/UL (ref 4–11)

## 2024-01-17 PROCEDURE — 80061 LIPID PANEL: CPT

## 2024-01-17 PROCEDURE — 83036 HEMOGLOBIN GLYCOSYLATED A1C: CPT

## 2024-01-17 PROCEDURE — 85027 COMPLETE CBC AUTOMATED: CPT

## 2024-01-17 PROCEDURE — 82570 ASSAY OF URINE CREATININE: CPT

## 2024-01-17 PROCEDURE — 84460 ALANINE AMINO (ALT) (SGPT): CPT

## 2024-01-17 PROCEDURE — 36415 COLL VENOUS BLD VENIPUNCTURE: CPT

## 2024-01-17 PROCEDURE — 82043 UR ALBUMIN QUANTITATIVE: CPT

## 2024-01-17 PROCEDURE — 80048 BASIC METABOLIC PNL TOTAL CA: CPT

## 2024-01-19 ASSESSMENT — ENCOUNTER SYMPTOMS
NAUSEA: 0
HEADACHES: 0
HEARTBURN: 0
DIARRHEA: 0
NERVOUS/ANXIOUS: 0
ARTHRALGIAS: 0
CHILLS: 0
FREQUENCY: 0
DIZZINESS: 0
EYE PAIN: 0
HEMATURIA: 0
JOINT SWELLING: 0
DYSURIA: 0
MYALGIAS: 0
PALPITATIONS: 0
ABDOMINAL PAIN: 0
HEMATOCHEZIA: 0
PARESTHESIAS: 0
COUGH: 0
SORE THROAT: 0
FEVER: 0
SHORTNESS OF BREATH: 0
CONSTIPATION: 0
WEAKNESS: 0

## 2024-01-19 ASSESSMENT — ACTIVITIES OF DAILY LIVING (ADL): CURRENT_FUNCTION: NO ASSISTANCE NEEDED

## 2024-01-23 ENCOUNTER — TELEPHONE (OUTPATIENT)
Dept: FAMILY MEDICINE | Facility: CLINIC | Age: 84
End: 2024-01-23
Payer: COMMERCIAL

## 2024-01-23 DIAGNOSIS — E78.5 HYPERLIPIDEMIA WITH TARGET LDL LESS THAN 100: ICD-10-CM

## 2024-01-23 DIAGNOSIS — N18.32 STAGE 3B CHRONIC KIDNEY DISEASE (H): Primary | ICD-10-CM

## 2024-01-23 RX ORDER — ATORVASTATIN CALCIUM 20 MG/1
40 TABLET, FILM COATED ORAL EVERY EVENING
Qty: 180 TABLET | Refills: 1 | Status: SHIPPED | OUTPATIENT
Start: 2024-01-23 | End: 2024-07-25

## 2024-01-23 NOTE — TELEPHONE ENCOUNTER
RN left detailed VM informing patient of atorvastatin dose change to 40mg daily and that nephrology referral has been ordered.

## 2024-01-23 NOTE — TELEPHONE ENCOUNTER
Called and spoke with pt, relayed message below. Pt verbalized understanding and in agreement with plan.     Do you want to change atorvastatin order to reflect 4 mg daily? Orders and pharmacy pended for your review.  Do you want nephrology referral? Order pended for your review.    LIBRADO MICHAELS, RN on 1/23/2024 at 12:59 PM      Eliza Rogers MD  P  Triage [facility], reviewed your labs,     Lipid panel ;Are stable from 1 year ago, HDL is low at 37 goal greater than 50, HDL is a good cholesterol, LDL is at 136, LDL is bad cholesterol; goal is less than 100, triglyceride is slightly elevated to 240.     I recommend you increase atorvastatin to 40 mg 1 tablet once daily you are currently at 20 mg dose.     The ASCVD Risk score (Padmaja DK, et al., 2019) failed to calculate for the following reasons:  The 2019 ASCVD risk score is only valid for ages 40 to 79     Please continue with healthy diet and lifestyle changes, healthy diet, low-fat low-cholesterol diet, exercise activities and weight loss as tolerated     Chemistry shows normal electrolytes, stable kidney function test from 1 year ago GFR is low at 38 but is stable from 1 year ago.  Please follow-up with the kidney specialist.     Urine protein is negative which is reassuring, liver enzymes are normal.     Please schedule a follow-up wellness visit.     Any further questions please let me know,  Regards,  Dr. Rogers  Written by Eliza Rogers MD on 1/18/2024  7:47 AM CST

## 2024-01-24 ENCOUNTER — OFFICE VISIT (OUTPATIENT)
Dept: FAMILY MEDICINE | Facility: CLINIC | Age: 84
End: 2024-01-24
Payer: COMMERCIAL

## 2024-01-24 VITALS
WEIGHT: 205.4 LBS | RESPIRATION RATE: 13 BRPM | TEMPERATURE: 97.9 F | HEART RATE: 80 BPM | BODY MASS INDEX: 29.41 KG/M2 | DIASTOLIC BLOOD PRESSURE: 79 MMHG | OXYGEN SATURATION: 93 % | SYSTOLIC BLOOD PRESSURE: 164 MMHG | HEIGHT: 70 IN

## 2024-01-24 DIAGNOSIS — Z87.891 FORMER SMOKER: ICD-10-CM

## 2024-01-24 DIAGNOSIS — I10 BENIGN ESSENTIAL HYPERTENSION: ICD-10-CM

## 2024-01-24 DIAGNOSIS — J44.9 CHRONIC OBSTRUCTIVE PULMONARY DISEASE, UNSPECIFIED COPD TYPE (H): ICD-10-CM

## 2024-01-24 DIAGNOSIS — I48.91 ATRIAL FIBRILLATION, UNSPECIFIED TYPE (H): ICD-10-CM

## 2024-01-24 DIAGNOSIS — Z00.00 ROUTINE HISTORY AND PHYSICAL EXAMINATION OF ADULT: Primary | ICD-10-CM

## 2024-01-24 DIAGNOSIS — N18.31 STAGE 3A CHRONIC KIDNEY DISEASE (H): ICD-10-CM

## 2024-01-24 DIAGNOSIS — Z95.2 S/P TAVR (TRANSCATHETER AORTIC VALVE REPLACEMENT): ICD-10-CM

## 2024-01-24 DIAGNOSIS — E78.5 HYPERLIPIDEMIA WITH TARGET LDL LESS THAN 100: ICD-10-CM

## 2024-01-24 PROCEDURE — 99214 OFFICE O/P EST MOD 30 MIN: CPT | Mod: 25 | Performed by: INTERNAL MEDICINE

## 2024-01-24 PROCEDURE — G0439 PPPS, SUBSEQ VISIT: HCPCS | Performed by: INTERNAL MEDICINE

## 2024-01-24 RX ORDER — LIDOCAINE 50 MG/G
PATCH TOPICAL
COMMUNITY
Start: 2024-01-16 | End: 2024-02-29

## 2024-01-24 RX ORDER — A/SINGAPORE/GP1908/2015 IVR-180 (AN A/MICHIGAN/45/2015 (H1N1)PDM09-LIKE VIRUS, A/HONG KONG/4801/2014, NYMC X-263B (H3N2) (AN A/HONG KONG/4801/2014-LIKE VIRUS), AND B/BRISBANE/60/2008, WILD TYPE (A B/BRISBANE/60/2008-LIKE VIRUS) 15; 15; 15 UG/.5ML; UG/.5ML; UG/.5ML
INJECTION, SUSPENSION INTRAMUSCULAR
COMMUNITY
Start: 2023-09-27 | End: 2024-02-29

## 2024-01-24 RX ORDER — RESPIRATORY SYNCYTIAL VISUS VACCINE RECOMBINANT, ADJUVANTED 120MCG/0.5
KIT INTRAMUSCULAR
COMMUNITY
Start: 2023-09-27 | End: 2024-02-29

## 2024-01-24 ASSESSMENT — ACTIVITIES OF DAILY LIVING (ADL): CURRENT_FUNCTION: NO ASSISTANCE NEEDED

## 2024-01-24 ASSESSMENT — PAIN SCALES - GENERAL: PAINLEVEL: NO PAIN (0)

## 2024-01-24 ASSESSMENT — ENCOUNTER SYMPTOMS
FEVER: 0
DYSURIA: 0
ABDOMINAL PAIN: 0
MYALGIAS: 0
HEMATURIA: 0
FREQUENCY: 0
SORE THROAT: 0
NAUSEA: 0
WEAKNESS: 0
CONSTIPATION: 0
COUGH: 0
EYE PAIN: 0
DIARRHEA: 0
PALPITATIONS: 0
DIZZINESS: 0
NERVOUS/ANXIOUS: 0
ARTHRALGIAS: 0
JOINT SWELLING: 0
CHILLS: 0
SHORTNESS OF BREATH: 0
HEADACHES: 0

## 2024-01-24 NOTE — PROGRESS NOTES
"Preventive Care Visit  [facility]  Eliza Rogers MD, Internal Medicine  Jan 24, 2024      SUBJECTIVE:   Casey is a 83 year old, presenting for the following:  Physical         No data to display              Are you in the first 12 months of your Medicare coverage?  No    Healthy Habits:     In general, how would you rate your overall health?  Good    Frequency of exercise:  2-3 days/week    Duration of exercise:  Less than 15 minutes    Do you usually eat at least 4 servings of fruit and vegetables a day, include whole grains    & fiber and avoid regularly eating high fat or \"junk\" foods?  No    Taking medications regularly:  Yes    Ability to successfully perform activities of daily living:  No assistance needed    Home Safety:  No safety concerns identified    Hearing Impairment:  No hearing concerns    In the past 6 months, have you been bothered by leaking of urine?  No    In general, how would you rate your overall mental or emotional health?  Excellent    Additional concerns today:  No      Today's PHQ-2 Score:       1/24/2024    10:02 AM   PHQ-2 ( 1999 [employer])   Q1: Little interest or pleasure in doing things 0   Q2: Feeling down, depressed or hopeless 0   PHQ-2 Score 0   Q1: Little interest or pleasure in doing things Not at all   Q2: Feeling down, depressed or hopeless Not at all   PHQ-2 Score 0     Presenting for follow-up.  He is recovering from a URI illness runny nose.  He is using Spiriva and albuterol as needed.  He had labs done in the PA's GFR is 40.  He was advised to increase his atorvastatin to 40 mg.  He has low HDL and elevated LDL went up from 77-1 37.  He has not been able to lose weight.  Denies any dyspnea chest pain palpitations presyncope or syncope.  He has BPH and use your symptoms are stable.  No GI symptoms had colonoscopy that showed an adenoma he was told no need for repeat I cannot find the note from Dr. Novak.  He had a scab on his forehead that has been?  " Recurrent but not bleeding.  He has multiple AK lesions on his back.  He has chronic back pain he has seen physiatry he denies radiculopathy or sciatica-like symptoms that limits his walking.  He has not followed up with physiatry.He reports his blood pressure at home is the 120 systolic was elevated in the clinic.  He is only on metoprolol for A-fib.  He remains on Eliquis well-tolerated.      Have you ever done Advance Care Planning? (For example, a Health Directive, POLST, or a discussion with a medical provider or your loved ones about your wishes): Yes, advance care planning is on file.       Fall risk  Fallen 2 or more times in the past year?: No  Any fall with injury in the past year?: No    Cognitive Screening   1) Repeat 3 items (Leader, Season, Table)    2) Clock draw: NORMAL  3) 3 item recall: Recalls 3 objects  Results: 3 items recalled: COGNITIVE IMPAIRMENT LESS LIKELY    Mini-CogTM Copyright NILESH Dickey. Licensed by the author for use in [facility]; reprinted with permission (jose a@[facility]). All rights reserved.      Do you have sleep apnea, excessive snoring or daytime drowsiness? : no    Reviewed and updated as needed this visit by clinical staff    Allergies  Meds  Problems             Reviewed and updated as needed this visit by Provider    Allergies  Meds  Problems             Social History     Tobacco Use    Smoking status: Former     Packs/day: 1.00     Years: 56.00     Additional pack years: 0.00     Total pack years: 56.00     Types: Cigarettes     Quit date: 2008     Years since quittin.0    Smokeless tobacco: Never    Tobacco comments:     quit    Substance Use Topics    Alcohol use: No     Alcohol/week: 0.0 standard drinks of alcohol             2024    10:30 AM   Alcohol Use   Prescreen: >3 drinks/day or >7 drinks/week? Not Applicable     Do you have a current opioid prescription? No  Do you use any other controlled substances or medications that are not  prescribed by a provider? None          Current providers sharing in care for this patient include:   Patient Care Team:  Eliza Rogers MD as PCP - General (Internal Medicine)  Megan Rossi, PharmD as Pharmacist (Pharmacist)  Eliza Rogers MD as Assigned PCP  Megan Rossi, PharmD as Assigned MTM Pharmacist  Dinorah Ho MD as Assigned Neuroscience Provider  Ip, Adeel Avendano MD as Assigned Heart and Vascular Provider    The following health maintenance items are reviewed in Epic and correct as of today:  Health Maintenance   Topic Date Due    HF ACTION PLAN  Never done    SPIROMETRY  Never done    A1C  07/17/2024    BMP  07/17/2024    ALT  01/17/2025    LIPID  01/17/2025    MICROALBUMIN  01/17/2025    CBC  01/17/2025    HEMOGLOBIN  01/17/2025    MEDICARE ANNUAL WELLNESS VISIT  01/24/2025    ANNUAL REVIEW OF HM ORDERS  01/24/2025    FALL RISK ASSESSMENT  01/24/2025    ADVANCE CARE PLANNING  01/24/2029    DTAP/TDAP/TD IMMUNIZATION (7 - Td or Tdap) 01/16/2034    TSH W/FREE T4 REFLEX  Completed    COPD ACTION PLAN  Completed    PHQ-2 (once per calendar year)  Completed    INFLUENZA VACCINE  Completed    Pneumococcal Vaccine: 65+ Years  Completed    URINALYSIS  Completed    ZOSTER IMMUNIZATION  Completed    RSV VACCINE (Pregnancy & 60+)  Completed    COVID-19 Vaccine  Completed    IPV IMMUNIZATION  Aged Out    HPV IMMUNIZATION  Aged Out    MENINGITIS IMMUNIZATION  Aged Out    RSV MONOCLONAL ANTIBODY  Aged Out    DIABETIC FOOT EXAM  Discontinued    EYE EXAM  Discontinued    COLORECTAL CANCER SCREENING  Discontinued     Lab work is in process  Labs reviewed in EPIC  BP Readings from Last 3 Encounters:   01/24/24 (!) 164/79   07/07/23 138/81   03/30/23 128/76    Wt Readings from Last 3 Encounters:   01/24/24 93.2 kg (205 lb 6.4 oz)   07/07/23 94.3 kg (208 lb)   01/11/23 93.4 kg (206 lb)                  Patient Active Problem List   Diagnosis    CKD (chronic kidney disease) stage 3, GFR 30-59  ml/min (H)    BPH (benign prostatic hyperplasia)    HTN (hypertension), benign    Advanced directives, counseling/discussion    Kidney stone    Former smoker    Peripheral neuropathy    Syncope    Paroxysmal ventricular tachycardia (H)    Vascular bruit    Hyperlipidemia with target LDL less than 100    Intractable episodic cluster headache    Type 2 diabetes mellitus with diabetic nephropathy (H)    Status post coronary angiogram    Aortic stenosis, severe    Chronic diastolic (congestive) heart failure (H)    Atrial fibrillation, unspecified type (H)     Past Surgical History:   Procedure Laterality Date    COLONOSCOPY      COLONOSCOPY N/A 3/30/2023    Procedure: COLONOSCOPY, WITH POLYPECTOMY AND BIOPSY;  Surgeon: Adi Geiger MD;  Location:  GI    CV CORONARY ANGIOGRAM Bilateral 2022    Procedure: Coronary Angiogram;  Surgeon: Dave Mao MD;  Location: [facility] CARDIAC CATH LAB    CV TRANSCATHETER AORTIC VALVE REPLACEMENT-FEMORAL APPROACH N/A 2022    Procedure: Transcatheter Aortic Valve Replacement-Femoral Approach;  Surgeon: Wilson Martinez MD;  Location: [facility] CARDIAC CATH LAB    KERATOTOMY ARCUATE WITH FEMTOSECOND LASER/IMAGING FOR ATIOL Left 2016    Procedure: KERATOTOMY ARCUATE WITH FEMTOSECOND LASER/IMAGING FOR ATIOL;  Surgeon: Gerard Larson MD;  Location: [facility]    ORTHOPEDIC SURGERY      PHACOEMULSIFICATION CLEAR CORNEA WITH STANDARD INTRAOCULAR LENS IMPLANT Left 2016    Procedure: PHACOEMULSIFICATION CLEAR CORNEA WITH STANDARD INTRAOCULAR LENS IMPLANT;  Surgeon: Gerard Larson MD;  Location: [facility]    Thumb surg         Social History     Tobacco Use    Smoking status: Former     Packs/day: 1.00     Years: 56.00     Additional pack years: 0.00     Total pack years: 56.00     Types: Cigarettes     Quit date: 2008     Years since quittin.0    Smokeless tobacco: Never    Tobacco comments:     quit    Substance Use Topics    Alcohol  use: No     Alcohol/week: 0.0 standard drinks of alcohol     Family History   Problem Relation Age of Onset    C.A.D. Father     Diabetes Father     Cancer - colorectal Mother 80    Cancer - colorectal Maternal Grandmother          Current Outpatient Medications   Medication Sig Dispense Refill    albuterol (PROAIR HFA/PROVENTIL HFA/VENTOLIN HFA) 108 (90 Base) MCG/ACT inhaler INHALE 2 PUFFS INTO THE LUNGS EVERY 6 HOURS AS NEEDED FOR SHORTNESS OF BREATH OR DIFFICULT BREATHING OR WHEEZING 8.5 g 3    alpha-lipoic acid 600 MG capsule Take 600 mg by mouth 2 times daily      apixaban ANTICOAGULANT (ELIQUIS) 2.5 MG tablet Take 2.5 mg by mouth 2 times daily      AREXVY injection       atorvastatin (LIPITOR) 20 MG tablet Take 2 tablets (40 mg) by mouth every evening 180 tablet 1    cetirizine (ZYRTEC) 10 MG tablet Take 10 mg by mouth daily      Cyanocobalamin (VITAMIN B-12 SL) Place 2,500 mcg under the tongue daily 30 tablet     finasteride (PROSCAR) 5 MG tablet Take 1 tablet (5 mg) by mouth daily 90 tablet 3    FLUAD QUADRIVALENT 0.5 ML PRSY injection       fluticasone (FLONASE) 50 MCG/ACT nasal spray Spray 1-2 sprays into both nostrils daily 48 mL 3    lidocaine (LIDODERM) 5 % patch APPLY 1 PATCH TOPICALLY EVERY DAY FOR PAIN. WEAR FOR ONLY 12 HOURS THEN REMOVE FOR 12 HOURS.      metoprolol tartrate (LOPRESSOR) 50 MG tablet Take 1 tablet (50 mg) by mouth 2 times daily 270 tablet 3    Multiple Vitamin (MULTI-VITAMIN) per tablet Take 1 tablet by mouth daily.      nitroGLYcerin (NITROSTAT) 0.4 MG sublingual tablet For chest pain place 1 tablet under the tongue every 5 minutes for 3 doses. If symptoms persist 5 minutes after 1st dose call 911. 10 tablet 0    polyethylene glycol (MIRALAX) 17 g packet Take 1 packet by mouth daily as needed      tamsulosin (FLOMAX) 0.4 MG capsule Take 0.4 mg by mouth daily      tiotropium (SPIRIVA) 18 MCG inhaled capsule INHALE THE CONTENTS OF 1 CAPSULE(18 MCG) INTO THE LUNGS DAILY 90 capsule 0     "vitamin D3 (CHOLECALCIFEROL) 50 mcg (2000 units) tablet Take 1 tablet by mouth daily       Allergies   Allergen Reactions    Lisinopril Shortness Of Breath and Fatigue    Amoxicillin Itching    Duloxetine      \"I can't take it\"    Levofloxacin      \"I can't take the 500 mgs\"    Neurontin [Gabapentin] Swelling     edema    Norvasc [Amlodipine] Swelling     edema    Oxycodone GI Disturbance    Penicillins Itching    Red Yeast Rice Extract [[place] Purpureus Went Yeast] Hives and Itching    Tramadol Itching    Verapamil Itching    Vicodin [Hydrocodone-Acetaminophen] Itching     Recent Labs   Lab Test 01/17/24  0903 04/19/23  0846 01/11/23  1227 01/04/23  0759 08/16/22  1020 08/03/22  0808 08/01/22  1039 12/28/21  0824 04/21/21  1332 12/17/20  0901   0000   A1C 5.5 5.6  --   --   --  5.5  --  5.8*  --  5.5   < >   *  --   --  77  --  54  --  84  --  54   < >   HDL 37*  --   --  38*  --  40  --  47  --  36*   < >   TRIG 240*  --   --  260*  --  162*  --  67  --  218*   < >   ALT 21  --   --   --   --  21  --  26  --  43  --    CR 1.74*  --  1.82* 1.80*   < > 1.61*   < > 1.67* 1.78* 1.79*  --    GFRESTIMATED 38*  --  36* 37*   < > 42*   < > 40* 35* 35*  --    GFRESTBLACK  --   --   --   --   --   --   --   --  40* 40*  --    POTASSIUM 4.9  --  4.8 4.7   < > 4.6   < > 5.1 5.1 4.4  --    TSH  --  2.87  --   --   --   --   --   --   --  2.97  --     < > = values in this interval not displayed.            Review of Systems   Constitutional:  Negative for chills and fever.   HENT:  Negative for congestion, ear pain, hearing loss and sore throat.    Eyes:  Negative for pain and visual disturbance.   Respiratory:  Negative for cough and shortness of breath.    Cardiovascular:  Negative for chest pain and palpitations.   Gastrointestinal:  Negative for abdominal pain, constipation, diarrhea and nausea.   Genitourinary:  Negative for dysuria, frequency, genital sores, hematuria, penile discharge and urgency. " "  Musculoskeletal:  Negative for arthralgias, joint swelling and myalgias.   Skin:  Negative for rash.   Neurological:  Negative for dizziness, weakness and headaches.   Psychiatric/Behavioral:  The patient is not nervous/anxious.           OBJECTIVE:   BP (!) 164/79   Pulse 80   Temp 97.9  F (36.6  C) (Oral)   Resp 13   Ht 1.778 m (5' 10\")   Wt 93.2 kg (205 lb 6.4 oz)   SpO2 93%   BMI 29.47 kg/m     Estimated body mass index is 29.47 kg/m  as calculated from the following:    Height as of this encounter: 1.778 m (5' 10\").    Weight as of this encounter: 93.2 kg (205 lb 6.4 oz).  Physical Exam  GENERAL: alert and no distress  EYES: Eyes grossly normal to inspection, PERRL and conjunctivae and sclerae normal  HENT: ear canals and TM's normal, nose and mouth without ulcers or lesions  NECK: no adenopathy, no asymmetry, masses, or scars  RESP: lungs clear to auscultation - no rales, rhonchi or wheezes  CV: regular rate and rhythm, normal S1 S2, no S3 or S4, no murmur, click or rub, no peripheral edema  ABDOMEN: soft, nontender, no hepatosplenomegaly, no masses and bowel sounds normal  MS: no gross musculoskeletal defects noted, no edema  SKIN: no suspicious lesions or rashes  NEURO: Normal strength and tone, mentation intact and speech normal  PSYCH: mentation appears normal, affect normal/bright  LYMPH: no cervical, supraclavicular, axillary, or inguinal adenopathy    Diagnostic Test Results:  Labs reviewed in Epic    ASSESSMENT / PLAN:     Problem List Items Addressed This Visit       CKD (chronic kidney disease) stage 3, GFR 30-59 ml/min (H)    Former smoker    Hyperlipidemia with target LDL less than 100    Atrial fibrillation, unspecified type (H)     Other Visit Diagnoses       Routine history and physical examination of adult    -  Primary    Chronic obstructive pulmonary disease, unspecified COPD type (H)        S/P TAVR (transcatheter aortic valve replacement)        Benign essential hypertension     " "       Advised patient schedule follow-up in 1 week check his blood pressure morning and evening and call us with readings.  Blood pressure elevated in the clinic.  Consideration for amlodipine pending further blood pressure readings.  He will need a spirometry test he has history of?  COPD stopped smoking 15 years ago he smoked for 50 years.  Does not meet criteria for lung cancer screening.  History of A-fib on metoprolol rate controlled on Eliquis tolerating well dose was decreased 2.5 twice daily due to his kidney disease.  Will consider referral to nephrology as well.  He has multiple actinic keratosis lesions on his back he has a lesion with thin scaling scab on his top of his forehead in the middle currently not bleeding, slightly scaly we will monitor closely.  Pulse ox 93% on room air, he is wearing a mask and is not in any labored breathing, ungs auscultation is clear he is recovering from some URI symptoms.  Remains on Spiriva daily and as needed albuterol.  Up-to-date on colonoscopy  Up-to-date on vaccines.  No dizziness or imbalance symptoms no chest pain no cough no urinary symptoms no other systemic complaints all questions answered.    Patient has been advised of split billing requirements and indicates understanding: Yes      Counseling  Reviewed preventive health counseling, as reflected in patient instructions       Regular exercise       Healthy diet/nutrition       Vision screening       Hearing screening       Dental care       Bladder control       Fall risk prevention       Colon cancer screening      BMI  Estimated body mass index is 29.47 kg/m  as calculated from the following:    Height as of this encounter: 1.778 m (5' 10\").    Weight as of this encounter: 93.2 kg (205 lb 6.4 oz).   Weight management plan: Discussed healthy diet and exercise guidelines      He reports that he quit smoking about 16 years ago. His smoking use included cigarettes. He has a 56 pack-year smoking history. He " has never used smokeless tobacco.      Appropriate preventive services were discussed with this patient, including applicable screening as appropriate for fall prevention, nutrition, physical activity, Tobacco-use cessation, weight loss and cognition.  Checklist reviewing preventive services available has been given to the patient.    Reviewed patients plan of care and provided an AVS. The Basic Care Plan (routine screening as documented in Health Maintenance) for Luis Daniel meets the Care Plan requirement. This Care Plan has been established and reviewed with the Patient.          Signed Electronically by: Eliza Rogers MD    Identified Health Risks  I have reviewed Opioid Use Disorder and Substance Use Disorder risk factors and made any needed referrals.

## 2024-01-30 DIAGNOSIS — I35.0 NONRHEUMATIC AORTIC VALVE STENOSIS: ICD-10-CM

## 2024-01-30 DIAGNOSIS — Z95.2 S/P TAVR (TRANSCATHETER AORTIC VALVE REPLACEMENT): ICD-10-CM

## 2024-01-30 DIAGNOSIS — I35.0 SEVERE AORTIC STENOSIS: ICD-10-CM

## 2024-01-30 DIAGNOSIS — I35.0 MILD AORTIC STENOSIS: Primary | ICD-10-CM

## 2024-01-30 RX ORDER — AZITHROMYCIN 250 MG/1
TABLET, FILM COATED ORAL
Qty: 6 TABLET | Refills: 1 | Status: SHIPPED | OUTPATIENT
Start: 2024-01-30 | End: 2024-08-14

## 2024-01-30 NOTE — TELEPHONE ENCOUNTER
Received request for refill from [facility] pharmacy, for Azithromycin 250mg tablets.  Take 2 tabs once prior to any dental work.    Last OV with Dr. Knight, July 2023.    [facility] Refill Guideline reviewed.  Medication meets criteria for refill.    Mariana Marmolejo RN on 1/30/2024 at 3:30 PM

## 2024-01-31 ENCOUNTER — VIRTUAL VISIT (OUTPATIENT)
Dept: FAMILY MEDICINE | Facility: CLINIC | Age: 84
End: 2024-01-31
Payer: COMMERCIAL

## 2024-01-31 ENCOUNTER — TELEPHONE (OUTPATIENT)
Dept: NEPHROLOGY | Facility: CLINIC | Age: 84
End: 2024-01-31

## 2024-01-31 DIAGNOSIS — E78.5 HYPERLIPIDEMIA WITH TARGET LDL LESS THAN 100: ICD-10-CM

## 2024-01-31 DIAGNOSIS — N18.32 STAGE 3B CHRONIC KIDNEY DISEASE (H): ICD-10-CM

## 2024-01-31 DIAGNOSIS — G62.9 NEUROPATHY: ICD-10-CM

## 2024-01-31 DIAGNOSIS — N18.32 STAGE 3B CHRONIC KIDNEY DISEASE (H): Primary | ICD-10-CM

## 2024-01-31 DIAGNOSIS — J44.9 CHRONIC OBSTRUCTIVE PULMONARY DISEASE, UNSPECIFIED COPD TYPE (H): Primary | ICD-10-CM

## 2024-01-31 DIAGNOSIS — I10 HTN (HYPERTENSION), BENIGN: ICD-10-CM

## 2024-01-31 PROCEDURE — 99443 PR PHYSICIAN TELEPHONE EVALUATION 21-30 MIN: CPT | Mod: 93 | Performed by: INTERNAL MEDICINE

## 2024-01-31 RX ORDER — GABAPENTIN 100 MG/1
100 CAPSULE ORAL DAILY
Qty: 30 CAPSULE | Refills: 0 | Status: SHIPPED | OUTPATIENT
Start: 2024-01-31 | End: 2024-02-29

## 2024-01-31 RX ORDER — METOPROLOL TARTRATE 50 MG
50 TABLET ORAL 2 TIMES DAILY
Qty: 270 TABLET | Refills: 0 | Status: SHIPPED | OUTPATIENT
Start: 2024-01-31 | End: 2024-05-22

## 2024-01-31 NOTE — TELEPHONE ENCOUNTER
Future lab orders will be placed closer to scheduled Nephrology appointment.  Sapphire Dillon LPN

## 2024-01-31 NOTE — TELEPHONE ENCOUNTER
[facility] Call Center    Phone Message    May a detailed message be left on voicemail: yes     Reason for Call: Order(s): Other:   Reason for requested: Lab  Date needed: 3/14/2024  Provider name: Clfiford Corbin  Labs to be draw at [facility] lab    Action Taken: Message routed to:  Other: neph    Travel Screening: Not Applicable

## 2024-01-31 NOTE — PROGRESS NOTES
Casey is a 83 year old who is being evaluated via a billable telephone visit.      What phone number would you like to be contacted at? [phone].  How would you like to obtain your AVS? Ko    Distant Location (provider location):  On-site    Assessment & Plan   Problem List Items Addressed This Visit       CKD (chronic kidney disease) stage 3, GFR 30-59 ml/min (H)    Relevant Orders    Basic metabolic panel  (Ca, Cl, CO2, Creat, Gluc, K, Na, BUN)    HTN (hypertension), benign    Relevant Medications    metoprolol tartrate (LOPRESSOR) 50 MG tablet    Hyperlipidemia with target LDL less than 100    Relevant Orders    Lipid panel reflex to direct LDL Fasting     Other Visit Diagnoses       Chronic obstructive pulmonary disease, unspecified COPD type (H)    -  Primary    Neuropathy        Relevant Medications    gabapentin (NEURONTIN) 100 MG capsule    Other Relevant Orders    Vitamin B12           Resume small dose of gabapentin 100 mg at bedtime for neuropathy pain, we will closely monitor for side effects, denies any oral mucosal swelling tongue swelling throat tightness or any other allergic reaction to gabapentin, swelling was probably related to the side effects of high-dose of gabapentin.  Losartan 25 mg increased to 1 tablet daily will monitor closely his kidney function potassium level.  He has an upcoming appointment in March with nephrology.  His GFR has been trending down to the mid 30s.  All questions answered repeat labs in 2 weeks.  Stay on metoprolol at current dose, not affecting his breathing at this time.  He does have COPD that seems to be stable.  Continue monitor blood pressure and call us with readings.  All questions answered           Subjective   Casey is a 83 year old, presenting for the following health issues:  Hypertension (Patient is having a telephone visit to follow up on BP.   )         No data to display              HPI   Patient presented for follow-up.  He is doing  well.  Still has neuropathy pain and tingling numbness in his feet.  [facility] 50 twice daily was changed to metoprolol succinate 25 mg once daily was put on losartan 25 mg half tablet once daily, he had not started medication yet.  Blood pressure checks day and night ranges between 125/65 to 131/67 and 1 reading was 155/88 pulse of 60 in the morning and the evening was 132/76.  He denies dizziness chest pain shortness of breath or other systemic complaints, he had gabapentin in the past for back pain was up to 1000 mg dose , had significant swelling in his legs and he had stopped.  No GI or  concerns.      Review of Systems  Constitutional, neuro, ENT, endocrine, pulmonary, cardiac, gastrointestinal, genitourinary, musculoskeletal, integument and psychiatric systems are negative, except as otherwise noted.      Objective    Vitals - Patient Reported  Systolic (Patient Reported): (!) 147  Diastolic (Patient Reported): 79  Pulse (Patient Reported): 63      Vitals:  No vitals were obtained today due to virtual visit.    Physical Exam   General: Alert and no distress //Respiratory: No audible wheeze, cough, or shortness of breath // Psychiatric:  Appropriate affect, tone, and pace of words      Lab on 01/17/2024   Component Date Value Ref Range Status    Sodium 01/17/2024 139  135 - 145 mmol/L Final    Reference intervals for this test were updated on 09/26/2023 to more accurately reflect our healthy population. There may be differences in the flagging of prior results with similar values performed with this method. Interpretation of those prior results can be made in the context of the updated reference intervals.     Potassium 01/17/2024 4.9  3.4 - 5.3 mmol/L Final    Chloride 01/17/2024 101  98 - 107 mmol/L Final    Carbon Dioxide (CO2) 01/17/2024 26  22 - 29 mmol/L Final    Anion Gap 01/17/2024 12  7 - 15 mmol/L Final    Urea Nitrogen 01/17/2024 20.7  8.0 - 23.0 mg/dL Final    Creatinine  01/17/2024 1.74 (H)  0.67 - 1.17 mg/dL Final    GFR Estimate 01/17/2024 38 (L)  >60 mL/min/1.73m2 Final    Calcium 01/17/2024 9.3  8.8 - 10.2 mg/dL Final    Glucose 01/17/2024 118 (H)  70 - 99 mg/dL Final    ALT 01/17/2024 21  0 - 70 U/L Final    Hemoglobin A1C 01/17/2024 5.5  0.0 - 5.6 % Final    Normal <5.7%   Prediabetes 5.7-6.4%    Diabetes 6.5% or higher     Note: Adopted from ADA consensus guidelines.    Cholesterol 01/17/2024 222 (H)  <200 mg/dL Final    Triglycerides 01/17/2024 240 (H)  <150 mg/dL Final    Direct Measure HDL 01/17/2024 37 (L)  >=40 mg/dL Final    LDL Cholesterol Calculated 01/17/2024 137 (H)  <=100 mg/dL Final    Non HDL Cholesterol 01/17/2024 185 (H)  <130 mg/dL Final    Patient Fasting > 8hrs? 01/17/2024 Yes   Final    Creatinine Urine mg/dL 01/17/2024 137.0  mg/dL Final    The reference ranges have not been established in urine creatinine. The results should be integrated into the clinical context for interpretation.    Albumin Urine mg/L 01/17/2024 16.3  mg/L Final    The reference ranges have not been established in urine albumin. The results should be integrated into the clinical context for interpretation.    Albumin Urine mg/g Cr 01/17/2024 11.90  0.00 - 17.00 mg/g Cr Final    Microalbuminuria is defined as an albumin:creatinine ratio of 17 to 299 for males and 25 to 299 for females. A ratio of albumin:creatinine of 300 or higher is indicative of overt proteinuria.  Due to biologic variability, positive results should be confirmed by a second, first-morning random or 24-hour timed urine specimen. If there is discrepancy, a third specimen is recommended. When 2 out of 3 results are in the microalbuminuria range, this is evidence for incipient nephropathy and warrants increased efforts at glucose control, blood pressure control, and institution of therapy with an angiotensin-converting-enzyme (ACE) inhibitor (if the patient can tolerate it).      WBC Count 01/17/2024 8.8  4.0 - 11.0  10e3/uL Final    RBC Count 01/17/2024 4.83  4.40 - 5.90 10e6/uL Final    Hemoglobin 01/17/2024 15.4  13.3 - 17.7 g/dL Final    Hematocrit 01/17/2024 47.3  40.0 - 53.0 % Final    MCV 01/17/2024 98  78 - 100 fL Final    MCH 01/17/2024 31.9  26.5 - 33.0 pg Final    MCHC 01/17/2024 32.6  31.5 - 36.5 g/dL Final    RDW 01/17/2024 12.2  10.0 - 15.0 % Final    Platelet Count 01/17/2024 188  150 - 450 10e3/uL Final         Phone call duration: 28 minutes   total time spent including review of records documentation and telephone visit was 37  Signed Electronically by: Eliza Rogers MD

## 2024-02-01 ENCOUNTER — DOCUMENTATION ONLY (OUTPATIENT)
Dept: FAMILY MEDICINE | Facility: CLINIC | Age: 84
End: 2024-02-01
Payer: COMMERCIAL

## 2024-02-01 NOTE — PROGRESS NOTES
Message  Received: Today  Megan Rossi PharmD Sayegh, Kamil Nadim, MD  Will reach out, thanks!  Megan Rossi PharmD, [facility]  Medication Therapy Management Provider  [phone]          Previous Messages       ----- Message -----  From: Eliza Rogers MD  Sent: 1/31/2024   3:18 PM CST  To: Vern Torres, I saw Casey today.  He is on metoprolol 50 twice daily [facility] metoprolol 25 extended release once daily he is staying on the 50 twice daily dose.  Also [facility] losartan 25 mg half tablet I think increasing to 1 tablet once a day with monitoring of the potassium would be reasonable is having some high systolic in the mid 150s at times.  Any thoughts.  He does have chronic kidney disease stage IIIb.  I started him also on gabapentin 100 mg once daily for neuropathy he had swelling with this on the thousand milligram dose which was for his back given by neurosurgery.  Thank you for follow-up with the patient.  Eliza

## 2024-02-14 ENCOUNTER — LAB (OUTPATIENT)
Dept: LAB | Facility: CLINIC | Age: 84
End: 2024-02-14
Payer: COMMERCIAL

## 2024-02-14 DIAGNOSIS — E78.5 HYPERLIPIDEMIA WITH TARGET LDL LESS THAN 100: ICD-10-CM

## 2024-02-14 DIAGNOSIS — G62.9 NEUROPATHY: ICD-10-CM

## 2024-02-14 DIAGNOSIS — N18.32 STAGE 3B CHRONIC KIDNEY DISEASE (H): ICD-10-CM

## 2024-02-14 LAB
ANION GAP SERPL CALCULATED.3IONS-SCNC: 12 MMOL/L (ref 7–15)
BUN SERPL-MCNC: 19.2 MG/DL (ref 8–23)
CALCIUM SERPL-MCNC: 9.5 MG/DL (ref 8.8–10.2)
CHLORIDE SERPL-SCNC: 101 MMOL/L (ref 98–107)
CHOLEST SERPL-MCNC: 129 MG/DL
CREAT SERPL-MCNC: 1.67 MG/DL (ref 0.67–1.17)
DEPRECATED HCO3 PLAS-SCNC: 26 MMOL/L (ref 22–29)
EGFRCR SERPLBLD CKD-EPI 2021: 40 ML/MIN/1.73M2
FASTING STATUS PATIENT QL REPORTED: YES
GLUCOSE SERPL-MCNC: 105 MG/DL (ref 70–99)
HDLC SERPL-MCNC: 37 MG/DL
LDLC SERPL CALC-MCNC: 63 MG/DL
NONHDLC SERPL-MCNC: 92 MG/DL
POTASSIUM SERPL-SCNC: 4.9 MMOL/L (ref 3.4–5.3)
SODIUM SERPL-SCNC: 139 MMOL/L (ref 135–145)
TRIGL SERPL-MCNC: 143 MG/DL
VIT B12 SERPL-MCNC: 702 PG/ML (ref 232–1245)

## 2024-02-14 PROCEDURE — 80048 BASIC METABOLIC PNL TOTAL CA: CPT

## 2024-02-14 PROCEDURE — 36415 COLL VENOUS BLD VENIPUNCTURE: CPT

## 2024-02-14 PROCEDURE — 80061 LIPID PANEL: CPT

## 2024-02-14 PROCEDURE — 82607 VITAMIN B-12: CPT

## 2024-02-29 ENCOUNTER — VIRTUAL VISIT (OUTPATIENT)
Dept: PHARMACY | Facility: CLINIC | Age: 84
End: 2024-02-29
Payer: COMMERCIAL

## 2024-02-29 DIAGNOSIS — J30.2 SEASONAL ALLERGIC RHINITIS, UNSPECIFIED TRIGGER: ICD-10-CM

## 2024-02-29 DIAGNOSIS — Z78.9 TAKES DIETARY SUPPLEMENTS: ICD-10-CM

## 2024-02-29 DIAGNOSIS — J44.9 CHRONIC OBSTRUCTIVE PULMONARY DISEASE, UNSPECIFIED COPD TYPE (H): ICD-10-CM

## 2024-02-29 DIAGNOSIS — N40.1 BENIGN PROSTATIC HYPERPLASIA WITH POST-VOID DRIBBLING: ICD-10-CM

## 2024-02-29 DIAGNOSIS — K59.00 CONSTIPATION, UNSPECIFIED CONSTIPATION TYPE: ICD-10-CM

## 2024-02-29 DIAGNOSIS — G62.9 PERIPHERAL POLYNEUROPATHY: Primary | ICD-10-CM

## 2024-02-29 DIAGNOSIS — N39.43 BENIGN PROSTATIC HYPERPLASIA WITH POST-VOID DRIBBLING: ICD-10-CM

## 2024-02-29 DIAGNOSIS — Z95.2 S/P TAVR (TRANSCATHETER AORTIC VALVE REPLACEMENT): ICD-10-CM

## 2024-02-29 DIAGNOSIS — E78.5 HYPERLIPIDEMIA WITH TARGET LDL LESS THAN 100: ICD-10-CM

## 2024-02-29 DIAGNOSIS — I10 HTN (HYPERTENSION), BENIGN: ICD-10-CM

## 2024-02-29 DIAGNOSIS — G62.9 NEUROPATHY: ICD-10-CM

## 2024-02-29 DIAGNOSIS — I48.91 ATRIAL FIBRILLATION, UNSPECIFIED TYPE (H): ICD-10-CM

## 2024-02-29 PROCEDURE — 99605 MTMS BY PHARM NP 15 MIN: CPT | Mod: 93 | Performed by: PHARMACIST

## 2024-02-29 RX ORDER — GABAPENTIN 100 MG/1
200 CAPSULE ORAL DAILY
Qty: 180 CAPSULE | Refills: 0 | Status: SHIPPED | OUTPATIENT
Start: 2024-02-29 | End: 2024-06-10

## 2024-02-29 NOTE — LETTER
March 3, 2024  Luis Daniel Gomez  [address]  [place] [address]    Dear Mr. Gomez, TAMEKA [facility]     Thank you for talking with me on Feb 29, 2024 about your health and medications. As a follow-up to our conversation, I have included two documents:      Your Recommended To-Do List has steps you should take to get the best results from your medications.  Your Medication List will help you keep track of your medications and how to take them.    If you want to talk about these documents, please call Megan Rossi PharmD at phone: [phone], Monday-Friday 8-4:30pm.    I look forward to working with you and your doctors to make sure your medications work well for you.    Sincerely,  Megan Rossi PharmD  [facility] Pharmacist, [facility]

## 2024-02-29 NOTE — LETTER
"Recommended To-Do List      Prepared on: 03/03/2024       You can get the best results from your medications by completing the items on this \"To-Do List.\"      Bring your To-Do List when you go to your doctor. And, share it with your family or caregivers.    My To-Do List:  What we talked about: What I should do:   Your medication dosage being too low    Increase your dosage of gabapentin (NEURONTIN) to 200mg at bedtime           What we talked about: What I should do:                     "

## 2024-02-29 NOTE — LETTER
_  Medication List        Prepared on: 03/03/2024     Bring your Medication List when you go to the doctor, hospital, or   emergency room. And, share it with your family or caregivers.     Note any changes to how you take your medications.  Cross out medications when you no longer use them.    Medication How I take it Why I use it Prescriber   albuterol (PROAIR HFA/PROVENTIL HFA/VENTOLIN HFA) 108 (90 Base) MCG/ACT inhaler INHALE 2 PUFFS INTO THE LUNGS EVERY 6 HOURS AS NEEDED FOR SHORTNESS OF BREATH OR DIFFICULT BREATHING OR WHEEZING Seasonal allergic rhinitis, unspecified trigger Marilu Lam MD   alpha-lipoic acid 600 MG capsule Take 600 mg by mouth 2 times daily as needed Neuropathy Self   apixaban ANTICOAGULANT (ELIQUIS) 2.5 MG tablet Take 2.5 mg by mouth 2 times daily Atrial Fibrillation Patient Reported   atorvastatin (LIPITOR) 20 MG tablet Take 2 tablets (40 mg) by mouth every evening Hyperlipidemia with Target LDL Less than 100 Eliza Rogers MD   azithromycin (ZITHROMAX) 250 MG tablet Take two 250mg tablets (500mg dose) one hour prior to every dental procedure. S/P TAVR (transcatheter aortic valve replacement); Severe aortic stenosis; Nonrheumatic aortic valve stenosis Adeel Knight MD   cetirizine (ZYRTEC) 10 MG tablet Take 10 mg by mouth daily Allergy Self   finasteride (PROSCAR) 5 MG tablet Take 1 tablet (5 mg) by mouth daily Benign non-nodular prostatic hyperplasia with lower urinary tract symptoms Josephine Jacob PA-C   fluticasone (FLONASE) 50 MCG/ACT nasal spray Spray 1-2 sprays into both nostrils daily Chronic Nasal Congestion Josephine Jacob PA-C   gabapentin (NEURONTIN) 100 MG capsule Take 2 capsules (200 mg) by mouth daily Neuropathy Eliza Rogers MD   metoprolol tartrate (LOPRESSOR) 50 MG tablet Take 1 tablet (50 mg) by mouth 2 times daily HTN (Hypertension), Benign Eliza Rogers MD   Multiple Vitamin (MULTI-VITAMIN) per tablet Take 1 tablet by mouth daily. General  Health  Self   nitroGLYcerin (NITROSTAT) 0.4 MG sublingual tablet For chest pain place 1 tablet under the tongue every 5 minutes for 3 doses. If symptoms persist 5 minutes after 1st dose call 911. S/P TAVR (transcatheter aortic valve replacement) Nighat House PA-C   polyethylene glycol (MIRALAX) 17 g packet Take 1 packet by mouth daily as needed Constipation Self   tamsulosin (FLOMAX) 0.4 MG capsule Take 0.4 mg by mouth daily BPH Patient Reported   tiotropium (SPIRIVA) 18 MCG inhaled capsule INHALE THE CONTENTS OF 1 CAPSULE(18 MCG) INTO THE LUNGS DAILY Chronic obstructive pulmonary disease, unspecified COPD type (H) Eliza Rogers MD   vitamin D3 (CHOLECALCIFEROL) 50 mcg (2000 units) tablet Take 1 tablet by mouth daily General Health  Self         Add new medications, over-the-counter drugs, herbals, vitamins, or  minerals in the blank rows below.    Medication How I take it Why I use it Prescriber                                      Allergies:      lisinopril; amoxicillin; duloxetine; levofloxacin; neurontin [gabapentin]; norvasc [amlodipine]; oxycodone; penicillins; red yeast rice extract [monascus purpureus went yeast]; tramadol; verapamil; vicodin [hydrocodone-acetaminophen]        Side effects I have had:               Other Information:              My notes and questions:

## 2024-02-29 NOTE — PROGRESS NOTES
Medication Therapy Management (MTM) Encounter    ASSESSMENT:                            Medication Adherence/Access: No issues identified    Neuropathy:   Agree it'd be reasonable to slightly increase gabapentin dose.    BPH:  Stable.    COPD:   Stable.    Allergies:  Stable.    Hypertension/Atrial Fibrillation/s/p TAVR:    Stable, agree with reduced dose for Eliquis given SrCr >1.5mg/dL and age >79yo.  Home blood pressure readings are at goal <130/80mmHg.    Hyperlipidemia:   Stable.    Constipation:    Stable.    Supplements:    Stable.     PLAN:                            Increased gabapentin to 200mg at bedtime - updated Rx sent to pharmacy.    Follow-up: Return in about 6 months (around 8/29/2024) for Follow-up Medication Review.    SUBJECTIVE/OBJECTIVE:                          Casey Gomez is a 83 year old male called for a follow-up visit from 10/3/2023.       Reason for visit: Routine follow-up, he wonders about increasing gabapentin dosing    Tobacco: He reports that he quit smoking about 16 years ago. His smoking use included cigarettes. He has a 56 pack-year smoking history. He has never used smokeless tobacco.  Alcohol: none    Medication Adherence/Access: no issues reported    Neuropathy:  Alpha lipoic acid 600mg twice daily as needed   Gabapentin 100mg at bedtime  Has tolerated gabapentin ok, had edema with 1000mg dose in the past.  Would be interested in increasing gabapentin dose to further aid with neuropathy symptoms.    BPH:   Finasteride 5mg daily   Tamsulosin 0.4mg daily  He reports having a slow and intermittent urinary stream, doesn't feel like he's completely urinating his bladder but does feel medications have been moderately effective.    Feels symptoms are manageable at this time.  No other side effects reported.    COPD:   Albuterol MDI as needed - doesn't use daily  Spiriva 18mcg daily  Patient is not experiencing side effects.   Patient reports the following symptoms: none.    Allergies:     Zyrtec 10mg daily  Flonase nasal spray daily   He reports allergies are stable with this regimen.  He denies side effects.    Hypertension/Atrial Fibrillation/s/p TAVR:   Eliquis 2.5mg twice daily  Metoprolol tartrate 50mg twice daily  Sublingual nitroglycerin as needed - has never needed to use.  Azithromycin prior to dental appointments  Patient reports no current medication side effects, but stopped taking losartan due to dizziness, which is now resolved.  Home blood pressure monitoring generally 125/70mmHg.  BP Readings from Last 3 Encounters:   01/24/24 (!) 164/79   07/07/23 138/81   03/30/23 128/76         Hyperlipidemia:   Atoravastatin 40mg daily  Patient reports no significant myalgias or other side effects.  Recent Labs   Lab Test 02/14/24  1026 01/17/24  0903   CHOL 129 222*   HDL 37* 37*   LDL 63 137*   TRIG 143 240*       Constipation:    Miralax 17g daily as needed  He reports this is is effective.  No side effects reported.    Supplements:    Vitamin D 2000 international unit(s) daily (dose reduced since last lab check)  Stopped taking B12 as he made this itch - he's been off for several months.  Daily multivitamin  No issues reported    Vitamin D Deficiency Screening Results:  Lab Results   Component Value Date    VITDT 113 (H) 08/03/2022      Lab Results   Component Value Date    B12 702 02/14/2024      Today's Vitals: There were no vitals taken for this visit.  ----------------    I spent 12 minutes with this patient today. All changes were made via collaborative practice agreement with Dr. Rogers. A copy of the visit note was provided to the patient's provider(s).    A summary of these recommendations was sent via [employer].    Megan Rossi, PharmD, BCACP  Medication Therapy Management Provider  [phone]     Telemedicine Visit Details  Type of service:  Telephone visit  Start Time: 1:05 PM  End Time: 1:17 PM     Medication Therapy Recommendations  Peripheral neuropathy    Current Medication:  gabapentin (NEURONTIN) 100 MG capsule (Discontinued)   Rationale: Dose too low - Dosage too low - Effectiveness   Recommendation: Increase Dose   Status: Accepted per CPA

## 2024-03-03 NOTE — PATIENT INSTRUCTIONS
"Recommendations from today's MTM visit:                                                    MTM (medication therapy management) is a service provided by a clinical pharmacist designed to help you get the most of out of your medicines.   Today we reviewed what your medicines are for, how to know if they are working, that your medicines are safe and how to make your medicine regimen as easy as possible.      Increase gabapentin to take 200mg at bedtime - I sent an updated prescription to your pharmacy.    Follow-up: Return in about 6 months (around 8/29/2024) for Follow-up Medication Review.    It was great speaking with you today.  I value your experience and would be very thankful for your time in providing feedback in our clinic survey. In the next few days, you may receive an email or text message from [employer] with a link to a survey related to your  clinical pharmacist.\"     To schedule another MTM appointment, please call the clinic directly or you may call the MTM scheduling line at [phone] or toll-free at [phone].     My Clinical Pharmacist's contact information:                                                      Please feel free to contact me with any questions or concerns you have.      Megan Rossi, Vern, [facility]  Medication Therapy Management Provider  [phone]    "

## 2024-03-18 ENCOUNTER — DOCUMENTATION ONLY (OUTPATIENT)
Dept: LAB | Facility: CLINIC | Age: 84
End: 2024-03-18

## 2024-03-18 DIAGNOSIS — N18.30 CKD (CHRONIC KIDNEY DISEASE) STAGE 3, GFR 30-59 ML/MIN (H): Primary | ICD-10-CM

## 2024-03-20 ENCOUNTER — HOSPITAL ENCOUNTER (OUTPATIENT)
Dept: CARDIOLOGY | Facility: CLINIC | Age: 84
Discharge: HOME | End: 2024-03-20
Attending: INTERNAL MEDICINE | Admitting: INTERNAL MEDICINE
Payer: COMMERCIAL

## 2024-03-20 DIAGNOSIS — I35.0 MILD AORTIC STENOSIS: ICD-10-CM

## 2024-03-20 DIAGNOSIS — E78.5 HYPERLIPIDEMIA WITH TARGET LDL LESS THAN 100: ICD-10-CM

## 2024-03-20 DIAGNOSIS — I47.29 PAROXYSMAL VENTRICULAR TACHYCARDIA (H): ICD-10-CM

## 2024-03-20 DIAGNOSIS — I65.23 BILATERAL CAROTID ARTERY STENOSIS: ICD-10-CM

## 2024-03-20 DIAGNOSIS — Z87.898 HISTORY OF DIZZINESS: ICD-10-CM

## 2024-03-20 DIAGNOSIS — I10 HTN (HYPERTENSION), BENIGN: ICD-10-CM

## 2024-03-20 DIAGNOSIS — I35.0 AORTIC STENOSIS, SEVERE: Primary | ICD-10-CM

## 2024-03-20 LAB — LVEF ECHO: NORMAL

## 2024-03-20 PROCEDURE — 255N000002 HC RX 255 OP 636: Performed by: INTERNAL MEDICINE

## 2024-03-20 PROCEDURE — 999N000208 ECHOCARDIOGRAM COMPLETE

## 2024-03-20 PROCEDURE — C8929 TTE W OR WO FOL WCON,DOPPLER: HCPCS

## 2024-03-20 PROCEDURE — 93306 TTE W/DOPPLER COMPLETE: CPT | Mod: 26 | Performed by: INTERNAL MEDICINE

## 2024-03-20 RX ADMIN — HUMAN ALBUMIN MICROSPHERES AND PERFLUTREN 9 ML: 10; .22 INJECTION, SOLUTION INTRAVENOUS at 15:30

## 2024-03-21 ENCOUNTER — LAB (OUTPATIENT)
Dept: LAB | Facility: CLINIC | Age: 84
End: 2024-03-21
Payer: COMMERCIAL

## 2024-03-21 DIAGNOSIS — N18.30 CKD (CHRONIC KIDNEY DISEASE) STAGE 3, GFR 30-59 ML/MIN (H): ICD-10-CM

## 2024-03-21 LAB
ALBUMIN UR-MCNC: NEGATIVE MG/DL
APPEARANCE UR: CLEAR
BACTERIA #/AREA URNS HPF: ABNORMAL /HPF
BILIRUB UR QL STRIP: NEGATIVE
COLOR UR AUTO: YELLOW
ERYTHROCYTE [DISTWIDTH] IN BLOOD BY AUTOMATED COUNT: 12.2 % (ref 10–15)
GLUCOSE UR STRIP-MCNC: NEGATIVE MG/DL
HCT VFR BLD AUTO: 46.9 % (ref 40–53)
HGB BLD-MCNC: 15.6 G/DL (ref 13.3–17.7)
HGB UR QL STRIP: NEGATIVE
KETONES UR STRIP-MCNC: NEGATIVE MG/DL
LEUKOCYTE ESTERASE UR QL STRIP: NEGATIVE
MCH RBC QN AUTO: 32 PG (ref 26.5–33)
MCHC RBC AUTO-ENTMCNC: 33.3 G/DL (ref 31.5–36.5)
MCV RBC AUTO: 96 FL (ref 78–100)
NITRATE UR QL: NEGATIVE
PH UR STRIP: 6 [PH] (ref 5–7)
PLATELET # BLD AUTO: 189 10E3/UL (ref 150–450)
RBC # BLD AUTO: 4.87 10E6/UL (ref 4.4–5.9)
RBC #/AREA URNS AUTO: ABNORMAL /HPF
SP GR UR STRIP: 1.02 (ref 1–1.03)
SQUAMOUS #/AREA URNS AUTO: ABNORMAL /LPF
UROBILINOGEN UR STRIP-ACNC: 0.2 E.U./DL
WBC # BLD AUTO: 9.3 10E3/UL (ref 4–11)
WBC #/AREA URNS AUTO: ABNORMAL /HPF

## 2024-03-21 PROCEDURE — 81001 URINALYSIS AUTO W/SCOPE: CPT

## 2024-03-21 PROCEDURE — 84156 ASSAY OF PROTEIN URINE: CPT

## 2024-03-21 PROCEDURE — 36415 COLL VENOUS BLD VENIPUNCTURE: CPT

## 2024-03-21 PROCEDURE — 83970 ASSAY OF PARATHORMONE: CPT

## 2024-03-21 PROCEDURE — 82306 VITAMIN D 25 HYDROXY: CPT

## 2024-03-21 PROCEDURE — 85027 COMPLETE CBC AUTOMATED: CPT

## 2024-03-21 PROCEDURE — 80069 RENAL FUNCTION PANEL: CPT

## 2024-03-21 PROCEDURE — 82570 ASSAY OF URINE CREATININE: CPT

## 2024-03-21 PROCEDURE — 82043 UR ALBUMIN QUANTITATIVE: CPT

## 2024-03-22 LAB
ALBUMIN MFR UR ELPH: 17.1 MG/DL
ALBUMIN SERPL BCG-MCNC: 4.4 G/DL (ref 3.5–5.2)
ANION GAP SERPL CALCULATED.3IONS-SCNC: 11 MMOL/L (ref 7–15)
BUN SERPL-MCNC: 20.1 MG/DL (ref 8–23)
CALCIUM SERPL-MCNC: 9.7 MG/DL (ref 8.8–10.2)
CHLORIDE SERPL-SCNC: 100 MMOL/L (ref 98–107)
CREAT SERPL-MCNC: 1.58 MG/DL (ref 0.67–1.17)
CREAT UR-MCNC: 134 MG/DL
CREAT UR-MCNC: 134 MG/DL
DEPRECATED HCO3 PLAS-SCNC: 27 MMOL/L (ref 22–29)
EGFRCR SERPLBLD CKD-EPI 2021: 43 ML/MIN/1.73M2
GLUCOSE SERPL-MCNC: 103 MG/DL (ref 70–99)
MICROALBUMIN UR-MCNC: 31.3 MG/L
MICROALBUMIN/CREAT UR: 23.36 MG/G CR (ref 0–17)
PHOSPHATE SERPL-MCNC: 2.9 MG/DL (ref 2.5–4.5)
POTASSIUM SERPL-SCNC: 4.8 MMOL/L (ref 3.4–5.3)
PROT/CREAT 24H UR: 0.13 MG/MG CR (ref 0–0.2)
PTH-INTACT SERPL-MCNC: 41 PG/ML (ref 15–65)
SODIUM SERPL-SCNC: 138 MMOL/L (ref 135–145)
VIT D+METAB SERPL-MCNC: 87 NG/ML (ref 20–50)

## 2024-04-16 DIAGNOSIS — J44.9 CHRONIC OBSTRUCTIVE PULMONARY DISEASE, UNSPECIFIED COPD TYPE (H): ICD-10-CM

## 2024-04-16 RX ORDER — TIOTROPIUM BROMIDE 18 UG/1
CAPSULE ORAL; RESPIRATORY (INHALATION)
Qty: 90 CAPSULE | Refills: 2 | Status: SHIPPED | OUTPATIENT
Start: 2024-04-16 | End: 2024-09-18

## 2024-04-19 ENCOUNTER — OFFICE VISIT (OUTPATIENT)
Dept: CARDIOLOGY | Facility: CLINIC | Age: 84
End: 2024-04-19
Attending: INTERNAL MEDICINE
Payer: COMMERCIAL

## 2024-04-19 VITALS
BODY MASS INDEX: 29.78 KG/M2 | HEIGHT: 70 IN | WEIGHT: 208 LBS | HEART RATE: 70 BPM | SYSTOLIC BLOOD PRESSURE: 130 MMHG | DIASTOLIC BLOOD PRESSURE: 70 MMHG

## 2024-04-19 DIAGNOSIS — E78.5 HYPERLIPIDEMIA WITH TARGET LDL LESS THAN 100: ICD-10-CM

## 2024-04-19 DIAGNOSIS — I35.0 MILD AORTIC STENOSIS: ICD-10-CM

## 2024-04-19 DIAGNOSIS — I65.23 BILATERAL CAROTID ARTERY STENOSIS: ICD-10-CM

## 2024-04-19 DIAGNOSIS — I10 HTN (HYPERTENSION), BENIGN: ICD-10-CM

## 2024-04-19 PROCEDURE — 99214 OFFICE O/P EST MOD 30 MIN: CPT | Performed by: INTERNAL MEDICINE

## 2024-04-19 NOTE — PROGRESS NOTES
HPI and Plan:   It is my pleasure to see this very pleasant 83-year-old gentleman, accompanied by his spouse, who returns for follow-up of aortic valve disease.     He had severe aortic stenosis and a 29 mm Anaya CPN valve was placed in September 2022.  Preoperative angiography did not show any significant coronary artery disease.     He has hypertension and also stage III chronic renal failure as a result.      He has peripheral neuropathy.     He has paroxysmal atrial fibrillation and is on Eliquis 5 mg p.o. twice daily.  He reports no issues with easy bruising or bleeding.     He feels well at this time.  He has no cardiac complaints.  He appears younger than stated age.    Cardiac examination reveals just a very soft systolic murmur compatible with normal prosthetic valvular function.  No evidence of CHF    He is mildly hypertensive blood pressure around 150/90 when he first arrived.  He does measure his blood pressures at home and on occasion he can be as low as 90/60 though the majority readings are around 120-130/80.  For a gentleman of his age I think these numbers are actually fine and I would not be increasing doses of his antihypertensives as there is a risk of falling    I will see him again in 1 years time for continued follow-up and he will have repeat echocardiogram prior to clinic visit.       Orders Placed This Encounter   Procedures    Follow-Up with Cardiology    Echocardiogram Complete       No orders of the defined types were placed in this encounter.      Encounter Diagnoses   Name Primary?    HTN (hypertension), benign     Hyperlipidemia with target LDL less than 100     Mild aortic stenosis     Bilateral carotid artery stenosis        CURRENT MEDICATIONS:  Current Outpatient Medications   Medication Sig Dispense Refill    albuterol (PROAIR HFA/PROVENTIL HFA/VENTOLIN HFA) 108 (90 Base) MCG/ACT inhaler INHALE 2 PUFFS INTO THE LUNGS EVERY 6 HOURS AS NEEDED FOR SHORTNESS OF BREATH OR DIFFICULT  "BREATHING OR WHEEZING 8.5 g 3    alpha-lipoic acid 600 MG capsule Take 600 mg by mouth 2 times daily as needed      apixaban ANTICOAGULANT (ELIQUIS) 2.5 MG tablet Take 2.5 mg by mouth 2 times daily      atorvastatin (LIPITOR) 20 MG tablet Take 2 tablets (40 mg) by mouth every evening 180 tablet 1    cetirizine (ZYRTEC) 10 MG tablet Take 10 mg by mouth daily      finasteride (PROSCAR) 5 MG tablet Take 1 tablet (5 mg) by mouth daily 90 tablet 3    fluticasone (FLONASE) 50 MCG/ACT nasal spray Spray 1-2 sprays into both nostrils daily 48 mL 3    gabapentin (NEURONTIN) 100 MG capsule Take 2 capsules (200 mg) by mouth daily 180 capsule 0    metoprolol tartrate (LOPRESSOR) 50 MG tablet Take 1 tablet (50 mg) by mouth 2 times daily 270 tablet 0    Multiple Vitamin (MULTI-VITAMIN) per tablet Take 1 tablet by mouth daily.      nitroGLYcerin (NITROSTAT) 0.4 MG sublingual tablet For chest pain place 1 tablet under the tongue every 5 minutes for 3 doses. If symptoms persist 5 minutes after 1st dose call 911. 10 tablet 0    polyethylene glycol (MIRALAX) 17 g packet Take 1 packet by mouth daily as needed      tamsulosin (FLOMAX) 0.4 MG capsule Take 0.4 mg by mouth daily      tiotropium (SPIRIVA) 18 MCG inhaled capsule INHALE THE CONTENTS OF 1 CAPSULE(18 MCG) INTO THE LUNGS DAILY 90 capsule 2    vitamin D3 (CHOLECALCIFEROL) 50 mcg (2000 units) tablet Take 1 tablet by mouth daily      azithromycin (ZITHROMAX) 250 MG tablet Take two 250mg tablets (500mg dose) one hour prior to every dental procedure. (Patient not taking: Reported on 2/29/2024) 6 tablet 1       ALLERGIES     Allergies   Allergen Reactions    Lisinopril Shortness Of Breath and Fatigue    Amoxicillin Itching    Duloxetine      \"I can't take it\"    Levofloxacin      \"I can't take the 500 mgs\"    Neurontin [Gabapentin] Swelling     edema    Norvasc [Amlodipine] Swelling     edema    Oxycodone GI Disturbance    Penicillins Itching    Red Yeast Rice Extract [Monascus " Purpureus Went Yeast] Hives and Itching    Tramadol Itching    Verapamil Itching    Vicodin [Hydrocodone-Acetaminophen] Itching       PAST MEDICAL HISTORY:  Past Medical History:   Diagnosis Date    Aortic stenosis, severe 09/20/2022    Arthritis     BPH (benign prostatic hyperplasia)     Chronic diastolic (congestive) heart failure (H) 01/11/2023    CKD (chronic kidney disease) stage 3, GFR 30-59 ml/min (H)     Cluster headache 2015    Dr. Roth    Constipation     Dyslipidemia     Dyspnea     Normal nuc stress test 9-15-08, fainted once in life    Fatty liver     Ultrasound 11/11    FH: colon cancer     colonoscopy 10/10, repeat 5 years.    Former smoker     quit 2008    HTN (hypertension), benign     Peripheral neuropathy     Dr. Patricia    Spinal stenosis     Last MRI 2010, Dr. Fraire    Status post coronary angiogram 08/22/2022    Syncope     Type 2 diabetes mellitus (H)     diet controlled    Ureterolithiases        PAST SURGICAL HISTORY:  Past Surgical History:   Procedure Laterality Date    COLONOSCOPY      COLONOSCOPY N/A 3/30/2023    Procedure: COLONOSCOPY, WITH POLYPECTOMY AND BIOPSY;  Surgeon: Adi Geiger MD;  Location:  GI    CV CORONARY ANGIOGRAM Bilateral 8/22/2022    Procedure: Coronary Angiogram;  Surgeon: Dave Mao MD;  Location: [facility] CARDIAC CATH LAB    CV TRANSCATHETER AORTIC VALVE REPLACEMENT-FEMORAL APPROACH N/A 9/20/2022    Procedure: Transcatheter Aortic Valve Replacement-Femoral Approach;  Surgeon: Wilson Martinez MD;  Location: [facility] CARDIAC CATH LAB    KERATOTOMY ARCUATE WITH FEMTOSECOND LASER/IMAGING FOR ATIOL Left 8/29/2016    Procedure: KERATOTOMY ARCUATE WITH FEMTOSECOND LASER/IMAGING FOR ATIOL;  Surgeon: Gerard Larson MD;  Location: [facility]    ORTHOPEDIC SURGERY      PHACOEMULSIFICATION CLEAR CORNEA WITH STANDARD INTRAOCULAR LENS IMPLANT Left 8/29/2016    Procedure: PHACOEMULSIFICATION CLEAR CORNEA WITH STANDARD INTRAOCULAR LENS IMPLANT;   Surgeon: Gerard Larson MD;  Location:  [facility]    Thumb surg         FAMILY HISTORY:  Family History   Problem Relation Age of Onset    C.A.D. Father     Diabetes Father     Cancer - colorectal Mother 80    Cancer - colorectal Maternal Grandmother        SOCIAL HISTORY:  Social History     Socioeconomic History    Marital status:      Spouse name: None    Number of children: None    Years of education: None    Highest education level: None   Tobacco Use    Smoking status: Former     Current packs/day: 0.00     Average packs/day: 1 pack/day for 56.0 years (56.0 ttl pk-yrs)     Types: Cigarettes     Start date: 1952     Quit date: 2008     Years since quittin.3    Smokeless tobacco: Never    Tobacco comments:     quit    Substance and Sexual Activity    Alcohol use: No     Alcohol/week: 0.0 standard drinks of alcohol    Drug use: No    Sexual activity: Yes     Partners: Female   Other Topics Concern    Caffeine Concern Yes     Comment: one cup caffeine per day    Sleep Concern No    Special Diet No    Exercise Yes     Comment: walks daily 30 minutes    Parent/sibling w/ CABG, MI or angioplasty before 65F 55M? No   Social History Narrative    , 2 kids, retired. Wife (Khalida).     Social Determinants of Health     Financial Resource Strain: Low Risk  (2024)    Financial Resource Strain     Within the past 12 months, have you or your family members you live with been unable to get utilities (heat, electricity) when it was really needed?: No   Food Insecurity: Low Risk  (2024)    Food Insecurity     Within the past 12 months, did you worry that your food would run out before you got money to buy more?: No     Within the past 12 months, did the food you bought just not last and you didn t have money to get more?: No   Transportation Needs: Low Risk  (2024)    Transportation Needs     Within the past 12 months, has lack of transportation kept you from medical appointments,  "getting your medicines, non-medical meetings or appointments, work, or from getting things that you need?: No   Interpersonal Safety: Low Risk  (1/24/2024)    Interpersonal Safety     Do you feel physically and emotionally safe where you currently live?: Yes     Within the past 12 months, have you been hit, slapped, kicked or otherwise physically hurt by someone?: No     Within the past 12 months, have you been humiliated or emotionally abused in other ways by your partner or ex-partner?: No   Housing Stability: Low Risk  (1/19/2024)    Housing Stability     Do you have housing? : Yes     Are you worried about losing your housing?: No       Review of Systems:  Skin:  Negative     Eyes:  Negative    ENT:  Negative    Respiratory:  Negative    Cardiovascular:  Negative;palpitations;chest pain;syncope or near-syncope;cyanosis;fatigue;lightheadedness;dizziness;exercise intolerance;edema    Gastroenterology: Negative    Genitourinary:  not assessed    Musculoskeletal:  Positive for back pain  Neurologic:  Negative    Psychiatric:  Negative    Heme/Lymph/Imm:  Negative    Endocrine:  Positive for      Physical Exam:  Vitals: /70   Pulse 70   Ht 1.778 m (5' 10\")   Wt 94.3 kg (208 lb)   BMI 29.84 kg/m      Constitutional:  cooperative        Skin:  warm and dry to the touch          Head:  normocephalic        Eyes:  conjunctivae and lids unremarkable        Lymph:No Cervical lymphadenopathy present     ENT:  no pallor or cyanosis        Neck:  JVP normal bilateral carotid bruit      Respiratory:  clear to auscultation;normal symmetry         Cardiac: regular rhythm;normal S1 and S2;apical impulse not displaced       systolic ejection murmur;grade 1          pulses below the femoral arteries are diminished                                 no bruit or hematoma at groin sites    GI:  abdomen soft        Extremities and Muscular Skeletal:  no edema              Neurological:  no gross motor deficits        Psych:  " Alert and Oriented x 3        Recent Lab Results:  LIPID RESULTS:  Lab Results   Component Value Date    CHOL 129 02/14/2024    CHOL 134 12/17/2020    HDL 37 (L) 02/14/2024    HDL 36 (L) 12/17/2020    LDL 63 02/14/2024    LDL 54 12/17/2020    TRIG 143 02/14/2024    TRIG 218 (H) 12/17/2020    CHOLHDLRATIO 2.9 11/10/2014       LIVER ENZYME RESULTS:  Lab Results   Component Value Date    AST 26 01/04/2023    AST 25 12/17/2020    ALT 21 01/17/2024    ALT 43 12/17/2020       CBC RESULTS:  Lab Results   Component Value Date    WBC 9.3 03/21/2024    WBC 9.7 12/17/2020    RBC 4.87 03/21/2024    RBC 4.52 12/17/2020    HGB 15.6 03/21/2024    HGB 15.3 04/21/2021    HCT 46.9 03/21/2024    HCT 44.8 12/17/2020    MCV 96 03/21/2024    MCV 99 12/17/2020    MCH 32.0 03/21/2024    MCH 32.7 12/17/2020    MCHC 33.3 03/21/2024    MCHC 33.0 12/17/2020    RDW 12.2 03/21/2024    RDW 12.9 12/17/2020     03/21/2024     12/17/2020       BMP RESULTS:  Lab Results   Component Value Date     03/21/2024     04/21/2021    POTASSIUM 4.8 03/21/2024    POTASSIUM 4.3 10/19/2022    POTASSIUM 5.1 04/21/2021    CHLORIDE 100 03/21/2024    CHLORIDE 101 10/19/2022    CHLORIDE 105 04/21/2021    CO2 27 03/21/2024    CO2 29 10/19/2022    CO2 29 04/21/2021    ANIONGAP 11 03/21/2024    ANIONGAP 8 10/19/2022    ANIONGAP 4 04/21/2021     (H) 03/21/2024     (H) 10/19/2022     (H) 04/21/2021    BUN 20.1 03/21/2024    BUN 27 10/19/2022    BUN 27 04/21/2021    CR 1.58 (H) 03/21/2024    CR 1.78 (H) 04/21/2021    GFRESTIMATED 43 (L) 03/21/2024    GFRESTIMATED 40 (L) 08/16/2022    GFRESTIMATED 35 (L) 04/21/2021    GFRESTBLACK 40 (L) 04/21/2021    SANTY 9.7 03/21/2024    SANTY 9.2 04/21/2021        A1C RESULTS:  Lab Results   Component Value Date    A1C 5.5 01/17/2024    A1C 5.5 12/17/2020       INR RESULTS:  Lab Results   Component Value Date    INR 0.99 09/09/2022    INR 0.96 08/22/2022           CC  Adeel Knight MD  [id number]  [place] [id number]

## 2024-04-19 NOTE — PATIENT INSTRUCTIONS
Your blood pressure was elevated at your appointment today.  Elevated blood pressure can increase your risk of a heart attack, stroke and heart failure.  For this reason, we feel it is important to monitor your blood pressure closely.  If you have access to a home blood pressure monitor or are able to check your blood pressure at a local pharmacy in the next week we would like you to do so and call our clinic with those readings. Please call [phone] (Marian[place]) and leave a message with your name, date of birth, blood pressure reading, date and location it was completed. If your blood pressure remains elevated your care team will be notified.  We appreciate being a part of your healthcare team and look forward to hearing from you soon.

## 2024-04-19 NOTE — LETTER
4/19/2024    Eliza Rogers MD  [address]  [facility] [address]    RE: Luis Daniel Gomez       Dear Colleague,     I had the pleasure of seeing Luis Daniel Gomez in the [facility].  HPI and Plan:   It is my pleasure to see this very pleasant 83-year-old gentleman, accompanied by his spouse, who returns for follow-up of aortic valve disease.     He had severe aortic stenosis and a 29 mm Anaya CPN valve was placed in September 2022.  Preoperative angiography did not show any significant coronary artery disease.     He has hypertension and also stage III chronic renal failure as a result.      He has peripheral neuropathy.     He has paroxysmal atrial fibrillation and is on Eliquis 5 mg p.o. twice daily.  He reports no issues with easy bruising or bleeding.     He feels well at this time.  He has no cardiac complaints.  He appears younger than stated age.    Cardiac examination reveals just a very soft systolic murmur compatible with normal prosthetic valvular function.  No evidence of CHF    He is mildly hypertensive blood pressure around 150/90 when he first arrived.  He does measure his blood pressures at home and on occasion he can be as low as 90/60 though the majority readings are around 120-130/80.  For a gentleman of his age I think these numbers are actually fine and I would not be increasing doses of his antihypertensives as there is a risk of falling    I will see him again in 1 years time for continued follow-up and he will have repeat echocardiogram prior to clinic visit.       Orders Placed This Encounter   Procedures    Follow-Up with Cardiology    Echocardiogram Complete       No orders of the defined types were placed in this encounter.      Encounter Diagnoses   Name Primary?    HTN (hypertension), benign     Hyperlipidemia with target LDL less than 100     Mild aortic stenosis     Bilateral carotid artery stenosis        CURRENT MEDICATIONS:  Current Outpatient  "Medications   Medication Sig Dispense Refill    albuterol (PROAIR HFA/PROVENTIL HFA/VENTOLIN HFA) 108 (90 Base) MCG/ACT inhaler INHALE 2 PUFFS INTO THE LUNGS EVERY 6 HOURS AS NEEDED FOR SHORTNESS OF BREATH OR DIFFICULT BREATHING OR WHEEZING 8.5 g 3    alpha-lipoic acid 600 MG capsule Take 600 mg by mouth 2 times daily as needed      apixaban ANTICOAGULANT (ELIQUIS) 2.5 MG tablet Take 2.5 mg by mouth 2 times daily      atorvastatin (LIPITOR) 20 MG tablet Take 2 tablets (40 mg) by mouth every evening 180 tablet 1    cetirizine (ZYRTEC) 10 MG tablet Take 10 mg by mouth daily      finasteride (PROSCAR) 5 MG tablet Take 1 tablet (5 mg) by mouth daily 90 tablet 3    fluticasone (FLONASE) 50 MCG/ACT nasal spray Spray 1-2 sprays into both nostrils daily 48 mL 3    gabapentin (NEURONTIN) 100 MG capsule Take 2 capsules (200 mg) by mouth daily 180 capsule 0    metoprolol tartrate (LOPRESSOR) 50 MG tablet Take 1 tablet (50 mg) by mouth 2 times daily 270 tablet 0    Multiple Vitamin (MULTI-VITAMIN) per tablet Take 1 tablet by mouth daily.      nitroGLYcerin (NITROSTAT) 0.4 MG sublingual tablet For chest pain place 1 tablet under the tongue every 5 minutes for 3 doses. If symptoms persist 5 minutes after 1st dose call 911. 10 tablet 0    polyethylene glycol (MIRALAX) 17 g packet Take 1 packet by mouth daily as needed      tamsulosin (FLOMAX) 0.4 MG capsule Take 0.4 mg by mouth daily      tiotropium (SPIRIVA) 18 MCG inhaled capsule INHALE THE CONTENTS OF 1 CAPSULE(18 MCG) INTO THE LUNGS DAILY 90 capsule 2    vitamin D3 (CHOLECALCIFEROL) 50 mcg (2000 units) tablet Take 1 tablet by mouth daily      azithromycin (ZITHROMAX) 250 MG tablet Take two 250mg tablets (500mg dose) one hour prior to every dental procedure. (Patient not taking: Reported on 2/29/2024) 6 tablet 1       ALLERGIES     Allergies   Allergen Reactions    Lisinopril Shortness Of Breath and Fatigue    Amoxicillin Itching    Duloxetine      \"I can't take it\"    " "Levofloxacin      \"I can't take the 500 mgs\"    Neurontin [Gabapentin] Swelling     edema    Norvasc [Amlodipine] Swelling     edema    Oxycodone GI Disturbance    Penicillins Itching    Red Yeast Rice Extract [[place]ascus Purpureus Went Yeast] Hives and Itching    Tramadol Itching    Verapamil Itching    Vicodin [Hydrocodone-Acetaminophen] Itching       PAST MEDICAL HISTORY:  Past Medical History:   Diagnosis Date    Aortic stenosis, severe 09/20/2022    Arthritis     BPH (benign prostatic hyperplasia)     Chronic diastolic (congestive) heart failure (H) 01/11/2023    CKD (chronic kidney disease) stage 3, GFR 30-59 ml/min (H)     Cluster headache 2015    Dr. Roth    Constipation     Dyslipidemia     Dyspnea     Normal nuc stress test 9-15-08, fainted once in life    Fatty liver     Ultrasound 11/11    FH: colon cancer     colonoscopy 10/10, repeat 5 years.    Former smoker     quit 2008    HTN (hypertension), benign     Peripheral neuropathy     Dr. Patricia    Spinal stenosis     Last MRI 2010, Dr. Fraire    Status post coronary angiogram 08/22/2022    Syncope     Type 2 diabetes mellitus (H)     diet controlled    Ureterolithiases        PAST SURGICAL HISTORY:  Past Surgical History:   Procedure Laterality Date    COLONOSCOPY      COLONOSCOPY N/A 3/30/2023    Procedure: COLONOSCOPY, WITH POLYPECTOMY AND BIOPSY;  Surgeon: Adi Geiger MD;  Location:  GI    CV CORONARY ANGIOGRAM Bilateral 8/22/2022    Procedure: Coronary Angiogram;  Surgeon: Dave Mao MD;  Location: [facility] CARDIAC CATH LAB    CV TRANSCATHETER AORTIC VALVE REPLACEMENT-FEMORAL APPROACH N/A 9/20/2022    Procedure: Transcatheter Aortic Valve Replacement-Femoral Approach;  Surgeon: Wilson Martinez MD;  Location: [facility] CARDIAC CATH LAB    KERATOTOMY ARCUATE WITH FEMTOSECOND LASER/IMAGING FOR ATIOL Left 8/29/2016    Procedure: KERATOTOMY ARCUATE WITH FEMTOSECOND LASER/IMAGING FOR ATIOL;  Surgeon: Gerard Larson MD;  " Location: [facility]    ORTHOPEDIC SURGERY      PHACOEMULSIFICATION CLEAR CORNEA WITH STANDARD INTRAOCULAR LENS IMPLANT Left 2016    Procedure: PHACOEMULSIFICATION CLEAR CORNEA WITH STANDARD INTRAOCULAR LENS IMPLANT;  Surgeon: Marsha, Gerard PRESTON MD;  Location: [facility]    Thumb surg         FAMILY HISTORY:  Family History   Problem Relation Age of Onset    C.A.D. Father     Diabetes Father     Cancer - colorectal Mother 80    Cancer - colorectal Maternal Grandmother        SOCIAL HISTORY:  Social History     Socioeconomic History    Marital status:      Spouse name: None    Number of children: None    Years of education: None    Highest education level: None   Tobacco Use    Smoking status: Former     Current packs/day: 0.00     Average packs/day: 1 pack/day for 56.0 years (56.0 ttl pk-yrs)     Types: Cigarettes     Start date: 1952     Quit date: 2008     Years since quittin.3    Smokeless tobacco: Never    Tobacco comments:     quit    Substance and Sexual Activity    Alcohol use: No     Alcohol/week: 0.0 standard drinks of alcohol    Drug use: No    Sexual activity: Yes     Partners: Female   Other Topics Concern    Caffeine Concern Yes     Comment: one cup caffeine per day    Sleep Concern No    Special Diet No    Exercise Yes     Comment: walks daily 30 minutes    Parent/sibling w/ CABG, MI or angioplasty before 65F 55M? No   Social History Narrative    , 2 kids, retired. Wife (Khalida).     Social Determinants of Health     Financial Resource Strain: Low Risk  (2024)    Financial Resource Strain     Within the past 12 months, have you or your family members you live with been unable to get utilities (heat, electricity) when it was really needed?: No   Food Insecurity: Low Risk  (2024)    Food Insecurity     Within the past 12 months, did you worry that your food would run out before you got money to buy more?: No     Within the past 12 months, did the food you bought just  "not last and you didn t have money to get more?: No   Transportation Needs: Low Risk  (1/19/2024)    Transportation Needs     Within the past 12 months, has lack of transportation kept you from medical appointments, getting your medicines, non-medical meetings or appointments, work, or from getting things that you need?: No   Interpersonal Safety: Low Risk  (1/24/2024)    Interpersonal Safety     Do you feel physically and emotionally safe where you currently live?: Yes     Within the past 12 months, have you been hit, slapped, kicked or otherwise physically hurt by someone?: No     Within the past 12 months, have you been humiliated or emotionally abused in other ways by your partner or ex-partner?: No   Housing Stability: Low Risk  (1/19/2024)    Housing Stability     Do you have housing? : Yes     Are you worried about losing your housing?: No       Review of Systems:  Skin:  Negative     Eyes:  Negative    ENT:  Negative    Respiratory:  Negative    Cardiovascular:  Negative;palpitations;chest pain;syncope or near-syncope;cyanosis;fatigue;lightheadedness;dizziness;exercise intolerance;edema    Gastroenterology: Negative    Genitourinary:  not assessed    Musculoskeletal:  Positive for back pain  Neurologic:  Negative    Psychiatric:  Negative    Heme/Lymph/Imm:  Negative    Endocrine:  Positive for      Physical Exam:  Vitals: /70   Pulse 70   Ht 1.778 m (5' 10\")   Wt 94.3 kg (208 lb)   BMI 29.84 kg/m      Constitutional:  cooperative        Skin:  warm and dry to the touch          Head:  normocephalic        Eyes:  conjunctivae and lids unremarkable        Lymph:No Cervical lymphadenopathy present     ENT:  no pallor or cyanosis        Neck:  JVP normal bilateral carotid bruit      Respiratory:  clear to auscultation;normal symmetry         Cardiac: regular rhythm;normal S1 and S2;apical impulse not displaced       systolic ejection murmur;grade 1          pulses below the femoral arteries are " diminished                                 no bruit or hematoma at groin sites    GI:  abdomen soft        Extremities and Muscular Skeletal:  no edema              Neurological:  no gross motor deficits        Psych:  Alert and Oriented x 3        Recent Lab Results:  LIPID RESULTS:  Lab Results   Component Value Date    CHOL 129 02/14/2024    CHOL 134 12/17/2020    HDL 37 (L) 02/14/2024    HDL 36 (L) 12/17/2020    LDL 63 02/14/2024    LDL 54 12/17/2020    TRIG 143 02/14/2024    TRIG 218 (H) 12/17/2020    CHOLHDLRATIO 2.9 11/10/2014       LIVER ENZYME RESULTS:  Lab Results   Component Value Date    AST 26 01/04/2023    AST 25 12/17/2020    ALT 21 01/17/2024    ALT 43 12/17/2020       CBC RESULTS:  Lab Results   Component Value Date    WBC 9.3 03/21/2024    WBC 9.7 12/17/2020    RBC 4.87 03/21/2024    RBC 4.52 12/17/2020    HGB 15.6 03/21/2024    HGB 15.3 04/21/2021    HCT 46.9 03/21/2024    HCT 44.8 12/17/2020    MCV 96 03/21/2024    MCV 99 12/17/2020    MCH 32.0 03/21/2024    MCH 32.7 12/17/2020    MCHC 33.3 03/21/2024    MCHC 33.0 12/17/2020    RDW 12.2 03/21/2024    RDW 12.9 12/17/2020     03/21/2024     12/17/2020       BMP RESULTS:  Lab Results   Component Value Date     03/21/2024     04/21/2021    POTASSIUM 4.8 03/21/2024    POTASSIUM 4.3 10/19/2022    POTASSIUM 5.1 04/21/2021    CHLORIDE 100 03/21/2024    CHLORIDE 101 10/19/2022    CHLORIDE 105 04/21/2021    CO2 27 03/21/2024    CO2 29 10/19/2022    CO2 29 04/21/2021    ANIONGAP 11 03/21/2024    ANIONGAP 8 10/19/2022    ANIONGAP 4 04/21/2021     (H) 03/21/2024     (H) 10/19/2022     (H) 04/21/2021    BUN 20.1 03/21/2024    BUN 27 10/19/2022    BUN 27 04/21/2021    CR 1.58 (H) 03/21/2024    CR 1.78 (H) 04/21/2021    GFRESTIMATED 43 (L) 03/21/2024    GFRESTIMATED 40 (L) 08/16/2022    GFRESTIMATED 35 (L) 04/21/2021    GFRESTBLACK 40 (L) 04/21/2021    SANTY 9.7 03/21/2024    SANTY 9.2 04/21/2021        A1C  RESULTS:  Lab Results   Component Value Date    A1C 5.5 01/17/2024    A1C 5.5 12/17/2020       INR RESULTS:  Lab Results   Component Value Date    INR 0.99 09/09/2022    INR 0.96 08/22/2022           CC  Latasha Knight MD  [address]  [place] [address]      Thank you for allowing me to participate in the care of your patient.      Sincerely,     DR LATASHA KNIGHT MD     [facility]

## 2024-05-22 DIAGNOSIS — I10 HTN (HYPERTENSION), BENIGN: ICD-10-CM

## 2024-05-22 RX ORDER — METOPROLOL TARTRATE 50 MG
50 TABLET ORAL 2 TIMES DAILY
Qty: 270 TABLET | Refills: 0 | Status: SHIPPED | OUTPATIENT
Start: 2024-05-22

## 2024-05-22 NOTE — TELEPHONE ENCOUNTER
Prescription approved per [facility] Refill Protocol.  Sandy Ernst RN  [facility]

## 2024-05-28 ENCOUNTER — TELEPHONE (OUTPATIENT)
Dept: CARDIOLOGY | Facility: CLINIC | Age: 84
End: 2024-05-28
Payer: COMMERCIAL

## 2024-05-28 NOTE — CONFIDENTIAL NOTE
Patient returned call and left voicemail message with update blood pressure reading.      Last Blood Pressure: 130/70  Last Heart Rate: 70  Date: 4/10/24  Location: [facility]    Today's Blood Pressure: 125/67   Location: Home BP    Patient reported blood pressure updated in Epic. Blood pressure falls within [place] Community Measures guidelines.  Patient will follow up as previously advised. ALDAIR Bullard

## 2024-06-06 ENCOUNTER — MYC MEDICAL ADVICE (OUTPATIENT)
Dept: FAMILY MEDICINE | Facility: CLINIC | Age: 84
End: 2024-06-06
Payer: COMMERCIAL

## 2024-06-10 ENCOUNTER — VIRTUAL VISIT (OUTPATIENT)
Dept: FAMILY MEDICINE | Facility: CLINIC | Age: 84
End: 2024-06-10
Payer: COMMERCIAL

## 2024-06-10 DIAGNOSIS — N18.31 STAGE 3A CHRONIC KIDNEY DISEASE (H): ICD-10-CM

## 2024-06-10 DIAGNOSIS — G62.9 NEUROPATHY: ICD-10-CM

## 2024-06-10 DIAGNOSIS — R80.9 PROTEINURIA, UNSPECIFIED TYPE: Primary | ICD-10-CM

## 2024-06-10 PROCEDURE — 99213 OFFICE O/P EST LOW 20 MIN: CPT | Mod: 95 | Performed by: INTERNAL MEDICINE

## 2024-06-10 RX ORDER — GABAPENTIN 100 MG/1
CAPSULE ORAL
Status: SHIPPED
Start: 2024-06-10 | End: 2024-06-17

## 2024-06-10 RX ORDER — LOSARTAN POTASSIUM 25 MG/1
25 TABLET ORAL DAILY
Qty: 90 TABLET | Refills: 0 | Status: SHIPPED | OUTPATIENT
Start: 2024-06-10 | End: 2024-09-10

## 2024-06-10 NOTE — PROGRESS NOTES
Casey is a 83 year old who is being evaluated via a billable video visit.    How would you like to obtain your AVS? MyChart  If the video visit is dropped, the invitation should be resent by: Text to cell phone: [phone]  Will anyone else be joining your video visit? No      Assessment & Plan   Problem List Items Addressed This Visit       CKD (chronic kidney disease) stage 3, GFR 30-59 ml/min (H)     Other Visit Diagnoses       Proteinuria, unspecified type    -  Primary    Relevant Medications    losartan (COZAAR) 25 MG tablet    Neuropathy        Relevant Medications    gabapentin (NEURONTIN) 100 MG capsule        We okayed that he increase his gabapentin to 3 at bedtime in addition he is taking 1 in the morning and 1 early afternoon.  He request total of gabapentin of 500 mg/day.  Advises HE IS NOT DIZZY WILL FORWARD TO [facility].  HE HAS CHRONIC KIDNEY DISEASE STAGE III.  HE HAS SLIGHT PROTEINURIA WE WILL ADD A LOW-DOSE OF LOSARTAN ADVISED HIM TO AVOID ANY HIGH RICH POTASSIUM DIET AS HIS POTASSIUM IS UPPER NORMAL.  WILL CONTINUE TO CLOSELY MONITOR HIS POTASSIUM.                   Subjective   Casey is a 83 year old, presenting for the following health issues:  RECHECK         No data to display                Video Start Time:  10:08 am  Total time spent was 22 minutes review of records addressing multiple health conditions and medication management.  HPI     Patient presenting for follow-up.  He continues to have leg pain describes as jolts of pain in his feet more at night.  Has increased the dose of the gabapentin, is currently taking 100 mg 2 in the evening at bedtime, 1 capsule in the morning, 1 capsule in the afternoon.  Would like to increase dosage of gabapentin, denies any side effects from such as listed as edema as allergic reaction.  Denies any dizziness from the medication.  He reports his blood pressure readings have been ranging 116 systolic to 120 in the morning occasionally he will see 140 systolic  but pressure decreases after intake medication/metoprolol blood pressure..  No other systemic symptoms.  He is doing well.    Review of Systems  Constitutional, HEENT, cardiovascular, pulmonary, gi and gu systems are negative, except as otherwise noted.      Objective           Vitals:  No vitals were obtained today due to virtual visit.    Physical Exam   GENERAL: alert and no distress  EYES: Eyes grossly normal to inspection.  No discharge or erythema, or obvious scleral/conjunctival abnormalities.  RESP: No audible wheeze, cough, or visible cyanosis.    SKIN: Visible skin clear. No significant rash, abnormal pigmentation or lesions.  NEURO: Cranial nerves grossly intact.  Mentation and speech appropriate for age.  PSYCH: Appropriate affect, tone, and pace of words    Lab on 03/21/2024   Component Date Value Ref Range Status    Creatinine Urine mg/dL 03/21/2024 134.0  mg/dL Final    The reference ranges have not been established in urine creatinine. The results should be integrated into the clinical context for interpretation.    Albumin Urine mg/L 03/21/2024 31.3  mg/L Final    The reference ranges have not been established in urine albumin. The results should be integrated into the clinical context for interpretation.    Albumin Urine mg/g Cr 03/21/2024 23.36 (H)  0.00 - 17.00 mg/g Cr Final    Microalbuminuria is defined as an albumin:creatinine ratio of 17 to 299 for males and 25 to 299 for females. A ratio of albumin:creatinine of 300 or higher is indicative of overt proteinuria.  Due to biologic variability, positive results should be confirmed by a second, first-morning random or 24-hour timed urine specimen. If there is discrepancy, a third specimen is recommended. When 2 out of 3 results are in the microalbuminuria range, this is evidence for incipient nephropathy and warrants increased efforts at glucose control, blood pressure control, and institution of therapy with an angiotensin-converting-enzyme  (ACE) inhibitor (if the patient can tolerate it).      WBC Count 03/21/2024 9.3  4.0 - 11.0 10e3/uL Final    RBC Count 03/21/2024 4.87  4.40 - 5.90 10e6/uL Final    Hemoglobin 03/21/2024 15.6  13.3 - 17.7 g/dL Final    Hematocrit 03/21/2024 46.9  40.0 - 53.0 % Final    MCV 03/21/2024 96  78 - 100 fL Final    MCH 03/21/2024 32.0  26.5 - 33.0 pg Final    MCHC 03/21/2024 33.3  31.5 - 36.5 g/dL Final    RDW 03/21/2024 12.2  10.0 - 15.0 % Final    Platelet Count 03/21/2024 189  150 - 450 10e3/uL Final    Parathyroid Hormone Intact 03/21/2024 41  15 - 65 pg/mL Final    Total Protein Urine mg/dL 03/21/2024 17.1    mg/dL Final    The reference ranges have not been established in urine protein. The results should be integrated into the clinical context for interpretation.    Total Protein Urine mg/mg Creat 03/21/2024 0.13  0.00 - 0.20 mg/mg Cr Final    Creatinine Urine mg/dL 03/21/2024 134.0  mg/dL Final    The reference ranges have not been established in urine creatinine. The results should be integrated into the clinical context for interpretation.  The reference ranges have not been established in urine creatinine. The results should be integrated into the clinical context for interpretation.    Sodium 03/21/2024 138  135 - 145 mmol/L Final    Reference intervals for this test were updated on 09/26/2023 to more accurately reflect our healthy population. There may be differences in the flagging of prior results with similar values performed with this method. Interpretation of those prior results can be made in the context of the updated reference intervals.     Potassium 03/21/2024 4.8  3.4 - 5.3 mmol/L Final    Chloride 03/21/2024 100  98 - 107 mmol/L Final    Carbon Dioxide (CO2) 03/21/2024 27  22 - 29 mmol/L Final    Anion Gap 03/21/2024 11  7 - 15 mmol/L Final    Glucose 03/21/2024 103 (H)  70 - 99 mg/dL Final    Urea Nitrogen 03/21/2024 20.1  8.0 - 23.0 mg/dL Final    Creatinine 03/21/2024 1.58 (H)  0.67 - 1.17 mg/dL  Final    GFR Estimate 03/21/2024 43 (L)  >60 mL/min/1.73m2 Final    Calcium 03/21/2024 9.7  8.8 - 10.2 mg/dL Final    Albumin 03/21/2024 4.4  3.5 - 5.2 g/dL Final    Phosphorus 03/21/2024 2.9  2.5 - 4.5 mg/dL Final    Color Urine 03/21/2024 Yellow  Colorless, Straw, Light Yellow, Yellow Final    Appearance Urine 03/21/2024 Clear  Clear Final    Glucose Urine 03/21/2024 Negative  Negative mg/dL Final    Bilirubin Urine 03/21/2024 Negative  Negative Final    Ketones Urine 03/21/2024 Negative  Negative mg/dL Final    Specific Gravity Urine 03/21/2024 1.020  1.003 - 1.035 Final    Blood Urine 03/21/2024 Negative  Negative Final    pH Urine 03/21/2024 6.0  5.0 - 7.0 Final    Protein Albumin Urine 03/21/2024 Negative  Negative mg/dL Final    Urobilinogen Urine 03/21/2024 0.2  0.2, 1.0 E.U./dL Final    Nitrite Urine 03/21/2024 Negative  Negative Final    Leukocyte Esterase Urine 03/21/2024 Negative  Negative Final    Vitamin D, Total (25-Hydroxy) 03/21/2024 87 (H)  20 - 50 ng/mL Final    toxicity possible    Bacteria Urine 03/21/2024 None Seen  None Seen /HPF Final    RBC Urine 03/21/2024 None Seen  0-2 /HPF /HPF Final    WBC Urine 03/21/2024 0-5  0-5 /HPF /HPF Final    Squamous Epithelials Urine 03/21/2024 Few (A)  None Seen /LPF Final         Video-Visit Details    Type of service:  Video Visit   Video End Time: 10:25 am  Originating Location (pt. Location): Home    Distant Location (provider location):  On-site  Platform used for Video Visit: [place]  Signed Electronically by: Eliza Rogers MD

## 2024-07-25 DIAGNOSIS — E78.5 HYPERLIPIDEMIA WITH TARGET LDL LESS THAN 100: ICD-10-CM

## 2024-07-25 RX ORDER — ATORVASTATIN CALCIUM 40 MG/1
40 TABLET, FILM COATED ORAL EVERY EVENING
Qty: 90 TABLET | Refills: 1 | Status: SHIPPED | OUTPATIENT
Start: 2024-07-25

## 2024-07-31 DIAGNOSIS — N18.32 STAGE 3B CHRONIC KIDNEY DISEASE (H): Primary | ICD-10-CM

## 2024-08-04 ENCOUNTER — HEALTH MAINTENANCE LETTER (OUTPATIENT)
Age: 84
End: 2024-08-04

## 2024-08-13 ENCOUNTER — OFFICE VISIT (OUTPATIENT)
Dept: NEPHROLOGY | Facility: CLINIC | Age: 84
End: 2024-08-13
Payer: COMMERCIAL

## 2024-08-13 ENCOUNTER — LAB (OUTPATIENT)
Dept: LAB | Facility: CLINIC | Age: 84
End: 2024-08-13
Payer: COMMERCIAL

## 2024-08-13 VITALS
SYSTOLIC BLOOD PRESSURE: 129 MMHG | BODY MASS INDEX: 30.38 KG/M2 | OXYGEN SATURATION: 93 % | HEART RATE: 72 BPM | WEIGHT: 211.7 LBS | DIASTOLIC BLOOD PRESSURE: 73 MMHG

## 2024-08-13 DIAGNOSIS — N20.0 KIDNEY STONE: Primary | ICD-10-CM

## 2024-08-13 DIAGNOSIS — N18.32 STAGE 3B CHRONIC KIDNEY DISEASE (H): ICD-10-CM

## 2024-08-13 LAB
ALBUMIN MFR UR ELPH: 11.3 MG/DL
ALBUMIN SERPL BCG-MCNC: 4.4 G/DL (ref 3.5–5.2)
ANION GAP SERPL CALCULATED.3IONS-SCNC: 10 MMOL/L (ref 7–15)
BUN SERPL-MCNC: 19.2 MG/DL (ref 8–23)
CALCIUM SERPL-MCNC: 10 MG/DL (ref 8.8–10.4)
CHLORIDE SERPL-SCNC: 100 MMOL/L (ref 98–107)
CREAT SERPL-MCNC: 1.75 MG/DL (ref 0.67–1.17)
CREAT UR-MCNC: 133 MG/DL
CREAT UR-MCNC: 136 MG/DL
EGFRCR SERPLBLD CKD-EPI 2021: 38 ML/MIN/1.73M2
ERYTHROCYTE [DISTWIDTH] IN BLOOD BY AUTOMATED COUNT: 12.5 % (ref 10–15)
GLUCOSE SERPL-MCNC: 127 MG/DL (ref 70–99)
HCO3 SERPL-SCNC: 27 MMOL/L (ref 22–29)
HCT VFR BLD AUTO: 44.4 % (ref 40–53)
HGB BLD-MCNC: 15.1 G/DL (ref 13.3–17.7)
MCH RBC QN AUTO: 32.2 PG (ref 26.5–33)
MCHC RBC AUTO-ENTMCNC: 34 G/DL (ref 31.5–36.5)
MCV RBC AUTO: 95 FL (ref 78–100)
MICROALBUMIN UR-MCNC: 18.3 MG/L
MICROALBUMIN/CREAT UR: 13.76 MG/G CR (ref 0–17)
PHOSPHATE SERPL-MCNC: 2.6 MG/DL (ref 2.5–4.5)
PLATELET # BLD AUTO: 204 10E3/UL (ref 150–450)
POTASSIUM SERPL-SCNC: 5.1 MMOL/L (ref 3.4–5.3)
PROT/CREAT 24H UR: 0.08 MG/MG CR (ref 0–0.2)
RBC # BLD AUTO: 4.69 10E6/UL (ref 4.4–5.9)
SODIUM SERPL-SCNC: 137 MMOL/L (ref 135–145)
WBC # BLD AUTO: 9.2 10E3/UL (ref 4–11)

## 2024-08-13 PROCEDURE — 82570 ASSAY OF URINE CREATININE: CPT

## 2024-08-13 PROCEDURE — 84156 ASSAY OF PROTEIN URINE: CPT

## 2024-08-13 PROCEDURE — 80069 RENAL FUNCTION PANEL: CPT

## 2024-08-13 PROCEDURE — 82043 UR ALBUMIN QUANTITATIVE: CPT

## 2024-08-13 PROCEDURE — 85027 COMPLETE CBC AUTOMATED: CPT

## 2024-08-13 PROCEDURE — 36415 COLL VENOUS BLD VENIPUNCTURE: CPT

## 2024-08-13 PROCEDURE — 99204 OFFICE O/P NEW MOD 45 MIN: CPT | Performed by: INTERNAL MEDICINE

## 2024-08-13 NOTE — PROGRESS NOTES
"Self-referred    CC: CKD 3 b    HPI: Luis Daniel Gomez is a 83 year old male  who presents for evaluation of CKD 3 b.  He is here today with his wife of 45 years.  They have no kids.    His medical history is significant for low back pain and neuropathy; what seems to be spinal canal stenosis.  His other medical history includes longstanding hypertension, BPH, COPD, former smoker, aortic stenosis s/p repair.  The cause of his neuropathy is not entirely clear to me [might be associated to his spinal stenosis]. He is currently on gabapentin.    His other medical history is also significant for kidney stones.  His last episode was 15 years ago.    His energy is okay.  He has no particular complaints.  He denies any lower extremity edema.  His blood pressure at home has been ranging around 115/65 on average.    Social history: He is  and lives with his wife.  They have no kids.  He is currently retired but used to work as a  in [employer].  He is a former smoker and quit smoking 2008.    BP Readings from Last 6 Encounters:   08/13/24 129/73   04/19/24 130/70   01/24/24 (!) 164/79   07/07/23 138/81   03/30/23 128/76   01/11/23 130/79     Allergies   Allergen Reactions    Lisinopril Shortness Of Breath and Fatigue    Amoxicillin Itching    Duloxetine      \"I can't take it\"    Levofloxacin      \"I can't take the 500 mgs\"    Neurontin [Gabapentin] Swelling     edema    Norvasc [Amlodipine] Swelling     edema    Oxycodone GI Disturbance    Penicillins Itching    Red Yeast Rice Extract [[place] Purpureus Went Yeast] Hives and Itching    Tramadol Itching    Verapamil Itching    Vicodin [Hydrocodone-Acetaminophen] Itching       Current Outpatient Medications   Medication Sig Dispense Refill    albuterol (PROAIR HFA/PROVENTIL HFA/VENTOLIN HFA) 108 (90 Base) MCG/ACT inhaler INHALE 2 PUFFS INTO THE LUNGS EVERY 6 HOURS AS NEEDED FOR SHORTNESS OF BREATH OR DIFFICULT BREATHING OR WHEEZING 8.5 g 3    " alpha-lipoic acid 600 MG capsule Take 600 mg by mouth 2 times daily as needed      apixaban ANTICOAGULANT (ELIQUIS) 2.5 MG tablet Take 2.5 mg by mouth 2 times daily      atorvastatin (LIPITOR) 40 MG tablet TAKE 1 TABLET BY MOUTH EVERY EVENING 90 tablet 1    azithromycin (ZITHROMAX) 250 MG tablet Take two 250mg tablets (500mg dose) one hour prior to every dental procedure. 6 tablet 1    cetirizine (ZYRTEC) 10 MG tablet Take 10 mg by mouth daily      finasteride (PROSCAR) 5 MG tablet Take 1 tablet (5 mg) by mouth daily 90 tablet 3    fluticasone (FLONASE) 50 MCG/ACT nasal spray Spray 1-2 sprays into both nostrils daily 48 mL 3    gabapentin (NEURONTIN) 100 MG capsule Take 1 capsules by mouth every morning, 1 capsule in the afternoon, 3 capsules at bedtime [increase dose] 450 capsule 1    losartan (COZAAR) 25 MG tablet Take 1 tablet (25 mg) by mouth daily 90 tablet 0    metoprolol tartrate (LOPRESSOR) 50 MG tablet TAKE 1 TABLET BY MOUTH TWICE DAILY 270 tablet 0    Multiple Vitamin (MULTI-VITAMIN) per tablet Take 1 tablet by mouth daily.      nitroGLYcerin (NITROSTAT) 0.4 MG sublingual tablet For chest pain place 1 tablet under the tongue every 5 minutes for 3 doses. If symptoms persist 5 minutes after 1st dose call 911. 10 tablet 0    polyethylene glycol (MIRALAX) 17 g packet Take 1 packet by mouth daily as needed      tamsulosin (FLOMAX) 0.4 MG capsule Take 0.4 mg by mouth daily      tiotropium (SPIRIVA) 18 MCG inhaled capsule INHALE THE CONTENTS OF 1 CAPSULE(18 MCG) INTO THE LUNGS DAILY 90 capsule 2    vitamin D3 (CHOLECALCIFEROL) 50 mcg (2000 units) tablet Take 1 tablet by mouth daily       No current facility-administered medications for this visit.       Past Medical History:   Diagnosis Date    Aortic stenosis, severe 09/20/2022    Arthritis     BPH (benign prostatic hyperplasia)     Chronic diastolic (congestive) heart failure (H) 01/11/2023    CKD (chronic kidney disease) stage 3, GFR 30-59 ml/min (H)      Cluster headache     Dr. Roth    Constipation     Dyslipidemia     Dyspnea     Normal nuc stress test 9-15-08, fainted once in life    Fatty liver     Ultrasound     FH: colon cancer     colonoscopy 10/10, repeat 5 years.    Former smoker     quit     HTN (hypertension), benign     Peripheral neuropathy     Dr. Patricia    Spinal stenosis     Last MRI , Dr. Fraire    Status post coronary angiogram 2022    Syncope     Type 2 diabetes mellitus (H)     diet controlled    Ureterolithiases        Past Surgical History:   Procedure Laterality Date    COLONOSCOPY      COLONOSCOPY N/A 3/30/2023    Procedure: COLONOSCOPY, WITH POLYPECTOMY AND BIOPSY;  Surgeon: Adi Geiger MD;  Location:  GI    CV CORONARY ANGIOGRAM Bilateral 2022    Procedure: Coronary Angiogram;  Surgeon: Dave Mao MD;  Location:  HEART CARDIAC CATH LAB    CV TRANSCATHETER AORTIC VALVE REPLACEMENT-FEMORAL APPROACH N/A 2022    Procedure: Transcatheter Aortic Valve Replacement-Femoral Approach;  Surgeon: Wilson Martinez MD;  Location: [facility] CARDIAC CATH LAB    KERATOTOMY ARCUATE WITH FEMTOSECOND LASER/IMAGING FOR ATIOL Left 2016    Procedure: KERATOTOMY ARCUATE WITH FEMTOSECOND LASER/IMAGING FOR ATIOL;  Surgeon: Gerard Larson MD;  Location: [facility]    ORTHOPEDIC SURGERY      PHACOEMULSIFICATION CLEAR CORNEA WITH STANDARD INTRAOCULAR LENS IMPLANT Left 2016    Procedure: PHACOEMULSIFICATION CLEAR CORNEA WITH STANDARD INTRAOCULAR LENS IMPLANT;  Surgeon: Gerard Larson MD;  Location: [facility]    Thumb surg         Social History     Tobacco Use    Smoking status: Former     Current packs/day: 0.00     Average packs/day: 1 pack/day for 56.0 years (56.0 ttl pk-yrs)     Types: Cigarettes     Start date: 1952     Quit date: 2008     Years since quittin.6    Smokeless tobacco: Never    Tobacco comments:     quit    Substance Use Topics    Alcohol use: No     Alcohol/week:  0.0 standard drinks of alcohol    Drug use: No       Family History   Problem Relation Age of Onset    C.A.D. Father     Diabetes Father     Cancer - colorectal Mother 80    Cancer - colorectal Maternal Grandmother        ROS: A 12 system review of systems was negative other than noted here or above.     Exam:  /73 (BP Location: Left arm, Patient Position: Chair, Cuff Size: Adult Regular)   Pulse 72   Wt 96 kg (211 lb 11.2 oz)   SpO2 93%   BMI 30.38 kg/m    GENERAL APPEARANCE: alert and no distress  EYES: PERRL  HENT: mouth without ulcers or lesions  NECK: supple, no adenopathy  RESP: lungs clear to auscultation - no rales, rhonchi or wheezes  CV: regular rhythm, normal rate, no rub   ABDOMEN:  soft, nontender, no HSM or masses and bowel sounds normal  MS: extremities normal- no gross deformities noted, no evidence of inflammation in joints, no muscle tenderness  SKIN: no rash  NEURO: Normal strength and tone, sensory exam grossly normal, mentation intact and speech normal  PSYCH: mentation appears normal. and affect normal/bright  No LE edema    Results: Reviewed in details with the patient and his wife    Assessment/Plan:   Problem #1 chronic kidney disease stage IIIb: His CKD is longstanding at least since 2008.  His creatinine has been fluctuating between 1.5-1.8 since 2008.  He has minimal albuminuria with a UACR at 23 mg/g.  He was recently started on losartan 25 mg daily by his PCP and his blood pressure is tolerating it.  -The absence of proteinuria and the longstanding history of a stable creatinine are rather reassuring  -Agree with losartan at 25 mg daily. The slight rise of creatinine recently can be explained by it. Would continue losartan 25 mg daily.  -No indication for SGLT2 inhibitor given his age and comorbidities.    Problem #2 hypertension: His blood pressure is currently well-controlled.  He was recently started on losartan 25 mg daily by his PCP.    Problem #3 CKD  complications  -Anemia: No anemia  -CKD-MBD: Calcium and phosphorus are within normal limits.  His vitamin D is elevated at 87.  I instructed the him to cut down on his vitamin D 2000 units to 3 times weekly.  His PTH is at goal.  -No major electrolyte disturbances    Problem # 4 kidney stones: No recent episodes.  Will order ultrasound kidney to check for stone load     Problem #5 prediabetes: His hemoglobin A1c is at 5.5%.  Advised him to avoid any weight gain and keep a low carbohydrate diet.    The total time of this encounter amounted to 45 minutes on the day of the encounter 8/13/2024. This time included face to face time spent with the patient, preparatory work and chart review, ordering tests, and performing post visit documentation.      *This dictation was prepared in part using Dragon recognition software.  As a result errors may occur. When identified these transcription errors have been corrected.  While every attempt is made to correct errors during dictation, errors may still exist.     Clifford Corbin MD   [facility]   Department of Medicine   Division of Renal Disease and Hypertension

## 2024-08-13 NOTE — NURSING NOTE
Luis Daniel Gomez's goals for this visit include:   Chief Complaint   Patient presents with    Consult     Referred by Dr. Eliza Rogers  Stage 3b chronic kidney disease       He requests these members of his care team be copied on today's visit information: yes     PCP: Eliza Rogers    Referring Provider:  Referred Self, MD  No address on file    /73 (BP Location: Left arm, Patient Position: Chair, Cuff Size: Adult Regular)   Pulse 72   Wt 96 kg (211 lb 11.2 oz)   SpO2 93%   BMI 30.38 kg/m      Do you need any medication refills at today's visit? No     ALDAIR Barron   Neph/Pulm Teams  [facility]

## 2024-08-14 ENCOUNTER — TELEPHONE (OUTPATIENT)
Dept: NEPHROLOGY | Facility: CLINIC | Age: 84
End: 2024-08-14
Payer: COMMERCIAL

## 2024-08-14 DIAGNOSIS — I35.0 NONRHEUMATIC AORTIC VALVE STENOSIS: ICD-10-CM

## 2024-08-14 DIAGNOSIS — Z95.2 S/P TAVR (TRANSCATHETER AORTIC VALVE REPLACEMENT): ICD-10-CM

## 2024-08-14 DIAGNOSIS — I35.0 SEVERE AORTIC STENOSIS: ICD-10-CM

## 2024-08-14 RX ORDER — AZITHROMYCIN 250 MG/1
TABLET, FILM COATED ORAL
Qty: 6 TABLET | Refills: 1 | Status: SHIPPED | OUTPATIENT
Start: 2024-08-14

## 2024-08-14 NOTE — CONFIDENTIAL NOTE
Left Voicemail (1st Attempt) for the patient to call back and schedule the following:    Appointment type: return nephrology  Provider:   Return date: 03/13/2025  Specialty phone number: [phone]  Additional appointment(s) needed: labs prior to return visit  Additonal Notes: follow up in 7 months, around 03/13/2025.       [facility] Complex   Gastroenterology, Infectious Diseases, Nephrology, Pulmonology and Rheumatology Specialties  [facility]

## 2024-08-16 ENCOUNTER — ANCILLARY PROCEDURE (OUTPATIENT)
Dept: ULTRASOUND IMAGING | Facility: CLINIC | Age: 84
End: 2024-08-16
Attending: INTERNAL MEDICINE
Payer: COMMERCIAL

## 2024-08-16 DIAGNOSIS — N20.0 KIDNEY STONE: ICD-10-CM

## 2024-08-16 PROCEDURE — 76770 US EXAM ABDO BACK WALL COMP: CPT

## 2024-09-04 ENCOUNTER — OFFICE VISIT (OUTPATIENT)
Dept: FAMILY MEDICINE | Facility: CLINIC | Age: 84
End: 2024-09-04
Payer: COMMERCIAL

## 2024-09-04 VITALS
SYSTOLIC BLOOD PRESSURE: 138 MMHG | DIASTOLIC BLOOD PRESSURE: 76 MMHG | WEIGHT: 214 LBS | BODY MASS INDEX: 30.64 KG/M2 | HEIGHT: 70 IN | OXYGEN SATURATION: 94 % | HEART RATE: 82 BPM | TEMPERATURE: 97.7 F | RESPIRATION RATE: 16 BRPM

## 2024-09-04 DIAGNOSIS — N18.31 STAGE 3A CHRONIC KIDNEY DISEASE (H): Primary | ICD-10-CM

## 2024-09-04 DIAGNOSIS — E11.21 TYPE 2 DIABETES MELLITUS WITH DIABETIC NEPHROPATHY, WITHOUT LONG-TERM CURRENT USE OF INSULIN (H): ICD-10-CM

## 2024-09-04 DIAGNOSIS — I10 HTN (HYPERTENSION), BENIGN: ICD-10-CM

## 2024-09-04 DIAGNOSIS — I48.91 ATRIAL FIBRILLATION, UNSPECIFIED TYPE (H): ICD-10-CM

## 2024-09-04 DIAGNOSIS — I77.9 DISORDER OF CAROTID ARTERY (H): ICD-10-CM

## 2024-09-04 DIAGNOSIS — E78.5 DYSLIPIDEMIA: ICD-10-CM

## 2024-09-04 DIAGNOSIS — I65.21 STENOSIS OF RIGHT CAROTID ARTERY: ICD-10-CM

## 2024-09-04 DIAGNOSIS — Z13.21 ENCOUNTER FOR VITAMIN DEFICIENCY SCREENING: ICD-10-CM

## 2024-09-04 DIAGNOSIS — Z23 NEED FOR PROPHYLACTIC VACCINATION AND INOCULATION AGAINST INFLUENZA: ICD-10-CM

## 2024-09-04 PROBLEM — I50.32 CHRONIC DIASTOLIC (CONGESTIVE) HEART FAILURE (H): Status: RESOLVED | Noted: 2023-01-11 | Resolved: 2024-09-04

## 2024-09-04 LAB
ERYTHROCYTE [DISTWIDTH] IN BLOOD BY AUTOMATED COUNT: 12.3 % (ref 10–15)
HBA1C MFR BLD: 5.8 % (ref 0–5.6)
HCT VFR BLD AUTO: 45 % (ref 40–53)
HGB BLD-MCNC: 15.2 G/DL (ref 13.3–17.7)
MCH RBC QN AUTO: 32.1 PG (ref 26.5–33)
MCHC RBC AUTO-ENTMCNC: 33.8 G/DL (ref 31.5–36.5)
MCV RBC AUTO: 95 FL (ref 78–100)
PLATELET # BLD AUTO: 204 10E3/UL (ref 150–450)
RBC # BLD AUTO: 4.74 10E6/UL (ref 4.4–5.9)
WBC # BLD AUTO: 7.6 10E3/UL (ref 4–11)

## 2024-09-04 PROCEDURE — 82570 ASSAY OF URINE CREATININE: CPT | Performed by: INTERNAL MEDICINE

## 2024-09-04 PROCEDURE — 82306 VITAMIN D 25 HYDROXY: CPT | Mod: GZ | Performed by: INTERNAL MEDICINE

## 2024-09-04 PROCEDURE — 93000 ELECTROCARDIOGRAM COMPLETE: CPT | Performed by: INTERNAL MEDICINE

## 2024-09-04 PROCEDURE — 90662 IIV NO PRSV INCREASED AG IM: CPT | Performed by: INTERNAL MEDICINE

## 2024-09-04 PROCEDURE — 80061 LIPID PANEL: CPT | Performed by: INTERNAL MEDICINE

## 2024-09-04 PROCEDURE — 84443 ASSAY THYROID STIM HORMONE: CPT | Performed by: INTERNAL MEDICINE

## 2024-09-04 PROCEDURE — G0008 ADMIN INFLUENZA VIRUS VAC: HCPCS | Performed by: INTERNAL MEDICINE

## 2024-09-04 PROCEDURE — 85027 COMPLETE CBC AUTOMATED: CPT | Performed by: INTERNAL MEDICINE

## 2024-09-04 PROCEDURE — 82043 UR ALBUMIN QUANTITATIVE: CPT | Performed by: INTERNAL MEDICINE

## 2024-09-04 PROCEDURE — 80053 COMPREHEN METABOLIC PANEL: CPT | Performed by: INTERNAL MEDICINE

## 2024-09-04 PROCEDURE — 83036 HEMOGLOBIN GLYCOSYLATED A1C: CPT | Performed by: INTERNAL MEDICINE

## 2024-09-04 PROCEDURE — 99214 OFFICE O/P EST MOD 30 MIN: CPT | Mod: 25 | Performed by: INTERNAL MEDICINE

## 2024-09-04 PROCEDURE — 36415 COLL VENOUS BLD VENIPUNCTURE: CPT | Performed by: INTERNAL MEDICINE

## 2024-09-04 ASSESSMENT — PAIN SCALES - GENERAL: PAINLEVEL: NO PAIN (0)

## 2024-09-04 NOTE — PROGRESS NOTES
Assessment & Plan   Problem List Items Addressed This Visit       CKD (chronic kidney disease) stage 3, GFR 30-59 ml/min (H) - Primary    Relevant Orders    Comprehensive metabolic panel (BMP + Alb, Alk Phos, ALT, AST, Total. Bili, TP) (Completed)    CBC with platelets (Completed)    HTN (hypertension), benign    Relevant Orders    Comprehensive metabolic panel (BMP + Alb, Alk Phos, ALT, AST, Total. Bili, TP) (Completed)    Albumin Random Urine Quantitative with Creat Ratio (Completed)    Type 2 diabetes mellitus with diabetic nephropathy (H)    Relevant Orders    Hemoglobin A1c (Completed)    Comprehensive metabolic panel (BMP + Alb, Alk Phos, ALT, AST, Total. Bili, TP) (Completed)    Albumin Random Urine Quantitative with Creat Ratio (Completed)    Atrial fibrillation, unspecified type (H)    Relevant Orders    Comprehensive metabolic panel (BMP + Alb, Alk Phos, ALT, AST, Total. Bili, TP) (Completed)    CBC with platelets (Completed)    EKG 12-lead complete w/read - Clinics (Completed)    TSH with free T4 reflex (Completed)    Disorder of carotid artery (H24)     Other Visit Diagnoses       Encounter for vitamin deficiency screening        Relevant Orders    Vitamin D Deficiency (Completed)    Dyslipidemia        Relevant Orders    Lipid panel reflex to direct LDL Fasting (Completed)    Body mass index (BMI) 30.0-30.9, adult        Relevant Orders    Vitamin D Deficiency (Completed)    Need for prophylactic vaccination and inoculation against influenza        Stenosis of right carotid artery        Relevant Orders    Vascular Medicine Referral           Status post TAVR, remains on chronic anticoagulation due to history of A-fib.  Has been having some irregular heartbeats we will check an EKG.  Check basic labs chemistry panel kidney function test, his vitamin D was slightly elevated repeat.  Encourage more exercise activities as tolerated more walking. increase fluid intake.  Continue follow-up with cardiology.  "   There was a renal mass on ultrasound, 2.1 cm complex cystic lesion MRI of the kidney was recommended, will follow-up with nephrology.  R Carotid artery disease, asymptomatic, see vascular   All questions answered       BMI  Estimated body mass index is 30.71 kg/m  as calculated from the following:    Height as of this encounter: 1.778 m (5' 10\").    Weight as of this encounter: 97.1 kg (214 lb).   Weight management plan: Discussed healthy diet and exercise guidelines    Work on weight loss  Regular exercise  See Patient Instructions      Keanu Peña is a 83 year old, presenting for the following health issues:  RECHECK and Imm/Inj (Flu Shot)         No data to display              History of Present Illness       Hypertension: He presents for follow up of hypertension.  He does check blood pressure  regularly outside of the clinic. Outpatient blood pressures have not been over 140/90. He does not follow a low salt diet.     He eats 0-1 servings of fruits and vegetables daily.He consumes 1 sweetened beverage(s) daily.He exercises with enough effort to increase his heart rate 20 to 29 minutes per day.    He is taking medications regularly.     Patient presenting for follow-up.  He is recovering from?  Cold or allergies.  He reports his blood pressure runs in the 120s over 70s at home.  Denies any chest pain.  Denies any breathing difficulty.  Has no GI or  symptoms.  No leg swelling.  No shortness of breath with exertion but he has not been very physically active.  He gained couple pounds he reports.    Review of Systems  Constitutional, HEENT, cardiovascular, pulmonary, gi and gu systems are negative, except as otherwise noted.      Objective    /76 (BP Location: Left arm, Patient Position: Sitting, Cuff Size: Adult Large)   Pulse 82   Temp 97.7  F (36.5  C) (Temporal)   Resp 16   Ht 1.778 m (5' 10\")   Wt 97.1 kg (214 lb)   SpO2 94%   BMI 30.71 kg/m    Body mass index is 30.71 kg/m .  Physical " Exam   GENERAL: alert and no distress  EYES: Eyes grossly normal to inspection, PERRL and conjunctivae and sclerae normal  HENT: ear canals and TM's normal, nose and mouth without ulcers or lesions  NECK: no adenopathy, no asymmetry, masses, or scars  RESP: lungs clear to auscultation - no rales, rhonchi or wheezes  CV: regular rate and rhythm, normal S1 S2, no S3 or S4, no murmur, click or rub, no peripheral edema  ABDOMEN: soft, nontender, no hepatosplenomegaly, no masses and bowel sounds normal  MS: no gross musculoskeletal defects noted, no edema  SKIN: no suspicious lesions or rashes  NEURO: Normal strength and tone, mentation intact and speech normal  PSYCH: mentation appears normal, affect normal/bright    Lab on 08/13/2024   Component Date Value Ref Range Status    Sodium 08/13/2024 137  135 - 145 mmol/L Final    Potassium 08/13/2024 5.1  3.4 - 5.3 mmol/L Final    Chloride 08/13/2024 100  98 - 107 mmol/L Final    Carbon Dioxide (CO2) 08/13/2024 27  22 - 29 mmol/L Final    Anion Gap 08/13/2024 10  7 - 15 mmol/L Final    Glucose 08/13/2024 127 (H)  70 - 99 mg/dL Final    Urea Nitrogen 08/13/2024 19.2  8.0 - 23.0 mg/dL Final    Creatinine 08/13/2024 1.75 (H)  0.67 - 1.17 mg/dL Final    GFR Estimate 08/13/2024 38 (L)  >60 mL/min/1.73m2 Final    eGFR calculated using 2021 CKD-EPI equation.    Calcium 08/13/2024 10.0  8.8 - 10.4 mg/dL Final    Reference intervals for this test were updated on 7/16/2024 to reflect our healthy population more accurately. There may be differences in the flagging of prior results with similar values performed with this method. Those prior results can be interpreted in the context of the updated reference intervals.    Albumin 08/13/2024 4.4  3.5 - 5.2 g/dL Final    Phosphorus 08/13/2024 2.6  2.5 - 4.5 mg/dL Final    WBC Count 08/13/2024 9.2  4.0 - 11.0 10e3/uL Final    RBC Count 08/13/2024 4.69  4.40 - 5.90 10e6/uL Final    Hemoglobin 08/13/2024 15.1  13.3 - 17.7 g/dL Final     Hematocrit 08/13/2024 44.4  40.0 - 53.0 % Final    MCV 08/13/2024 95  78 - 100 fL Final    MCH 08/13/2024 32.2  26.5 - 33.0 pg Final    MCHC 08/13/2024 34.0  31.5 - 36.5 g/dL Final    RDW 08/13/2024 12.5  10.0 - 15.0 % Final    Platelet Count 08/13/2024 204  150 - 450 10e3/uL Final    Creatinine Urine mg/dL 08/13/2024 133.0  mg/dL Final    The reference ranges have not been established in urine creatinine. The results should be integrated into the clinical context for interpretation.    Albumin Urine mg/L 08/13/2024 18.3  mg/L Final    The reference ranges have not been established in urine albumin. The results should be integrated into the clinical context for interpretation.    Albumin Urine mg/g Cr 08/13/2024 13.76  0.00 - 17.00 mg/g Cr Final    Microalbuminuria is defined as an albumin:creatinine ratio of 17 to 299 for males and 25 to 299 for females. A ratio of albumin:creatinine of 300 or higher is indicative of overt proteinuria.  Due to biologic variability, positive results should be confirmed by a second, first-morning random or 24-hour timed urine specimen. If there is discrepancy, a third specimen is recommended. When 2 out of 3 results are in the microalbuminuria range, this is evidence for incipient nephropathy and warrants increased efforts at glucose control, blood pressure control, and institution of therapy with an angiotensin-converting-enzyme (ACE) inhibitor (if the patient can tolerate it).      Total Protein Urine mg/dL 08/13/2024 11.3    mg/dL Final    The reference ranges have not been established in urine protein. The results should be integrated into the clinical context for interpretation.    Total Protein Urine mg/mg Creat 08/13/2024 0.08  0.00 - 0.20 mg/mg Cr Final    Creatinine Urine mg/dL 08/13/2024 136.0  mg/dL Final    The reference ranges have not been established in urine creatinine. The results should be integrated into the clinical context for interpretation.           Signed  Electronically by: Eliza Rogers MD

## 2024-09-05 DIAGNOSIS — E87.5 HYPERKALEMIA: Primary | ICD-10-CM

## 2024-09-05 DIAGNOSIS — I10 HTN (HYPERTENSION), BENIGN: Primary | ICD-10-CM

## 2024-09-05 DIAGNOSIS — I10 HTN (HYPERTENSION), BENIGN: ICD-10-CM

## 2024-09-05 LAB
ALBUMIN SERPL BCG-MCNC: 4.1 G/DL (ref 3.5–5.2)
ALP SERPL-CCNC: 82 U/L (ref 40–150)
ALT SERPL W P-5'-P-CCNC: 20 U/L (ref 0–70)
ANION GAP SERPL CALCULATED.3IONS-SCNC: 9 MMOL/L (ref 7–15)
AST SERPL W P-5'-P-CCNC: 25 U/L (ref 0–45)
BILIRUB SERPL-MCNC: 0.4 MG/DL
BUN SERPL-MCNC: 18.8 MG/DL (ref 8–23)
CALCIUM SERPL-MCNC: 9.4 MG/DL (ref 8.8–10.4)
CHLORIDE SERPL-SCNC: 102 MMOL/L (ref 98–107)
CHOLEST SERPL-MCNC: 125 MG/DL
CREAT SERPL-MCNC: 1.68 MG/DL (ref 0.67–1.17)
CREAT UR-MCNC: 81.1 MG/DL
EGFRCR SERPLBLD CKD-EPI 2021: 40 ML/MIN/1.73M2
FASTING STATUS PATIENT QL REPORTED: YES
FASTING STATUS PATIENT QL REPORTED: YES
GLUCOSE SERPL-MCNC: 120 MG/DL (ref 70–99)
HCO3 SERPL-SCNC: 26 MMOL/L (ref 22–29)
HDLC SERPL-MCNC: 37 MG/DL
LDLC SERPL CALC-MCNC: 64 MG/DL
MICROALBUMIN UR-MCNC: 21 MG/L
MICROALBUMIN/CREAT UR: 25.89 MG/G CR (ref 0–17)
NONHDLC SERPL-MCNC: 88 MG/DL
POTASSIUM SERPL-SCNC: 5.5 MMOL/L (ref 3.4–5.3)
PROT SERPL-MCNC: 6.8 G/DL (ref 6.4–8.3)
SODIUM SERPL-SCNC: 137 MMOL/L (ref 135–145)
TRIGL SERPL-MCNC: 119 MG/DL
TSH SERPL DL<=0.005 MIU/L-ACNC: 2.5 UIU/ML (ref 0.3–4.2)
VIT D+METAB SERPL-MCNC: 71 NG/ML (ref 20–50)

## 2024-09-05 RX ORDER — HYDROCHLOROTHIAZIDE 12.5 MG/1
12.5 TABLET ORAL DAILY
Qty: 90 TABLET | Refills: 0 | Status: SHIPPED | OUTPATIENT
Start: 2024-09-05

## 2024-09-05 RX ORDER — HYDROCHLOROTHIAZIDE 12.5 MG/1
12.5 TABLET ORAL DAILY
Qty: 30 TABLET | Refills: 0 | Status: SHIPPED | OUTPATIENT
Start: 2024-09-05 | End: 2024-09-05

## 2024-09-05 NOTE — RESULT ENCOUNTER NOTE
Mihir Peña, it was very nice seeing you in the clinic again,    Reviewed rest of your labs ,    Urine protein slightly positive at 25.89.  Adequate control of blood pressure less than 130/80 will help reverse that protein in the urine.    Vitamin D level is elevated, please cut down on vitamin D supplements, hold for the next 2 weeks and start at the lower dose and less frequent probably 2 or 3 times a week only.    Excess vitamin D can precipitate calcium/kidney stones.    Potassium is elevated at 5.5, kidney function test is stable from 3 weeks ago with GFR of 40, please follow-up with the kidney specialist you have been seeing.  Please schedule appointment with the kidney specialist sometime soon.  Calcium level is normal,  Liver enzymes called ALT AST are normal.    Your cholesterol panel is stable from 6 months ago you continue to have low HDL which is the good cholesterol, you can increase HDL by increase exercise activities and weight loss.  Which is strongly recommended.  LDL the bad cholesterol is at goal.  Please schedule follow-up with Dr Megan Rossi for medical therapy management to discuss weight management option including injection medications such as GLP-1 agonist injections.  I will cc Dr Megan Rossi from medical therapy management on the plan.    Thyroid test called TSH is normal.    The hyperkalemia probably as a result of the underlying chronic kidney disease and being on a low-dose of losartan.  Please avoid any foods that are rich in potassium such as bananas for example.  Hold losartan for the next 7 days.  Start on hydrochlorothiazide which is another blood pressure diuretic medication that can help lower your potassium, then resume losartan with the hydrochlorothiazide 1 tablet of each once daily in 1 week.  Please continue to monitor blood pressure and record readings to us.  [Losartan may increase the potassium, hydrochlorothiazide will lower the potassium so they will balance each other to  help keeping the potassium less than 5 and avoid any foods that are rich in potassium].      Please will need to repeat your metabolic chemistry panel in 2 week by labs, please call to schedule to recheck on your potassium level..      Any further questions please let me know , please continue monitor blood pressure and check readings and send readings to us through [facility].      Please you take your blood pressure 2 hours after morning blood pressure medications 1 time in the morning and 1 time in the evening for a week.    It has been a pleasure to participate in your care.  Please call our clinic if you have any questions or concerns.     Regards,  Dr Rogers      We hope you had an excellent experience during the clinic visit. You may receive an e-mail from [employer] within the next few days inviting you to participate in an online patient experience survey. I value your experience and would be very thankful for your time with providing feedback on our clinic survey.

## 2024-09-09 ENCOUNTER — TELEPHONE (OUTPATIENT)
Dept: OTHER | Facility: CLINIC | Age: 84
End: 2024-09-09
Payer: COMMERCIAL

## 2024-09-09 DIAGNOSIS — I77.9 DISORDER OF CAROTID ARTERY (H): Primary | ICD-10-CM

## 2024-09-09 DIAGNOSIS — R09.89 OTHER SPECIFIED SYMPTOMS AND SIGNS INVOLVING THE CIRCULATORY AND RESPIRATORY SYSTEMS: ICD-10-CM

## 2024-09-09 DIAGNOSIS — R80.9 PROTEINURIA, UNSPECIFIED TYPE: ICD-10-CM

## 2024-09-09 NOTE — TELEPHONE ENCOUNTER
Referral received via [employer] on 09/09/24.    Pt referred to [facility] by Dr. Eliza Rogers for stenosis of right carotid artery.    Per 07/19/2022 US Carotid Bilateral:      Routing to scheduling to coordinate the following:  US Carotid Bilateral  NEW VASCULAR PATIENT consult with Vascular Medicine  Please schedule this at next available      Appt note:  Pt referred to [facility] by Dr. Eliza Rogers for stenosis of right carotid artery.

## 2024-09-09 NOTE — TELEPHONE ENCOUNTER
Briefly spoke with patient- patient was busy and unavailable to schedule at this time. Requested to be called back at a later time. Flagging for follow up 1 day.

## 2024-09-10 RX ORDER — LOSARTAN POTASSIUM 25 MG/1
25 TABLET ORAL DAILY
Qty: 90 TABLET | Refills: 0 | Status: SHIPPED | OUTPATIENT
Start: 2024-09-10

## 2024-09-16 ENCOUNTER — MYC MEDICAL ADVICE (OUTPATIENT)
Dept: NEPHROLOGY | Facility: CLINIC | Age: 84
End: 2024-09-16
Payer: COMMERCIAL

## 2024-09-16 ENCOUNTER — PATIENT OUTREACH (OUTPATIENT)
Dept: NEPHROLOGY | Facility: CLINIC | Age: 84
End: 2024-09-16
Payer: COMMERCIAL

## 2024-09-16 DIAGNOSIS — N28.1 COMPLEX RENAL CYST: Primary | ICD-10-CM

## 2024-09-16 NOTE — PROGRESS NOTES
Left message for Casey to return call to discuss results of ultrasound. Sending him a mychart message as well.

## 2024-09-17 DIAGNOSIS — J44.9 CHRONIC OBSTRUCTIVE PULMONARY DISEASE, UNSPECIFIED COPD TYPE (H): ICD-10-CM

## 2024-09-17 NOTE — TELEPHONE ENCOUNTER
Patient has been scheduled    Eladia quevedo Complex   Gastroenterology, Infectious Diseases, Nephrology, Pulmonology and Rheumatology Specialties  [facility]

## 2024-09-18 DIAGNOSIS — L98.9 SKIN LESION: Primary | ICD-10-CM

## 2024-09-18 RX ORDER — TIOTROPIUM BROMIDE 18 UG/1
18 CAPSULE ORAL; RESPIRATORY (INHALATION) DAILY
Qty: 30 CAPSULE | Refills: 1 | Status: SHIPPED | OUTPATIENT
Start: 2024-09-18

## 2024-09-24 ENCOUNTER — LAB (OUTPATIENT)
Dept: LAB | Facility: CLINIC | Age: 84
End: 2024-09-24
Payer: COMMERCIAL

## 2024-09-24 DIAGNOSIS — E87.5 HYPERKALEMIA: ICD-10-CM

## 2024-09-24 LAB
ANION GAP SERPL CALCULATED.3IONS-SCNC: 9 MMOL/L (ref 7–15)
BUN SERPL-MCNC: 24.4 MG/DL (ref 8–23)
CALCIUM SERPL-MCNC: 9.4 MG/DL (ref 8.8–10.4)
CHLORIDE SERPL-SCNC: 98 MMOL/L (ref 98–107)
CREAT SERPL-MCNC: 1.7 MG/DL (ref 0.67–1.17)
EGFRCR SERPLBLD CKD-EPI 2021: 39 ML/MIN/1.73M2
GLUCOSE SERPL-MCNC: 110 MG/DL (ref 70–99)
HCO3 SERPL-SCNC: 28 MMOL/L (ref 22–29)
POTASSIUM SERPL-SCNC: 4.7 MMOL/L (ref 3.4–5.3)
SODIUM SERPL-SCNC: 135 MMOL/L (ref 135–145)

## 2024-09-24 PROCEDURE — 36415 COLL VENOUS BLD VENIPUNCTURE: CPT

## 2024-09-24 PROCEDURE — 80048 BASIC METABOLIC PNL TOTAL CA: CPT

## 2024-09-24 NOTE — RESULT ENCOUNTER NOTE
Mihir Peña,     This is Kyle Light and I am a PA who is covering for Dr. Rogers who is currently out of the office. I had a chance to review your recent results.  Improved potassium levels from 2 weeks ago.  Continue losartan/hydrochlorothiazide as Dr. Rogers had recommended.    Let me know if you have any questions or concerns,     Kyle Light PA-C  [facility]

## 2024-09-26 ENCOUNTER — HOSPITAL ENCOUNTER (OUTPATIENT)
Dept: ULTRASOUND IMAGING | Facility: CLINIC | Age: 84
Discharge: HOME | End: 2024-09-26
Attending: INTERNAL MEDICINE
Payer: COMMERCIAL

## 2024-09-26 ENCOUNTER — OFFICE VISIT (OUTPATIENT)
Dept: OTHER | Facility: CLINIC | Age: 84
End: 2024-09-26
Attending: INTERNAL MEDICINE
Payer: COMMERCIAL

## 2024-09-26 ENCOUNTER — OFFICE VISIT (OUTPATIENT)
Dept: DERMATOLOGY | Facility: CLINIC | Age: 84
End: 2024-09-26
Payer: COMMERCIAL

## 2024-09-26 VITALS
BODY MASS INDEX: 30.56 KG/M2 | WEIGHT: 213 LBS | DIASTOLIC BLOOD PRESSURE: 68 MMHG | SYSTOLIC BLOOD PRESSURE: 118 MMHG | HEART RATE: 82 BPM | OXYGEN SATURATION: 92 %

## 2024-09-26 DIAGNOSIS — D49.2 NEOPLASM OF UNSPECIFIED BEHAVIOR OF BONE, SOFT TISSUE, AND SKIN: Primary | ICD-10-CM

## 2024-09-26 DIAGNOSIS — E78.5 HYPERLIPIDEMIA LDL GOAL <70: ICD-10-CM

## 2024-09-26 DIAGNOSIS — I35.0 NONRHEUMATIC AORTIC VALVE STENOSIS: ICD-10-CM

## 2024-09-26 DIAGNOSIS — I10 ESSENTIAL HYPERTENSION: ICD-10-CM

## 2024-09-26 DIAGNOSIS — E11.9 TYPE 2 DIABETES, HBA1C GOAL < 7% (H): Primary | ICD-10-CM

## 2024-09-26 DIAGNOSIS — N18.32 STAGE 3B CHRONIC KIDNEY DISEASE (CKD) (H): ICD-10-CM

## 2024-09-26 DIAGNOSIS — R09.89 OTHER SPECIFIED SYMPTOMS AND SIGNS INVOLVING THE CIRCULATORY AND RESPIRATORY SYSTEMS: ICD-10-CM

## 2024-09-26 DIAGNOSIS — I65.21 STENOSIS OF RIGHT CAROTID ARTERY: ICD-10-CM

## 2024-09-26 DIAGNOSIS — I77.9 DISORDER OF CAROTID ARTERY (H): ICD-10-CM

## 2024-09-26 PROCEDURE — 11102 TANGNTL BX SKIN SINGLE LES: CPT | Performed by: PHYSICIAN ASSISTANT

## 2024-09-26 PROCEDURE — G2211 COMPLEX E/M VISIT ADD ON: HCPCS | Performed by: INTERNAL MEDICINE

## 2024-09-26 PROCEDURE — 88305 TISSUE EXAM BY PATHOLOGIST: CPT | Performed by: DERMATOLOGY

## 2024-09-26 PROCEDURE — 99205 OFFICE O/P NEW HI 60 MIN: CPT | Performed by: INTERNAL MEDICINE

## 2024-09-26 PROCEDURE — 93880 EXTRACRANIAL BILAT STUDY: CPT

## 2024-09-26 PROCEDURE — G0463 HOSPITAL OUTPT CLINIC VISIT: HCPCS | Performed by: INTERNAL MEDICINE

## 2024-09-26 NOTE — PROGRESS NOTES
[facility]        Patient is here for a consult to discuss stenosis of right carotid artery    Pt is currently taking Statin.    /82 (BP Location: Right arm, Patient Position: Sitting, Cuff Size: Adult Regular)   Pulse 82   Wt 213 lb (96.6 kg)   SpO2 92%   BMI 30.56 kg/m      The provider has been notified that the patient has no concerns.     Questions patient would like addressed today are: N/A.    Refills are needed: N/A    Has homecare services and agency name:  Jennifer Plunkett MA

## 2024-09-26 NOTE — LETTER
9/26/2024      Luis Daniel Gomez  [address]  [facility] [facility] [address]      Dear Colleague,    Thank you for referring your patient, Luis Daniel Gomez, to the [facility]. Please see a copy of my visit note below.    [facility] Dermatology Note  Encounter Date: Sep 26, 2024  Office Visit     Reviewed patients past medical history and pertinent chart review prior to patients visit today.     Dermatology Problem List:  # NUB, proximal forehead, shave biopsy 9/26/2024     ____________________________________________    Assessment & Plan:     # Neoplasm of uncertain behavior:  proximal forehead  DDx includes HAK vs SCC vs prurigo nodule. Shave biopsy today.    Procedure Note: Biopsy by shave technique  The risks and benefits of the procedure were described to the patient. These include but are not limited to bleeding, infection, scar, incomplete removal, and non-diagnostic biopsy. Verbal informed consent was obtained. The above site(s) was cleansed with an alcohol pad and injected with 1% lidocaine with epinephrine. Once anesthesia was obtained, a biopsy(ies) was performed with Gilette blade. The tissue(s) was placed in a labeled container(s) with formalin and sent to pathology. Hemostasis was achieved with aluminum chloride. Vaseline and a bandage were applied to the wound(s). The patient tolerated the procedure well and was given post biopsy care instructions.    All risks, benefits and alternatives were discussed with patient.  Patient is in agreement and understands the assessment and plan.  All questions were answered.  Iva Mahan PA-C  [facility] Dermatology  _______________________________________    CC: Derm Problem (Look at spot on forehead, no itchy/discomfort present, present over the past year)    HPI:  Mr. Luis Daniel Gomez is a(n) 83 year old male who presents today as a new patient for a lesion on the forehead. The lesion has been present for a year  and has not healed. Initially it was sore, recently asymptomatic. It has become smaller over time. The patient occasionally picks at the site. Patient is otherwise feeling well, without additional skin concerns.      Physical Exam:  SKIN: Focused examination of forehead was performed.  - Involving the proximal forehead is a 1.2 x 1.2 cm pink plaque with adherent scale.     - No other lesions of concern on areas examined.     Medications:  Current Outpatient Medications   Medication Sig Dispense Refill     albuterol (PROAIR HFA/PROVENTIL HFA/VENTOLIN HFA) 108 (90 Base) MCG/ACT inhaler INHALE 2 PUFFS INTO THE LUNGS EVERY 6 HOURS AS NEEDED FOR SHORTNESS OF BREATH OR DIFFICULT BREATHING OR WHEEZING 8.5 g 3     alpha-lipoic acid 600 MG capsule Take 600 mg by mouth 2 times daily as needed       apixaban ANTICOAGULANT (ELIQUIS) 2.5 MG tablet Take 2.5 mg by mouth 2 times daily       atorvastatin (LIPITOR) 40 MG tablet TAKE 1 TABLET BY MOUTH EVERY EVENING 90 tablet 1     azithromycin (ZITHROMAX) 250 MG tablet Take two 250mg tablets (500mg dose) one hour prior to every dental procedure. 6 tablet 1     cetirizine (ZYRTEC) 10 MG tablet Take 10 mg by mouth daily       finasteride (PROSCAR) 5 MG tablet Take 1 tablet (5 mg) by mouth daily 90 tablet 3     fluticasone (FLONASE) 50 MCG/ACT nasal spray Spray 1-2 sprays into both nostrils daily 48 mL 3     gabapentin (NEURONTIN) 100 MG capsule Take 1 capsules by mouth every morning, 1 capsule in the afternoon, 3 capsules at bedtime [increase dose] 450 capsule 1     hydroCHLOROthiazide 12.5 MG tablet Take 1 tablet (12.5 mg) by mouth daily. Please repeat labs in  2 weeks to check potassium level 90 tablet 0     losartan (COZAAR) 25 MG tablet TAKE 1 TABLET(25 MG) BY MOUTH DAILY 90 tablet 0     metoprolol tartrate (LOPRESSOR) 50 MG tablet TAKE 1 TABLET BY MOUTH TWICE DAILY 270 tablet 0     Multiple Vitamin (MULTI-VITAMIN) per tablet Take 1 tablet by mouth daily.       nitroGLYcerin  (NITROSTAT) 0.4 MG sublingual tablet For chest pain place 1 tablet under the tongue every 5 minutes for 3 doses. If symptoms persist 5 minutes after 1st dose call 911. 10 tablet 0     polyethylene glycol (MIRALAX) 17 g packet Take 1 packet by mouth daily as needed       tamsulosin (FLOMAX) 0.4 MG capsule Take 0.4 mg by mouth daily       tiotropium (SPIRIVA) 18 MCG inhaled capsule Inhale 1 capsule (18 mcg) into the lungs daily. 30 capsule 1     vitamin D3 (CHOLECALCIFEROL) 50 mcg (2000 units) tablet Take 1 tablet by mouth daily       No current facility-administered medications for this visit.      Past Medical History:   Patient Active Problem List   Diagnosis     CKD (chronic kidney disease) stage 3, GFR 30-59 ml/min (H)     BPH (benign prostatic hyperplasia)     HTN (hypertension), benign     Kidney stone     Former smoker     Peripheral neuropathy     Syncope     Paroxysmal ventricular tachycardia (H)     Vascular bruit     Hyperlipidemia with target LDL less than 100     Intractable episodic cluster headache     Type 2 diabetes mellitus with diabetic nephropathy (H)     Status post coronary angiogram     Aortic stenosis, severe     Atrial fibrillation, unspecified type (H)     Disorder of carotid artery (H24)     Past Medical History:   Diagnosis Date     Aortic stenosis, severe 09/20/2022     Arthritis      BPH (benign prostatic hyperplasia)      Chronic diastolic (congestive) heart failure (H) 01/11/2023     CKD (chronic kidney disease) stage 3, GFR 30-59 ml/min (H)      Cluster headache 2015    Dr. Roth     Constipation      Dyslipidemia      Dyspnea     Normal nuc stress test 9-15-08, fainted once in life     Fatty liver     Ultrasound 11/11     FH: colon cancer     colonoscopy 10/10, repeat 5 years.     Former smoker     quit 2008     HTN (hypertension), benign      Peripheral neuropathy     Dr. Patricia     Spinal stenosis     Last MRI 2010, Dr. Fraire     Status post coronary angiogram 08/22/2022     Syncope       Type 2 diabetes mellitus (H)     diet controlled     Ureterolithiases        CC Eliza Rogers MD  [address] [address] on close of this encounter.       Again, thank you for allowing me to participate in the care of your patient.        Sincerely,        Iva Mahan PA-C

## 2024-09-26 NOTE — PATIENT INSTRUCTIONS
.Wound Care After a Biopsy    What is a skin biopsy?  A skin biopsy allows the doctor to examine a very small piece of tissue under the microscope to determine the diagnosis and the best treatment for the skin condition. A local anesthetic (numbing medicine) is injected with a very small needle into the skin area to be tested. A small piece of skin is taken from the area. Sometimes a suture (stitch) is used.     What are the risks of a skin biopsy?  I will experience scar, bleeding, swelling, pain, crusting and redness. I may experience incomplete removal or recurrence. Risks of this procedure are excessive bleeding, bruising, infection, nerve damage, numbness, thick (hypertrophic or keloidal) scar and non-diagnostic biopsy.    How should I care for my wound for the first 24 hours?  Keep the wound dry and covered for 24 hours  If it bleeds, hold direct pressure on the area for 15 minutes. If bleeding does not stop, call us or go to the emergency room  Avoid strenuous exercise the first 1-2 days or as your doctor instructs you    How should I care for the wound after 24 hours?  After 24 hours, remove the bandage  You may bathe or shower as normal  If you had a scalp biopsy, you can shampoo as usual and can use shower water to clean the biopsy site daily  Clean the wound once a day with gentle soap and water  Do not scrub, be gentle  Apply white petroleum/Vaseline after cleaning the wound with a cotton swab or a clean finger, and keep the site covered with a Bandaid /bandage. Bandages are not necessary with a scalp biopsy  If you are unable to cover the site with a Bandaid /bandage, re-apply ointment 2-3 times a day to keep the site moist. Moisture will help with healing  Avoid strenuous activity for first 1-2 days  Avoid lakes, rivers, pools, and oceans until the stitches are removed or the site is healed    How do I clean my wound?  Wash hands thoroughly with soap or use hand  before all wound  care  Clean the wound with gentle soap and water  Apply white petroleum/Vaseline  to wound after it is clean  Replace the Bandaid /bandage to keep the wound covered for the first few days or as instructed by your doctor  If you had a scalp biopsy, warm shower water to the area on a daily basis should suffice    What should I use to clean my wound?   Cotton-tipped applicators (Qtips )  White petroleum jelly (Vaseline or Aquaphor ). Use a clean new container and use Q-tips to apply.  Bandaids  as needed  Gentle soap     How should I care for my wound long term?  Do not get your wound dirty  Keep up with wound care for one week or until the area is healed.  A small scab will form and fall off by itself when the area is completely healed. The area will be red and will become pink in color as it heals. Sun protection is very important for how your scar will turn out. Sunscreen with an SPF 30 or greater is recommended once the area is healed.  You should have some soreness but it should be mild and slowly go away over several days. Talk to your doctor about using tylenol for pain,    When should I call my doctor?  If you have increased:   Pain or swelling  Pus or drainage (clear or slightly yellow drainage is ok)  Temperature over 100F  Spreading redness or warmth around wound    When will I hear about my results?  The biopsy results can take up to 2 weeks to come back.  Your results will automatically release to [employer] before your provider has even reviewed them.  The clinic will call you with the results, send you a [employer] message, or have you schedule a follow-up clinic or phone time to discuss the results.  Contact our clinics if you do not hear from us in 2 weeks.    Who should I call with questions?  [facility]: [phone]  For urgent needs outside of business hours call the [facility] at [phone] and ask for the dermatology resident on call     Proper skin care from  [facility]:    -Eliminate harsh soaps as they strip the natural oils from the skin, often resulting in dry itchy skin ( i.e. Dial, Zest, Urdu Spring)  -Use mild soaps such as Cetaphil or Dove Sensitive Skin in the shower. You do not need to use soap on arms, legs, and trunk every time you shower unless visibly soiled.   -Avoid hot or cold showers.  -After showering, lightly dry off and apply moisturizing within 2-3 minutes. This will help trap moisture in the skin.   -Aggressive use of a moisturizer at least 1-2 times a day to the entire body (including -Vanicream, Cetaphil, Aquaphor or Cerave) and moisturize hands after every washing.  -We recommend using moisturizers that come in a tub that needs to be scooped out, not a pump. This has more of an oil base. It will hold moisture in your skin much better than a water base moisturizer. The above recommended are non-pore clogging.      Wear a sunscreen with at least SPF 30 on your face, ears, neck and V of the chest daily. Wear sunscreen on other areas of the body if those areas are exposed to the sun throughout the day. Sunscreens can contain physical and/or chemical blockers. Physical blockers are less likely to clog pores, these include zinc oxide and titanium dioxide. Reapply every two hour and after swimming.     Sunscreen examples: https://www.ewg.org/sunscreen/    UV radiation  UVA radiation remains constant throughout the day and throughout the year. It is a longer wavelength than UVB and therefore penetrates deeper into the skin leading to immediate and delayed tanning, photoaging, and skin cancer. 70-80% of UVA and UVB radiation occurs between the hours of 10am-2pm.  UVB radiation  UVB radiation causes the most harmful effects and is more significant during the summer months. However, snow and ice can reflect UVB radiation leading to skin damage during the winter months as well. UVB radiation is responsible for tanning, burning, inflammation,  delayed erythema (pinkness), pigmentation (brown spots), and skin cancer.     I recommend self monthly full body exams and yearly full body exams with a dermatology provider. If you develop a new or changing lesion please follow up for examination. Most skin cancers are pink and scaly or pink and pearly. However, we do see blue/brown/black skin cancers.  Consider the ABCDEs of melanoma when giving yourself your monthly full body exam ( don't forget the groin, buttocks, feet, toes, etc). A-asymmetry, B-borders, C-color, D-diameter, E-elevation or evolving. If you see any of these changes please follow up in clinic. If you cannot see your back I recommend purchasing a hand held mirror to use with a larger wall mirror.       Checking for Skin Cancer  You can find cancer early by checking your skin each month. There are 3 kinds of skin cancer. They are melanoma, basal cell carcinoma, and squamous cell carcinoma. Doing monthly skin checks is the best way to find new marks or skin changes. Follow the instructions below for checking your skin.   The ABCDEs of checking moles for melanoma   Check your moles or growths for signs of melanoma using ABCDE:   Asymmetry: the sides of the mole or growth don t match  Border: the edges are ragged, notched, or blurred  Color: the color within the mole or growth varies  Diameter: the mole or growth is larger than 6 mm (size of a pencil eraser)  Evolving: the size, shape, or color of the mole or growth is changing (evolving is not shown in the images below)    Checking for other types of skin cancer  Basal cell carcinoma or squamous cell carcinoma have symptoms such as:     A spot or mole that looks different from all other marks on your skin  Changes in how an area feels, such as itching, tenderness, or pain  Changes in the skin's surface, such as oozing, bleeding, or scaliness  A sore that does not heal  New swelling or redness beyond the border of a mole    Who s at risk?  Anyone can  get skin cancer. But you are at greater risk if you have:   Fair skin, light-colored hair, or light-colored eyes  Many moles or abnormal moles on your skin  A history of sunburns from sunlight or tanning beds  A family history of skin cancer  A history of exposure to radiation or chemicals  A weakened immune system  If you have had skin cancer in the past, you are at risk for recurring skin cancer.   How to check your skin  Do your monthly skin checkups in front of a full-length mirror. Check all parts of your body, including your:   Head (ears, face, neck, and scalp)  Torso (front, back, and sides)  Arms (tops, undersides, upper, and lower armpits)  Hands (palms, backs, and fingers, including under the nails)  Buttocks and genitals  Legs (front, back, and sides)  Feet (tops, soles, toes, including under the nails, and between toes)  If you have a lot of moles, take digital photos of them each month. Make sure to take photos both up close and from a distance. These can help you see if any moles change over time.   Most skin changes are not cancer. But if you see any changes in your skin, call your doctor right away. Only he or she can diagnose a problem. If you have skin cancer, seeing your doctor can be the first step toward getting the treatment that could save your life.   [employer] last reviewed this educational content on 4/1/2019 2000-2020 [employer]. [address] [place] [address]. All rights reserved. This information is not intended as a substitute for professional medical care. Always follow your healthcare professional's instructions.       When should I call my doctor?  If you are worsening or not improving, please, contact us or seek urgent care as noted below.     Who should I call with questions (adults)?    [facility] [phone]  For urgent needs outside of business hours call the [facility] at [phone] and ask for the  dermatology resident on call to be paged  If this is a medical emergency and you are unable to reach an ER, Call 911      If you need a prescription refill, please contact your pharmacy. Refills are approved or denied by our Physicians during normal business hours, Monday through Fridays  Per office policy, refills will not be granted if you have not been seen within the past year (or sooner depending on your child's condition)

## 2024-09-26 NOTE — PROGRESS NOTES
INITIAL VASCULAR MEDICAL ASSESSMENT  REFERRAL SOURCE: Dr. Eliza Rogers   REASON FOR CONSULT: for stenosis of right   carotid artery.       A/P:        (I65.21) Bilateral asymptomatic moderate carotid aretery stneoses    Comment: Duplex done today reveals bilateral moderate ICA stenoses which by velocity criteria are closer to the 50% end of the 50-69% spectrum defining moderate ds. This is progression on the left (from mild disease) since 2022. It is stable on the right since that time, although in 2014, he had mild ds on the right. He is asx from this.     Plan: US Carotid Bilateral in 09/2025, RTC two weeks alter.         (I35.0) Nonrheumatic severe aortic valve stenosis S/P 29 mm Anaya CPN valve TAVR September 2022    Comment: Doing well, asx, on 3/20/2024 TTE he was noted to have the bioprosthetic aortic valve in  place. Valve morphology was not well visualized but he had normal gradients w/o aortic  insufficiency or paravalvular leak.  Plan: Annual f/u TTE with cardiology in 04/2025        (E11.9) Type 2 diabetes, HbA1c goal < 7% (H)      Comment: On no meds, he is at goal. Avoid CHO.     Plan: Comprehensive metabolic panel, Hemoglobin A1c in 09/2025, RTC tow weeks later.      (E78.5) Hyperlipidemia LDL goal <70    Comment: At goal on  and Lipitor 40 daily.     Plan: LipoFit by NMR, Lipoprotein (a), C-Reactive         Protein, High Sensitivity in 09/2025, RTC two weeks later.      (I10) Essential hypertension    Comment: At goal on losartan 25 mg, HCTZ 12.5 mg PO daily.    Plan: No med changes, monitor renal function through nephrology.     (N18.32) Stage 3b chronic kidney disease (CKD) (H)    Comment: Cr stable at  1.7    Plan: Monitor renal function through nephrology.       The longitudinal care of eric yves Peña was addressed during this visit. Due to added complexity of care, we will continue to support Luis Daniel Gomez  and the subsequent management of these conditions and with ongoing continuity  of care for these conditions.       70 minutes total medical care on today's date.             HPI: Luis Daniel Gomez is a 83 year old male with a h/o severe aortic stenosis S/P a 29 mm Anaya CPN valve TAVR in September 2022.  Preoperative angiography did not show any significant coronary artery disease. He has hypertension, stage III chronic renal disease , glycemically well controlled DM2 on no meds with peripheral neuropathy, paroxysmal atrial fibrillation on Eliquis 2.5 mg BID (renal and age dose reduction).  He reports no issues with easy bruising or bleeding.    He was referred to us due to ADDISON stenosis. Duplex done today reveals bilateral moderate ICA stenoses which by velocity criteria are closer to the 50% end of the 50-69% spectrum defining moderate ds. This is progression on the left (from mild disease) since 2022. It is stable on the right since that time, although in 2014, he had mild ds on the right. He is asx from this.      He feels well at this time.  He has no cardiac complaints.  He appears younger than stated age.         Review Of Systems  Skin: negative  Eyes: negative  Ears/Nose/Throat: negative  Respiratory: No shortness of breath, dyspnea on exertion, cough, or hemoptysis  Cardiovascular: negative  Gastrointestinal: negative  Genitourinary: negative  Musculoskeletal: negative  Neurologic: negative  Psychiatric: negative  Hematologic/Lymphatic/Immunologic: negative  Endocrine: negative      PAST MEDICAL HISTORY:                  Past Medical History:   Diagnosis Date    Aortic stenosis, severe 09/20/2022    Arthritis     BPH (benign prostatic hyperplasia)     Chronic diastolic (congestive) heart failure (H) 01/11/2023    CKD (chronic kidney disease) stage 3, GFR 30-59 ml/min (H)     Cluster headache 2015    Dr. Roth    Constipation     Dyslipidemia     Dyspnea     Normal nuc stress test 9-15-08, fainted once in life    Fatty liver     Ultrasound 11/11    FH: colon cancer     colonoscopy  10/10, repeat 5 years.    Former smoker     quit 2008    HTN (hypertension), benign     Peripheral neuropathy     Dr. Patricia    Spinal stenosis     Last MRI 2010, Dr. Fraire    Status post coronary angiogram 08/22/2022    Syncope     Type 2 diabetes mellitus (H)     diet controlled    Ureterolithiases        PAST SURGICAL HISTORY:                  Past Surgical History:   Procedure Laterality Date    COLONOSCOPY      COLONOSCOPY N/A 3/30/2023    Procedure: COLONOSCOPY, WITH POLYPECTOMY AND BIOPSY;  Surgeon: Adi Geiger MD;  Location:  GI    CV CORONARY ANGIOGRAM Bilateral 8/22/2022    Procedure: Coronary Angiogram;  Surgeon: Dave Mao MD;  Location:  HEART CARDIAC CATH LAB    CV TRANSCATHETER AORTIC VALVE REPLACEMENT-FEMORAL APPROACH N/A 9/20/2022    Procedure: Transcatheter Aortic Valve Replacement-Femoral Approach;  Surgeon: Wilson Martinez MD;  Location:  HEART CARDIAC CATH LAB    KERATOTOMY ARCUATE WITH FEMTOSECOND LASER/IMAGING FOR ATIOL Left 8/29/2016    Procedure: KERATOTOMY ARCUATE WITH FEMTOSECOND LASER/IMAGING FOR ATIOL;  Surgeon: Gerard Larson MD;  Location: [facility]    ORTHOPEDIC SURGERY      PHACOEMULSIFICATION CLEAR CORNEA WITH STANDARD INTRAOCULAR LENS IMPLANT Left 8/29/2016    Procedure: PHACOEMULSIFICATION CLEAR CORNEA WITH STANDARD INTRAOCULAR LENS IMPLANT;  Surgeon: Gerard Larson MD;  Location: [facility]    Thumb surg         CURRENT MEDICATIONS:                  Current Outpatient Medications   Medication Sig Dispense Refill    albuterol (PROAIR HFA/PROVENTIL HFA/VENTOLIN HFA) 108 (90 Base) MCG/ACT inhaler INHALE 2 PUFFS INTO THE LUNGS EVERY 6 HOURS AS NEEDED FOR SHORTNESS OF BREATH OR DIFFICULT BREATHING OR WHEEZING 8.5 g 3    alpha-lipoic acid 600 MG capsule Take 600 mg by mouth 2 times daily as needed      apixaban ANTICOAGULANT (ELIQUIS) 2.5 MG tablet Take 2.5 mg by mouth 2 times daily      atorvastatin (LIPITOR) 40 MG tablet TAKE 1 TABLET BY MOUTH EVERY  "EVENING 90 tablet 1    azithromycin (ZITHROMAX) 250 MG tablet Take two 250mg tablets (500mg dose) one hour prior to every dental procedure. 6 tablet 1    cetirizine (ZYRTEC) 10 MG tablet Take 10 mg by mouth daily      finasteride (PROSCAR) 5 MG tablet Take 1 tablet (5 mg) by mouth daily 90 tablet 3    fluticasone (FLONASE) 50 MCG/ACT nasal spray Spray 1-2 sprays into both nostrils daily 48 mL 3    gabapentin (NEURONTIN) 100 MG capsule Take 1 capsules by mouth every morning, 1 capsule in the afternoon, 3 capsules at bedtime [increase dose] 450 capsule 1    hydroCHLOROthiazide 12.5 MG tablet Take 1 tablet (12.5 mg) by mouth daily. Please repeat labs in  2 weeks to check potassium level 90 tablet 0    losartan (COZAAR) 25 MG tablet TAKE 1 TABLET(25 MG) BY MOUTH DAILY 90 tablet 0    metoprolol tartrate (LOPRESSOR) 50 MG tablet TAKE 1 TABLET BY MOUTH TWICE DAILY 270 tablet 0    Multiple Vitamin (MULTI-VITAMIN) per tablet Take 1 tablet by mouth daily.      nitroGLYcerin (NITROSTAT) 0.4 MG sublingual tablet For chest pain place 1 tablet under the tongue every 5 minutes for 3 doses. If symptoms persist 5 minutes after 1st dose call 911. 10 tablet 0    polyethylene glycol (MIRALAX) 17 g packet Take 1 packet by mouth daily as needed      tamsulosin (FLOMAX) 0.4 MG capsule Take 0.4 mg by mouth daily      tiotropium (SPIRIVA) 18 MCG inhaled capsule Inhale 1 capsule (18 mcg) into the lungs daily. 30 capsule 1    vitamin D3 (CHOLECALCIFEROL) 50 mcg (2000 units) tablet Take 1 tablet by mouth daily         ALLERGIES:                  Allergies   Allergen Reactions    Lisinopril Shortness Of Breath and Fatigue    Amoxicillin Itching    Duloxetine      \"I can't take it\"    Levofloxacin      \"I can't take the 500 mgs\"    Neurontin [Gabapentin] Swelling     edema    Norvasc [Amlodipine] Swelling     edema    Oxycodone GI Disturbance    Penicillins Itching    Red Yeast Rice Extract [[place] Purpureus Went Yeast] Hives and Itching    " Tramadol Itching    Verapamil Itching    Vicodin [Hydrocodone-Acetaminophen] Itching       SOCIAL HISTORY:                  Social History     Socioeconomic History    Marital status:      Spouse name: Not on file    Number of children: Not on file    Years of education: Not on file    Highest education level: Not on file   Occupational History    Not on file   Tobacco Use    Smoking status: Former     Current packs/day: 0.00     Average packs/day: 1 pack/day for 56.0 years (56.0 ttl pk-yrs)     Types: Cigarettes     Start date: 1952     Quit date: 2008     Years since quittin.7    Smokeless tobacco: Never    Tobacco comments:     quit    Substance and Sexual Activity    Alcohol use: No     Alcohol/week: 0.0 standard drinks of alcohol    Drug use: No    Sexual activity: Yes     Partners: Female   Other Topics Concern     Service Not Asked    Blood Transfusions Not Asked    Caffeine Concern Yes     Comment: one cup caffeine per day    Occupational Exposure Not Asked    Hobby Hazards Not Asked    Sleep Concern No    Stress Concern Not Asked    Weight Concern Not Asked    Special Diet No    Back Care Not Asked    Exercise Yes     Comment: walks daily 30 minutes    Bike Helmet Not Asked    Seat Belt Not Asked    Self-Exams Not Asked    Parent/sibling w/ CABG, MI or angioplasty before 65F 55M? No   Social History Narrative    , 2 kids, retired. Wife (Khalida).     Social Determinants of Health     Financial Resource Strain: Low Risk  (2024)    Financial Resource Strain     Within the past 12 months, have you or your family members you live with been unable to get utilities (heat, electricity) when it was really needed?: No   Food Insecurity: Low Risk  (2024)    Food Insecurity     Within the past 12 months, did you worry that your food would run out before you got money to buy more?: No     Within the past 12 months, did the food you bought just not last and you didn t  have money to get more?: No   Transportation Needs: Low Risk  (1/19/2024)    Transportation Needs     Within the past 12 months, has lack of transportation kept you from medical appointments, getting your medicines, non-medical meetings or appointments, work, or from getting things that you need?: No   Physical Activity: Not on file   Stress: Not on file   Social Connections: Not on file   Interpersonal Safety: Low Risk  (1/24/2024)    Interpersonal Safety     Do you feel physically and emotionally safe where you currently live?: Yes     Within the past 12 months, have you been hit, slapped, kicked or otherwise physically hurt by someone?: No     Within the past 12 months, have you been humiliated or emotionally abused in other ways by your partner or ex-partner?: No   Housing Stability: Low Risk  (1/19/2024)    Housing Stability     Do you have housing? : Yes     Are you worried about losing your housing?: No       FAMILY HISTORY:                   Family History   Problem Relation Age of Onset    C.A.D. Father     Diabetes Father     Cancer - colorectal Mother 80    Cancer - colorectal Maternal Grandmother          Physical exam Reveals:    O/P: WNL  HEENT: WNL  NECK: No JVD, thyromegaly, or lymphadenopathy; soft right carotid bruit; no left carotid bruit  HEART: irr irr,very soft systolic murmur in keeping with prosthetic valve ; no  gallops, or rubs  LUNGS: CTA bilaterally without rales, wheezes, or rhonchi  GI: NABS, nondistended, nontender, soft  EXT:without cyanosis, clubbing, or edema  NEURO: nonfocal  : no flank tenderness             All relevant labs and imaging reviewed by myself on today's date.            BILATERAL CAROTID ULTRASOUND   9/26/2024 2:36 PM      HISTORY: Disorder of carotid artery (H24); Other specified symptoms  and signs involving the circulatory and respiratory systems.     COMPARISON: July 19, 2022     RIGHT CAROTID FINDINGS:  There is moderate mixed atherosclerotic  plaque at the  carotid bifurcation proximal internal carotid artery.  Right ICA PSV:  140 cm/sec.  Right ICA EDV:  28 cm/sec.  Right ICA/CCA PSV Ratio:  1.31    These indicate  50 - 69%  diameter stenosis of the right ICA.    Right Vertebral: Antegrade flow.   Right ECA: Antegrade flow.      LEFT CAROTID FINDINGS:  There is moderate mixed atherosclerotic plaque  at the carotid bifurcation and proximal internal carotid artery.  Left ICA PSV: 129  cm/sec.  Left ICA EDV:  25 cm/sec.  Left ICA/CCA PSV Ratio:  1.09    These indicate  50 - 69%  diameter stenosis of the left ICA.    Left Vertebral: Antegrade flow.   Left ECA: Antegrade flow.      Causes of Decreased Accuracy:   None.                                                                       IMPRESSION:    1. 50-69% diameter stenosis of the right ICA relative to the distal  ICA diameter.  2. 50-69% diameter stenosis of the left ICA relative to the distal ICA  diameter.     LATASHA BLACKMAN MD

## 2024-09-26 NOTE — PROGRESS NOTES
[facility] Dermatology Note  Encounter Date: Sep 26, 2024  Office Visit     Reviewed patients past medical history and pertinent chart review prior to patients visit today.     Dermatology Problem List:  # NUB, proximal forehead, shave biopsy 9/26/2024     ____________________________________________    Assessment & Plan:     # Neoplasm of uncertain behavior:  proximal forehead  DDx includes HAK vs SCC vs prurigo nodule. Shave biopsy today.    Procedure Note: Biopsy by shave technique  The risks and benefits of the procedure were described to the patient. These include but are not limited to bleeding, infection, scar, incomplete removal, and non-diagnostic biopsy. Verbal informed consent was obtained. The above site(s) was cleansed with an alcohol pad and injected with 1% lidocaine with epinephrine. Once anesthesia was obtained, a biopsy(ies) was performed with Gilette blade. The tissue(s) was placed in a labeled container(s) with formalin and sent to pathology. Hemostasis was achieved with aluminum chloride. Vaseline and a bandage were applied to the wound(s). The patient tolerated the procedure well and was given post biopsy care instructions.    All risks, benefits and alternatives were discussed with patient.  Patient is in agreement and understands the assessment and plan.  All questions were answered.  Iva Mahan PA-C  [facility] Dermatology  _______________________________________    CC: Derm Problem (Look at spot on forehead, no itchy/discomfort present, present over the past year)    HPI:  Mr. Luis Daniel Gomez is a(n) 83 year old male who presents today as a new patient for a lesion on the forehead. The lesion has been present for a year and has not healed. Initially it was sore, recently asymptomatic. It has become smaller over time. The patient occasionally picks at the site. Patient is otherwise feeling well, without additional skin concerns.      Physical Exam:  SKIN:  Focused examination of forehead was performed.  - Involving the proximal forehead is a 1.2 x 1.2 cm pink plaque with adherent scale.     - No other lesions of concern on areas examined.     Medications:  Current Outpatient Medications   Medication Sig Dispense Refill    albuterol (PROAIR HFA/PROVENTIL HFA/VENTOLIN HFA) 108 (90 Base) MCG/ACT inhaler INHALE 2 PUFFS INTO THE LUNGS EVERY 6 HOURS AS NEEDED FOR SHORTNESS OF BREATH OR DIFFICULT BREATHING OR WHEEZING 8.5 g 3    alpha-lipoic acid 600 MG capsule Take 600 mg by mouth 2 times daily as needed      apixaban ANTICOAGULANT (ELIQUIS) 2.5 MG tablet Take 2.5 mg by mouth 2 times daily      atorvastatin (LIPITOR) 40 MG tablet TAKE 1 TABLET BY MOUTH EVERY EVENING 90 tablet 1    azithromycin (ZITHROMAX) 250 MG tablet Take two 250mg tablets (500mg dose) one hour prior to every dental procedure. 6 tablet 1    cetirizine (ZYRTEC) 10 MG tablet Take 10 mg by mouth daily      finasteride (PROSCAR) 5 MG tablet Take 1 tablet (5 mg) by mouth daily 90 tablet 3    fluticasone (FLONASE) 50 MCG/ACT nasal spray Spray 1-2 sprays into both nostrils daily 48 mL 3    gabapentin (NEURONTIN) 100 MG capsule Take 1 capsules by mouth every morning, 1 capsule in the afternoon, 3 capsules at bedtime [increase dose] 450 capsule 1    hydroCHLOROthiazide 12.5 MG tablet Take 1 tablet (12.5 mg) by mouth daily. Please repeat labs in  2 weeks to check potassium level 90 tablet 0    losartan (COZAAR) 25 MG tablet TAKE 1 TABLET(25 MG) BY MOUTH DAILY 90 tablet 0    metoprolol tartrate (LOPRESSOR) 50 MG tablet TAKE 1 TABLET BY MOUTH TWICE DAILY 270 tablet 0    Multiple Vitamin (MULTI-VITAMIN) per tablet Take 1 tablet by mouth daily.      nitroGLYcerin (NITROSTAT) 0.4 MG sublingual tablet For chest pain place 1 tablet under the tongue every 5 minutes for 3 doses. If symptoms persist 5 minutes after 1st dose call 911. 10 tablet 0    polyethylene glycol (MIRALAX) 17 g packet Take 1 packet by mouth daily as  needed      tamsulosin (FLOMAX) 0.4 MG capsule Take 0.4 mg by mouth daily      tiotropium (SPIRIVA) 18 MCG inhaled capsule Inhale 1 capsule (18 mcg) into the lungs daily. 30 capsule 1    vitamin D3 (CHOLECALCIFEROL) 50 mcg (2000 units) tablet Take 1 tablet by mouth daily       No current facility-administered medications for this visit.      Past Medical History:   Patient Active Problem List   Diagnosis    CKD (chronic kidney disease) stage 3, GFR 30-59 ml/min (H)    BPH (benign prostatic hyperplasia)    HTN (hypertension), benign    Kidney stone    Former smoker    Peripheral neuropathy    Syncope    Paroxysmal ventricular tachycardia (H)    Vascular bruit    Hyperlipidemia with target LDL less than 100    Intractable episodic cluster headache    Type 2 diabetes mellitus with diabetic nephropathy (H)    Status post coronary angiogram    Aortic stenosis, severe    Atrial fibrillation, unspecified type (H)    Disorder of carotid artery (H24)     Past Medical History:   Diagnosis Date    Aortic stenosis, severe 09/20/2022    Arthritis     BPH (benign prostatic hyperplasia)     Chronic diastolic (congestive) heart failure (H) 01/11/2023    CKD (chronic kidney disease) stage 3, GFR 30-59 ml/min (H)     Cluster headache 2015    Dr. Roth    Constipation     Dyslipidemia     Dyspnea     Normal nuc stress test 9-15-08, fainted once in life    Fatty liver     Ultrasound 11/11    FH: colon cancer     colonoscopy 10/10, repeat 5 years.    Former smoker     quit 2008    HTN (hypertension), benign     Peripheral neuropathy     Dr. Patricia    Spinal stenosis     Last MRI 2010, Dr. Fraire    Status post coronary angiogram 08/22/2022    Syncope     Type 2 diabetes mellitus (H)     diet controlled    Ureterolithiases        CC Eliza Rogers MD  [address] [address] on close of this encounter.

## 2024-09-27 LAB
PATH REPORT.COMMENTS IMP SPEC: NORMAL
PATH REPORT.COMMENTS IMP SPEC: NORMAL
PATH REPORT.FINAL DX SPEC: NORMAL
PATH REPORT.GROSS SPEC: NORMAL
PATH REPORT.MICROSCOPIC SPEC OTHER STN: NORMAL
PATH REPORT.RELEVANT HX SPEC: NORMAL

## 2024-09-30 ENCOUNTER — VIRTUAL VISIT (OUTPATIENT)
Dept: FAMILY MEDICINE | Facility: CLINIC | Age: 84
End: 2024-09-30
Payer: COMMERCIAL

## 2024-09-30 DIAGNOSIS — I77.9 DISORDER OF CAROTID ARTERY (H): ICD-10-CM

## 2024-09-30 DIAGNOSIS — Z79.01 CHRONIC ANTICOAGULATION: ICD-10-CM

## 2024-09-30 DIAGNOSIS — I10 HTN (HYPERTENSION), BENIGN: ICD-10-CM

## 2024-09-30 DIAGNOSIS — E66.811 CLASS 1 OBESITY WITH SERIOUS COMORBIDITY AND BODY MASS INDEX (BMI) OF 30.0 TO 30.9 IN ADULT, UNSPECIFIED OBESITY TYPE: ICD-10-CM

## 2024-09-30 DIAGNOSIS — E78.5 HYPERLIPIDEMIA WITH TARGET LDL LESS THAN 100: ICD-10-CM

## 2024-09-30 DIAGNOSIS — G62.9 PERIPHERAL POLYNEUROPATHY: ICD-10-CM

## 2024-09-30 DIAGNOSIS — N18.31 STAGE 3A CHRONIC KIDNEY DISEASE (H): Primary | ICD-10-CM

## 2024-09-30 DIAGNOSIS — I48.91 ATRIAL FIBRILLATION, UNSPECIFIED TYPE (H): ICD-10-CM

## 2024-09-30 PROCEDURE — 99213 OFFICE O/P EST LOW 20 MIN: CPT | Mod: 95 | Performed by: INTERNAL MEDICINE

## 2024-09-30 NOTE — PROGRESS NOTES
Casey is a 83 year old who is being evaluated via a billable video visit.    How would you like to obtain your AVS? MyChart  If the video visit is dropped, the invitation should be resent by: Text to cell phone: [phone]  Will anyone else be joining your video visit? No      Assessment & Plan   Problem List Items Addressed This Visit       CKD (chronic kidney disease) stage 3, GFR 30-59 ml/min (H) - Primary    HTN (hypertension), benign    Peripheral neuropathy    Hyperlipidemia with target LDL less than 100    Atrial fibrillation, unspecified type (H)    Disorder of carotid artery (H)     Other Visit Diagnoses       Class 1 obesity with serious comorbidity and body mass index (BMI) of 30.0 to 30.9 in adult, unspecified obesity type        Chronic anticoagulation               Follow-up with MTM.  Repeat basic metabolic panel in 4 weeks.  Hydrochlorothiazide well-tolerated.  Potassium was corrected.  He remains on anticoagulation with Eliquis well-tolerated, no bleeding issues.  Given his underlying carotid artery disease?  Need for low-dose aspirin every other day with risk of bleeding will discuss with vascular and MTM as well.  He may benefit from GLP-1 agonist to help with his weight loss.  All questions answered       Work on weight loss  Regular exercise  See Patient Instructions    Subjective   Casey is a 83 year old, presenting for the following health issues:  Follow Up      Video Start Time:  10:16 AM    History of Present Illness       Reason for visit:  BP   He is taking medications regularly.     Patient presents for follow-up.  Denies any systemic concerns.  He has started hydrochlorothiazide 12.5 mg once daily, denies any dizziness or orthostatic dizziness.  Repeat potassium was normal  He had carotid Doppler ultrasound that showed 50 to 69% occlusion bilateral.  He follows with vascular medicine.  Is maintained on Eliquis denies any bleeding issues.  Blood pressure repeat was 120/80.  He is taking  gabapentin up to 4 times a day.  He is also taking alpha lipoic acid that seems to help with his nerve pain/neuropathy.  He had a skin biopsy of his forehead lesion and that showed no cancer, as per patient      Review of Systems  Constitutional, HEENT, cardiovascular, pulmonary, gi and gu systems are negative, except as otherwise noted.      Objective           Vitals:  No vitals were obtained today due to virtual visit.    Physical Exam   GENERAL: alert and no distress  EYES: Eyes grossly normal to inspection.  No discharge or erythema, or obvious scleral/conjunctival abnormalities.  RESP: No audible wheeze, cough, or visible cyanosis.    SKIN: Visible skin clear. No significant rash, abnormal pigmentation or lesions.  NEURO: Cranial nerves grossly intact.  Mentation and speech appropriate for age.  PSYCH: Appropriate affect, tone, and pace of words    Office Visit on 09/26/2024   Component Date Value Ref Range Status    Case Report 09/26/2024    Final                    Value:Surgical Pathology Report                         Case: [id number]                                  Authorizing Provider:  Iva Mahan PA-C    Collected:           09/26/2024 07:41 AM          Ordering Location:     [facility]   Received:            09/26/2024 08:22 AM                                 Shanice Brown                                                                 Pathologist:           Rey Dorsey MD                                                         Specimen:    Skin, proximal forehead                                                                    Final Diagnosis 09/26/2024    Final                    Value:This result contains rich text formatting which cannot be displayed here.    Clinical Information 09/26/2024    Final                    Value:This result contains rich text formatting which cannot be displayed here.    Gross Description 09/26/2024    Final                    Value:This  result contains rich text formatting which cannot be displayed here.    Microscopic Description 09/26/2024    Final                    Value:This result contains rich text formatting which cannot be displayed here.    Performing Labs 09/26/2024    Final                    Value:This result contains rich text formatting which cannot be displayed here.         Video-Visit Details    Type of service:  Video Visit   Video End Time: 10:30 AM  Originating Location (pt. Location): Home    Distant Location (provider location):  On-site  Platform used for Video Visit: Briana  Signed Electronically by: Eliza Rogers MD

## 2024-10-01 ENCOUNTER — TELEPHONE (OUTPATIENT)
Dept: DERMATOLOGY | Facility: CLINIC | Age: 84
End: 2024-10-01
Payer: COMMERCIAL

## 2024-10-01 ENCOUNTER — MYC MEDICAL ADVICE (OUTPATIENT)
Dept: PHARMACY | Facility: CLINIC | Age: 84
End: 2024-10-01
Payer: COMMERCIAL

## 2024-10-01 ENCOUNTER — VIRTUAL VISIT (OUTPATIENT)
Dept: PHARMACY | Facility: CLINIC | Age: 84
End: 2024-10-01
Payer: COMMERCIAL

## 2024-10-01 DIAGNOSIS — Z95.2 S/P TAVR (TRANSCATHETER AORTIC VALVE REPLACEMENT): ICD-10-CM

## 2024-10-01 DIAGNOSIS — Z78.9 TAKES DIETARY SUPPLEMENTS: ICD-10-CM

## 2024-10-01 DIAGNOSIS — N39.43 BENIGN PROSTATIC HYPERPLASIA WITH POST-VOID DRIBBLING: ICD-10-CM

## 2024-10-01 DIAGNOSIS — I10 HTN (HYPERTENSION), BENIGN: ICD-10-CM

## 2024-10-01 DIAGNOSIS — E66.811 CLASS 1 OBESITY WITH SERIOUS COMORBIDITY AND BODY MASS INDEX (BMI) OF 30.0 TO 30.9 IN ADULT, UNSPECIFIED OBESITY TYPE: Primary | ICD-10-CM

## 2024-10-01 DIAGNOSIS — E78.5 HYPERLIPIDEMIA WITH TARGET LDL LESS THAN 100: ICD-10-CM

## 2024-10-01 DIAGNOSIS — J30.2 SEASONAL ALLERGIC RHINITIS, UNSPECIFIED TRIGGER: ICD-10-CM

## 2024-10-01 DIAGNOSIS — J44.9 CHRONIC OBSTRUCTIVE PULMONARY DISEASE, UNSPECIFIED COPD TYPE (H): ICD-10-CM

## 2024-10-01 DIAGNOSIS — K59.00 CONSTIPATION, UNSPECIFIED CONSTIPATION TYPE: ICD-10-CM

## 2024-10-01 DIAGNOSIS — G62.9 NEUROPATHY: ICD-10-CM

## 2024-10-01 DIAGNOSIS — N40.1 BENIGN PROSTATIC HYPERPLASIA WITH POST-VOID DRIBBLING: ICD-10-CM

## 2024-10-01 DIAGNOSIS — I48.91 ATRIAL FIBRILLATION, UNSPECIFIED TYPE (H): ICD-10-CM

## 2024-10-01 PROCEDURE — 99606 MTMS BY PHARM EST 15 MIN: CPT | Mod: 93 | Performed by: PHARMACIST

## 2024-10-01 NOTE — PROGRESS NOTES
Weight management GLP1RA are excluded from coverage under patient's plan.  Patient notified via [employer].  April DiazD, [facility]  Medication Therapy Management Provider  [phone]

## 2024-10-01 NOTE — PROGRESS NOTES
"Medication Therapy Management (MTM) Encounter    ASSESSMENT:                            Medication Adherence/Access: No issues identified    Weight Management   Needed additional education about GLP1RA use for weight management.  After learning more he'd like to see if his insurance covers.    Neuropathy   Stable.    BPH  Stable.    COPD   Stable.    Allergies  Stable.    Hypertension/Atrial Fibrillation/s/p TAVR    Stable, agree with reduced dose for Eliquis given SrCr >1.5mg/dL and age >81yo.  Blood pressure is at goal <130/80mmHg.  Potassium has normalized.     Hyperlipidemia  Stable.    Constipation  Stable.    Supplements   Stable.     PLAN:                            I've asked our pharmacy liaison teams to submit test claims for GLP1RA for weight management.  Will await response.    Follow-up: Return in about 1 day (around 10/2/2024) for check in via [employer] after I get test claims back.    SUBJECTIVE/OBJECTIVE:                          Casey Gomez is a 83 year old male seen for a follow-up visit.       Reason for visit: Routine follow-up.    Tobacco: He reports that he quit smoking about 16 years ago. His smoking use included cigarettes. He started smoking about 72 years ago. He has a 56 pack-year smoking history. He has never used smokeless tobacco.  Alcohol: none    Medication Adherence/Access: no issues reported    Weight Management   GLP1RA has been recommended, he wishes to learn more about this    He feels he has the knowledge to lose weight through diet and exercise, but acknowledges he just \"needs to do it.\"    Medications Tried/Failed:  None.        Wt Readings from Last 4 Encounters:   09/26/24 213 lb (96.6 kg)   09/04/24 214 lb (97.1 kg)   08/13/24 211 lb 11.2 oz (96 kg)   04/19/24 208 lb (94.3 kg)     Estimated body mass index is 30.56 kg/m  as calculated from the following:    Height as of 9/4/24: 5' 10\" (1.778 m).    Weight as of 9/26/24: 213 lb (96.6 kg).    Neuropathy  Alpha lipoic acid 600mg " twice daily  Gabapentin 100mg every morning, 100mg mid-day and 300mg at bedtime  Has tolerated gabapentin ok, had edema with 1000mg dose in the past.  Would be interested in increasing gabapentin dose to further aid with neuropathy symptoms.    BPH  Finasteride 5mg daily   Tamsulosin 0.4mg daily  He reports having a slow and intermittent urinary stream, doesn't feel like he's completely urinating his bladder but does feel medications have been moderately effective.    Feels symptoms are manageable at this time.  No other side effects reported.    COPD   Albuterol MDI as needed - doesn't use daily  Spiriva 18mcg daily  Patient is not experiencing side effects.   Patient reports the following symptoms: none.    Allergies    Zyrtec 10mg daily  Flonase nasal spray daily   He reports allergies are stable with this regimen.  He denies side effects.    Hypertension/Atrial Fibrillation/s/p TAVR  Eliquis 2.5mg twice daily  Hydrochlorothiazide 12.5mg daily (added to lower potassium)  Losartan 25mg daily  Metoprolol tartrate 50mg twice daily  Sublingual nitroglycerin as needed - has never needed to use.  Azithromycin prior to dental appointments  Patient reports no current medication side effects        Hyperlipidemia   Atorvastatin 40mg daily  Patient reports no significant myalgias or other side effects.      Constipation  Miralax 17g daily as needed  He reports this is is effective.  No side effects reported.    Supplements    Daily multivitamin  Vitamin D stopped due to elevated levels  No issues reported    Today's Vitals: There were no vitals taken for this visit.  ----------------    I spent 13 minutes with this patient today. A copy of the visit note was provided to the patient's provider(s).    A summary of these recommendations was sent via [employer].    Megan Rossi, PharmD, BCACP  Medication Therapy Management Provider  [phone]     Telemedicine Visit Details  The patient's medications can be safely assessed via  a telemedicine encounter.  Type of service:  Telephone visit  Originating Location (pt. Location): Home    Distant Location (provider location):  On-site  Start Time: 11:02 AM  End Time:  11:15 AM     Medication Therapy Recommendations  No medication therapy recommendations to display

## 2024-10-01 NOTE — TELEPHONE ENCOUNTER
----- Message from Iva Mahan sent at 9/30/2024  6:49 AM CDT -----  HAK, follow up in 4 weeks please call to schedule in any open slot. Thanks.     Proximal forehead:  - Hypertrophic actinic keratosis      This specimen was examined under the microscope and demonstrated an actinic keratosis.     An actinic keratosis is not a skin cancer, but has the potential to become cancerous. Actinic keratoses are caused by sun exposure. The biopsy is sufficient to treat the lesion, no further treatment is needed at this time.     I recommend follow up in 4 weeks to ensure the lesion has resolved. The nursing team will call to schedule this visit.     I recommend continued sun avoidence, diligent use of sunscreens with an SPF of 30 or more, and regular skin examinations. Please follow up sooner if you develop any lesions you are concerned about.     It was a pleasure to participate in your dermatologic care. Please do not hesitate to contact me if you have any questions.     Sincerely,  Iva Mahan PA-C  Dermatology   Written by Iav Mahan PA-C on 9/30/2024  6:49 AM CDT  Seen by patient Casey Gomez on 9/30/2024  9:37 AM

## 2024-10-01 NOTE — TELEPHONE ENCOUNTER
S/w pt and went over Iva's message below about biopsy results.  Scheduled a follow up appt on 10/24/24 at 12:30 pm at  [facility].    Yamila WORRELL RN  [facility]  [phone]

## 2024-10-01 NOTE — PATIENT INSTRUCTIONS
"Recommendations from today's MTM visit:                                                    MTM (medication therapy management) is a service provided by a clinical pharmacist designed to help you get the most of out of your medicines.   Today we reviewed what your medicines are for, how to know if they are working, that your medicines are safe and how to make your medicine regimen as easy as possible.      I've asked our pharmacy liaison teams to submit test claims for GLP1RA for weight management.  Will await response.    Follow-up: Return in about 1 day (around 10/2/2024) for check in via [employer] after I get test claims back.    It was great speaking with you today.  I value your experience and would be very thankful for your time in providing feedback in our clinic survey. In the next few days, you may receive an email or text message from [employer] with a link to a survey related to your  clinical pharmacist.\"     To schedule another MTM appointment, please call the clinic directly or you may call the MTM scheduling line at [phone] or toll-free at [phone].     My Clinical Pharmacist's contact information:                                                      Please feel free to contact me with any questions or concerns you have.      Megan Rossi, PharmD, [facility]  Medication Therapy Management Provider  [phone]    "

## 2024-10-02 ENCOUNTER — HOSPITAL ENCOUNTER (OUTPATIENT)
Dept: MRI IMAGING | Facility: CLINIC | Age: 84
Discharge: HOME | End: 2024-10-02
Attending: INTERNAL MEDICINE | Admitting: INTERNAL MEDICINE
Payer: COMMERCIAL

## 2024-10-02 DIAGNOSIS — N28.1 COMPLEX RENAL CYST: ICD-10-CM

## 2024-10-02 PROCEDURE — A9585 GADOBUTROL INJECTION: HCPCS | Performed by: INTERNAL MEDICINE

## 2024-10-02 PROCEDURE — 74183 MRI ABD W/O CNTR FLWD CNTR: CPT

## 2024-10-02 PROCEDURE — 255N000002 HC RX 255 OP 636: Performed by: INTERNAL MEDICINE

## 2024-10-02 RX ORDER — GADOBUTROL 604.72 MG/ML
0.1 INJECTION INTRAVENOUS ONCE
Status: COMPLETED | OUTPATIENT
Start: 2024-10-02 | End: 2024-10-02

## 2024-10-02 RX ADMIN — GADOBUTROL 10 ML: 604.72 INJECTION INTRAVENOUS at 11:01

## 2024-10-03 NOTE — TELEPHONE ENCOUNTER
Message  Received: 3 days ago  Megan Rossi PharmD Sayegh, Kamil Nadim, MD  I'll discuss GLP1RA with him tomorrow.  I think aspirin would be reasonable, will see what Dr. Nelson says also with the other message.  Megan Rossi PharmD, [facility]  Medication Therapy Management Provider  [phone]          Previous Messages       ----- Message -----  From: Eliza Rogers MD  Sent: 9/30/2024  10:35 AM CDT  To: Megan Rossi PharmD    Hi Megan, you are seeing Casey tomorrow.  is there  a role for GLP-1 agonist injection to help with his weight loss.  Also he has carotid artery disease 50 to 69% occlusion bilateral.  Do you think will be reasonable to have him on low-dose aspirin every other day, but he is on Eliquis he denies any bruising or bleeding from Eliquis.  Will start patient on hydrochlorothiazide 12.5 mg blood pressure is 120/80.  He takes gabapentin 4 times a day.  Denies any dizziness or lightheadedness.  Advised him to repeat labs in 4 weeks.  His potassium is corrected.  Appreciate your feedback.  Eliza

## 2024-10-04 ENCOUNTER — DOCUMENTATION ONLY (OUTPATIENT)
Dept: FAMILY MEDICINE | Facility: CLINIC | Age: 84
End: 2024-10-04
Payer: COMMERCIAL

## 2024-10-05 NOTE — PROGRESS NOTES
Message  Received: Today  Megan Rossi PharmD Sayegh, Kamil Nadim, MD  Yes, I'll let him know.  Megan Rossi PharmD, [facility]  Medication Therapy Management Provider  [phone]          Previous Messages       ----- Message -----  From: Eliza Rogers MD  Sent: 10/3/2024   4:02 PM CDT  To: Megan Rossi PharmD    Hi Raysa Guzman is Dr Nelson recommendation to start on aspirin.  Please if you can follow-up with the patient on below recommendation.    I appreciate your feedback as well.  Eliaz  ----- Message -----  From: Raudel Nelson MD  Sent: 9/30/2024  12:29 PM CDT  To: Eliza Rogers MD    Hi there:    I would leave him on the renally and age adjusted Eliquis as he has paroxysmal atrial fibrillation, and that is the indication for the Eliquis.    In terms of ASCVD risk reduction and taking a baby ASA, yes, an EC ASA 81 mg would be reasonable. The bleeding risk associated with this is in my opinion minimal , so , I do believe benefit outweighs risk.      Regards,      CF  ----- Message -----  From: Eliza Rogers MD  Sent: 9/30/2024  12:04 PM CDT  To: Raudel Nelson MD; *    Hi Dr. Nelson this patient is on anticoagulation with Eliquis given his underlying carotid artery disease do you think is reasonable that patient be on a low-dose aspirin every other day [he does have chronic kidney disease as well], knowing the increased risk of bleeding with anticoagulation and antiplatelet therapy as well.  He reports he is tolerating Eliquis fine without any bruises bleeds or other concerns of bleeding..    He will repeat carotid Doppler in a year I am assuming,  Thanks for follow-up,  Eliza

## 2024-10-07 DIAGNOSIS — I10 HTN (HYPERTENSION), BENIGN: ICD-10-CM

## 2024-10-07 RX ORDER — HYDROCHLOROTHIAZIDE 12.5 MG/1
12.5 TABLET ORAL DAILY
Qty: 90 TABLET | Refills: 0 | OUTPATIENT
Start: 2024-10-07

## 2024-10-22 DIAGNOSIS — J44.9 CHRONIC OBSTRUCTIVE PULMONARY DISEASE, UNSPECIFIED COPD TYPE (H): ICD-10-CM

## 2024-10-23 RX ORDER — TIOTROPIUM BROMIDE 18 UG/1
CAPSULE ORAL; RESPIRATORY (INHALATION)
Qty: 30 CAPSULE | Refills: 1 | Status: SHIPPED | OUTPATIENT
Start: 2024-10-23

## 2024-10-24 ENCOUNTER — OFFICE VISIT (OUTPATIENT)
Dept: DERMATOLOGY | Facility: CLINIC | Age: 84
End: 2024-10-24
Payer: COMMERCIAL

## 2024-10-24 DIAGNOSIS — L82.1 SEBORRHEIC KERATOSES: Primary | ICD-10-CM

## 2024-10-24 DIAGNOSIS — L57.0 HYPERTROPHIC ACTINIC KERATOSIS: ICD-10-CM

## 2024-10-24 PROCEDURE — 99213 OFFICE O/P EST LOW 20 MIN: CPT | Performed by: PHYSICIAN ASSISTANT

## 2024-10-24 ASSESSMENT — PAIN SCALES - GENERAL: PAINLEVEL_OUTOF10: NO PAIN (0)

## 2024-10-24 NOTE — PROGRESS NOTES
[facility] Dermatology Note  Encounter Date: Oct 24, 2024  Office Visit     Reviewed patients past medical history and pertinent chart review prior to patients visit today.     Dermatology Problem List:  # HAK  - s/p bx 09/26/2024    ____________________________________________    Assessment & Plan:     # History of hypertrophic actinic keratosis  - Appears to be healing well with no residual ak. Continue to monitor.     # Seborrheic keratoses  - We discussed the benign nature of the skin lesions. No treatment is required. I recommend continued observation with follow up should any concerning changes arise.     Follow up annually, sooner should concerns arise.      All risks, benefits and alternatives were discussed with patient.  Patient is in agreement and understands the assessment and plan.  All questions were answered.  Iva Mahan PA-C  [facility] Dermatology  _______________________________________    CC: Derm Problem (Spot check on AK on forehead)    HPI:  Mr. Luis Daniel Gomez is a(n) 83 year old male who presents today as a return patient for evaluation of HAK on the scalp. Patient reports that the site is healing well. No residual symptoms. Also requests evaluation of the back. Patient is otherwise feeling well, without additional skin concerns.      Physical Exam:  SKIN: Focused examination of forehead and back was performed.  - Waxy, stuck on appearing papule(s) throughout exam, consistent with seborrheic keratoses.   - Pink patch involving the forehead, consistent with healing biopsy site.     - No other lesions of concern on areas examined.     Medications:  Current Outpatient Medications   Medication Sig Dispense Refill    albuterol (PROAIR HFA/PROVENTIL HFA/VENTOLIN HFA) 108 (90 Base) MCG/ACT inhaler INHALE 2 PUFFS INTO THE LUNGS EVERY 6 HOURS AS NEEDED FOR SHORTNESS OF BREATH OR DIFFICULT BREATHING OR WHEEZING 8.5 g 3    alpha-lipoic acid 600 MG capsule Take 600 mg by  mouth 2 times daily.      apixaban ANTICOAGULANT (ELIQUIS) 2.5 MG tablet Take 2.5 mg by mouth 2 times daily      aspirin 81 MG EC tablet Take 81 mg by mouth daily.      atorvastatin (LIPITOR) 40 MG tablet TAKE 1 TABLET BY MOUTH EVERY EVENING 90 tablet 1    azithromycin (ZITHROMAX) 250 MG tablet Take two 250mg tablets (500mg dose) one hour prior to every dental procedure. 6 tablet 1    cetirizine (ZYRTEC) 10 MG tablet Take 10 mg by mouth daily      finasteride (PROSCAR) 5 MG tablet Take 1 tablet (5 mg) by mouth daily 90 tablet 3    fluticasone (FLONASE) 50 MCG/ACT nasal spray Spray 1-2 sprays into both nostrils daily 48 mL 3    gabapentin (NEURONTIN) 100 MG capsule Take 1 capsules by mouth every morning, 1 capsule in the afternoon, 3 capsules at bedtime [increase dose] 450 capsule 1    losartan (COZAAR) 25 MG tablet TAKE 1 TABLET(25 MG) BY MOUTH DAILY 90 tablet 0    metoprolol tartrate (LOPRESSOR) 50 MG tablet TAKE 1 TABLET BY MOUTH TWICE DAILY 270 tablet 0    Multiple Vitamin (MULTI-VITAMIN) per tablet Take 1 tablet by mouth daily.      nitroGLYcerin (NITROSTAT) 0.4 MG sublingual tablet For chest pain place 1 tablet under the tongue every 5 minutes for 3 doses. If symptoms persist 5 minutes after 1st dose call 911. 10 tablet 0    polyethylene glycol (MIRALAX) 17 g packet Take 1 packet by mouth daily as needed      tamsulosin (FLOMAX) 0.4 MG capsule Take 0.4 mg by mouth daily      tiotropium (SPIRIVA) 18 MCG inhaled capsule INHALE THE CONTENTS OF 1 CAPSULE(18 MCG) INTO THE LUNGS DAILY 30 capsule 1    hydroCHLOROthiazide 12.5 MG tablet Take 1 tablet (12.5 mg) by mouth daily. Please repeat labs in  2 weeks to check potassium level (Patient not taking: Reported on 10/24/2024) 90 tablet 0     No current facility-administered medications for this visit.      Past Medical History:   Patient Active Problem List   Diagnosis    CKD (chronic kidney disease) stage 3, GFR 30-59 ml/min (H)    BPH (benign prostatic hyperplasia)     HTN (hypertension), benign    Kidney stone    Former smoker    Peripheral neuropathy    Syncope    Paroxysmal ventricular tachycardia (H)    Vascular bruit    Hyperlipidemia with target LDL less than 100    Intractable episodic cluster headache    Type 2 diabetes mellitus with diabetic nephropathy (H)    Status post coronary angiogram    Aortic stenosis, severe    Atrial fibrillation, unspecified type (H)    Disorder of carotid artery (H)     Past Medical History:   Diagnosis Date    Aortic stenosis, severe 09/20/2022    Arthritis     BPH (benign prostatic hyperplasia)     Chronic diastolic (congestive) heart failure (H) 01/11/2023    CKD (chronic kidney disease) stage 3, GFR 30-59 ml/min (H)     Cluster headache 2015    Dr. Roth    Constipation     Dyslipidemia     Dyspnea     Normal nuc stress test 9-15-08, fainted once in life    Fatty liver     Ultrasound 11/11    FH: colon cancer     colonoscopy 10/10, repeat 5 years.    Former smoker     quit 2008    HTN (hypertension), benign     Peripheral neuropathy     Dr. Patricia    Spinal stenosis     Last MRI 2010, Dr. Fraire    Status post coronary angiogram 08/22/2022    Syncope     Type 2 diabetes mellitus (H)     diet controlled    Ureterolithiases        CC Referred Self, MD  No address on file on close of this encounter.

## 2024-10-24 NOTE — PATIENT INSTRUCTIONS
Patient Education        Proper skin care from [facility]:     -Eliminate harsh soaps as they strip the natural oils from the skin, often resulting in dry itchy skin ( i.e. Dial, Zest, Tamazight Spring)  -Use mild soaps such as Cetaphil or Dove Sensitive Skin in the shower. You do not need to use soap on arms, legs, and trunk every time you shower unless visibly soiled.   -Avoid hot or cold showers.  -After showering, lightly dry off and apply moisturizing within 2-3 minutes. This will help trap moisture in the skin.   -Aggressive use of a moisturizer at least 1-2 times a day to the entire body (including -Vanicream, Cetaphil, Aquaphor or Cerave) and moisturize hands after every washing.  -We recommend using moisturizers that come in a tub that needs to be scooped out, not a pump. This has more of an oil base. It will hold moisture in your skin much better than a water base moisturizer. The above recommended are non-pore clogging.        Wear a sunscreen with at least SPF 30 on your face, ears, neck and V of the chest daily. Wear sunscreen on other areas of the body if those areas are exposed to the sun throughout the day. Sunscreens can contain physical and/or chemical blockers. Physical blockers are less likely to clog pores, these include zinc oxide and titanium dioxide. Reapply every two hour and after swimming.      Sunscreen examples: https://www.ewg.org/sunscreen/     UV radiation  UVA radiation remains constant throughout the day and throughout the year. It is a longer wavelength than UVB and therefore penetrates deeper into the skin leading to immediate and delayed tanning, photoaging, and skin cancer. 70-80% of UVA and UVB radiation occurs between the hours of 10am-2pm.  UVB radiation  UVB radiation causes the most harmful effects and is more significant during the summer months. However, snow and ice can reflect UVB radiation leading to skin damage during the winter months as well. UVB radiation is  responsible for tanning, burning, inflammation, delayed erythema (pinkness), pigmentation (brown spots), and skin cancer.      I recommend self monthly full body exams and yearly full body exams with a dermatology provider. If you develop a new or changing lesion please follow up for examination. Most skin cancers are pink and scaly or pink and pearly. However, we do see blue/brown/black skin cancers.  Consider the ABCDEs of melanoma when giving yourself your monthly full body exam ( don't forget the groin, buttocks, feet, toes, etc). A-asymmetry, B-borders, C-color, D-diameter, E-elevation or evolving. If you see any of these changes please follow up in clinic. If you cannot see your back I recommend purchasing a hand held mirror to use with a larger wall mirror.       Checking for Skin Cancer  You can find cancer early by checking your skin each month. There are 3 kinds of skin cancer. They are melanoma, basal cell carcinoma, and squamous cell carcinoma. Doing monthly skin checks is the best way to find new marks or skin changes. Follow the instructions below for checking your skin.   The ABCDEs of checking moles for melanoma   Check your moles or growths for signs of melanoma using ABCDE:   Asymmetry: the sides of the mole or growth don t match  Border: the edges are ragged, notched, or blurred  Color: the color within the mole or growth varies  Diameter: the mole or growth is larger than 6 mm (size of a pencil eraser)  Evolving: the size, shape, or color of the mole or growth is changing (evolving is not shown in the images below)    Checking for other types of skin cancer  Basal cell carcinoma or squamous cell carcinoma have symptoms such as:      A spot or mole that looks different from all other marks on your skin  Changes in how an area feels, such as itching, tenderness, or pain  Changes in the skin's surface, such as oozing, bleeding, or scaliness  A sore that does not heal  New swelling or redness beyond  the border of a mole     Who s at risk?  Anyone can get skin cancer. But you are at greater risk if you have:   Fair skin, light-colored hair, or light-colored eyes  Many moles or abnormal moles on your skin  A history of sunburns from sunlight or tanning beds  A family history of skin cancer  A history of exposure to radiation or chemicals  A weakened immune system  If you have had skin cancer in the past, you are at risk for recurring skin cancer.   How to check your skin  Do your monthly skin checkups in front of a full-length mirror. Check all parts of your body, including your:   Head (ears, face, neck, and scalp)  Torso (front, back, and sides)  Arms (tops, undersides, upper, and lower armpits)  Hands (palms, backs, and fingers, including under the nails)  Buttocks and genitals  Legs (front, back, and sides)  Feet (tops, soles, toes, including under the nails, and between toes)  If you have a lot of moles, take digital photos of them each month. Make sure to take photos both up close and from a distance. These can help you see if any moles change over time.   Most skin changes are not cancer. But if you see any changes in your skin, call your doctor right away. Only he or she can diagnose a problem. If you have skin cancer, seeing your doctor can be the first step toward getting the treatment that could save your life.   [employer] last reviewed this educational content on 4/1/2019 2000-2020 [employer]. [address] [place] [address], [place] [address]. All rights reserved. This information is not intended as a substitute for professional medical care. Always follow your healthcare professional's instructions.        When should I call my doctor?  If you are worsening or not improving, please, contact us or seek urgent care as noted below.      Who should I call with questions (adults)?  [facility] (adult and pediatric): [phone]  [facility]  [place] (adult): [phone]  [facility] ([place], [place], [place] and [place]) [phone]  For urgent needs outside of business hours call the [facility] at [phone] and ask for the dermatology resident on call to be paged  If this is a medical emergency and you are unable to reach an ER, Call 911        If you need a prescription refill, please contact your pharmacy. Refills are approved or denied by our Physicians during normal business hours, Monday through Fridays  Per office policy, refills will not be granted if you have not been seen within the past year (or sooner depending on your child's condition)

## 2024-10-24 NOTE — LETTER
10/24/2024      Luis Daniel Gomez  [address]  [place] [address]      Dear Colleague,    Thank you for referring your patient, Luis Daniel Gomez, to the [facility] [place]. Please see a copy of my visit note below.    [facility] Dermatology Note  Encounter Date: Oct 24, 2024  Office Visit     Reviewed patients past medical history and pertinent chart review prior to patients visit today.     Dermatology Problem List:  # HAK  - s/p bx 09/26/2024    ____________________________________________    Assessment & Plan:     # History of hypertrophic actinic keratosis  - Appears to be healing well with no residual ak. Continue to monitor.     # Seborrheic keratoses  - We discussed the benign nature of the skin lesions. No treatment is required. I recommend continued observation with follow up should any concerning changes arise.     Follow up annually, sooner should concerns arise.      All risks, benefits and alternatives were discussed with patient.  Patient is in agreement and understands the assessment and plan.  All questions were answered.  Iva Mahan PA-C  [facility] Dermatology  _______________________________________    CC: Derm Problem (Spot check on AK on forehead)    HPI:  Mr. Luis Daniel Gomez is a(n) 83 year old male who presents today as a return patient for evaluation of HAK on the scalp. Patient reports that the site is healing well. No residual symptoms. Also requests evaluation of the back. Patient is otherwise feeling well, without additional skin concerns.      Physical Exam:  SKIN: Focused examination of forehead and back was performed.  - Waxy, stuck on appearing papule(s) throughout exam, consistent with seborrheic keratoses.   - Pink patch involving the forehead, consistent with healing biopsy site.     - No other lesions of concern on areas examined.     Medications:  Current Outpatient Medications   Medication Sig Dispense Refill      albuterol (PROAIR HFA/PROVENTIL HFA/VENTOLIN HFA) 108 (90 Base) MCG/ACT inhaler INHALE 2 PUFFS INTO THE LUNGS EVERY 6 HOURS AS NEEDED FOR SHORTNESS OF BREATH OR DIFFICULT BREATHING OR WHEEZING 8.5 g 3     alpha-lipoic acid 600 MG capsule Take 600 mg by mouth 2 times daily.       apixaban ANTICOAGULANT (ELIQUIS) 2.5 MG tablet Take 2.5 mg by mouth 2 times daily       aspirin 81 MG EC tablet Take 81 mg by mouth daily.       atorvastatin (LIPITOR) 40 MG tablet TAKE 1 TABLET BY MOUTH EVERY EVENING 90 tablet 1     azithromycin (ZITHROMAX) 250 MG tablet Take two 250mg tablets (500mg dose) one hour prior to every dental procedure. 6 tablet 1     cetirizine (ZYRTEC) 10 MG tablet Take 10 mg by mouth daily       finasteride (PROSCAR) 5 MG tablet Take 1 tablet (5 mg) by mouth daily 90 tablet 3     fluticasone (FLONASE) 50 MCG/ACT nasal spray Spray 1-2 sprays into both nostrils daily 48 mL 3     gabapentin (NEURONTIN) 100 MG capsule Take 1 capsules by mouth every morning, 1 capsule in the afternoon, 3 capsules at bedtime [increase dose] 450 capsule 1     losartan (COZAAR) 25 MG tablet TAKE 1 TABLET(25 MG) BY MOUTH DAILY 90 tablet 0     metoprolol tartrate (LOPRESSOR) 50 MG tablet TAKE 1 TABLET BY MOUTH TWICE DAILY 270 tablet 0     Multiple Vitamin (MULTI-VITAMIN) per tablet Take 1 tablet by mouth daily.       nitroGLYcerin (NITROSTAT) 0.4 MG sublingual tablet For chest pain place 1 tablet under the tongue every 5 minutes for 3 doses. If symptoms persist 5 minutes after 1st dose call 911. 10 tablet 0     polyethylene glycol (MIRALAX) 17 g packet Take 1 packet by mouth daily as needed       tamsulosin (FLOMAX) 0.4 MG capsule Take 0.4 mg by mouth daily       tiotropium (SPIRIVA) 18 MCG inhaled capsule INHALE THE CONTENTS OF 1 CAPSULE(18 MCG) INTO THE LUNGS DAILY 30 capsule 1     hydroCHLOROthiazide 12.5 MG tablet Take 1 tablet (12.5 mg) by mouth daily. Please repeat labs in  2 weeks to check potassium level (Patient not taking:  Reported on 10/24/2024) 90 tablet 0     No current facility-administered medications for this visit.      Past Medical History:   Patient Active Problem List   Diagnosis     CKD (chronic kidney disease) stage 3, GFR 30-59 ml/min (H)     BPH (benign prostatic hyperplasia)     HTN (hypertension), benign     Kidney stone     Former smoker     Peripheral neuropathy     Syncope     Paroxysmal ventricular tachycardia (H)     Vascular bruit     Hyperlipidemia with target LDL less than 100     Intractable episodic cluster headache     Type 2 diabetes mellitus with diabetic nephropathy (H)     Status post coronary angiogram     Aortic stenosis, severe     Atrial fibrillation, unspecified type (H)     Disorder of carotid artery (H)     Past Medical History:   Diagnosis Date     Aortic stenosis, severe 09/20/2022     Arthritis      BPH (benign prostatic hyperplasia)      Chronic diastolic (congestive) heart failure (H) 01/11/2023     CKD (chronic kidney disease) stage 3, GFR 30-59 ml/min (H)      Cluster headache 2015    Dr. Roth     Constipation      Dyslipidemia      Dyspnea     Normal nuc stress test 9-15-08, fainted once in life     Fatty liver     Ultrasound 11/11     FH: colon cancer     colonoscopy 10/10, repeat 5 years.     Former smoker     quit 2008     HTN (hypertension), benign      Peripheral neuropathy     Dr. Patricia     Spinal stenosis     Last MRI 2010, Dr. Fraire     Status post coronary angiogram 08/22/2022     Syncope      Type 2 diabetes mellitus (H)     diet controlled     Ureterolithiases        CC Referred Self, MD  No address on file on close of this encounter.       Again, thank you for allowing me to participate in the care of your patient.        Sincerely,        Iva Mahan PA-C

## 2024-10-30 DIAGNOSIS — E78.5 HYPERLIPIDEMIA WITH TARGET LDL LESS THAN 100: ICD-10-CM

## 2024-10-30 RX ORDER — ATORVASTATIN CALCIUM 40 MG/1
40 TABLET, FILM COATED ORAL EVERY EVENING
Qty: 90 TABLET | Refills: 0 | Status: SHIPPED | OUTPATIENT
Start: 2024-10-30

## 2024-10-31 DIAGNOSIS — I10 HTN (HYPERTENSION), BENIGN: ICD-10-CM

## 2024-10-31 RX ORDER — METOPROLOL TARTRATE 50 MG
50 TABLET ORAL 2 TIMES DAILY
Qty: 270 TABLET | Refills: 2 | Status: SHIPPED | OUTPATIENT
Start: 2024-10-31

## 2024-12-09 DIAGNOSIS — R80.9 PROTEINURIA, UNSPECIFIED TYPE: ICD-10-CM

## 2024-12-10 RX ORDER — LOSARTAN POTASSIUM 25 MG/1
25 TABLET ORAL DAILY
Qty: 90 TABLET | Refills: 0 | Status: SHIPPED | OUTPATIENT
Start: 2024-12-10

## 2025-01-24 SDOH — HEALTH STABILITY: PHYSICAL HEALTH: ON AVERAGE, HOW MANY DAYS PER WEEK DO YOU ENGAGE IN MODERATE TO STRENUOUS EXERCISE (LIKE A BRISK WALK)?: 7 DAYS

## 2025-01-24 SDOH — HEALTH STABILITY: PHYSICAL HEALTH: ON AVERAGE, HOW MANY MINUTES DO YOU ENGAGE IN EXERCISE AT THIS LEVEL?: 30 MIN

## 2025-01-24 ASSESSMENT — SOCIAL DETERMINANTS OF HEALTH (SDOH): HOW OFTEN DO YOU GET TOGETHER WITH FRIENDS OR RELATIVES?: ONCE A WEEK

## 2025-01-27 ENCOUNTER — OFFICE VISIT (OUTPATIENT)
Dept: FAMILY MEDICINE | Facility: CLINIC | Age: 85
End: 2025-01-27
Payer: COMMERCIAL

## 2025-01-27 VITALS
HEIGHT: 70 IN | OXYGEN SATURATION: 93 % | HEART RATE: 80 BPM | BODY MASS INDEX: 29.78 KG/M2 | SYSTOLIC BLOOD PRESSURE: 112 MMHG | RESPIRATION RATE: 16 BRPM | WEIGHT: 208 LBS | TEMPERATURE: 98.2 F | DIASTOLIC BLOOD PRESSURE: 65 MMHG

## 2025-01-27 DIAGNOSIS — E78.5 HYPERLIPIDEMIA WITH TARGET LDL LESS THAN 100: ICD-10-CM

## 2025-01-27 DIAGNOSIS — J43.9 MILD EMPHYSEMA (H): ICD-10-CM

## 2025-01-27 DIAGNOSIS — J42 CHRONIC BRONCHITIS, UNSPECIFIED CHRONIC BRONCHITIS TYPE (H): ICD-10-CM

## 2025-01-27 DIAGNOSIS — I48.91 ATRIAL FIBRILLATION, UNSPECIFIED TYPE (H): ICD-10-CM

## 2025-01-27 DIAGNOSIS — G62.9 PERIPHERAL POLYNEUROPATHY: ICD-10-CM

## 2025-01-27 DIAGNOSIS — E11.21 TYPE 2 DIABETES MELLITUS WITH DIABETIC NEPHROPATHY, WITHOUT LONG-TERM CURRENT USE OF INSULIN (H): ICD-10-CM

## 2025-01-27 DIAGNOSIS — Z87.891 FORMER SMOKER: ICD-10-CM

## 2025-01-27 DIAGNOSIS — I77.9 DISORDER OF CAROTID ARTERY: ICD-10-CM

## 2025-01-27 DIAGNOSIS — I10 HTN (HYPERTENSION), BENIGN: ICD-10-CM

## 2025-01-27 DIAGNOSIS — N18.32 STAGE 3B CHRONIC KIDNEY DISEASE (H): ICD-10-CM

## 2025-01-27 DIAGNOSIS — Z00.00 MEDICARE ANNUAL WELLNESS VISIT, SUBSEQUENT: Primary | ICD-10-CM

## 2025-01-27 PROCEDURE — 84244 ASSAY OF RENIN: CPT | Mod: 90 | Performed by: INTERNAL MEDICINE

## 2025-01-27 PROCEDURE — 99000 SPECIMEN HANDLING OFFICE-LAB: CPT | Performed by: INTERNAL MEDICINE

## 2025-01-27 PROCEDURE — 36415 COLL VENOUS BLD VENIPUNCTURE: CPT | Performed by: INTERNAL MEDICINE

## 2025-01-27 PROCEDURE — G0439 PPPS, SUBSEQ VISIT: HCPCS | Performed by: INTERNAL MEDICINE

## 2025-01-27 PROCEDURE — 82088 ASSAY OF ALDOSTERONE: CPT | Performed by: INTERNAL MEDICINE

## 2025-01-27 PROCEDURE — 99214 OFFICE O/P EST MOD 30 MIN: CPT | Mod: 25 | Performed by: INTERNAL MEDICINE

## 2025-01-27 PROCEDURE — 93000 ELECTROCARDIOGRAM COMPLETE: CPT | Performed by: INTERNAL MEDICINE

## 2025-01-27 RX ORDER — BUDESONIDE AND FORMOTEROL FUMARATE DIHYDRATE 160; 4.5 UG/1; UG/1
2 AEROSOL RESPIRATORY (INHALATION) 2 TIMES DAILY PRN
Qty: 10.2 G | Refills: 1 | Status: SHIPPED | OUTPATIENT
Start: 2025-01-27

## 2025-01-27 ASSESSMENT — PAIN SCALES - GENERAL: PAINLEVEL_OUTOF10: NO PAIN (0)

## 2025-01-27 NOTE — PROGRESS NOTES
Preventive Care Visit  [facility]  Eliza Rogers MD, Internal Medicine  Jan 27, 2025      Assessment & Plan   Problem List Items Addressed This Visit       Stage 3b chronic kidney disease (H)    HTN (hypertension), benign    Relevant Orders    Aldosterone Renin Ratio    Former smoker    Relevant Orders    Adult Pulmonary Medicine  Referral    Peripheral neuropathy    Hyperlipidemia with target LDL less than 100    Type 2 diabetes mellitus with diabetic nephropathy (H)    Atrial fibrillation, unspecified type (H)    Relevant Orders    EKG 12-lead complete w/read - Clinics (Completed)    Disorder of carotid artery    Chronic bronchitis, unspecified chronic bronchitis type (H)     Other Visit Diagnoses       Medicare annual wellness visit, subsequent    -  Primary    Mild emphysema (H)        Relevant Medications    budesonide-formoterol (SYMBICORT) 160-4.5 MCG/ACT Inhaler    Other Relevant Orders    Adult Pulmonary Medicine  Referral           Gabapentin helps with his neuropathy pain.  Also on alpha-lipoic acid.  Consideration for GLP-1 agonist injection discussed with MTM    Aspirin low-dose added to his regimen is on Eliquis low-dose given his A-fib.    He reports blood pressure is controlled at home.  Repeat was 112/65    He is on albuterol as needed and tiotropium, he has mild emphysema.  He uses the albuterol daily and to tiotropium once daily.  Consider pulmonary function test.  Add Symbicort 2 puffs twice daily as needed.  Rinse after use.    He is active during the day, he is walking daily.  Continues physical activities as tolerated.    He has carotid artery disease, he has an upcoming carotid Doppler in September 2025.    Voiding is good.  On Flomax daily.  Well-tolerated.      No dizziness or lightheadedness or palpitation or chest pain, no chest pain with exertion.  Continue follow-up with cardiology yearly.    Colonoscopy up-to-date    Vision is good and hearing  is good,    Sees eye doctor once a year    Immunizations up-to-date    Ox 93% room air is recovering from a cold.  Denies any shortness of breath.  Referred to pulmonary for pulmonary function test    Patient has been advised of split billing requirements and indicates understanding: Yes       Counseling  Appropriate preventive services were addressed with this patient via screening, questionnaire, or discussion as appropriate for fall prevention, nutrition, physical activity, Tobacco-use cessation, social engagement, weight loss and cognition.  Checklist reviewing preventive services available has been given to the patient.  Reviewed patient's diet, addressing concerns and/or questions.     Work on weight loss  Regular exercise  See Patient Instructions      Subjective   Casey is a 84 year old, presenting for the following:  Physical         No data to display                    HPI    Presenting for yearly wellness.  Doing well.    Reports blood pressure at home in the 120s over 80s occasionally gets 140s  Likes Mexican food.  He used to be a smoker in the past pain, smoked for 50 years, half pack a day.  Stop smoking 20 years ago.  No breathing issues no wheezing  CT scan showed mild emphysema.  Uses albuterol every day and Spiriva once a day in the morning, uses albuterol as needed, feels stuffed up in the airWay    Recovering from a cold.  No phlegm production..    Last colonoscopy in March 2023 they found one 5 mm polyp that was resected.  Declines any future colonoscopy.    Had NAYELI in March 2024      MRI renal in October 2024.  1. A renal parenchymal lobulation in the lateral cortex of the right  kidney corresponds to the lesion seen on ultrasound and requires no  specific follow-up. There is no suspicious mass in this location.  2. A few benign cysts in the right and left kidney.    Carotid Doppler ultrasound showed 50 to 69% better carotid arteries.      Health Care Directive  Patient does not have a Health  Care Directive: Discussed advance care planning with patient; information given to patient to review.      1/24/2025   General Health   How would you rate your overall physical health? Good   Feel stress (tense, anxious, or unable to sleep) Not at all         1/24/2025   Nutrition   Diet: Regular (no restrictions)         1/24/2025   Exercise   Days per week of moderate/strenous exercise 7 days   Average minutes spent exercising at this level 30 min         1/24/2025   Social Factors   Frequency of gathering with friends or relatives Once a week   Worry food won't last until get money to buy more No    No   Food not last or not have enough money for food? No    No   Do you have housing? (Housing is defined as stable permanent housing and does not include staying ouside in a car, in a tent, in an abandoned building, in an overnight shelter, or couch-surfing.) Yes    Yes   Are you worried about losing your housing? No    No   Lack of transportation? No    No   Unable to get utilities (heat,electricity)? No    No       Multiple values from one day are sorted in reverse-chronological order         1/24/2025   Fall Risk   Fallen 2 or more times in the past year? No    No   Trouble with walking or balance? No    No       Multiple values from one day are sorted in reverse-chronological order          1/24/2025   Activities of Daily Living- Home Safety   Needs help with the following daily activites None of the above   Safety concerns in the home None of the above         1/24/2025   Dental   Dentist two times every year? Yes         1/24/2025   Hearing Screening   Hearing concerns? None of the above         1/24/2025   Driving Risk Screening   Patient/family members have concerns about driving No         1/24/2025   General Alertness/Fatigue Screening   Have you been more tired than usual lately? No         1/24/2025   Urinary Incontinence Screening   Bothered by leaking urine in past 6 months No         1/24/2025   TB  Screening   Were you born outside of the [place]? No         Today's PHQ-2 Score:       2025    12:50 PM   PHQ-2 (  [employer])   Q1: Little interest or pleasure in doing things 0   Q2: Feeling down, depressed or hopeless 0   PHQ-2 Score 0    Q1: Little interest or pleasure in doing things Not at all   Q2: Feeling down, depressed or hopeless Not at all   PHQ-2 Score 0       Patient-reported           2025   Substance Use   Alcohol more than 3/day or more than 7/wk Not Applicable   Do you have a current opioid prescription? No   How severe/bad is pain from 1 to 10? 0/10 (No Pain)   Do you use any other substances recreationally? No     Social History     Tobacco Use    Smoking status: Former     Current packs/day: 0.00     Average packs/day: 1 pack/day for 56.0 years (56.0 ttl pk-yrs)     Types: Cigarettes     Start date: 1952     Quit date: 2008     Years since quittin.0    Smokeless tobacco: Never    Tobacco comments:     quit    Vaping Use    Vaping status: Never Used   Substance Use Topics    Alcohol use: No     Alcohol/week: 0.0 standard drinks of alcohol    Drug use: No             Reviewed and updated as needed this visit by Provider      Problems               Past Medical History:   Diagnosis Date    Aortic stenosis, severe 2022    Arthritis     BPH (benign prostatic hyperplasia)     Chronic diastolic (congestive) heart failure (H) 2023    CKD (chronic kidney disease) stage 3, GFR 30-59 ml/min (H)     Cluster headache     Dr. Roth    Constipation     Dyslipidemia     Dyspnea     Normal nuc stress test 9-15-08, fainted once in life    Fatty liver     Ultrasound     FH: colon cancer     colonoscopy 10/10, repeat 5 years.    Former smoker     quit     HTN (hypertension), benign     Peripheral neuropathy     Dr. Patricia    Spinal stenosis     Last MRI , Dr. Fraire    Status post coronary angiogram 2022    Syncope     Type 2 diabetes mellitus (H)     diet  controlled    Ureterolithiases      Past Surgical History:   Procedure Laterality Date    COLONOSCOPY      COLONOSCOPY N/A 3/30/2023    Procedure: COLONOSCOPY, WITH POLYPECTOMY AND BIOPSY;  Surgeon: Adi Geiger MD;  Location:  GI    CV CORONARY ANGIOGRAM Bilateral 8/22/2022    Procedure: Coronary Angiogram;  Surgeon: Dave Mao MD;  Location:  HEART CARDIAC CATH LAB    CV TRANSCATHETER AORTIC VALVE REPLACEMENT-FEMORAL APPROACH N/A 9/20/2022    Procedure: Transcatheter Aortic Valve Replacement-Femoral Approach;  Surgeon: Wilson Martinez MD;  Location:  HEART CARDIAC CATH LAB    KERATOTOMY ARCUATE WITH FEMTOSECOND LASER/IMAGING FOR ATIOL Left 8/29/2016    Procedure: KERATOTOMY ARCUATE WITH FEMTOSECOND LASER/IMAGING FOR ATIOL;  Surgeon: Gerard Larson MD;  Location: [facility]    ORTHOPEDIC SURGERY      PHACOEMULSIFICATION CLEAR CORNEA WITH STANDARD INTRAOCULAR LENS IMPLANT Left 8/29/2016    Procedure: PHACOEMULSIFICATION CLEAR CORNEA WITH STANDARD INTRAOCULAR LENS IMPLANT;  Surgeon: Gerard Larson MD;  Location: [facility]    Thumb surg       Lab work is in process  Labs reviewed in EPIC  BP Readings from Last 3 Encounters:   01/27/25 112/65   09/26/24 118/68   09/04/24 138/76    Wt Readings from Last 3 Encounters:   01/27/25 94.3 kg (208 lb)   09/26/24 96.6 kg (213 lb)   09/04/24 97.1 kg (214 lb)                  Patient Active Problem List   Diagnosis    Stage 3b chronic kidney disease (H)    BPH (benign prostatic hyperplasia)    HTN (hypertension), benign    Kidney stone    Former smoker    Peripheral neuropathy    Syncope    Paroxysmal ventricular tachycardia (H)    Vascular bruit    Hyperlipidemia with target LDL less than 100    Intractable episodic cluster headache    Type 2 diabetes mellitus with diabetic nephropathy (H)    Status post coronary angiogram    Aortic stenosis, severe    Atrial fibrillation, unspecified type (H)    Disorder of carotid artery    Chronic bronchitis,  unspecified chronic bronchitis type (H)     Past Surgical History:   Procedure Laterality Date    COLONOSCOPY      COLONOSCOPY N/A 3/30/2023    Procedure: COLONOSCOPY, WITH POLYPECTOMY AND BIOPSY;  Surgeon: Adi Geiger MD;  Location:  GI    CV CORONARY ANGIOGRAM Bilateral 2022    Procedure: Coronary Angiogram;  Surgeon: Dave Mao MD;  Location:  HEART CARDIAC CATH LAB    CV TRANSCATHETER AORTIC VALVE REPLACEMENT-FEMORAL APPROACH N/A 2022    Procedure: Transcatheter Aortic Valve Replacement-Femoral Approach;  Surgeon: Wilson Martinez MD;  Location: [facility] CARDIAC CATH LAB    KERATOTOMY ARCUATE WITH FEMTOSECOND LASER/IMAGING FOR ATIOL Left 2016    Procedure: KERATOTOMY ARCUATE WITH FEMTOSECOND LASER/IMAGING FOR ATIOL;  Surgeon: Gerard Larson MD;  Location: [facility]    ORTHOPEDIC SURGERY      PHACOEMULSIFICATION CLEAR CORNEA WITH STANDARD INTRAOCULAR LENS IMPLANT Left 2016    Procedure: PHACOEMULSIFICATION CLEAR CORNEA WITH STANDARD INTRAOCULAR LENS IMPLANT;  Surgeon: Gerard Larson MD;  Location: [facility]    Thumb surg         Social History     Tobacco Use    Smoking status: Former     Current packs/day: 0.00     Average packs/day: 1 pack/day for 56.0 years (56.0 ttl pk-yrs)     Types: Cigarettes     Start date: 1954     Quit date: 2008     Years since quittin.0    Smokeless tobacco: Never    Tobacco comments:     quit    Substance Use Topics    Alcohol use: No     Alcohol/week: 0.0 standard drinks of alcohol     Family History   Problem Relation Age of Onset    C.A.D. Father     Diabetes Father     Cancer - colorectal Mother 80    Cancer - colorectal Maternal Grandmother          Current Outpatient Medications   Medication Sig Dispense Refill    albuterol (PROAIR HFA/PROVENTIL HFA/VENTOLIN HFA) 108 (90 Base) MCG/ACT inhaler INHALE 2 PUFFS INTO THE LUNGS EVERY 6 HOURS AS NEEDED FOR SHORTNESS OF BREATH OR DIFFICULT BREATHING OR WHEEZING 8.5 g 3     alpha-lipoic acid 600 MG capsule Take 600 mg by mouth 2 times daily.      apixaban ANTICOAGULANT (ELIQUIS) 2.5 MG tablet Take 2.5 mg by mouth 2 times daily      aspirin 81 MG EC tablet Take 81 mg by mouth daily.      atorvastatin (LIPITOR) 40 MG tablet Take 1 tablet (40 mg) by mouth every evening. 90 tablet 0    azithromycin (ZITHROMAX) 250 MG tablet Take two 250mg tablets (500mg dose) one hour prior to every dental procedure. 6 tablet 1    budesonide-formoterol (SYMBICORT) 160-4.5 MCG/ACT Inhaler Inhale 2 puffs into the lungs 2 times daily as needed (cough,). 10.2 g 1    cetirizine (ZYRTEC) 10 MG tablet Take 10 mg by mouth daily      finasteride (PROSCAR) 5 MG tablet Take 1 tablet (5 mg) by mouth daily 90 tablet 3    fluticasone (FLONASE) 50 MCG/ACT nasal spray Spray 1-2 sprays into both nostrils daily 48 mL 3    gabapentin (NEURONTIN) 100 MG capsule Take 1 capsules by mouth every morning, 1 capsule in the afternoon, 3 capsules at bedtime [increase dose] 450 capsule 3    hydroCHLOROthiazide 12.5 MG tablet Take 1 tablet (12.5 mg) by mouth daily. Please repeat labs in  2 weeks to check potassium level 90 tablet 0    losartan (COZAAR) 25 MG tablet TAKE 1 TABLET(25 MG) BY MOUTH DAILY 90 tablet 0    metoprolol tartrate (LOPRESSOR) 50 MG tablet TAKE 1 TABLET BY MOUTH TWICE DAILY 270 tablet 2    Multiple Vitamin (MULTI-VITAMIN) per tablet Take 1 tablet by mouth daily.      nitroGLYcerin (NITROSTAT) 0.4 MG sublingual tablet For chest pain place 1 tablet under the tongue every 5 minutes for 3 doses. If symptoms persist 5 minutes after 1st dose call 911. 10 tablet 0    polyethylene glycol (MIRALAX) 17 g packet Take 1 packet by mouth daily as needed      tamsulosin (FLOMAX) 0.4 MG capsule Take 0.4 mg by mouth daily      tiotropium (SPIRIVA) 18 MCG inhaled capsule INHALE THE CONTENTS OF 1 CAPSULE(18 MCG) INTO THE LUNGS DAILY 30 capsule 1     Allergies   Allergen Reactions    Lisinopril Shortness Of Breath and Fatigue     "Amoxicillin Itching    Duloxetine      \"I can't take it\"    Levofloxacin      \"I can't take the 500 mgs\"    Neurontin [Gabapentin] Swelling     edema    Norvasc [Amlodipine] Swelling     edema    Oxycodone GI Disturbance    Penicillins Itching    Red Yeast Rice Extract [[place] Purpureus Went Yeast] Hives and Itching    Tramadol Itching    Verapamil Itching    Vicodin [Hydrocodone-Acetaminophen] Itching     Recent Labs   Lab Test 09/24/24  1008 09/04/24  1315 03/21/24  1122 02/14/24  1026 01/17/24  0903 04/19/23  0846 08/16/22  1020 08/03/22  0808 12/28/21  0824 04/21/21  1332 12/17/20  0901   A1C  --  5.8*  --   --  5.5 5.6  --  5.5   < >  --  5.5   LDL  --  64  --  63 137*  --    < > 54   < >  --  54   HDL  --  37*  --  37* 37*  --    < > 40   < >  --  36*   TRIG  --  119  --  143 240*  --    < > 162*   < >  --  218*   ALT  --  20  --   --  21  --   --  21   < >  --  43   CR 1.70* 1.68*   < > 1.67* 1.74*  --    < > 1.61*   < > 1.78* 1.79*   GFRESTIMATED 39* 40*   < > 40* 38*  --    < > 42*   < > 35* 35*   GFRESTBLACK  --   --   --   --   --   --   --   --   --  40* 40*   POTASSIUM 4.7 5.5*   < > 4.9 4.9  --    < > 4.6   < > 5.1 4.4   TSH  --  2.50  --   --   --  2.87  --   --   --   --  2.97    < > = values in this interval not displayed.      Current providers sharing in care for this patient include:  Patient Care Team:  Eliza Rogers MD as PCP - General (Internal Medicine)  Megan Rossi, PharmD as Pharmacist (Pharmacist)  Eliza Rogers MD as Assigned PCP  Burres, Megan E, PharmD as Assigned MTM Pharmacist  Clifford Corbin MD as MD (Internal Medicine)  Adeel Knight MD as MD (Cardiovascular Disease)  Clifford Corbin MD as MD (Internal Medicine)  Clifford Corbin MD as Assigned Nephrology Provider  Iva Mahan PA-C as Physician Assistant (Dermatology)  Iva Mahan PA-C as Assigned Surgical Provider  Raudel Nelson MD as Assigned Heart and Vascular Provider    The " "following health maintenance items are reviewed in Epic and correct as of today:  Health Maintenance   Topic Date Due    HF ACTION PLAN  Never done    SPIROMETRY  02/01/2026 (Originally 1940)    A1C  03/04/2025    MICROALBUMIN  03/04/2025    BMP  03/24/2025    ALT  09/04/2025    LIPID  09/04/2025    CBC  09/04/2025    HEMOGLOBIN  09/04/2025    MEDICARE ANNUAL WELLNESS VISIT  01/27/2026    ANNUAL REVIEW OF HM ORDERS  01/27/2026    FALL RISK ASSESSMENT  01/27/2026    ADVANCE CARE PLANNING  01/27/2030    DTAP/TDAP/TD IMMUNIZATION (7 - Td or Tdap) 01/16/2034    PARATHYROID  Completed    PHOSPHORUS  Completed    TSH W/FREE T4 REFLEX  Completed    COPD ACTION PLAN  Completed    PHQ-2 (once per calendar year)  Completed    INFLUENZA VACCINE  Completed    Pneumococcal Vaccine: 50+ Years  Completed    URINALYSIS  Completed    ALK PHOS  Completed    ZOSTER IMMUNIZATION  Completed    RSV VACCINE  Completed    COVID-19 Vaccine  Completed    HPV IMMUNIZATION  Aged Out    MENINGITIS IMMUNIZATION  Aged Out    RSV MONOCLONAL ANTIBODY  Aged Out    DIABETIC FOOT EXAM  Discontinued    EYE EXAM  Discontinued    COLORECTAL CANCER SCREENING  Discontinued         Review of Systems  Constitutional, neuro, ENT, endocrine, pulmonary, cardiac, gastrointestinal, genitourinary, musculoskeletal, integument and psychiatric systems are negative, except as otherwise noted.     Objective    Exam  /65   Pulse 80   Temp 98.2  F (36.8  C) (Tympanic)   Resp 16   Ht 1.778 m (5' 10\")   Wt 94.3 kg (208 lb)   SpO2 93%   BMI 29.84 kg/m     Estimated body mass index is 29.84 kg/m  as calculated from the following:    Height as of this encounter: 1.778 m (5' 10\").    Weight as of this encounter: 94.3 kg (208 lb).    Physical Exam  GENERAL: alert and no distress  EYES: Eyes grossly normal to inspection, PERRL and conjunctivae and sclerae normal  HENT: ear canals and TM's normal, nose and mouth without ulcers or lesions  NECK: no adenopathy, " no asymmetry, masses, or scars  RESP: lungs clear to auscultation - no rales, rhonchi or wheezes  CV: regular rate and rhythm, normal S1 S2, no S3 or S4, no murmur, click or rub, no peripheral edema  ABDOMEN: soft, nontender, no hepatosplenomegaly, no masses and bowel sounds normal  MS: no gross musculoskeletal defects noted, no edema  SKIN: no suspicious lesions or rashes  NEURO: Normal strength and tone, mentation intact and speech normal  PSYCH: mentation appears normal, affect normal/bright        1/27/2025   Mini Cog   Clock Draw Score 2 Normal   3 Item Recall 3 objects recalled   Mini Cog Total Score 5              Signed Electronically by: Eliza Rogers MD

## 2025-01-28 LAB — ALDOST SERPL-MCNC: 7.8 NG/DL (ref 0–31)

## 2025-01-30 LAB — RENIN PLAS-CCNC: 0.9 NG/ML/HR

## 2025-02-07 DIAGNOSIS — J43.9 MILD EMPHYSEMA (H): Primary | ICD-10-CM

## 2025-02-07 NOTE — Clinical Note
Future Appointments 2/27/2025  1:00 PM    Megan Rossi, PharmD    CSMTM                3/11/2025  1:30 PM    LAB FIRST FLOOR [facility]       MGLABR              [place] 3/11/2025  2:30 PM    Clifford Corbin MD          [facility] 3/26/2025  12:45 PM   [id number]                  SHCVCV              CVIMG 3/28/2025  8:45 AM    Adeel Knight MD     [facility] PSA CLIN 6/4/2025   2:00 PM    CS PULMONARY FUNCTION      CSPULM               6/4/2025   3:00 PM    Ame Leo PA* CSPULM              CS 10/23/2025 10:15 AM   Iva Mahan PA-C    ECDERM              EC 2/2/2026   10:30 AM   Eliza Rogers MD    CSFPIM              CS

## 2025-02-12 NOTE — TELEPHONE ENCOUNTER
Antoinette-  Here are your recent results.     Your labs look normal and do not show signs of deficiency.  I am placing an order for an MRI.  You will get a call to schedule this.    [facility]    Phone Message    May a detailed message be left on voicemail: yes     Reason for Call: Other:     Pt is asking for a callback to discuss discontinuing Lasix due to chronic kidney issues.        Action Taken: Other: cardio    Travel Screening: Not Applicable     Thank you!  [facility]

## 2025-02-19 DIAGNOSIS — N18.32 STAGE 3B CHRONIC KIDNEY DISEASE (H): Primary | ICD-10-CM

## 2025-02-27 ENCOUNTER — VIRTUAL VISIT (OUTPATIENT)
Dept: PHARMACY | Facility: CLINIC | Age: 85
End: 2025-02-27
Payer: COMMERCIAL

## 2025-02-27 DIAGNOSIS — G62.9 NEUROPATHY: Primary | ICD-10-CM

## 2025-02-27 DIAGNOSIS — E78.5 HYPERLIPIDEMIA WITH TARGET LDL LESS THAN 100: ICD-10-CM

## 2025-02-27 DIAGNOSIS — N39.43 BENIGN PROSTATIC HYPERPLASIA WITH POST-VOID DRIBBLING: ICD-10-CM

## 2025-02-27 DIAGNOSIS — Z95.2 S/P TAVR (TRANSCATHETER AORTIC VALVE REPLACEMENT): ICD-10-CM

## 2025-02-27 DIAGNOSIS — Z78.9 TAKES DIETARY SUPPLEMENTS: ICD-10-CM

## 2025-02-27 DIAGNOSIS — J30.2 SEASONAL ALLERGIC RHINITIS, UNSPECIFIED TRIGGER: ICD-10-CM

## 2025-02-27 DIAGNOSIS — J44.9 CHRONIC OBSTRUCTIVE PULMONARY DISEASE, UNSPECIFIED COPD TYPE (H): ICD-10-CM

## 2025-02-27 DIAGNOSIS — I48.91 ATRIAL FIBRILLATION, UNSPECIFIED TYPE (H): ICD-10-CM

## 2025-02-27 DIAGNOSIS — I10 HTN (HYPERTENSION), BENIGN: ICD-10-CM

## 2025-02-27 DIAGNOSIS — K59.00 CONSTIPATION, UNSPECIFIED CONSTIPATION TYPE: ICD-10-CM

## 2025-02-27 DIAGNOSIS — N40.1 BENIGN PROSTATIC HYPERPLASIA WITH POST-VOID DRIBBLING: ICD-10-CM

## 2025-02-27 PROCEDURE — 99607 MTMS BY PHARM ADDL 15 MIN: CPT | Mod: 93 | Performed by: PHARMACIST

## 2025-02-27 PROCEDURE — 99605 MTMS BY PHARM NP 15 MIN: CPT | Mod: 93 | Performed by: PHARMACIST

## 2025-02-27 NOTE — LETTER
"Recommended To-Do List      Prepared on: Feb 27, 2025       You can get the best results from your medications by completing the items on this \"To-Do List.\"      Bring your To-Do List when you go to your doctor. And, share it with your family or caregivers.    My To-Do List:  What we talked about: What I should do:   What my medicines are for, how to know if my medicines are working, made sure my medicines are safe for me and reviewed how to take my medicines.    Take my medicines every day               "

## 2025-02-27 NOTE — PROGRESS NOTES
Medication Therapy Management (MTM) Encounter    ASSESSMENT:                            Medication Adherence/Access: No issues identified.    Neuropathy   Stable.    BPH  Stable.    COPD   Stable.    Allergies    Stable.     Hypertension/Atrial Fibrillation/s/p TAVR  Stable, blood pressure has been at goal <130/80mmHg without losartan and hydrochlorothiazide.        Hyperlipidemia   Stable.       Constipation  Stable.     Supplements    Stable.    PLAN:                            Continue current medication regimen.     Follow-up: Return in about 1 year (around 2/27/2026) for Follow-up Medication Review.    SUBJECTIVE/OBJECTIVE:                          Casey Gomez is a 84 year old male seen for a follow-up visit.       Reason for visit: Routine follow-up.    Tobacco: He reports that he quit smoking about 17 years ago. His smoking use included cigarettes. He started smoking about 73 years ago. He has a 56 pack-year smoking history. He has never used smokeless tobacco.  Alcohol: none    Medication Adherence/Access: no issues reported.    Neuropathy  Alpha lipoic acid 600mg twice daily  Gabapentin 100mg every morning, 100mg mid-day and 300mg at bedtime  Has tolerated gabapentin ok, had edema with 1000mg dose in the past.    No issues reported.     BPH  Finasteride 5mg daily   Tamsulosin 0.4mg daily  He reports having a slow and intermittent urinary stream, doesn't feel like he's completely urinating his bladder but does feel medications have been moderately effective.    Feels symptoms are manageable at this time.  No other side effects reported.     COPD   Albuterol MDI as needed - has been needing to use more often due to recent URI, is feeling better now  Symbicort 160/4.5mcg 2 puffs twice daily as needed - using twice daily   Spiriva 18mcg daily  Patient is not experiencing side effects.   Patient reports the following symptoms: none.     Allergies    Zyrtec 10mg daily  Flonase nasal spray daily   He reports  allergies are stable with this regimen.    He denies side effects.     Hypertension/Atrial Fibrillation/s/p TAVR  Aspirin 81mg daily (added by Dr. Nelson)  Eliquis 2.5mg twice daily  Metoprolol tartrate 50mg twice daily  Sublingual nitroglycerin as needed - has never needed to use.  Azithromycin prior to dental appointments  He stopped taking hydrochlorothiazide and losartan due to dizziness.   He has been monitoring his blood pressure and reports it's been around 120/70mmHg.          Hyperlipidemia   Atorvastatin 40mg daily  Patient reports no significant myalgias or other side effects.      Constipation  Miralax 17g daily as needed  He reports this is is effective.  No side effects reported.     Supplements    Daily multivitamin  Vitamin D stopped due to elevated levels  No issues reported    Today's Vitals: There were no vitals taken for this visit.  ----------------    I spent 22 minutes with this patient today.     A summary of these recommendations was sent via [employer].    Megan Rossi, PharmD, BCACP  Medication Therapy Management Provider  [phone]     Telemedicine Visit Details  The patient's medications can be safely assessed via a telemedicine encounter.  Type of service:  Telephone visit  Originating Location (pt. Location): Home    Distant Location (provider location):  Off-site  Start Time: 1:03 PM  End Time: 1:25 PM     Medication Therapy Recommendations  No medication therapy recommendations to display

## 2025-02-27 NOTE — LETTER
_  Medication List        Prepared on: Feb 27, 2025     Bring your Medication List when you go to the doctor, hospital, or   emergency room. And, share it with your family or caregivers.     Note any changes to how you take your medications.  Cross out medications when you no longer use them.    Medication How I take it Why I use it Prescriber   albuterol (PROAIR HFA/PROVENTIL HFA/VENTOLIN HFA) 108 (90 Base) MCG/ACT inhaler INHALE 2 PUFFS INTO THE LUNGS EVERY 6 HOURS AS NEEDED FOR SHORTNESS OF BREATH OR DIFFICULT BREATHING OR WHEEZING Seasonal allergic rhinitis, unspecified trigger Marilu Lam MD   alpha-lipoic acid 600 MG capsule Take 600 mg by mouth 2 times daily. General Health  Self   apixaban ANTICOAGULANT (ELIQUIS) 2.5 MG tablet Take 2.5 mg by mouth 2 times daily Atrial Fibrillation Patient Reported   aspirin 81 MG EC tablet Take 81 mg by mouth daily. S/p TAVR Patient Reported   atorvastatin (LIPITOR) 40 MG tablet Take 1 tablet (40 mg) by mouth every evening. Hyperlipidemia with Target LDL Less than 100 Eliza Rogers MD   azithromycin (ZITHROMAX) 250 MG tablet Take two 250mg tablets (500mg dose) one hour prior to every dental procedure. S/P TAVR (transcatheter aortic valve replacement); Severe aortic stenosis; Nonrheumatic aortic valve stenosis Adeel Knight MD   budesonide-formoterol (SYMBICORT) 160-4.5 MCG/ACT Inhaler Inhale 2 puffs into the lungs 2 times daily as needed (cough,). Mild emphysema (H) Eliza Rogers MD   cetirizine (ZYRTEC) 10 MG tablet Take 10 mg by mouth daily Allergies Patient Reported   finasteride (PROSCAR) 5 MG tablet Take 1 tablet (5 mg) by mouth daily Benign non-nodular prostatic hyperplasia with lower urinary tract symptoms Josephine Jacob PA-C   fluticasone (FLONASE) 50 MCG/ACT nasal spray Spray 1-2 sprays into both nostrils daily Chronic Nasal Congestion Josephine Jacob PA-C   gabapentin (NEURONTIN) 100 MG capsule Take 1 capsules by mouth every morning, 1  capsule in the afternoon, 3 capsules at bedtime [increase dose] Neuropathy Ozzy Zhou MD   metoprolol tartrate (LOPRESSOR) 50 MG tablet TAKE 1 TABLET BY MOUTH TWICE DAILY HTN (Hypertension), Benign Eliza Rogers MD   Multiple Vitamin (MULTI-VITAMIN) per tablet Take 1 tablet by mouth daily. General Health  Self   nitroGLYcerin (NITROSTAT) 0.4 MG sublingual tablet For chest pain place 1 tablet under the tongue every 5 minutes for 3 doses. If symptoms persist 5 minutes after 1st dose call 911. S/P TAVR (transcatheter aortic valve replacement) Nighat House PA-C   polyethylene glycol (MIRALAX) 17 g packet Take 1 packet by mouth daily as needed Constipation Patient Reported   tamsulosin (FLOMAX) 0.4 MG capsule Take 0.4 mg by mouth daily BPH Patient Reported   tiotropium (SPIRIVA) 18 MCG inhaled capsule INHALE THE CONTENTS OF 1 CAPSULE(18 MCG) INTO THE LUNGS DAILY Chronic obstructive pulmonary disease, unspecified COPD type (H) Eliaz Rogers MD         Add new medications, over-the-counter drugs, herbals, vitamins, or  minerals in the blank rows below.    Medication How I take it Why I use it Prescriber                                      Allergies:      - Lisinopril - Shortness Of Breath, Fatigue  - Amoxicillin - Itching  - Duloxetine  - Levofloxacin  - Neurontin [gabapentin] - Swelling  - Norvasc [amlodipine] - Swelling  - Oxycodone - GI Disturbance  - Penicillins - Itching  - Red Yeast Rice Extract [monascus Purpureus Went Yeast] - Hives, Itching  - Tramadol - Itching  - Verapamil - Itching  - Vicodin [hydrocodone-acetaminophen] - Itching        Side effects I have had:      Not on File        Other Information:              My notes and questions:

## 2025-02-27 NOTE — LETTER
March 3, 2025  Luis Daniel Gomez  [address]  [place] [address]    Dear Mr. Gomez, [facility]     Thank you for talking with me on Feb 27, 2025 about your health and medications. As a follow-up to our conversation, I have included two documents:      Your Recommended To-Do List has steps you should take to get the best results from your medications.  Your Medication List will help you keep track of your medications and how to take them.    If you want to talk about these documents, please call Megan Rossi PharmD at phone: [phone], Monday-Friday 8-4:30pm.    I look forward to working with you and your doctors to make sure your medications work well for you.    Sincerely,  Megan Rossi PharmD  [facility] Pharmacist, [facility]

## 2025-03-03 NOTE — PATIENT INSTRUCTIONS
"Recommendations from today's MTM visit:                                                    MTM (medication therapy management) is a service provided by a clinical pharmacist designed to help you get the most of out of your medicines.   Today we reviewed what your medicines are for, how to know if they are working, that your medicines are safe and how to make your medicine regimen as easy as possible.      Continue current medication regimen.     Follow-up: Return in about 1 year (around 2/27/2026) for Follow-up Medication Review.    It was great speaking with you today.  I value your experience and would be very thankful for your time in providing feedback in our clinic survey. In the next few days, you may receive an email or text message from [facility] [employer] with a link to a survey related to your  clinical pharmacist.\"     To schedule another MTM appointment, please call the clinic directly or you may call the MTM scheduling line at [phone] or toll-free at [phone].     My Clinical Pharmacist's contact information:                                                      Please feel free to contact me with any questions or concerns you have.      Betzy Wong PharmD  Medication Therapy Management Pharmacy Resident  [facility]  [phone]    Megan Rossi PharmD, [facility]  Medication Therapy Management Provider  [facility]  [phone]     "

## 2025-03-20 DIAGNOSIS — J43.9 MILD EMPHYSEMA (H): ICD-10-CM

## 2025-03-20 RX ORDER — BUDESONIDE AND FORMOTEROL FUMARATE DIHYDRATE 160; 4.5 UG/1; UG/1
AEROSOL RESPIRATORY (INHALATION)
Qty: 10.2 G | Refills: 1 | Status: SHIPPED | OUTPATIENT
Start: 2025-03-20

## 2025-03-22 ENCOUNTER — HEALTH MAINTENANCE LETTER (OUTPATIENT)
Age: 85
End: 2025-03-22

## 2025-03-26 ENCOUNTER — HOSPITAL ENCOUNTER (OUTPATIENT)
Dept: CARDIOLOGY | Facility: CLINIC | Age: 85
Discharge: HOME | End: 2025-03-26
Attending: INTERNAL MEDICINE
Payer: COMMERCIAL

## 2025-03-26 DIAGNOSIS — E78.5 HYPERLIPIDEMIA WITH TARGET LDL LESS THAN 100: ICD-10-CM

## 2025-03-26 DIAGNOSIS — I10 HTN (HYPERTENSION), BENIGN: ICD-10-CM

## 2025-03-26 DIAGNOSIS — I35.0 MILD AORTIC STENOSIS: ICD-10-CM

## 2025-03-26 DIAGNOSIS — I65.23 BILATERAL CAROTID ARTERY STENOSIS: ICD-10-CM

## 2025-03-26 LAB — LVEF ECHO: NORMAL

## 2025-03-26 PROCEDURE — 255N000002 HC RX 255 OP 636: Performed by: INTERNAL MEDICINE

## 2025-03-26 PROCEDURE — 93306 TTE W/DOPPLER COMPLETE: CPT | Mod: 26 | Performed by: INTERNAL MEDICINE

## 2025-03-26 PROCEDURE — 999N000208 ECHOCARDIOGRAM COMPLETE

## 2025-03-26 RX ADMIN — HUMAN ALBUMIN MICROSPHERES AND PERFLUTREN 9 ML: 10; .22 INJECTION, SOLUTION INTRAVENOUS at 13:32

## 2025-05-03 DIAGNOSIS — E78.5 HYPERLIPIDEMIA WITH TARGET LDL LESS THAN 100: ICD-10-CM

## 2025-05-05 RX ORDER — ATORVASTATIN CALCIUM 40 MG/1
40 TABLET, FILM COATED ORAL EVERY EVENING
Qty: 90 TABLET | Refills: 1 | Status: SHIPPED | OUTPATIENT
Start: 2025-05-05

## 2025-05-19 DIAGNOSIS — J44.9 CHRONIC OBSTRUCTIVE PULMONARY DISEASE, UNSPECIFIED COPD TYPE (H): ICD-10-CM

## 2025-05-19 RX ORDER — TIOTROPIUM BROMIDE 18 UG/1
CAPSULE ORAL; RESPIRATORY (INHALATION)
Qty: 30 CAPSULE | Refills: 1 | Status: SHIPPED | OUTPATIENT
Start: 2025-05-19

## 2025-05-28 ENCOUNTER — ANCILLARY PROCEDURE (OUTPATIENT)
Dept: GENERAL RADIOLOGY | Facility: CLINIC | Age: 85
End: 2025-05-28
Attending: PHYSICIAN ASSISTANT
Payer: COMMERCIAL

## 2025-05-28 DIAGNOSIS — J43.9 MILD EMPHYSEMA (H): ICD-10-CM

## 2025-05-28 PROCEDURE — 71046 X-RAY EXAM CHEST 2 VIEWS: CPT | Mod: TC | Performed by: RADIOLOGY

## 2025-06-04 ENCOUNTER — OFFICE VISIT (OUTPATIENT)
Dept: PULMONOLOGY | Facility: CLINIC | Age: 85
End: 2025-06-04
Attending: PHYSICIAN ASSISTANT
Payer: COMMERCIAL

## 2025-06-04 VITALS
OXYGEN SATURATION: 94 % | BODY MASS INDEX: 29.33 KG/M2 | DIASTOLIC BLOOD PRESSURE: 77 MMHG | HEART RATE: 75 BPM | HEIGHT: 70 IN | RESPIRATION RATE: 16 BRPM | WEIGHT: 204.9 LBS | SYSTOLIC BLOOD PRESSURE: 131 MMHG

## 2025-06-04 DIAGNOSIS — Z87.891 FORMER SMOKER: ICD-10-CM

## 2025-06-04 DIAGNOSIS — J43.9 MILD EMPHYSEMA (H): ICD-10-CM

## 2025-06-04 DIAGNOSIS — J30.2 SEASONAL ALLERGIC RHINITIS, UNSPECIFIED TRIGGER: ICD-10-CM

## 2025-06-04 PROCEDURE — 1126F AMNT PAIN NOTED NONE PRSNT: CPT | Performed by: PHYSICIAN ASSISTANT

## 2025-06-04 PROCEDURE — 99204 OFFICE O/P NEW MOD 45 MIN: CPT | Mod: 25 | Performed by: PHYSICIAN ASSISTANT

## 2025-06-04 PROCEDURE — 3078F DIAST BP <80 MM HG: CPT | Performed by: PHYSICIAN ASSISTANT

## 2025-06-04 PROCEDURE — 3075F SYST BP GE 130 - 139MM HG: CPT | Performed by: PHYSICIAN ASSISTANT

## 2025-06-04 RX ORDER — BIOTIN 1 MG
1000 TABLET ORAL DAILY
COMMUNITY

## 2025-06-04 RX ORDER — ALBUTEROL SULFATE 90 UG/1
INHALANT RESPIRATORY (INHALATION)
Qty: 8.5 G | Refills: 2 | Status: SHIPPED | OUTPATIENT
Start: 2025-06-04

## 2025-06-04 ASSESSMENT — PAIN SCALES - GENERAL: PAINLEVEL_OUTOF10: NO PAIN (0)

## 2025-06-04 NOTE — PROGRESS NOTES
Luis Daniel Gomez comes into clinic today at the request of Ame Leo,  Ordering Provider for PFT      This service provided today was under the supervising provider of the day Ame Leo, who was available if needed.    Wes Angulo, RT

## 2025-06-04 NOTE — LETTER
6/4/2025      Luis Daniel Gomez  [address]  [place] [address]      Dear Colleague,    Thank you for referring your patient, Luis Daniel Gomez, to the [facility] [place]. Please see a copy of my visit note below.    [facility]- Pulmonary Clinic  New Pulmonary Consult    Name: Luis Daniel Gomez MRN: [id number]     Age: 84 year old   YOB: 1940       Reason for Visit:   Chief Complaint   Patient presents with     New Patient     COPD             Assessment and Plan:       # Group A COPD. PFTs with mild obstruction, he is asymptomatic. Ok to just use albuterol PRN but if having persistent dyspnea recommend resuming Spiriva. Does not need ICS-LABA at this time.    # Healthcare maintenance  Immunizations: UTD flu, COVID booster, pneumonia and RSV     Return to clinic PRN      35 minutes spent reviewing chart, reviewing test results, talking with and examining patient, formulating plan, and documentation on the day of the encounter.     Ame Leo PA-C  [facility], General Pulmonology            HPI:   Luis Daniel Gomez is a 84 year old male with history of  atrial fibrillation,severe aortic stenosis s/p TAVR 9/2022, HTN, HLD, chronic diastolic heart failure, CKD III, DM2 who presents for evaluation of copd.     He was told he had COPD. He denies any respiratory issues. Not short of breath. No significant cough. Does have some allergies currently and congestion. Uses flonase as needed, which works well. No wheeze, chest tightness, or chest pain. No orthopnea or PND. Slight BLE edema.     Using Spiriva, Symbicort 160, prn albuterol    10 point ROS performed and negative except for as noted in HPI.    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 Roth     Constipation      Dyslipidemia      Dyspnea     Normal nuc stress test 9-15-08, fainted once in life     Fatty liver     Ultrasound 11/11     FH: colon cancer     colonoscopy 10/10, repeat 5 years.     Former smoker     quit 2008     HTN (hypertension), benign      Peripheral neuropathy     Dr. Patricia     Spinal stenosis     Last MRI 2010, Dr. Fraire     Status post coronary angiogram 08/22/2022     Syncope      Type 2 diabetes mellitus (H)     diet controlled     Ureterolithiases              Past Surgical History:      Past Surgical History:   Procedure Laterality Date     COLONOSCOPY       COLONOSCOPY N/A 3/30/2023    Procedure: COLONOSCOPY, WITH POLYPECTOMY AND BIOPSY;  Surgeon: Adi Geiger MD;  Location:  GI     CV CORONARY ANGIOGRAM Bilateral 8/22/2022    Procedure: Coronary Angiogram;  Surgeon: Dave Mao MD;  Location: [facility] CARDIAC CATH LAB     CV TRANSCATHETER AORTIC VALVE REPLACEMENT-FEMORAL APPROACH N/A 9/20/2022    Procedure: Transcatheter Aortic Valve Replacement-Femoral Approach;  Surgeon: Wilson Martinez MD;  Location: [facility] CARDIAC CATH LAB     KERATOTOMY ARCUATE WITH FEMTOSECOND LASER/IMAGING FOR ATIOL Left 8/29/2016    Procedure: KERATOTOMY ARCUATE WITH FEMTOSECOND LASER/IMAGING FOR ATIOL;  Surgeon: Gerard Larson MD;  Location: [facility]     ORTHOPEDIC SURGERY       PHACOEMULSIFICATION CLEAR CORNEA WITH STANDARD INTRAOCULAR LENS IMPLANT Left 8/29/2016    Procedure: PHACOEMULSIFICATION CLEAR CORNEA WITH STANDARD INTRAOCULAR LENS IMPLANT;  Surgeon: Gerard Larson MD;  Location: [facility]     Thumb surg               Social History:     Social History     Socioeconomic History     Marital status:       Spouse name: Not on file     Number of children: Not on file     Years of education: Not on file     Highest education level: Not on file   Occupational History     Not on file   Tobacco Use     Smoking status: Former     Current packs/day: 0.00     Average packs/day: 1 pack/day for 56.0 years (56.0 ttl pk-yrs)     Types: Cigarettes     Start date: 1952     Quit date: 2008     Years since quittin.4     Smokeless tobacco: Never     Tobacco comments:     quit    Vaping Use     Vaping status: Never Used   Substance and Sexual Activity     Alcohol use: No     Alcohol/week: 0.0 standard drinks of alcohol     Drug use: No     Sexual activity: Yes     Partners: Female   Other Topics Concern      Service Not Asked     Blood Transfusions Not Asked     Caffeine Concern Yes     Comment: one cup caffeine per day     Occupational Exposure Not Asked     Hobby Hazards Not Asked     Sleep Concern No     Stress Concern Not Asked     Weight Concern Not Asked     Special Diet No     Back Care Not Asked     Exercise Yes     Comment: walks daily 30 minutes     Bike Helmet Not Asked     Seat Belt Not Asked     Self-Exams Not Asked     Parent/sibling w/ CABG, MI or angioplasty before 65F 55M? No   Social History Narrative    , 2 kids, retired. Wife (Khalida).     Social Drivers of Health     Financial Resource Strain: Low Risk  (2025)    Financial Resource Strain      Within the past 12 months, have you or your family members you live with been unable to get utilities (heat, electricity) when it was really needed?: No   Food Insecurity: Low Risk  (2025)    Food Insecurity      Within the past 12 months, did you worry that your food would run out before you got money to buy more?: No      Within the past 12 months, did the food you bought just not last and you didn t have money to get more?: No   Transportation Needs: Low Risk  (2025)    Transportation Needs      Within the past  "12 months, has lack of transportation kept you from medical appointments, getting your medicines, non-medical meetings or appointments, work, or from getting things that you need?: No   Physical Activity: Sufficiently Active (1/24/2025)    Exercise Vital Sign      Days of Exercise per Week: 7 days      Minutes of Exercise per Session: 30 min   Stress: No Stress Concern Present (1/24/2025)    [employer] [place] [employer] - Occupational Stress Questionnaire      Feeling of Stress : Not at all   Social Connections: Unknown (1/24/2025)    Social Connection and Isolation Panel [NHANES]      Frequency of Communication with Friends and Family: Not on file      Frequency of Social Gatherings with Friends and Family: Once a week      Attends Jewish Services: Not on file      Active Member of Clubs or Organizations: Not on file      Attends Club or Organization Meetings: Not on file      Marital Status: Not on file   Interpersonal Safety: Low Risk  (1/27/2025)    Interpersonal Safety      Do you feel physically and emotionally safe where you currently live?: Yes      Within the past 12 months, have you been hit, slapped, kicked or otherwise physically hurt by someone?: No      Within the past 12 months, have you been humiliated or emotionally abused in other ways by your partner or ex-partner?: No   Housing Stability: Low Risk  (1/24/2025)    Housing Stability      Do you have housing? : Yes      Are you worried about losing your housing?: No            Family History:     Family History   Problem Relation Age of Onset     C.A.D. Father      Diabetes Father      Cancer - colorectal Mother 80     Cancer - colorectal Maternal Grandmother              Allergies:     Allergies   Allergen Reactions     Lisinopril Shortness Of Breath and Fatigue     Amoxicillin Itching     Duloxetine      \"I can't take it\"     Levofloxacin      \"I can't take the 500 mgs\"     Neurontin [Gabapentin] Swelling     edema     Norvasc " [Amlodipine] Swelling     edema     Oxycodone GI Disturbance     Penicillins Itching     Red Yeast Rice Extract [Monascus Purpureus Went Yeast] Hives and Itching     Tramadol Itching     Verapamil Itching     Vicodin [Hydrocodone-Acetaminophen] Itching             Medications:     Current Outpatient Medications   Medication Sig Dispense Refill     albuterol (PROAIR HFA/PROVENTIL HFA/VENTOLIN HFA) 108 (90 Base) MCG/ACT inhaler INHALE 2 PUFFS INTO THE LUNGS EVERY 6 HOURS AS NEEDED FOR SHORTNESS OF BREATH OR DIFFICULT BREATHING OR WHEEZING 8.5 g 3     alpha-lipoic acid 600 MG capsule Take 600 mg by mouth 2 times daily.       apixaban ANTICOAGULANT (ELIQUIS) 2.5 MG tablet Take 2.5 mg by mouth 2 times daily       aspirin 81 MG EC tablet Take 81 mg by mouth daily.       atorvastatin (LIPITOR) 40 MG tablet Take 1 tablet (40 mg) by mouth every evening. 90 tablet 1     azithromycin (ZITHROMAX) 250 MG tablet Take two 250mg tablets (500mg dose) one hour prior to every dental procedure. 6 tablet 1     biotin 1000 MCG TABS tablet Take 1,000 mcg by mouth daily.       budesonide-formoterol (SYMBICORT/BREYNA) 160-4.5 MCG/ACT Inhaler INHALE 2 PUFFS INTO THE LUNGS TWICE DAILY AS NEEDED FOR COUGH 10.2 g 1     cetirizine (ZYRTEC) 10 MG tablet Take 10 mg by mouth daily       finasteride (PROSCAR) 5 MG tablet Take 1 tablet (5 mg) by mouth daily 90 tablet 3     fluticasone (FLONASE) 50 MCG/ACT nasal spray Spray 1-2 sprays into both nostrils daily 48 mL 3     gabapentin (NEURONTIN) 100 MG capsule Take 1 capsules by mouth every morning, 1 capsule in the afternoon, 3 capsules at bedtime [increase dose] 450 capsule 3     metoprolol tartrate (LOPRESSOR) 50 MG tablet TAKE 1 TABLET BY MOUTH TWICE DAILY 270 tablet 2     Multiple Vitamin (MULTI-VITAMIN) per tablet Take 1 tablet by mouth daily.       polyethylene glycol (MIRALAX) 17 g packet Take 1 packet by mouth daily as needed       tamsulosin (FLOMAX) 0.4 MG capsule Take 0.4 mg by mouth daily    "    tiotropium (SPIRIVA) 18 MCG inhaled capsule INHALE THE CONTENTS OF 1 CAPSULE(18 MCG) INTO THE LUNGS DAILY 30 capsule 1     nitroGLYcerin (NITROSTAT) 0.4 MG sublingual tablet For chest pain place 1 tablet under the tongue every 5 minutes for 3 doses. If symptoms persist 5 minutes after 1st dose call 911. 10 tablet 0     No current facility-administered medications for this visit.              Exam:   /77   Pulse 75   Resp 16   Ht 1.778 m (5' 10\")   Wt 92.9 kg (204 lb 14.4 oz)   SpO2 94%   BMI 29.40 kg/m      Gen: well-appearing, NAD  CV: RRR, no murmurs  Resp: Normal respiratory effort on room air. Clear to auscultation bilaterally without wheezes, rales or ronchi.   Skin: no obvious rashes on gross evaluation  Extremities: No edema  Neuro: alert and appropriate, no focal abnormalities         Data:     I have personally reviewed all pertinent labs, imaging studies and PFT results unless otherwise noted.    Labs:    Imaging:  CXR 5/2025- No acute cardiopulmonary disease identified.   CTA 8/2022- Mild emphysema. No acute extra-cardiovascular findings.     PFT:  Mild obstruction without significant bronchodilator response, hyperinflation. Mildly reduced diffusion capacity, not corrected for hgb (15.1 g/dL 8/2024)       Again, thank you for allowing me to participate in the care of your patient.        Sincerely,        Ame Leo PA-C    Electronically signed"

## 2025-06-04 NOTE — PROGRESS NOTES
[facility]- Pulmonary Clinic  New Pulmonary Consult    Name: Luis Daniel Gomez MRN: [id number]     Age: 84 year old   YOB: 1940       Reason for Visit:   Chief Complaint   Patient presents with    New Patient     COPD             Assessment and Plan:       # Group A COPD. PFTs with mild obstruction, he is asymptomatic without history of exacerbations. Ok to just use albuterol PRN but if having persistent dyspnea recommend resuming Spiriva. Does not need ICS-LABA at this time.    # Healthcare maintenance  Immunizations: UTD flu, COVID booster, pneumonia and RSV     Return to clinic PRN      35 minutes spent reviewing chart, reviewing test results, talking with and examining patient, formulating plan, and documentation on the day of the encounter.     Ame Leo PA-C  [facility], General Pulmonology            HPI:   Luis Daniel Gomez is a 84 year old male with history of  atrial fibrillation,severe aortic stenosis s/p TAVR 9/2022, HTN, HLD, chronic diastolic heart failure, CKD III, DM2 who presents for evaluation of copd.     He was told he had COPD. He denies any respiratory issues. Not short of breath. No significant cough. Does have some allergies currently and congestion. Uses flonase as needed, which works well. No wheeze, chest tightness, or chest pain. No orthopnea or PND. Slight BLE edema.     Using Spiriva, Symbicort 160, prn albuterol    10 point ROS performed and negative except for as noted in HPI.    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 Roth    Constipation     Dyslipidemia     Dyspnea     Normal nuc stress test 9-15-08, fainted once in life    Fatty liver     Ultrasound 11/11    FH: colon cancer     colonoscopy 10/10, repeat 5 years.    Former smoker     quit 2008    HTN (hypertension), benign     Peripheral neuropathy     Dr. Patricia    Spinal stenosis     Last MRI 2010, Dr. Fraire    Status post coronary angiogram 08/22/2022    Syncope     Type 2 diabetes mellitus (H)     diet controlled    Ureterolithiases              Past Surgical History:      Past Surgical History:   Procedure Laterality Date    COLONOSCOPY      COLONOSCOPY N/A 3/30/2023    Procedure: COLONOSCOPY, WITH POLYPECTOMY AND BIOPSY;  Surgeon: Adi Geiger MD;  Location:  GI    CV CORONARY ANGIOGRAM Bilateral 8/22/2022    Procedure: Coronary Angiogram;  Surgeon: Dave Mao MD;  Location: [facility] CARDIAC CATH LAB    CV TRANSCATHETER AORTIC VALVE REPLACEMENT-FEMORAL APPROACH N/A 9/20/2022    Procedure: Transcatheter Aortic Valve Replacement-Femoral Approach;  Surgeon: Wilson Martinez MD;  Location: [facility] CARDIAC CATH LAB    KERATOTOMY ARCUATE WITH FEMTOSECOND LASER/IMAGING FOR ATIOL Left 8/29/2016    Procedure: KERATOTOMY ARCUATE WITH FEMTOSECOND LASER/IMAGING FOR ATIOL;  Surgeon: Gerard Larson MD;  Location: [facility]    ORTHOPEDIC SURGERY      PHACOEMULSIFICATION CLEAR CORNEA WITH STANDARD INTRAOCULAR LENS IMPLANT Left 8/29/2016    Procedure: PHACOEMULSIFICATION CLEAR CORNEA WITH STANDARD INTRAOCULAR LENS IMPLANT;  Surgeon: Gerard Larson MD;  Location: [facility]    Thumb surg               Social History:     Social History     Socioeconomic History    Marital status:      Spouse name: Not on file    Number of children: Not on file    Years of education: Not on file    Highest education level: Not on file   Occupational History    Not on file   Tobacco Use    Smoking status: Former     Current  packs/day: 0.00     Average packs/day: 1 pack/day for 56.0 years (56.0 ttl pk-yrs)     Types: Cigarettes     Start date: 1952     Quit date: 2008     Years since quittin.4    Smokeless tobacco: Never    Tobacco comments:     quit    Vaping Use    Vaping status: Never Used   Substance and Sexual Activity    Alcohol use: No     Alcohol/week: 0.0 standard drinks of alcohol    Drug use: No    Sexual activity: Yes     Partners: Female   Other Topics Concern     Service Not Asked    Blood Transfusions Not Asked    Caffeine Concern Yes     Comment: one cup caffeine per day    Occupational Exposure Not Asked    Hobby Hazards Not Asked    Sleep Concern No    Stress Concern Not Asked    Weight Concern Not Asked    Special Diet No    Back Care Not Asked    Exercise Yes     Comment: walks daily 30 minutes    Bike Helmet Not Asked    Seat Belt Not Asked    Self-Exams Not Asked    Parent/sibling w/ CABG, MI or angioplasty before 65F 55M? No   Social History Narrative    , 2 kids, retired. Wife (Khalida).     Social Drivers of Health     Financial Resource Strain: Low Risk  (2025)    Financial Resource Strain     Within the past 12 months, have you or your family members you live with been unable to get utilities (heat, electricity) when it was really needed?: No   Food Insecurity: Low Risk  (2025)    Food Insecurity     Within the past 12 months, did you worry that your food would run out before you got money to buy more?: No     Within the past 12 months, did the food you bought just not last and you didn t have money to get more?: No   Transportation Needs: Low Risk  (2025)    Transportation Needs     Within the past 12 months, has lack of transportation kept you from medical appointments, getting your medicines, non-medical meetings or appointments, work, or from getting things that you need?: No   Physical Activity: Sufficiently Active (2025)    Exercise Vital Sign      "Days of Exercise per Week: 7 days     Minutes of Exercise per Session: 30 min   Stress: No Stress Concern Present (1/24/2025)    [employer] [employer] - Occupational Stress Questionnaire     Feeling of Stress : Not at all   Social Connections: Unknown (1/24/2025)    Social Connection and Isolation Panel [NHANES]     Frequency of Communication with Friends and Family: Not on file     Frequency of Social Gatherings with Friends and Family: Once a week     Attends Adventism Services: Not on file     Active Member of Clubs or Organizations: Not on file     Attends Club or Organization Meetings: Not on file     Marital Status: Not on file   Interpersonal Safety: Low Risk  (1/27/2025)    Interpersonal Safety     Do you feel physically and emotionally safe where you currently live?: Yes     Within the past 12 months, have you been hit, slapped, kicked or otherwise physically hurt by someone?: No     Within the past 12 months, have you been humiliated or emotionally abused in other ways by your partner or ex-partner?: No   Housing Stability: Low Risk  (1/24/2025)    Housing Stability     Do you have housing? : Yes     Are you worried about losing your housing?: No            Family History:     Family History   Problem Relation Age of Onset    C.A.D. Father     Diabetes Father     Cancer - colorectal Mother 80    Cancer - colorectal Maternal Grandmother              Allergies:     Allergies   Allergen Reactions    Lisinopril Shortness Of Breath and Fatigue    Amoxicillin Itching    Duloxetine      \"I can't take it\"    Levofloxacin      \"I can't take the 500 mgs\"    Neurontin [Gabapentin] Swelling     edema    Norvasc [Amlodipine] Swelling     edema    Oxycodone GI Disturbance    Penicillins Itching    Red Yeast Rice Extract [[place] Purpureus Went Yeast] Hives and Itching    Tramadol Itching    Verapamil Itching    Vicodin [Hydrocodone-Acetaminophen] Itching             Medications:     Current " Outpatient Medications   Medication Sig Dispense Refill    albuterol (PROAIR HFA/PROVENTIL HFA/VENTOLIN HFA) 108 (90 Base) MCG/ACT inhaler INHALE 2 PUFFS INTO THE LUNGS EVERY 6 HOURS AS NEEDED FOR SHORTNESS OF BREATH OR DIFFICULT BREATHING OR WHEEZING 8.5 g 3    alpha-lipoic acid 600 MG capsule Take 600 mg by mouth 2 times daily.      apixaban ANTICOAGULANT (ELIQUIS) 2.5 MG tablet Take 2.5 mg by mouth 2 times daily      aspirin 81 MG EC tablet Take 81 mg by mouth daily.      atorvastatin (LIPITOR) 40 MG tablet Take 1 tablet (40 mg) by mouth every evening. 90 tablet 1    azithromycin (ZITHROMAX) 250 MG tablet Take two 250mg tablets (500mg dose) one hour prior to every dental procedure. 6 tablet 1    biotin 1000 MCG TABS tablet Take 1,000 mcg by mouth daily.      budesonide-formoterol (SYMBICORT/BREYNA) 160-4.5 MCG/ACT Inhaler INHALE 2 PUFFS INTO THE LUNGS TWICE DAILY AS NEEDED FOR COUGH 10.2 g 1    cetirizine (ZYRTEC) 10 MG tablet Take 10 mg by mouth daily      finasteride (PROSCAR) 5 MG tablet Take 1 tablet (5 mg) by mouth daily 90 tablet 3    fluticasone (FLONASE) 50 MCG/ACT nasal spray Spray 1-2 sprays into both nostrils daily 48 mL 3    gabapentin (NEURONTIN) 100 MG capsule Take 1 capsules by mouth every morning, 1 capsule in the afternoon, 3 capsules at bedtime [increase dose] 450 capsule 3    metoprolol tartrate (LOPRESSOR) 50 MG tablet TAKE 1 TABLET BY MOUTH TWICE DAILY 270 tablet 2    Multiple Vitamin (MULTI-VITAMIN) per tablet Take 1 tablet by mouth daily.      polyethylene glycol (MIRALAX) 17 g packet Take 1 packet by mouth daily as needed      tamsulosin (FLOMAX) 0.4 MG capsule Take 0.4 mg by mouth daily      tiotropium (SPIRIVA) 18 MCG inhaled capsule INHALE THE CONTENTS OF 1 CAPSULE(18 MCG) INTO THE LUNGS DAILY 30 capsule 1    nitroGLYcerin (NITROSTAT) 0.4 MG sublingual tablet For chest pain place 1 tablet under the tongue every 5 minutes for 3 doses. If symptoms persist 5 minutes after 1st dose call  "911. 10 tablet 0     No current facility-administered medications for this visit.              Exam:   /77   Pulse 75   Resp 16   Ht 1.778 m (5' 10\")   Wt 92.9 kg (204 lb 14.4 oz)   SpO2 94%   BMI 29.40 kg/m      Gen: well-appearing, NAD  CV: RRR, no murmurs  Resp: Normal respiratory effort on room air. Clear to auscultation bilaterally without wheezes, rales or ronchi.   Skin: no obvious rashes on gross evaluation  Extremities: No edema  Neuro: alert and appropriate, no focal abnormalities         Data:     I have personally reviewed all pertinent labs, imaging studies and PFT results unless otherwise noted.    Labs:    Imaging:  CXR 5/2025- No acute cardiopulmonary disease identified.   CTA 8/2022- Mild emphysema. No acute extra-cardiovascular findings.     PFT:  Mild obstruction without significant bronchodilator response, hyperinflation. Mildly reduced diffusion capacity, not corrected for hgb (15.1 g/dL 8/2024)     "

## 2025-06-04 NOTE — PATIENT INSTRUCTIONS
Albuterol 1-2 puffs every 4-6 hours as needed for cough, shortness of breath  If symptoms worsen or you have shortness of breath more consistently, then resume Spiriva  Stop Symbicort    Followup as needed

## 2025-06-05 LAB
DLCOUNC-%PRED-PRE: 63 %
DLCOUNC-PRE: 14.83 ML/MIN/MMHG
DLCOUNC-PRED: 23.51 ML/MIN/MMHG
ERV-%PRED-PRE: 120 %
ERV-PRE: 1.49 L
ERV-PRED: 1.24 L
EXPTIME-PRE: 15.84 SEC
FEF2575-%PRED-POST: 42 %
FEF2575-%PRED-PRE: 32 %
FEF2575-POST: 0.74 L/SEC
FEF2575-PRE: 0.57 L/SEC
FEF2575-PRED: 1.72 L/SEC
FEFMAX-%PRED-PRE: 82 %
FEFMAX-PRE: 5.25 L/SEC
FEFMAX-PRED: 6.37 L/SEC
FEV1-%PRED-PRE: 80 %
FEV1-PRE: 2.02 L
FEV1FEV6-PRE: 53 %
FEV1FEV6-PRED: 75 %
FEV1FVC-PRE: 44 %
FEV1FVC-PRED: 75 %
FEV1SVC-PRE: 42 %
FEV1SVC-PRED: 67 %
FIFMAX-PRE: 4.17 L/SEC
FRCPLETH-%PRED-PRE: 129 %
FRCPLETH-PRE: 5.04 L
FRCPLETH-PRED: 3.88 L
FVC-%PRED-PRE: 136 %
FVC-PRE: 4.63 L
FVC-PRED: 3.4 L
IC-%PRED-PRE: 132 %
IC-PRE: 3.27 L
IC-PRED: 2.47 L
RVPLETH-%PRED-PRE: 118 %
RVPLETH-PRE: 3.55 L
RVPLETH-PRED: 3 L
TLCPLETH-%PRED-PRE: 116 %
TLCPLETH-PRE: 8.31 L
TLCPLETH-PRED: 7.13 L
VA-%PRED-PRE: 93 %
VA-PRE: 5.8 L
VC-%PRED-PRE: 127 %
VC-PRE: 4.76 L
VC-PRED: 3.74 L

## 2025-07-16 ENCOUNTER — NURSE TRIAGE (OUTPATIENT)
Dept: FAMILY MEDICINE | Facility: CLINIC | Age: 85
End: 2025-07-16
Payer: COMMERCIAL

## 2025-07-16 NOTE — TELEPHONE ENCOUNTER
Triage spoke to pt and relayed PCP note below -         Pt confirmed that he has none of s/s mentioned above, and was able to schedule the following:    Jul 17, 2025 1:30 PM  (Arrive by 1:10 PM)  Provider Visit with Kelle Gillespie MD  [facility] ([facility]) [phone]     Yesika León RN

## 2025-07-16 NOTE — TELEPHONE ENCOUNTER
Nurse Triage SBAR    Is this a 2nd Level Triage? Yes.     Situation: Patient called with concerns of irregular HR and increasing HR.     Background: Started a couple weeks ago. Hx of aortic valve replacement a few years ago. Hx of palpitations when he was younger.     Assessment: Patient reported that his Baseline for HR is around 68 bpm and and typically when he wakes up in the morning it is always around there but as the day progresses he HR increases and he will randomly feel missed beats and HR will get up to the 140's even at rest.     Patient stated that he is taking all meds as ordered except for the Gabapentin, he wants to try to get off that medication all together and has been using it as needed when he has neuropathy pain and hasn't used it in two days.     Patient stated that he occasionally has some dizziness.     Denies falls, no LOC, no chest pains, no SOB.     Patient reported that BP during time of call was 133/82 with pulse of 138.     Patient also reported that he had something going on like a sinus infection or URI that lasted a month or so but that is gone now.     Patient last saw Dr. Rojas back on 3/28/25 and did complete an Echo but everything was good back then.     Protocol Recommended Disposition:   See in Office Today    Recommendation: Patient was advised of the disposition but felt that he wanted to run this by his PCP to see if there are any ideas what is going on and causing this irregular HB. Patient was given home care advice.      Routed to provider    Does the patient meet one of the following criteria for ADS visit consideration? 16+ years old, with an [facility] PCP     Braeden Moses RN  [facility]       TIP  Providers, please consider if this condition is appropriate for management at one of our Acute and Diagnostic Services sites.     If patient is a good candidate, please use dotphrase <dot>triageresponse and select Refer to ADS to document.      Reason for  Disposition   Age > 60 years  (Exception: Brief heartbeat symptoms that went away and now feels well.)    Additional Information   Negative: Passed out (e.g., fainted, lost consciousness, blacked out and was not responding)   Negative: Shock suspected (e.g., cold/pale/clammy skin, too weak to stand, low BP, rapid pulse)   Negative: Difficult to awaken or acting confused (e.g., disoriented, slurred speech)   Negative: Visible sweat on face or sweat dripping down face   Negative: Unable to walk, or can only walk with assistance (e.g., requires support)   Negative: Received SHOCK from implantable cardiac defibrillator and has persisting symptoms (i.e., palpitations, lightheadedness)   Negative: Feeling weak or lightheaded (e.g., woozy, feeling like they might faint) AND heart beating very rapidly (e.g., > 140 / minute)   Negative: Feeling weak or lightheaded (e.g., woozy, feeling like they might faint) AND heart beating very slowly (e.g., < 50 / minute)   Negative: Sounds like a life-threatening emergency to the triager   Negative: Chest pain   Negative: Difficulty breathing   Negative: Feeling weak or lightheaded (e.g., woozy, feeling like they might faint)   Negative: Heart beating very rapidly (e.g., > 140 / minute) and present now  (Exception: During exercise.)   Negative: Heart beating very slowly (e.g., < 50 / minute)  (Exception: Athlete and heart rate normal for caller.)   Negative: New or worsened shortness of breath with activity (dyspnea on exertion)   Negative: Patient sounds very sick or weak to the triager   Negative: Wearing a cardiac monitor (e.g., cardiac event, Holter)   Negative: Received SHOCK from implantable cardiac defibrillator (and now feels well)   Negative: Heart beating very rapidly (e.g., > 140 / minute) and not present now  (Exception: During exercise.)   Negative: Skipped or extra beat(s) and increases with exercise or exertion   Negative: Skipped or extra beat(s) and occurs 4 or more  "times per minute   Negative: History of heart disease (i.e., heart attack, bypass surgery, angina, angioplasty)  (Exception: Brief heartbeat symptoms that went away and now feels well.)    Answer Assessment - Initial Assessment Questions  1. DESCRIPTION: \"Please describe your heart rate or heartbeat that you are having\" (e.g., fast/slow, regular/irregular, skipped or extra beats, \"palpitations\")      HR is low and high usually starts in the 60's, feels irregular like three beats then a break. Skipping beats.   2. ONSET: \"When did it start?\" (e.g., minutes, hours, days)       3 weeks ago   3. DURATION: \"How long does it last\" (e.g., seconds, minutes, hours)      Starts low in the mornings and gradually moves up and happens all day.   4. PATTERN \"Does it come and go, or has it been constant since it started?\"  \"Does it get worse with exertion?\"   \"Are you feeling it now?\"      Comes and goes   5. TAP: \"Using your hand, can you tap out what you are feeling on a chair or table in front of you, so that I can hear?\" Note: Not all patients can do this.        Feels like it does three beats and then skips.   6. HEART RATE: \"Can you tell me your heart rate?\" \"How many beats in 15 seconds?\"  Note: Not all patients can do this.        Ranges from   7. RECURRENT SYMPTOM: \"Have you ever had this before?\" If Yes, ask: \"When was the last time?\" and \"What happened that time?\"       Been on and off for weeks.   8. CAUSE: \"What do you think is causing the palpitations?\"      Not sure   9. CARDIAC HISTORY: \"Do you have any history of heart disease?\" (e.g., heart attack, angina, bypass surgery, angioplasty, arrhythmia)       Had a valve replaced a few years ago.   10. OTHER SYMPTOMS: \"Do you have any other symptoms?\" (e.g., dizziness, chest pain, sweating, difficulty breathing)        Occasional dizziness, no chest pain.   11. PREGNANCY: \"Is there any chance you are pregnant?\" \"When was your last menstrual period?\"        " N/a    Protocols used: Heart Rate and Heartbeat [id number]

## 2025-07-17 ENCOUNTER — OFFICE VISIT (OUTPATIENT)
Dept: INTERNAL MEDICINE | Facility: CLINIC | Age: 85
End: 2025-07-17
Payer: COMMERCIAL

## 2025-07-17 VITALS
RESPIRATION RATE: 15 BRPM | HEIGHT: 70 IN | HEART RATE: 124 BPM | TEMPERATURE: 97.1 F | OXYGEN SATURATION: 94 % | WEIGHT: 206.2 LBS | DIASTOLIC BLOOD PRESSURE: 83 MMHG | BODY MASS INDEX: 29.52 KG/M2 | SYSTOLIC BLOOD PRESSURE: 138 MMHG

## 2025-07-17 DIAGNOSIS — I47.20 PAROXYSMAL VENTRICULAR TACHYCARDIA (H): ICD-10-CM

## 2025-07-17 DIAGNOSIS — I10 HTN (HYPERTENSION), BENIGN: ICD-10-CM

## 2025-07-17 DIAGNOSIS — I48.91 ATRIAL FIBRILLATION WITH RAPID VENTRICULAR RESPONSE (H): ICD-10-CM

## 2025-07-17 DIAGNOSIS — N18.32 STAGE 3B CHRONIC KIDNEY DISEASE (H): Primary | ICD-10-CM

## 2025-07-17 LAB
ALBUMIN MFR UR ELPH: 14.9 MG/DL
ALBUMIN SERPL BCG-MCNC: 4 G/DL (ref 3.5–5.2)
ALBUMIN UR-MCNC: NEGATIVE MG/DL
ANION GAP SERPL CALCULATED.3IONS-SCNC: 10 MMOL/L (ref 7–15)
APPEARANCE UR: CLEAR
BILIRUB UR QL STRIP: NEGATIVE
BUN SERPL-MCNC: 21.5 MG/DL (ref 8–23)
CALCIUM SERPL-MCNC: 9.6 MG/DL (ref 8.8–10.4)
CHLORIDE SERPL-SCNC: 102 MMOL/L (ref 98–107)
COLOR UR AUTO: YELLOW
CREAT SERPL-MCNC: 1.59 MG/DL (ref 0.67–1.17)
CREAT UR-MCNC: 85.1 MG/DL
CREAT UR-MCNC: 85.1 MG/DL
EGFRCR SERPLBLD CKD-EPI 2021: 42 ML/MIN/1.73M2
ERYTHROCYTE [DISTWIDTH] IN BLOOD BY AUTOMATED COUNT: 12.6 % (ref 10–15)
GLUCOSE SERPL-MCNC: 124 MG/DL (ref 70–99)
GLUCOSE UR STRIP-MCNC: NEGATIVE MG/DL
HCO3 SERPL-SCNC: 27 MMOL/L (ref 22–29)
HCT VFR BLD AUTO: 39.2 % (ref 40–53)
HGB BLD-MCNC: 13.4 G/DL (ref 13.3–17.7)
HGB UR QL STRIP: ABNORMAL
KETONES UR STRIP-MCNC: NEGATIVE MG/DL
LEUKOCYTE ESTERASE UR QL STRIP: NEGATIVE
MAGNESIUM SERPL-MCNC: 2 MG/DL (ref 1.7–2.3)
MCH RBC QN AUTO: 30.7 PG (ref 26.5–33)
MCHC RBC AUTO-ENTMCNC: 34.2 G/DL (ref 31.5–36.5)
MCV RBC AUTO: 90 FL (ref 78–100)
MICROALBUMIN UR-MCNC: 48.9 MG/L
MICROALBUMIN/CREAT UR: 57.46 MG/G CR (ref 0–17)
NITRATE UR QL: NEGATIVE
PH UR STRIP: 6 [PH] (ref 5–7)
PHOSPHATE SERPL-MCNC: 3 MG/DL (ref 2.5–4.5)
PLATELET # BLD AUTO: 272 10E3/UL (ref 150–450)
POTASSIUM SERPL-SCNC: 4.5 MMOL/L (ref 3.4–5.3)
PROT/CREAT 24H UR: 0.18 MG/MG CR (ref 0–0.2)
PTH-INTACT SERPL-MCNC: 40 PG/ML (ref 15–65)
RBC # BLD AUTO: 4.36 10E6/UL (ref 4.4–5.9)
RBC #/AREA URNS AUTO: NORMAL /HPF
SODIUM SERPL-SCNC: 139 MMOL/L (ref 135–145)
SP GR UR STRIP: 1.01 (ref 1–1.03)
TSH SERPL DL<=0.005 MIU/L-ACNC: 2.55 UIU/ML (ref 0.3–4.2)
UROBILINOGEN UR STRIP-ACNC: 0.2 E.U./DL
WBC # BLD AUTO: 9.5 10E3/UL (ref 4–11)
WBC #/AREA URNS AUTO: NORMAL /HPF

## 2025-07-17 PROCEDURE — 82570 ASSAY OF URINE CREATININE: CPT | Performed by: INTERNAL MEDICINE

## 2025-07-17 PROCEDURE — 93000 ELECTROCARDIOGRAM COMPLETE: CPT | Performed by: INTERNAL MEDICINE

## 2025-07-17 PROCEDURE — 3075F SYST BP GE 130 - 139MM HG: CPT | Performed by: INTERNAL MEDICINE

## 2025-07-17 PROCEDURE — 3079F DIAST BP 80-89 MM HG: CPT | Performed by: INTERNAL MEDICINE

## 2025-07-17 PROCEDURE — 84156 ASSAY OF PROTEIN URINE: CPT | Performed by: INTERNAL MEDICINE

## 2025-07-17 PROCEDURE — 83970 ASSAY OF PARATHORMONE: CPT | Performed by: INTERNAL MEDICINE

## 2025-07-17 PROCEDURE — 85027 COMPLETE CBC AUTOMATED: CPT | Performed by: INTERNAL MEDICINE

## 2025-07-17 PROCEDURE — 99214 OFFICE O/P EST MOD 30 MIN: CPT | Performed by: INTERNAL MEDICINE

## 2025-07-17 PROCEDURE — 84443 ASSAY THYROID STIM HORMONE: CPT | Performed by: INTERNAL MEDICINE

## 2025-07-17 PROCEDURE — 81001 URINALYSIS AUTO W/SCOPE: CPT | Performed by: INTERNAL MEDICINE

## 2025-07-17 PROCEDURE — 83735 ASSAY OF MAGNESIUM: CPT | Performed by: INTERNAL MEDICINE

## 2025-07-17 PROCEDURE — 36415 COLL VENOUS BLD VENIPUNCTURE: CPT | Performed by: INTERNAL MEDICINE

## 2025-07-17 PROCEDURE — 80069 RENAL FUNCTION PANEL: CPT | Performed by: INTERNAL MEDICINE

## 2025-07-17 PROCEDURE — 82043 UR ALBUMIN QUANTITATIVE: CPT | Performed by: INTERNAL MEDICINE

## 2025-07-17 RX ORDER — METOPROLOL TARTRATE 75 MG/1
50 TABLET ORAL 2 TIMES DAILY
Qty: 60 TABLET | Refills: 3 | Status: SHIPPED | OUTPATIENT
Start: 2025-07-17

## 2025-07-17 NOTE — Clinical Note
I'm seeing Mr Gomez today in clinic, he is having A-fib with RVR, 's now. I'm increasing metoprolol and suggested that he may want to follow up in cardiology to discuss rhythm control as he hasn't been in afib much since his TAVR.

## 2025-07-17 NOTE — PROGRESS NOTES
"  {PROVIDER CHARTING PREFERENCE:709264}    Keanu Peña is a 84 year old, presenting for the following health issues:  Tachycardia  Pt states the 145 HR was at rest, pt reports having elevated HR after the initial one 3 weeks ago but they have not been as has as the 145      He has a history of Afib but has not had symptoms from this regularly since his TAVR. In the past few weeks he has had palpitations and notes that his HR tends ot increase throughout the day.      History of Present Illness       Reason for visit:  145 pulse  Symptom onset:  3-4 weeks ago  Symptoms include:  145 pulse  Symptom intensity:  Severe  Symptom progression:  Staying the same  Had these symptoms before:  No  What makes it worse:  No  What makes it better:  No   He is taking medications regularly.        {MA/LPN/RN Pre-Provider Visit Orders- hCG/UA/Strep (Optional):254059}  {SUPERLIST (Optional):929402}  {additonal problems for provider to add (Optional):273018}    {ROS Picklists (Optional):011474}      Objective    /83   Pulse (!) 124   Temp 97.1  F (36.2  C) (Temporal)   Resp 15   Ht 1.778 m (5' 10\")   Wt 93.5 kg (206 lb 3.2 oz)   SpO2 94%   BMI 29.59 kg/m    Body mass index is 29.59 kg/m .  Physical Exam   {Exam List (Optional):945799}    {Diagnostic Test Results (Optional):011068}        Signed Electronically by: Kelle Gillespie MD  {Email feedback regarding this note to [email or web address]   :193529}  " "Staying the same  Had these symptoms before:  No  What makes it worse:  No  What makes it better:  No   He is taking medications regularly.                      Objective    /83   Pulse (!) 124   Temp 97.1  F (36.2  C) (Temporal)   Resp 15   Ht 1.778 m (5' 10\")   Wt 93.5 kg (206 lb 3.2 oz)   SpO2 94%   BMI 29.59 kg/m    Body mass index is 29.59 kg/m .  Physical Exam     Well appearing  Tachycardic, irregular rhythm  CTAB            Signed Electronically by: Kelle Gillespie MD    "

## 2025-07-23 ENCOUNTER — OFFICE VISIT (OUTPATIENT)
Dept: PEDIATRICS | Facility: CLINIC | Age: 85
End: 2025-07-23
Payer: COMMERCIAL

## 2025-07-23 ENCOUNTER — NURSE TRIAGE (OUTPATIENT)
Dept: FAMILY MEDICINE | Facility: CLINIC | Age: 85
End: 2025-07-23

## 2025-07-23 ENCOUNTER — ANCILLARY PROCEDURE (OUTPATIENT)
Dept: GENERAL RADIOLOGY | Facility: CLINIC | Age: 85
End: 2025-07-23
Attending: INTERNAL MEDICINE
Payer: COMMERCIAL

## 2025-07-23 VITALS
HEART RATE: 97 BPM | WEIGHT: 208 LBS | DIASTOLIC BLOOD PRESSURE: 89 MMHG | BODY MASS INDEX: 29.84 KG/M2 | SYSTOLIC BLOOD PRESSURE: 165 MMHG | RESPIRATION RATE: 20 BRPM | TEMPERATURE: 97.3 F | OXYGEN SATURATION: 95 %

## 2025-07-23 DIAGNOSIS — R60.0 PEDAL EDEMA: ICD-10-CM

## 2025-07-23 DIAGNOSIS — R00.0 TACHYCARDIA: ICD-10-CM

## 2025-07-23 DIAGNOSIS — I10 HTN (HYPERTENSION), BENIGN: ICD-10-CM

## 2025-07-23 DIAGNOSIS — Z79.899 POLYPHARMACY: ICD-10-CM

## 2025-07-23 DIAGNOSIS — R60.0 PEDAL EDEMA: Primary | ICD-10-CM

## 2025-07-23 PROCEDURE — 99215 OFFICE O/P EST HI 40 MIN: CPT | Performed by: INTERNAL MEDICINE

## 2025-07-23 PROCEDURE — 93005 ELECTROCARDIOGRAM TRACING: CPT | Performed by: INTERNAL MEDICINE

## 2025-07-23 PROCEDURE — 3079F DIAST BP 80-89 MM HG: CPT | Performed by: INTERNAL MEDICINE

## 2025-07-23 PROCEDURE — 3077F SYST BP >= 140 MM HG: CPT | Performed by: INTERNAL MEDICINE

## 2025-07-23 PROCEDURE — 71046 X-RAY EXAM CHEST 2 VIEWS: CPT | Mod: TC | Performed by: RADIOLOGY

## 2025-07-23 RX ORDER — METOPROLOL TARTRATE 100 MG/1
100 TABLET ORAL 2 TIMES DAILY
Qty: 180 TABLET | Refills: 1 | Status: SHIPPED | OUTPATIENT
Start: 2025-07-23

## 2025-07-23 RX ORDER — FUROSEMIDE 20 MG/1
20 TABLET ORAL DAILY
Qty: 5 TABLET | Refills: 0 | Status: SHIPPED | OUTPATIENT
Start: 2025-07-23 | End: 2025-07-28

## 2025-07-23 NOTE — PATIENT INSTRUCTIONS
STOP:  GABAPENTIN.  [facility] started you on LYRICA. This should not be combined with Gabapentin.    SWELLING  Could be related to the Gabapentin.    START Furosemide 20mg once a day for 5 days.    HEART RATE/BLOOD PRESSURE:  Increase the Metoprolol to 100mg twice a day.  I sent in an Rx for 100mg pills.  Take ONE of these TWICE a day.

## 2025-07-23 NOTE — TELEPHONE ENCOUNTER
"Nurse Triage SBAR    Is this a 2nd Level Triage? NO    Situation: Patient called reporting tachycardia, moderate bilateral ankle swelling and shortness of breath.    Background: Patient denies chest pain or breathing problems.  He has history of A-fib, CKD and HTN.   Tachycardia improves after taking metoprolol  \"but spikes up\" during the day.  Patient has been coughing for a couple months with clear sputum.  Pt states his ankles are so swollen \" I can't even see my ankle bone\".     Assessment: Patient needs to be evaluated.    Protocol Recommended Disposition:   Go to ED Now (Or PCP Triage)    Recommendation: huddle with ADS-UC/ ED if advised by provider.     Paged to provider    Does the patient meet one of the following criteria for ADS visit consideration? 16+ years old, with an [facility] PCP     TIP  Providers, please consider if this condition is appropriate for management at one of our Acute and Diagnostic Services sites.     If patient is a good candidate, please use dotphrase <dot>triageresponse and select Refer to ADS to document.      Reason for Disposition   Patient sounds very sick or weak to the triager    Additional Information   Negative: Passed out (i.e., lost consciousness, collapsed and was not responding)   Negative: Shock suspected (e.g., cold/pale/clammy skin, too weak to stand, low BP, rapid pulse)   Negative: Difficult to awaken or acting confused (e.g., disoriented, slurred speech)   Negative: Visible sweat on face or sweat dripping down face   Negative: Unable to walk, or can only walk with assistance (e.g., requires support)   Negative: [1] Received SHOCK from implantable cardiac defibrillator AND [2] persisting symptoms (i.e., palpitations, lightheadedness)   Negative: [1] Dizziness, lightheadedness, or weakness AND [2] heart beating very rapidly (e.g., > 140 / minute)   Negative: [1] Dizziness, lightheadedness, or weakness AND [2] heart beating very slowly (e.g., < 50 / minute)   Negative: " Sounds like a life-threatening emergency to the triager   Negative: Chest pain   Negative: Implantable Cardiac Defibrillator (ICD) or a pacemaker symptoms or questions   Negative: Difficulty breathing   Negative: Dizziness, lightheadedness, or weakness    Protocols used: Heart Rate and Heartbeat Qrokdazyp-T-XR

## 2025-07-23 NOTE — TELEPHONE ENCOUNTER
Routing message to RN's - please assess symptoms (high pulse readings).    Maira NARANJO MA on 7/23/2025 at 12:56 PM

## 2025-07-23 NOTE — PROGRESS NOTES
"[facility] Visit    Assessment & Plan     Pedal edema  Mostly the ankles.  No orthopnea or PND.  Patient did have an echo in the spring.  Normal EF.  I am not suspicious for congestive heart failure.    I suspect the recent increased dose of gabapentin is causing the pedal edema.  The patient over the last couple weeks also started Lyrica as [facility].  Discussed with the patient that he should not be taking both.    He will discontinue gabapentin    Patient would like to trial a diuretic as well.  I suggested low-dose of furosemide for 5 days.  Risks and side effects were discussed.  - EKG 12-lead, tracing only  - XR Chest 2 Views  - furosemide (LASIX) 20 MG tablet  Dispense: 5 tablet; Refill: 0    Tachycardia  EKG shows sinus rhythm today.  Increase metoprolol further to 100 mg twice a day.  We did obtain a CXR as he c/o mild cough.  No consolidation/ vascular congestion by my read.      - EKG 12-lead, tracing only  - XR Chest 2 Views  - metoprolol tartrate (LOPRESSOR) 100 MG tablet  Dispense: 180 tablet; Refill: 1    Polypharmacy  As above.  Discontinue gabapentin.    HTN (hypertension), benign  As above  - metoprolol tartrate (LOPRESSOR) 100 MG tablet  Dispense: 180 tablet; Refill: 1    42 minutes were spent doing chart review, history and exam, documentation and further activities per the note.     BMI  Estimated body mass index is 29.84 kg/m  as calculated from the following:    Height as of 7/17/25: 1.778 m (5' 10\").    Weight as of this encounter: 94.3 kg (208 lb).           Keanu Peña is a 84 year old, presenting for the following health issues:  Tachycardia    HPI Casey E Jason, 84 years    Increased Pulse Rate  Reported increased heart rate starting approximately two weeks ago. Noted pulse rising during the day, reaching up to 140 bpm, despite increased metoprolol dose to 1.5 tablets (75mg) twice daily as advised by previous physician. Morning pulse " measured at 72 bpm, rising a few hours after morning dose. No associated chest pain or discomfort. No shortness of breath or difficulty breathing reported. No recent weight gain.  Does have a cough which he thinks might be related to allergies.    Pedal Edema  Noticed increased swelling in the ankles over the past couple of weeks, unable to see the bone. Swelling localized to ankles, no generalized swelling or significant weight gain. History of similar or worse swelling in the past when taking higher doses of gabapentin (up to 1000 mg), which resolved after stopping gabapentin. Currently taking gabapentin, with recent increase in dose to two tablets at night. Previous use of hydrochlorothiazide for swelling resulted in hair loss and itching, leading to discontinuation. No current itching.  He was also [facility] and for the last 2 weeks has been taking both Lyrica AND Gabapetin.      Atrial Fibrillation  Known history of atrial fibrillation, on blood thinner. No recent changes in anticoagulation therapy.    Lower Back Pain  Chronic lower back pain managed with gabapentin and pain medication. Attempts to minimize use of pain pills. Increased gabapentin dose recently to manage pain.    Palpitations/Irregular rhythm  Onset:  pulse increase a few weeks ago  Duration: climbing all day  Description: no feeling   Chest pain or pressure: no  Progression of symptoms: same  Accompanying signs and symptoms:       Shortness of breath: No       Sweating: No       Nausea/vomiting: No       Lightheadedness: YES       Palpitations: No       Fever/chills: No       Cough: No       Heartburn: No  History   Family history of heart disease: YES  Tobacco use: No           Caffeine use: YES- one cup a day           Alcohol use: No           Drug/other chemical use: No  Previous similar symptoms: YES  Precipitating factors:   YES- metoprolol   Worse with exertion: No  Worse with deep breaths: No           Related  to eating: No           Better with burping: No  Alleviating factors: no  Therapies tried and outcome: mtropolol            Objective    BP (!) 165/89 (BP Location: Left arm, Patient Position: Chair, Cuff Size: Adult Regular)   Pulse 97   Temp 97.3  F (36.3  C) (Oral)   Resp 20   Wt 94.3 kg (208 lb)   SpO2 95%   BMI 29.84 kg/m    Body mass index is 29.84 kg/m .  Wt Readings from Last 3 Encounters:   07/23/25 94.3 kg (208 lb)   07/17/25 93.5 kg (206 lb 3.2 oz)   06/04/25 92.9 kg (204 lb 14.4 oz)       Physical Exam   GEN NAD  ENT MMM no oral lesions  CV RRR  CHEST scattered exp fine wheeze.  EXT bipedal edema limited to ankles only.              Signed Electronically by: Drew Vega MD

## 2025-07-23 NOTE — TELEPHONE ENCOUNTER
Reason for Call:  Appointment Request    Patient requesting this type of appt: Chronic Diease Management/Medication/Follow-Up    Requested provider: Eliza Rogers    Reason patient unable to be scheduled: Not within requested timeframe    When does patient want to be seen/preferred time: 3-7 days    Comments: Having high pulse readings up to 140 and does get down low enough even when taking my metoprolol, it would go to 85-90. Fluid in ankles and lungs (no chest pain) was told to follow up with PCP on these concerns from other provider they saw. HTN follow up as well.    Could we send this information to you in [employer] or would you prefer to receive a phone call?:   Patient would prefer a phone call   Okay to leave a detailed message?: Yes at Cell number on file:    Telephone Information:   Mobile [phone]       Call taken on 7/23/2025 at 12:07 PM by VINAY STUBBS

## 2025-07-28 DIAGNOSIS — R60.0 PEDAL EDEMA: ICD-10-CM

## 2025-07-29 RX ORDER — FUROSEMIDE 20 MG/1
TABLET ORAL
Qty: 5 TABLET | Refills: 0 | Status: SHIPPED | OUTPATIENT
Start: 2025-07-29

## 2025-09-02 DIAGNOSIS — R60.0 PEDAL EDEMA: ICD-10-CM

## 2025-09-03 RX ORDER — FUROSEMIDE 20 MG/1
TABLET ORAL
Qty: 5 TABLET | Refills: 0 | Status: SHIPPED | OUTPATIENT
Start: 2025-09-03

## 2025-09-04 ENCOUNTER — HOSPITAL ENCOUNTER (OUTPATIENT)
Dept: ULTRASOUND IMAGING | Facility: CLINIC | Age: 85
Discharge: HOME | End: 2025-09-04
Attending: INTERNAL MEDICINE
Payer: COMMERCIAL

## 2025-09-04 ENCOUNTER — LAB (OUTPATIENT)
Dept: LAB | Facility: CLINIC | Age: 85
End: 2025-09-04
Attending: INTERNAL MEDICINE
Payer: COMMERCIAL

## 2025-09-04 DIAGNOSIS — E11.9 TYPE 2 DIABETES, HBA1C GOAL < 7% (H): ICD-10-CM

## 2025-09-04 DIAGNOSIS — E78.5 HYPERLIPIDEMIA LDL GOAL <70: ICD-10-CM

## 2025-09-04 DIAGNOSIS — I65.21 STENOSIS OF RIGHT CAROTID ARTERY: ICD-10-CM

## 2025-09-04 LAB
ALBUMIN SERPL BCG-MCNC: 4.1 G/DL (ref 3.5–5.2)
ALP SERPL-CCNC: 81 U/L (ref 40–150)
ALT SERPL W P-5'-P-CCNC: 17 U/L (ref 0–70)
ANION GAP SERPL CALCULATED.3IONS-SCNC: 8 MMOL/L (ref 7–15)
APO A-I SERPL-MCNC: 7 MG/DL
AST SERPL W P-5'-P-CCNC: 27 U/L (ref 0–45)
BILIRUB SERPL-MCNC: 0.4 MG/DL
BUN SERPL-MCNC: 17.9 MG/DL (ref 8–23)
CALCIUM SERPL-MCNC: 9.1 MG/DL (ref 8.8–10.4)
CHLORIDE SERPL-SCNC: 102 MMOL/L (ref 98–107)
CREAT SERPL-MCNC: 1.61 MG/DL (ref 0.67–1.17)
CRP SERPL HS-MCNC: 5.11 MG/L
EGFRCR SERPLBLD CKD-EPI 2021: 41 ML/MIN/1.73M2
EST. AVERAGE GLUCOSE BLD GHB EST-MCNC: 114 MG/DL
GLUCOSE SERPL-MCNC: 110 MG/DL (ref 70–99)
HBA1C MFR BLD: 5.6 % (ref 0–5.6)
HCO3 SERPL-SCNC: 29 MMOL/L (ref 22–29)
POTASSIUM SERPL-SCNC: 4.5 MMOL/L (ref 3.4–5.3)
PROT SERPL-MCNC: 6.8 G/DL (ref 6.4–8.3)
SODIUM SERPL-SCNC: 139 MMOL/L (ref 135–145)

## 2025-09-04 PROCEDURE — 93880 EXTRACRANIAL BILAT STUDY: CPT

## (undated) DEVICE — KIT HAND CONTROL ANGIOTOUCH ACIST 65CM AT-P65

## (undated) DEVICE — SYSTEM DELIVERY MECHANISM COMMANDER 29MM

## (undated) DEVICE — CABLE PCNG 12FT REMINGTON PACI

## (undated) DEVICE — WIRE GUIDE 0.035"X150CM EMERALD STR 502542

## (undated) DEVICE — DEFIB PRO-PADZ LVP LQD GEL ADULT 8900-2105-01

## (undated) DEVICE — GUIDEWIRE HI-TORQUE VERSACORE MOD J 1

## (undated) DEVICE — LINE MONITOR NASAL SMART CAPNOLINE ADULT LONG 12463

## (undated) DEVICE — Device

## (undated) DEVICE — SLEEVE TR BAND RADIAL COMPRESSION DEVICE 24CM TRB24-REG

## (undated) DEVICE — CATH ANGIO INFINITI PIGTAIL 145 6 SH 6FRX110CM  534-652S

## (undated) DEVICE — RAD CLOSURE ANGIOSEAL 8FR  610131

## (undated) DEVICE — INTRO GLIDESHEATH SLENDER 6FR 10X45CM 60-1060

## (undated) DEVICE — CATH ANGIO INFINITI JR4 4FRX100CM 538421

## (undated) DEVICE — GUIDEWIRE VASC SAFARI2 0.035X275CM H74939406XS1

## (undated) DEVICE — CATH ANGIO JUDKINS JL4 6FRX100CM INFINITI 534620T

## (undated) DEVICE — MANIFOLD KIT ANGIO AUTOMATED 014613

## (undated) DEVICE — INTRO TERUMO 6FRX25CM W/MARKER RSB603

## (undated) DEVICE — WIRE GUIDE 0.035"X260CM SAFE-T-J EXCHANGE G00517

## (undated) DEVICE — INTRO TERUMO 7FRX25CM W/MARKER RSB703

## (undated) DEVICE — TOTE ANGIO CORP PC15AT SAN32CC83O

## (undated) DEVICE — CATH ANGIO SUPERTORQUE AL1 6FRX100CM 532-645

## (undated) DEVICE — INTRODUCER CATH VASC 5FRX10CM  MPIS-501-NT-U-SST

## (undated) DEVICE — SYR LOCKING ATRION QL38

## (undated) DEVICE — CLOSURE DEVICE 6FR VASC PROGLIDE MEDICATED SUTURE 12673-03

## (undated) DEVICE — CATH EP PACEL 5FRX110CM 1MM RIGHT HEART CURVE 401763

## (undated) DEVICE — GUIDEWIRE VASC 40CM .018IN NT PLAT TI

## (undated) DEVICE — CATH ANGIO JUDKINS R4 6FRX100CM INFINITI 534621T

## (undated) DEVICE — CLOSURE ANGIOSEAL 6FR 610130: Type: IMPLANTABLE DEVICE | Status: NON-FUNCTIONAL

## (undated) DEVICE — WIRE GUIDE LUNDERQUIST 0.035"X260CM DBL CVD

## (undated) DEVICE — GUIDEWIRE VASC 0.035INX150CM INQWIRE J TIP IQ35F150J3F/A

## (undated) RX ORDER — LIDOCAINE HYDROCHLORIDE 10 MG/ML
INJECTION, SOLUTION EPIDURAL; INFILTRATION; INTRACAUDAL; PERINEURAL
Status: DISPENSED
Start: 2022-08-22

## (undated) RX ORDER — FENTANYL CITRATE 50 UG/ML
INJECTION, SOLUTION INTRAMUSCULAR; INTRAVENOUS
Status: DISPENSED
Start: 2022-08-22

## (undated) RX ORDER — HEPARIN SODIUM 200 [USP'U]/100ML
INJECTION, SOLUTION INTRAVENOUS
Status: DISPENSED
Start: 2022-08-22

## (undated) RX ORDER — NICARDIPINE HYDROCHLORIDE 2.5 MG/ML
INJECTION INTRAVENOUS
Status: DISPENSED
Start: 2022-08-22

## (undated) RX ORDER — NITROGLYCERIN 5 MG/ML
VIAL (ML) INTRAVENOUS
Status: DISPENSED
Start: 2022-08-22

## (undated) RX ORDER — CLINDAMYCIN PHOSPHATE 900 MG/50ML
INJECTION, SOLUTION INTRAVENOUS
Status: DISPENSED
Start: 2022-09-20

## (undated) RX ORDER — ASPIRIN 325 MG
TABLET ORAL
Status: DISPENSED
Start: 2022-09-20

## (undated) RX ORDER — FENTANYL CITRATE 50 UG/ML
INJECTION, SOLUTION INTRAMUSCULAR; INTRAVENOUS
Status: DISPENSED
Start: 2023-03-30

## (undated) RX ORDER — ASPIRIN 81 MG/1
TABLET, CHEWABLE ORAL
Status: DISPENSED
Start: 2022-08-22

## (undated) RX ORDER — HEPARIN SODIUM 1000 [USP'U]/ML
INJECTION, SOLUTION INTRAVENOUS; SUBCUTANEOUS
Status: DISPENSED
Start: 2022-08-22

## (undated) RX ORDER — ASPIRIN 81 MG/1
TABLET ORAL
Status: DISPENSED
Start: 2022-08-22